# Patient Record
Sex: MALE | Race: WHITE | Employment: OTHER | URBAN - METROPOLITAN AREA
[De-identification: names, ages, dates, MRNs, and addresses within clinical notes are randomized per-mention and may not be internally consistent; named-entity substitution may affect disease eponyms.]

---

## 2017-01-25 ENCOUNTER — TRANSFERRED RECORDS (OUTPATIENT)
Dept: HEALTH INFORMATION MANAGEMENT | Facility: CLINIC | Age: 67
End: 2017-01-25

## 2017-02-14 LAB
CREATININE (EXTERNAL): 4.36 MG/DL (ref 0.7–1.3)
GFR ESTIMATED (EXTERNAL): 13 ML/MIN/1.73M2 (ref 60–150)
GFR ESTIMATED (IF AFRICAN AMERICAN) (EXTERNAL): 15 ML/MIN/1.73M2 (ref 60–150)
GLUCOSE (EXTERNAL): 130 MG/DL (ref 65–99)
POTASSIUM (EXTERNAL): 4.3 MMOL/L (ref 3.5–5.1)

## 2017-02-22 LAB
CREATININE (EXTERNAL): 3.67 MG/DL (ref 0.7–1.3)
GFR ESTIMATED (EXTERNAL): 16 ML/MIN/1.73M2 (ref 60–150)
GFR ESTIMATED (IF AFRICAN AMERICAN) (EXTERNAL): 19 ML/MIN/1.73M2 (ref 60–150)
GLUCOSE (EXTERNAL): 135 MG/DL (ref 65–99)
POTASSIUM (EXTERNAL): 4.9 MMOL/L (ref 3.5–5.1)

## 2017-03-15 ENCOUNTER — TRANSFERRED RECORDS (OUTPATIENT)
Dept: HEALTH INFORMATION MANAGEMENT | Facility: CLINIC | Age: 67
End: 2017-03-15

## 2017-03-24 ENCOUNTER — TRANSFERRED RECORDS (OUTPATIENT)
Dept: HEALTH INFORMATION MANAGEMENT | Facility: CLINIC | Age: 67
End: 2017-03-24

## 2017-10-11 LAB
CREATININE (EXTERNAL): 4.92 MG/DL (ref 0.7–1.3)
GFR ESTIMATED (EXTERNAL): 12 ML/MIN
GLUCOSE (EXTERNAL): 125 MG/DL (ref 70–99)
POTASSIUM (EXTERNAL): 4.8 MMOL/L (ref 3.5–5.1)

## 2017-12-04 ENCOUNTER — TRANSFERRED RECORDS (OUTPATIENT)
Dept: HEALTH INFORMATION MANAGEMENT | Facility: CLINIC | Age: 67
End: 2017-12-04
Payer: MEDICARE

## 2017-12-04 LAB
CREATININE (EXTERNAL): 6.43 MG/DL (ref 0.7–1.3)
GFR ESTIMATED (EXTERNAL): 8 ML/MIN
GFR ESTIMATED (IF AFRICAN AMERICAN) (EXTERNAL): 1.16 ML/MIN
GLUCOSE (EXTERNAL): 132 MG/DL (ref 70–99)
POTASSIUM (EXTERNAL): 5 MMOL/L (ref 3.5–5)

## 2017-12-08 ENCOUNTER — TRANSFERRED RECORDS (OUTPATIENT)
Dept: HEALTH INFORMATION MANAGEMENT | Facility: CLINIC | Age: 67
End: 2017-12-08
Payer: MEDICARE

## 2017-12-29 LAB — INR (EXTERNAL): 1.2

## 2018-01-18 ENCOUNTER — TRANSFERRED RECORDS (OUTPATIENT)
Dept: HEALTH INFORMATION MANAGEMENT | Facility: CLINIC | Age: 68
End: 2018-01-18
Payer: MEDICARE

## 2018-01-18 LAB
ALT SERPL-CCNC: 22 U/L (ref 7–52)
AST SERPL-CCNC: 25 U/L (ref 13–39)
CHOLESTEROL (EXTERNAL): 187 MG/DL (ref 0–200)
CREATININE (EXTERNAL): 5.38 MG/DL (ref 0.7–1.3)
GFR ESTIMATED (EXTERNAL): 10 ML/MIN/1.73M2 (ref 60–150)
GFR ESTIMATED (IF AFRICAN AMERICAN) (EXTERNAL): 12 ML/MIN/1.73M2 (ref 60–150)
GLUCOSE (EXTERNAL): 152 MG/DL (ref 65–99)
HBA1C MFR BLD: 6.8 % (ref 4.3–5.6)
HDLC SERPL-MCNC: 44 MG/DL (ref 23–92)
POTASSIUM (EXTERNAL): 4.9 MMOL/L (ref 3.5–5.1)
TRIGLYCERIDES (EXTERNAL): 410 MG/DL (ref 0–150)

## 2018-06-07 ENCOUNTER — TRANSFERRED RECORDS (OUTPATIENT)
Dept: HEALTH INFORMATION MANAGEMENT | Facility: CLINIC | Age: 68
End: 2018-06-07
Payer: MEDICARE

## 2018-07-19 ENCOUNTER — TRANSFERRED RECORDS (OUTPATIENT)
Dept: HEALTH INFORMATION MANAGEMENT | Facility: CLINIC | Age: 68
End: 2018-07-19
Payer: MEDICARE

## 2018-07-19 LAB — HBA1C MFR BLD: 6 % (ref 4.3–5.6)

## 2018-09-10 ENCOUNTER — TRANSFERRED RECORDS (OUTPATIENT)
Dept: HEALTH INFORMATION MANAGEMENT | Facility: CLINIC | Age: 68
End: 2018-09-10
Payer: MEDICARE

## 2018-10-04 ENCOUNTER — TRANSFERRED RECORDS (OUTPATIENT)
Dept: HEALTH INFORMATION MANAGEMENT | Facility: CLINIC | Age: 68
End: 2018-10-04

## 2018-10-10 ENCOUNTER — MEDICAL CORRESPONDENCE (OUTPATIENT)
Dept: HEALTH INFORMATION MANAGEMENT | Facility: CLINIC | Age: 68
End: 2018-10-10
Payer: MEDICARE

## 2019-01-01 ENCOUNTER — TRANSFERRED RECORDS (OUTPATIENT)
Dept: MULTI SPECIALTY CLINIC | Facility: CLINIC | Age: 69
End: 2019-01-01

## 2019-01-15 ENCOUNTER — TRANSFERRED RECORDS (OUTPATIENT)
Dept: HEALTH INFORMATION MANAGEMENT | Facility: CLINIC | Age: 69
End: 2019-01-15
Payer: MEDICARE

## 2019-01-15 LAB
ALT SERPL-CCNC: 16 U/L (ref 7–52)
AST SERPL-CCNC: 20 U/L (ref 13–39)
CHOLESTEROL (EXTERNAL): 185 MG/DL (ref 0–200)
CREATININE (EXTERNAL): 7.09 MG/DL (ref 0.7–1.3)
GFR ESTIMATED (EXTERNAL): 7 ML/MIN/1.73M2 (ref 60–150)
GLUCOSE (EXTERNAL): 82 MG/DL (ref 65–99)
HDLC SERPL-MCNC: 52 MG/DL (ref 23–92)
LDL CHOLESTEROL (EXTERNAL): 92 MG/DL (ref 0–130)
POTASSIUM (EXTERNAL): 4.7 MMOL/L (ref 3.5–5.1)
TRIGLYCERIDES (EXTERNAL): 205 MG/DL (ref 0–150)

## 2019-01-22 ENCOUNTER — TRANSFERRED RECORDS (OUTPATIENT)
Dept: HEALTH INFORMATION MANAGEMENT | Facility: CLINIC | Age: 69
End: 2019-01-22
Payer: MEDICARE

## 2019-01-24 ENCOUNTER — TRANSFERRED RECORDS (OUTPATIENT)
Dept: HEALTH INFORMATION MANAGEMENT | Facility: CLINIC | Age: 69
End: 2019-01-24

## 2019-03-26 ENCOUNTER — TRANSFERRED RECORDS (OUTPATIENT)
Dept: HEALTH INFORMATION MANAGEMENT | Facility: CLINIC | Age: 69
End: 2019-03-26
Payer: MEDICARE

## 2019-05-13 ENCOUNTER — TRANSFERRED RECORDS (OUTPATIENT)
Dept: HEALTH INFORMATION MANAGEMENT | Facility: CLINIC | Age: 69
End: 2019-05-13
Payer: MEDICARE

## 2019-05-29 ENCOUNTER — TRANSFERRED RECORDS (OUTPATIENT)
Dept: HEALTH INFORMATION MANAGEMENT | Facility: CLINIC | Age: 69
End: 2019-05-29

## 2019-07-25 NOTE — PROGRESS NOTES
Subjective     Leif Ariza is a 68 year old male who presents to clinic today for the following health issues:    HPI   New Patient/Transfer of Care    Leif is a 68 y.o male who presents to the office to establish care.  He recently moved to [place] from [place] with his wife.  He has brought a detailed list of his current medications and plans to request records from previous providers.  He has multiple chronic health conditions.      CKD 5-- currently requires dialysis MWF at [facility].  He is followed by Nephrology (Dr. Irby).  States he started dialysis 12/31/2017.      T2DM-- dx 12-15 years ago.  Reports most recent A1C 4.1.  Compliant with Repaglinide before meals.  Does not check blood sugars at home.  States he was previously on an injectable insulin, lost 90 lbs over the past 18 months and was switched to oral antidiabetic.  Does not follow a diabetic diet.  Does suffer with neuropathy in lower extremities. Reports Retinal Eye Scan completed at [facility] 1/2019.     HTN-- Reports blood pressures stable at dialysis 3 x per week. He does not monitor BPS at home.  States he has been moving multiple boxes into his new home and has had 3 spells of lightheadedness.  Reports spells seems to occur with position changes and resolve as soon as his sits down.  States it has been very hot outside when unloading boxes.  Denies chest pain.  Reports chronic exertional shortness of breath.  Follows a low sodium diet    HLD-- denies myalgias with Atorvastatin and Fenofibrate.      H/o Atrial Fibrillation s/p cardioversion-- Denies chest pain or palpitations.  Is requesting referral to new cardiologist at [facility].  Was previously followed by Cardiology.  Needs refills on meds.     Chronic left knee pain-- reports previous surgery on left knee in the 1970's.  States he is needing a knee replacement and would like to see Ortho for further evaluation.  Ambulates with a cane.       Hyperuricemia-- H/o frequent  gout flare-ups-- stable with Allopurinol.      DORI-- compliant with CPAP use. Sleeping well at night.     Patient Active Problem List   Diagnosis     Essential hypertension     Mixed hyperlipidemia     CKD (chronic kidney disease) stage 5, GFR less than 15 ml/min (H)     ESRD (end stage renal disease) on dialysis (H)     Type 2 diabetes mellitus, without long-term current use of insulin (H)     Atrial fibrillation status post cardioversion (H)     Hyperuricemia     DORI on CPAP     Morbid obesity (H)     Past Surgical History:   Procedure Laterality Date     APPENDECTOMY         Social History     Tobacco Use     Smoking status: Former Smoker     Smokeless tobacco: Never Used   Substance Use Topics     Alcohol use: Never     Frequency: Never     History reviewed. No pertinent family history.      Current Outpatient Medications   Medication Sig Dispense Refill     allopurinol (ZYLOPRIM) 300 MG tablet Take 2 tablets (600 mg) by mouth daily 180 tablet 3     amLODIPine (NORVASC) 5 MG tablet Take 1 tablet (5 mg) by mouth daily 90 tablet 3     atorvastatin (LIPITOR) 20 MG tablet Take 1 tablet (20 mg) by mouth daily 90 tablet 3     calcium acetate (PHOSLO) 667 MG CAPS capsule Take 4 capsule 3 times a day       ELIQUIS 2.5 MG tablet Take 2.5 mg by mouth 2 times daily        fenofibrate (TRICOR) 145 MG tablet Take 1 tablet (145 mg) by mouth daily 90 tablet 3     furosemide (LASIX) 80 MG tablet Take 80 mg by mouth daily        Misc Natural Products (OSTEO BI-FLEX TRIPLE STRENGTH) TABS Take 3 tablets by mouth 2 times daily       order for DME Equipment being ordered: Digital home blood pressure monitor kit 1 kit 0     propafenone (RYTHMOL) 300 MG tablet Take 1 tablet (300 mg) by mouth 2 times daily 180 tablet 0     repaglinide (PRANDIN) 1 MG tablet Take 1 tablet (1 mg) by mouth 3 times daily (before meals) 270 tablet 3     SODIUM BICARBONATE PO Take 10 g by mouth daily       Allergies   Allergen Reactions     Penicillins   "      Reviewed and updated as needed this visit by Provider  Tobacco  Allergies  Meds  Problems  Med Hx  Surg Hx  Fam Hx         Review of Systems   Constitutional: Negative for chills, fever and unexpected weight change.   HENT: Negative for congestion, ear pain, hearing loss, sore throat and trouble swallowing.    Eyes: Negative for pain and visual disturbance.   Respiratory: Negative for cough and shortness of breath.    Cardiovascular: Negative for chest pain, palpitations and peripheral edema.   Gastrointestinal: Negative for abdominal pain, constipation, diarrhea, heartburn, hematochezia and nausea.   Genitourinary: Negative for discharge, dysuria, frequency, genital sores, hematuria, impotence and urgency.   Musculoskeletal: Positive for arthralgias. Negative for joint swelling and myalgias.   Skin: Negative for rash.   Neurological: Positive for weakness, light-headedness and numbness. Negative for dizziness, headaches and paresthesias.   Psychiatric/Behavioral: Negative for mood changes and sleep disturbance. The patient is not nervous/anxious.             Objective    /68   Pulse 95   Temp 98.6  F (37  C) (Oral)   Ht 1.803 m (5' 11\")   Wt 139.3 kg (307 lb 3.2 oz)   SpO2 97%   BMI 42.85 kg/m    Body mass index is 42.85 kg/m .  Physical Exam   Constitutional: He is oriented to person, place, and time. Vital signs are normal. He appears well-developed and well-nourished.   HENT:   Head: Normocephalic.   Right Ear: Tympanic membrane normal.   Left Ear: Tympanic membrane normal.   Nose: Nose normal.   Mouth/Throat: Uvula is midline, oropharynx is clear and moist and mucous membranes are normal.   Eyes: Pupils are equal, round, and reactive to light. Conjunctivae and lids are normal.   Neck: Normal range of motion. Neck supple. No thyromegaly present.   Cardiovascular: Normal rate, regular rhythm and normal heart sounds.   Pulmonary/Chest: Effort normal and breath sounds normal.   Abdominal: " Soft. Bowel sounds are normal. He exhibits no distension. There is no tenderness.   Obese abdomen   Musculoskeletal: He exhibits tenderness.        Left knee: He exhibits decreased range of motion. Tenderness found.   Feet:   Right Foot:   Protective Sensation: 10 sites tested. 4 sites sensed.   Skin Integrity: Positive for dry skin.   Left Foot:   Protective Sensation: 10 sites tested. 2 sites sensed.   Skin Integrity: Positive for dry skin.   Lymphadenopathy:     He has no cervical adenopathy.   Neurological: He is alert and oriented to person, place, and time.   Skin: Skin is warm, dry and intact.   Psychiatric: He has a normal mood and affect. His speech is normal and behavior is normal. Thought content normal.   Vitals reviewed.               Assessment & Plan     1. Essential hypertension  - advised to check blood pressure if lightheaded spells occur at in the future and keep log.   - CBC with platelets; Future  - Comprehensive metabolic panel; Future  - TSH with free T4 reflex; Future  - amLODIPine (NORVASC) 5 MG tablet; Take 1 tablet (5 mg) by mouth daily  Dispense: 90 tablet; Refill: 3  - order for DME; Equipment being ordered: Digital home blood pressure monitor kit  Dispense: 1 kit; Refill: 0    2. Mixed hyperlipidemia  - Lipid panel reflex to direct LDL Fasting; Future  - fenofibrate (TRICOR) 145 MG tablet; Take 1 tablet (145 mg) by mouth daily  Dispense: 90 tablet; Refill: 3  - atorvastatin (LIPITOR) 20 MG tablet; Take 1 tablet (20 mg) by mouth daily  Dispense: 90 tablet; Refill: 3    3. CKD (chronic kidney disease) stage 5, GFR less than 15 ml/min (H)  - followed by Nephrology (Dr. Irby)  - CBC with platelets; Future  - Comprehensive metabolic panel; Future    4. ESRD (end stage renal disease) on dialysis (H)  - Followed by Nephrology (Dr. Irby). Dialysis MWF  - CBC with platelets; Future  - Comprehensive metabolic panel; Future    5. Type 2 diabetes mellitus with chronic kidney disease on chronic  "dialysis, without long-term current use of insulin (H)  - Hemoglobin A1c; Future  - Albumin Random Urine Quantitative with Creat Ratio; Future  - repaglinide (PRANDIN) 1 MG tablet; Take 1 tablet (1 mg) by mouth 3 times daily (before meals)  Dispense: 270 tablet; Refill: 3    6. Atrial fibrillation status post cardioversion (H)  - Pt requesting referral to Cardiologist at [facility]- he will call back with the providers name so the referral can be placed.  - he denies need for Eliquis refill at this time.   - TSH with free T4 reflex; Future  - *UA reflex to Microscopic; Future  - propafenone (RYTHMOL) 300 MG tablet; Take 1 tablet (300 mg) by mouth 2 times daily  Dispense: 180 tablet; Refill: 0  - order for DME; Equipment being ordered: Digital home blood pressure monitor kit  Dispense: 1 kit; Refill: 0    7. Lightheadedness  - advised to check blood pressure if occurs in the future.  All episodes have been related to quick position changes when moving.   - order for DME; Equipment being ordered: Digital home blood pressure monitor kit  Dispense: 1 kit; Refill: 0    8. Hyperuricemia  - Uric acid; Future  - allopurinol (ZYLOPRIM) 300 MG tablet; Take 2 tablets (600 mg) by mouth daily  Dispense: 180 tablet; Refill: 3    9. Chronic pain of left knee  - ORTHO  REFERRAL    10. DORI on CPAP    11. Morbid obesity (H)  - Discussed diet modifications.  He is working on smaller portions and healthier food options.  He has lost 90 lbs in the past 18 months.         BMI:   Estimated body mass index is 42.85 kg/m  as calculated from the following:    Height as of this encounter: 1.803 m (5' 11\").    Weight as of this encounter: 139.3 kg (307 lb 3.2 oz).   Weight management plan: Discussed healthy diet and exercise guidelines        Return in about 4 weeks (around 8/23/2019) for lab review and lightheadedness.    Nguyen Torres NP  [facility]    "

## 2019-07-26 ENCOUNTER — OFFICE VISIT (OUTPATIENT)
Dept: FAMILY MEDICINE | Facility: CLINIC | Age: 69
End: 2019-07-26
Payer: MEDICARE

## 2019-07-26 VITALS
OXYGEN SATURATION: 97 % | SYSTOLIC BLOOD PRESSURE: 123 MMHG | TEMPERATURE: 98.6 F | DIASTOLIC BLOOD PRESSURE: 68 MMHG | HEIGHT: 71 IN | HEART RATE: 95 BPM | BODY MASS INDEX: 43.01 KG/M2 | WEIGHT: 307.2 LBS

## 2019-07-26 DIAGNOSIS — R42 LIGHTHEADEDNESS: ICD-10-CM

## 2019-07-26 DIAGNOSIS — N18.6 TYPE 2 DIABETES MELLITUS WITH CHRONIC KIDNEY DISEASE ON CHRONIC DIALYSIS, WITHOUT LONG-TERM CURRENT USE OF INSULIN (H): ICD-10-CM

## 2019-07-26 DIAGNOSIS — N18.6 ESRD (END STAGE RENAL DISEASE) ON DIALYSIS (H): ICD-10-CM

## 2019-07-26 DIAGNOSIS — E78.2 MIXED HYPERLIPIDEMIA: ICD-10-CM

## 2019-07-26 DIAGNOSIS — Z99.2 TYPE 2 DIABETES MELLITUS WITH CHRONIC KIDNEY DISEASE ON CHRONIC DIALYSIS, WITHOUT LONG-TERM CURRENT USE OF INSULIN (H): ICD-10-CM

## 2019-07-26 DIAGNOSIS — E11.22 TYPE 2 DIABETES MELLITUS WITH CHRONIC KIDNEY DISEASE ON CHRONIC DIALYSIS, WITHOUT LONG-TERM CURRENT USE OF INSULIN (H): ICD-10-CM

## 2019-07-26 DIAGNOSIS — I10 ESSENTIAL HYPERTENSION: Primary | ICD-10-CM

## 2019-07-26 DIAGNOSIS — E79.0 HYPERURICEMIA: ICD-10-CM

## 2019-07-26 DIAGNOSIS — Z99.2 ESRD (END STAGE RENAL DISEASE) ON DIALYSIS (H): ICD-10-CM

## 2019-07-26 DIAGNOSIS — E66.01 MORBID OBESITY (H): ICD-10-CM

## 2019-07-26 DIAGNOSIS — N18.5 CKD (CHRONIC KIDNEY DISEASE) STAGE 5, GFR LESS THAN 15 ML/MIN (H): ICD-10-CM

## 2019-07-26 DIAGNOSIS — M25.562 CHRONIC PAIN OF LEFT KNEE: ICD-10-CM

## 2019-07-26 DIAGNOSIS — G47.33 OSA ON CPAP: ICD-10-CM

## 2019-07-26 DIAGNOSIS — G89.29 CHRONIC PAIN OF LEFT KNEE: ICD-10-CM

## 2019-07-26 DIAGNOSIS — I48.91 ATRIAL FIBRILLATION STATUS POST CARDIOVERSION (H): ICD-10-CM

## 2019-07-26 PROBLEM — E11.9 TYPE 2 DIABETES MELLITUS, WITHOUT LONG-TERM CURRENT USE OF INSULIN (H): Status: ACTIVE | Noted: 2019-07-26

## 2019-07-26 PROCEDURE — 99205 OFFICE O/P NEW HI 60 MIN: CPT | Performed by: NURSE PRACTITIONER

## 2019-07-26 RX ORDER — ATORVASTATIN CALCIUM 20 MG/1
20 TABLET, FILM COATED ORAL DAILY
COMMUNITY
Start: 2019-05-20 | End: 2019-07-26

## 2019-07-26 RX ORDER — ALLOPURINOL 300 MG/1
600 TABLET ORAL DAILY
Qty: 180 TABLET | Refills: 3 | Status: SHIPPED | OUTPATIENT
Start: 2019-07-26 | End: 2020-10-02

## 2019-07-26 RX ORDER — PROPAFENONE HYDROCHLORIDE 300 MG/1
300 TABLET, COATED ORAL 2 TIMES DAILY
COMMUNITY
Start: 2018-09-13 | End: 2019-07-26

## 2019-07-26 RX ORDER — REPAGLINIDE 1 MG/1
1 TABLET ORAL
COMMUNITY
Start: 2019-05-20 | End: 2019-07-26

## 2019-07-26 RX ORDER — FENOFIBRATE 145 MG/1
145 TABLET, COATED ORAL DAILY
COMMUNITY
Start: 2018-09-13 | End: 2019-07-26

## 2019-07-26 RX ORDER — ALLOPURINOL 300 MG/1
600 TABLET ORAL DAILY
COMMUNITY
Start: 2019-05-20 | End: 2019-07-26

## 2019-07-26 RX ORDER — AMLODIPINE BESYLATE 10 MG/1
TABLET ORAL
COMMUNITY
Start: 2018-12-21 | End: 2019-07-26 | Stop reason: DRUGHIGH

## 2019-07-26 RX ORDER — CALCIUM ACETATE 667 MG/1
CAPSULE ORAL
COMMUNITY
Start: 2019-05-20 | End: 2020-09-21

## 2019-07-26 RX ORDER — FUROSEMIDE 80 MG
80 TABLET ORAL DAILY
COMMUNITY
Start: 2018-12-21 | End: 2019-11-08

## 2019-07-26 RX ORDER — REPAGLINIDE 1 MG/1
1 TABLET ORAL
Qty: 270 TABLET | Refills: 3 | Status: SHIPPED | OUTPATIENT
Start: 2019-07-26 | End: 2019-09-12

## 2019-07-26 RX ORDER — ATORVASTATIN CALCIUM 20 MG/1
20 TABLET, FILM COATED ORAL DAILY
Qty: 90 TABLET | Refills: 3 | Status: SHIPPED | OUTPATIENT
Start: 2019-07-26 | End: 2020-05-27

## 2019-07-26 RX ORDER — PROPAFENONE HYDROCHLORIDE 300 MG/1
300 TABLET, COATED ORAL 2 TIMES DAILY
Qty: 180 TABLET | Refills: 0 | Status: SHIPPED | OUTPATIENT
Start: 2019-07-26 | End: 2020-01-29

## 2019-07-26 RX ORDER — AMLODIPINE BESYLATE 5 MG/1
5 TABLET ORAL DAILY
COMMUNITY
End: 2019-07-26

## 2019-07-26 RX ORDER — AMLODIPINE BESYLATE 5 MG/1
5 TABLET ORAL DAILY
Qty: 90 TABLET | Refills: 3 | Status: SHIPPED | OUTPATIENT
Start: 2019-07-26 | End: 2019-08-22

## 2019-07-26 RX ORDER — APIXABAN 2.5 MG/1
2.5 TABLET, FILM COATED ORAL 2 TIMES DAILY
COMMUNITY
Start: 2018-09-21 | End: 2019-09-12

## 2019-07-26 RX ORDER — FENOFIBRATE 145 MG/1
145 TABLET, COATED ORAL DAILY
Qty: 90 TABLET | Refills: 3 | Status: SHIPPED | OUTPATIENT
Start: 2019-07-26 | End: 2020-09-28

## 2019-07-26 SDOH — HEALTH STABILITY: MENTAL HEALTH: HOW OFTEN DO YOU HAVE A DRINK CONTAINING ALCOHOL?: NEVER

## 2019-07-26 ASSESSMENT — ENCOUNTER SYMPTOMS
DIZZINESS: 0
UNEXPECTED WEIGHT CHANGE: 0
NUMBNESS: 1
ARTHRALGIAS: 1
SORE THROAT: 0
HEMATURIA: 0
TROUBLE SWALLOWING: 0
LIGHT-HEADEDNESS: 1
CHILLS: 0
DIARRHEA: 0
SHORTNESS OF BREATH: 0
HEARTBURN: 0
ABDOMINAL PAIN: 0
EYE PAIN: 0
JOINT SWELLING: 0
HEADACHES: 0
HEMATOCHEZIA: 0
PALPITATIONS: 0
FREQUENCY: 0
COUGH: 0
DYSURIA: 0
PARESTHESIAS: 0
WEAKNESS: 1
SLEEP DISTURBANCE: 0
NERVOUS/ANXIOUS: 0
FEVER: 0
NAUSEA: 0
CONSTIPATION: 0
MYALGIAS: 0

## 2019-07-26 ASSESSMENT — MIFFLIN-ST. JEOR: SCORE: 2195.58

## 2019-07-26 NOTE — PATIENT INSTRUCTIONS
Akil Ariza,    Thank you for allowing [facility] to manage your care.    I ordered some blood work - please go to the laboratory to get your laboratory studies.  Please call [phone] to schedule your future laboratory appointment.     I sent your prescriptions to your pharmacy.    I made a referral, please call to scheduled/they will be calling you in 1-2 weeks to set up your appointment.  If you do not hear from them, please call the specialty number on your after visit or call our office.    Please allow 1-2 business days for our office to call you in regards to your laboratory/radiological studies.  If not done so, I encourage you to login into Mychart [email or web address] to review your results as well.     If you have any questions or concerns, please feel free to call us at [phone].    Sincerely,      JOYA Henley

## 2019-07-28 DIAGNOSIS — I48.91 ATRIAL FIBRILLATION STATUS POST CARDIOVERSION (H): ICD-10-CM

## 2019-07-28 DIAGNOSIS — Z99.2 ESRD (END STAGE RENAL DISEASE) ON DIALYSIS (H): ICD-10-CM

## 2019-07-28 DIAGNOSIS — E79.0 HYPERURICEMIA: ICD-10-CM

## 2019-07-28 DIAGNOSIS — E78.2 MIXED HYPERLIPIDEMIA: ICD-10-CM

## 2019-07-28 DIAGNOSIS — E11.22 TYPE 2 DIABETES MELLITUS WITH CHRONIC KIDNEY DISEASE ON CHRONIC DIALYSIS, WITHOUT LONG-TERM CURRENT USE OF INSULIN (H): ICD-10-CM

## 2019-07-28 DIAGNOSIS — N18.6 ESRD (END STAGE RENAL DISEASE) ON DIALYSIS (H): ICD-10-CM

## 2019-07-28 DIAGNOSIS — N18.6 TYPE 2 DIABETES MELLITUS WITH CHRONIC KIDNEY DISEASE ON CHRONIC DIALYSIS, WITHOUT LONG-TERM CURRENT USE OF INSULIN (H): ICD-10-CM

## 2019-07-28 DIAGNOSIS — N18.5 CKD (CHRONIC KIDNEY DISEASE) STAGE 5, GFR LESS THAN 15 ML/MIN (H): ICD-10-CM

## 2019-07-28 DIAGNOSIS — I10 ESSENTIAL HYPERTENSION: ICD-10-CM

## 2019-07-28 DIAGNOSIS — Z99.2 TYPE 2 DIABETES MELLITUS WITH CHRONIC KIDNEY DISEASE ON CHRONIC DIALYSIS, WITHOUT LONG-TERM CURRENT USE OF INSULIN (H): ICD-10-CM

## 2019-07-28 LAB
ALBUMIN UR-MCNC: 100 MG/DL
APPEARANCE UR: ABNORMAL
BACTERIA #/AREA URNS HPF: ABNORMAL /HPF
BILIRUB UR QL STRIP: NEGATIVE
COLOR UR AUTO: YELLOW
ERYTHROCYTE [DISTWIDTH] IN BLOOD BY AUTOMATED COUNT: 13.7 % (ref 10–15)
GLUCOSE UR STRIP-MCNC: NEGATIVE MG/DL
HBA1C MFR BLD: 5.4 % (ref 0–5.6)
HCT VFR BLD AUTO: 40.9 % (ref 40–53)
HGB BLD-MCNC: 13.2 G/DL (ref 13.3–17.7)
HGB UR QL STRIP: ABNORMAL
KETONES UR STRIP-MCNC: NEGATIVE MG/DL
LEUKOCYTE ESTERASE UR QL STRIP: ABNORMAL
MCH RBC QN AUTO: 33.5 PG (ref 26.5–33)
MCHC RBC AUTO-ENTMCNC: 32.3 G/DL (ref 31.5–36.5)
MCV RBC AUTO: 104 FL (ref 78–100)
NITRATE UR QL: NEGATIVE
NON-SQ EPI CELLS #/AREA URNS LPF: ABNORMAL /LPF
PH UR STRIP: 8.5 PH (ref 5–7)
PLATELET # BLD AUTO: 244 10E9/L (ref 150–450)
RBC # BLD AUTO: 3.94 10E12/L (ref 4.4–5.9)
RBC #/AREA URNS AUTO: ABNORMAL /HPF
SOURCE: ABNORMAL
SP GR UR STRIP: 1.01 (ref 1–1.03)
UROBILINOGEN UR STRIP-ACNC: 0.2 EU/DL (ref 0.2–1)
WBC # BLD AUTO: 7.5 10E9/L (ref 4–11)
WBC #/AREA URNS AUTO: ABNORMAL /HPF

## 2019-07-28 PROCEDURE — 80053 COMPREHEN METABOLIC PANEL: CPT | Performed by: NURSE PRACTITIONER

## 2019-07-28 PROCEDURE — 85027 COMPLETE CBC AUTOMATED: CPT | Performed by: NURSE PRACTITIONER

## 2019-07-28 PROCEDURE — 80061 LIPID PANEL: CPT | Performed by: NURSE PRACTITIONER

## 2019-07-28 PROCEDURE — 84439 ASSAY OF FREE THYROXINE: CPT | Performed by: NURSE PRACTITIONER

## 2019-07-28 PROCEDURE — 84550 ASSAY OF BLOOD/URIC ACID: CPT | Performed by: NURSE PRACTITIONER

## 2019-07-28 PROCEDURE — 81001 URINALYSIS AUTO W/SCOPE: CPT | Performed by: NURSE PRACTITIONER

## 2019-07-28 PROCEDURE — 84443 ASSAY THYROID STIM HORMONE: CPT | Performed by: NURSE PRACTITIONER

## 2019-07-28 PROCEDURE — 83036 HEMOGLOBIN GLYCOSYLATED A1C: CPT | Performed by: NURSE PRACTITIONER

## 2019-07-28 PROCEDURE — 36415 COLL VENOUS BLD VENIPUNCTURE: CPT | Performed by: NURSE PRACTITIONER

## 2019-07-28 PROCEDURE — 82043 UR ALBUMIN QUANTITATIVE: CPT | Performed by: NURSE PRACTITIONER

## 2019-07-29 ENCOUNTER — TELEPHONE (OUTPATIENT)
Dept: FAMILY MEDICINE | Facility: CLINIC | Age: 69
End: 2019-07-29

## 2019-07-29 LAB
ALBUMIN SERPL-MCNC: 3.6 G/DL (ref 3.4–5)
ALP SERPL-CCNC: 59 U/L (ref 40–150)
ALT SERPL W P-5'-P-CCNC: 21 U/L (ref 0–70)
ANION GAP SERPL CALCULATED.3IONS-SCNC: 12 MMOL/L (ref 3–14)
AST SERPL W P-5'-P-CCNC: 22 U/L (ref 0–45)
BILIRUB SERPL-MCNC: 0.5 MG/DL (ref 0.2–1.3)
BUN SERPL-MCNC: 45 MG/DL (ref 7–30)
CALCIUM SERPL-MCNC: 9.5 MG/DL (ref 8.5–10.1)
CHLORIDE SERPL-SCNC: 91 MMOL/L (ref 94–109)
CHOLEST SERPL-MCNC: 151 MG/DL
CO2 SERPL-SCNC: 30 MMOL/L (ref 20–32)
CREAT SERPL-MCNC: 9.16 MG/DL (ref 0.66–1.25)
CREAT UR-MCNC: 63 MG/DL
GFR SERPL CREATININE-BSD FRML MDRD: 5 ML/MIN/{1.73_M2}
GLUCOSE SERPL-MCNC: 121 MG/DL (ref 70–99)
HDLC SERPL-MCNC: 41 MG/DL
LDLC SERPL CALC-MCNC: 57 MG/DL
MICROALBUMIN UR-MCNC: 1040 MG/L
MICROALBUMIN/CREAT UR: 1640.38 MG/G CR (ref 0–17)
NONHDLC SERPL-MCNC: 110 MG/DL
POTASSIUM SERPL-SCNC: 4 MMOL/L (ref 3.4–5.3)
PROT SERPL-MCNC: 8.4 G/DL (ref 6.8–8.8)
SODIUM SERPL-SCNC: 133 MMOL/L (ref 133–144)
T4 FREE SERPL-MCNC: 0.77 NG/DL (ref 0.76–1.46)
TRIGL SERPL-MCNC: 267 MG/DL
TSH SERPL DL<=0.005 MIU/L-ACNC: 4.31 MU/L (ref 0.4–4)
URATE SERPL-MCNC: 3.6 MG/DL (ref 3.5–7.2)

## 2019-07-29 NOTE — TELEPHONE ENCOUNTER
According to Nguyen Torres, NP's office notes last week, patient is getting dialysis 3 times weekly:    CKD 5-- currently requires dialysis MWF at [facility].  He is followed by Nephrology (Dr. Irby).  States he started dialysis 12/31/2017.      Left message on voicemail for patient to call back.     Direct number given: [phone].  Daja PACHECON, RN

## 2019-07-29 NOTE — TELEPHONE ENCOUNTER
Lab called with a critical lab value of creatinine:  9.16.    Patient has CKD 5 and is on dialysis.  Routing to covering providers to address.    Daja PACHECON, RN

## 2019-07-30 NOTE — TELEPHONE ENCOUNTER
I called patient this morning and reached him.     He said that he can see his lab results on his MyChart. He is doing dialysis 3 days a week and has never missed an appointment.  His next appointment is tomorrow and he will bring his lab results with to show the dialysis center.     Routing to Nguyen Torres NP as ZEYNEP.    Daja PACHECON, RN

## 2019-07-31 ENCOUNTER — MYC MEDICAL ADVICE (OUTPATIENT)
Dept: FAMILY MEDICINE | Facility: CLINIC | Age: 69
End: 2019-07-31

## 2019-07-31 NOTE — PROGRESS NOTES
SUBJECTIVE:   Leif Ariza is a 68 year old male who is seen in consultation at the request of Dr. Torres for evaluation of left knee pain that has been present chronically. No known injury recently.  Reports previous surgery on left knee in the 1970's.  States he is needing a knee replacement  He was told his BMI was too high for TOTAL KNEE ARTHROPLASTY.  He has lost 82 pounds since then.  Present symptoms: pain to walk. Can't do stair normally.  Can't lift leg up get shoe on or step over thresholds, shower.  Pain at night.     Treatments tried to this point: Corticosteroid injections, multiple in the past. Help for a while, but less effective.  Last injection was early May.     Orthopedic PMH: open menisectomy in the 70's on right knee, thinks his knee is ACL deficient.    Review of Systems:  Constitutional:  NEGATIVE for fever, chills, change in weight  Integumentary/Skin:  NEGATIVE for worrisome rashes, moles or lesions  Eyes:  NEGATIVE for vision changes or irritation  ENT/Mouth:  NEGATIVE for ear, mouth and throat problems  Resp:  NEGATIVE for significant cough or SOB  Breast:  NEGATIVE for masses, tenderness or discharge  CV:  NEGATIVE for chest pain, palpitations or peripheral edema  GI:  NEGATIVE for nausea, abdominal pain, heartburn, or change in bowel habits  :  Negative   Musculoskeletal:  See HPI above  Neuro:  NEGATIVE for weakness, dizziness or paresthesias  Endocrine:  NEGATIVE for temperature intolerance, skin/hair changes  Heme/allergy/immune:  NEGATIVE for bleeding problems  Psychiatric:  NEGATIVE for changes in mood or affect    Past Medical History:   Past Medical History:   Diagnosis Date     Atrial fibrillation (H)  takes Eliquis.      CKD (chronic kidney disease) stage 5, GFR less than 15 ml/min (H).  He is on Dialysis.-- 2017     Gout      HLD (hyperlipidemia)      HTN (hypertension)      Type 2 diabetes mellitus (H)      Past Surgical History:   Past Surgical History:   Procedure  Laterality Date     APPENDECTOMY       Family History: No family history on file.  Social History:   Social History     Tobacco Use     Smoking status: Former Smoker     Smokeless tobacco: Never Used   Substance Use Topics     Alcohol use: Never     Frequency: Never     OBJECTIVE:  Physical Exam:  /76 (BP Location: Right arm, Patient Position: Sitting, Cuff Size: Adult Large)   Pulse 89   Wt 136.1 kg (300 lb)   SpO2 96%   BMI 41.84 kg/m    General Appearance: healthy, alert and no distress   Skin: no suspicious lesions or rashes  Neuro: Normal strength and tone, mentation intact and speech normal  Vascular: good pulses, and cappillary refill   Lymph: no lymphadenopathy   Psych:  mentation appears normal and affect normal/bright  Resp: no increased work of breathing     Left Knee Exam:  Gait: walks with antalgic gait favoring left side, difficulty getting to exam table  Alignment: normal   Squat: not tested .    Patellofemoral joint: moderate crepitations in the patellofemoral joint.  Effusion: mild  ROM: 7-92*  Tender: medial joint line  Masses: none  Ligaments:   Varus/Valgus stress: stable to varus and valgus stress.  Pain with varus-valgus  X-rays:  Obtained today of the left knee 3-views, reviewed in the office with the patient by myself today and show severe medial degenerative disease with some early bone loss of the tibia medially.  Tricompartmental disease is seen.  Chondrocalcinosis.  Osteopenia.       ASSESSMENT:   Encounter Diagnoses   Name Primary?     Primary osteoarthritis of left knee Yes     Chronic pain of left knee         PLAN:   I suggest TOTAL KNEE ARTHROPLASTY.  Total Knee Arthroplasty: We talked about the patient's condition and diagnosis.  Because of severe arthritis, and failure of conservative measures, I have suggested total knee arthroplasty of the left knee(s).  I've talked in depth about the procedure and the risks, which include, but are not limited to blood loss requiring  transfusion, infection, neurovascular injury, DVT, PE, continued pain, (both perioperative and persistent post-recovery pain), numbness around the wound, instability, and potential anesthetic and perioperative medical complications, and the possibility of needing additional procedures. We also talked about recovery time, which differs from patient to patient.  We've encouraged attendance at the arthroplasty class at the hospital.  Medical clearance will need to be obtained.  Special considerations dialysis patient.  They can do dialysis on the morning of surgery, and not use heparin.  Then a run after discharge 2 days later.  No Heparin x one week with dialysis postoperative probably.  If he has to stay in the hospital longer then normal, that may become a problem, and may need to be done elsewhere if not possible at [facility].        ZACHERY Carr MD  Dept. Orthopedic Surgery  [facility]

## 2019-08-01 ENCOUNTER — ANCILLARY PROCEDURE (OUTPATIENT)
Dept: GENERAL RADIOLOGY | Facility: CLINIC | Age: 69
End: 2019-08-01
Attending: ORTHOPAEDIC SURGERY
Payer: MEDICARE

## 2019-08-01 ENCOUNTER — OFFICE VISIT (OUTPATIENT)
Dept: ORTHOPEDICS | Facility: CLINIC | Age: 69
End: 2019-08-01
Payer: MEDICARE

## 2019-08-01 VITALS
WEIGHT: 300 LBS | DIASTOLIC BLOOD PRESSURE: 76 MMHG | OXYGEN SATURATION: 96 % | BODY MASS INDEX: 41.84 KG/M2 | HEART RATE: 89 BPM | SYSTOLIC BLOOD PRESSURE: 129 MMHG

## 2019-08-01 DIAGNOSIS — M25.562 CHRONIC PAIN OF LEFT KNEE: ICD-10-CM

## 2019-08-01 DIAGNOSIS — G89.29 CHRONIC PAIN OF LEFT KNEE: ICD-10-CM

## 2019-08-01 DIAGNOSIS — M17.12 PRIMARY OSTEOARTHRITIS OF LEFT KNEE: Primary | ICD-10-CM

## 2019-08-01 PROCEDURE — 73562 X-RAY EXAM OF KNEE 3: CPT | Mod: LT

## 2019-08-01 PROCEDURE — 99204 OFFICE O/P NEW MOD 45 MIN: CPT | Performed by: ORTHOPAEDIC SURGERY

## 2019-08-01 RX ORDER — TRAMADOL HYDROCHLORIDE 50 MG/1
50 TABLET ORAL EVERY 6 HOURS PRN
Qty: 40 TABLET | Refills: 0 | Status: SHIPPED | OUTPATIENT
Start: 2019-08-01 | End: 2019-09-30

## 2019-08-01 ASSESSMENT — PAIN SCALES - GENERAL: PAINLEVEL: EXTREME PAIN (8)

## 2019-08-01 NOTE — LETTER
8/1/2019         RE: Leif Ariza  [address] [address] [address]        Dear Colleague,    Thank you for referring your patient, Leif Ariza, to the [facility]. Please see a copy of my visit note below.    SUBJECTIVE:   Leif Ariza is a 68 year old male who is seen in consultation at the request of Dr. Torres for evaluation of left knee pain that has been present chronically. No known injury recently.  Reports previous surgery on left knee in the 1970's.  States he is needing a knee replacement  He was told his BMI was too high for TOTAL KNEE ARTHROPLASTY.  He has lost 82 pounds since then.  Present symptoms: pain to walk. Can't do stair normally.  Can't lift leg up get shoe on or step over thresholds, shower.  Pain at night.     Treatments tried to this point: Corticosteroid injections, multiple in the past. Help for a while, but less effective.  Last injection was early May.     Orthopedic PMH: open menisectomy in the 70's on right knee, thinks his knee is ACL deficient.    Review of Systems:  Constitutional:  NEGATIVE for fever, chills, change in weight  Integumentary/Skin:  NEGATIVE for worrisome rashes, moles or lesions  Eyes:  NEGATIVE for vision changes or irritation  ENT/Mouth:  NEGATIVE for ear, mouth and throat problems  Resp:  NEGATIVE for significant cough or SOB  Breast:  NEGATIVE for masses, tenderness or discharge  CV:  NEGATIVE for chest pain, palpitations or peripheral edema  GI:  NEGATIVE for nausea, abdominal pain, heartburn, or change in bowel habits  :  Negative   Musculoskeletal:  See HPI above  Neuro:  NEGATIVE for weakness, dizziness or paresthesias  Endocrine:  NEGATIVE for temperature intolerance, skin/hair changes  Heme/allergy/immune:  NEGATIVE for bleeding problems  Psychiatric:  NEGATIVE for changes in mood or affect    Past Medical History:   Past Medical History:   Diagnosis Date     Atrial fibrillation (H)  takes Eliquis.      CKD (chronic kidney  disease) stage 5, GFR less than 15 ml/min (H).  He is on Dialysis.-- 2017     Gout      HLD (hyperlipidemia)      HTN (hypertension)      Type 2 diabetes mellitus (H)      Past Surgical History:   Past Surgical History:   Procedure Laterality Date     APPENDECTOMY       Family History: No family history on file.  Social History:   Social History     Tobacco Use     Smoking status: Former Smoker     Smokeless tobacco: Never Used   Substance Use Topics     Alcohol use: Never     Frequency: Never     OBJECTIVE:  Physical Exam:  /76 (BP Location: Right arm, Patient Position: Sitting, Cuff Size: Adult Large)   Pulse 89   Wt 136.1 kg (300 lb)   SpO2 96%   BMI 41.84 kg/m     General Appearance: healthy, alert and no distress   Skin: no suspicious lesions or rashes  Neuro: Normal strength and tone, mentation intact and speech normal  Vascular: good pulses, and cappillary refill   Lymph: no lymphadenopathy   Psych:  mentation appears normal and affect normal/bright  Resp: no increased work of breathing     Left Knee Exam:  Gait: walks with antalgic gait favoring left side, difficulty getting to exam table  Alignment: normal   Squat: not tested .    Patellofemoral joint: moderate crepitations in the patellofemoral joint.  Effusion: mild  ROM: 7-92*  Tender: medial joint line  Masses: none  Ligaments:   Varus/Valgus stress: stable to varus and valgus stress.  Pain with varus-valgus  X-rays:  Obtained today of the left knee 3-views, reviewed in the office with the patient by myself today and show severe medial degenerative disease with some early bone loss of the tibia medially.  Tricompartmental disease is seen.  Chondrocalcinosis.  Osteopenia.       ASSESSMENT:   Encounter Diagnoses   Name Primary?     Primary osteoarthritis of left knee Yes     Chronic pain of left knee         PLAN:   I suggest TOTAL KNEE ARTHROPLASTY.  Total Knee Arthroplasty: We talked about the patient's condition and diagnosis.  Because of  severe arthritis, and failure of conservative measures, I have suggested total knee arthroplasty of the left knee(s).  I've talked in depth about the procedure and the risks, which include, but are not limited to blood loss requiring transfusion, infection, neurovascular injury, DVT, PE, continued pain, (both perioperative and persistent post-recovery pain), numbness around the wound, instability, and potential anesthetic and perioperative medical complications, and the possibility of needing additional procedures. We also talked about recovery time, which differs from patient to patient.  We've encouraged attendance at the arthroplasty class at the hospital.  Medical clearance will need to be obtained.  Special considerations dialysis patient.  They can do dialysis on the morning of surgery, and not use heparin.  Then a run after discharge 2 days later.  No Heparin x one week with dialysis postoperative probably.  If he has to stay in the hospital longer then normal, that may become a problem, and may need to be done elsewhere if not possible at [facility].        ZACHERY Carr MD  Dept. Orthopedic Surgery  [facility]       Again, thank you for allowing me to participate in the care of your patient.        Sincerely,        Arvind Carr MD

## 2019-08-09 ENCOUNTER — TELEPHONE (OUTPATIENT)
Dept: ORTHOPEDICS | Facility: CLINIC | Age: 69
End: 2019-08-09

## 2019-08-14 ENCOUNTER — MEDICAL CORRESPONDENCE (OUTPATIENT)
Dept: HEALTH INFORMATION MANAGEMENT | Facility: CLINIC | Age: 69
End: 2019-08-14
Payer: MEDICARE

## 2019-08-22 ENCOUNTER — OFFICE VISIT (OUTPATIENT)
Dept: FAMILY MEDICINE | Facility: CLINIC | Age: 69
End: 2019-08-22
Payer: MEDICARE

## 2019-08-22 VITALS
HEART RATE: 90 BPM | HEIGHT: 71 IN | SYSTOLIC BLOOD PRESSURE: 102 MMHG | WEIGHT: 296 LBS | RESPIRATION RATE: 18 BRPM | OXYGEN SATURATION: 99 % | DIASTOLIC BLOOD PRESSURE: 59 MMHG | TEMPERATURE: 98.4 F | BODY MASS INDEX: 41.44 KG/M2

## 2019-08-22 DIAGNOSIS — I10 ESSENTIAL HYPERTENSION: ICD-10-CM

## 2019-08-22 DIAGNOSIS — I48.91 ATRIAL FIBRILLATION STATUS POST CARDIOVERSION (H): ICD-10-CM

## 2019-08-22 DIAGNOSIS — Z13.6 SCREENING FOR AAA (ABDOMINAL AORTIC ANEURYSM): ICD-10-CM

## 2019-08-22 DIAGNOSIS — Z00.00 MEDICARE ANNUAL WELLNESS VISIT, SUBSEQUENT: Primary | ICD-10-CM

## 2019-08-22 DIAGNOSIS — R42 LIGHTHEADEDNESS: ICD-10-CM

## 2019-08-22 DIAGNOSIS — N18.6 ESRD (END STAGE RENAL DISEASE) ON DIALYSIS (H): ICD-10-CM

## 2019-08-22 DIAGNOSIS — E11.9 COMPREHENSIVE DIABETIC FOOT EXAMINATION, TYPE 2 DM, ENCOUNTER FOR (H): ICD-10-CM

## 2019-08-22 DIAGNOSIS — Z99.2 ESRD (END STAGE RENAL DISEASE) ON DIALYSIS (H): ICD-10-CM

## 2019-08-22 PROCEDURE — 99214 OFFICE O/P EST MOD 30 MIN: CPT | Mod: 25 | Performed by: NURSE PRACTITIONER

## 2019-08-22 PROCEDURE — G0438 PPPS, INITIAL VISIT: HCPCS | Performed by: NURSE PRACTITIONER

## 2019-08-22 RX ORDER — AMLODIPINE BESYLATE 5 MG/1
2.5 TABLET ORAL DAILY
Qty: 90 TABLET | Refills: 3
Start: 2019-08-22 | End: 2019-09-12

## 2019-08-22 ASSESSMENT — ENCOUNTER SYMPTOMS
ABDOMINAL PAIN: 0
MYALGIAS: 0
PALPITATIONS: 0
SHORTNESS OF BREATH: 0
FATIGUE: 1
LIGHT-HEADEDNESS: 1
SLEEP DISTURBANCE: 0
COUGH: 0
CHILLS: 0
ARTHRALGIAS: 1
FEVER: 0
DIFFICULTY URINATING: 0
DIZZINESS: 0
NERVOUS/ANXIOUS: 0

## 2019-08-22 ASSESSMENT — MIFFLIN-ST. JEOR: SCORE: 2144.78

## 2019-08-22 NOTE — PROGRESS NOTES
SUBJECTIVE:   Leif Ariza is a 68 year old male who presents for Preventive Visit.    Are you in the first 12 months of your Medicare coverage?  No    HPI  Do you feel safe in your environment? Yes    Do you have a Health Care Directive? No: Advance care planning reviewed with patient; information given to patient to review.      Fall risk  Fallen 2 or more times in the past year?: No  Any fall with injury in the past year?: No    Cognitive Screening   1) Repeat 3 items (Leader, Season, Table)    2) Clock draw: NORMAL  3) 3 item recall: Recalls 3 objects  Results: 3 items recalled: COGNITIVE IMPAIRMENT LESS LIKELY    Mini-CogTM Copyright S Blair. Licensed by the author for use in [facility]; reprinted with permission (jing@[email or web address]). All rights reserved.      Do you have sleep apnea, excessive snoring or daytime drowsiness?: yes sleep apnea    Reviewed and updated as needed this visit by clinical staff  Tobacco  Allergies  Soc Hx        Reviewed and updated as needed this visit by Provider        Social History     Tobacco Use     Smoking status: Former Smoker     Smokeless tobacco: Never Used   Substance Use Topics     Alcohol use: Never     Frequency: Never         No flowsheet data found.    Leif is here to also follow-up on a few chronic conditions. He has ESRD on Dialysis [facility] and followed by Dr. Bee (Nephrology).  He is requesting a referral to see Cardiologist as he wants to get established here in [place] since moving from [place].  He has a h/o afib s/p ablation.  He was seen 4 weeks ago as a new pt.  At that time he reported lightheadedness when going from a sitting to standing position.  This has continued to happen.  He is not checking BP routinely at home but has it monitored when at Dialysis.  Lightheadedness is only positional.  Denies chest pain, shortness of breath, weakness, nausea, or numbness.        Current providers sharing in care for this patient include:   Patient Care  "Team:  Nguyen Torres, NP as PCP - General   Dr. Irby- Nephrologist.      The following health maintenance items are reviewed in Epic and correct as of today:  Health Maintenance   Topic Date Due     URINE DRUG SCREEN  1950     HEPATITIS C SCREENING  1950     COLONOSCOPY  12/16/1962     DTAP/TDAP/TD IMMUNIZATION (1 - Tdap) 12/16/1977     ZOSTER IMMUNIZATION (1 of 2) 12/16/2002     MEDICARE ANNUAL WELLNESS VISIT  12/16/2017     PNEUMOCOCCAL IMMUNIZATION 65+ LOW/MEDIUM RISK (1 of 2 - PCV13) 12/16/2017     AORTIC ANEURYSM SCREENING (SYSTEM ASSIGNED)  12/16/2017     INFLUENZA VACCINE (1) 09/01/2019     A1C  10/28/2019     EYE EXAM  01/15/2020     BMP  07/28/2020     LIPID  07/28/2020     MICROALBUMIN  07/28/2020     DIABETIC FOOT EXAM  08/22/2020     FALL RISK ASSESSMENT  08/22/2020     TSH W/FREE T4 REFLEX  07/28/2021     ADVANCE CARE PLANNING  08/22/2024     PHQ-2  Completed     IPV IMMUNIZATION  Aged Out     MENINGITIS IMMUNIZATION  Aged Out           Review of Systems   Constitutional: Positive for fatigue. Negative for chills and fever.   Respiratory: Negative for cough and shortness of breath.    Cardiovascular: Positive for peripheral edema. Negative for chest pain and palpitations.   Gastrointestinal: Negative for abdominal pain.   Genitourinary: Negative for difficulty urinating.   Musculoskeletal: Positive for arthralgias. Negative for myalgias.   Neurological: Positive for light-headedness. Negative for dizziness and syncope.   Psychiatric/Behavioral: Negative for sleep disturbance. The patient is not nervous/anxious.          OBJECTIVE:   /59   Pulse 90   Temp 98.4  F (36.9  C) (Oral)   Resp 18   Ht 1.803 m (5' 11\")   Wt 134.3 kg (296 lb)   SpO2 99%   BMI 41.28 kg/m   Estimated body mass index is 41.28 kg/m  as calculated from the following:    Height as of this encounter: 1.803 m (5' 11\").    Weight as of this encounter: 134.3 kg (296 lb).  Physical Exam   Constitutional: He is " oriented to person, place, and time. Vital signs are normal. He appears well-developed and well-nourished.   HENT:   Head: Normocephalic.   Cardiovascular: Normal rate, regular rhythm and normal heart sounds.   Pulmonary/Chest: Effort normal and breath sounds normal.   Abdominal: Soft. There is no tenderness.   Musculoskeletal:        Right foot: There is no deformity.        Left foot: There is no deformity.   Feet:   Right Foot:   Protective Sensation: 10 sites tested. 2 sites sensed.   Skin Integrity: Negative for ulcer, blister or skin breakdown.   Left Foot:   Protective Sensation: 10 sites tested. 4 sites sensed.   Skin Integrity: Negative for ulcer, blister or skin breakdown.   Neurological: He is alert and oriented to person, place, and time.   Skin: Skin is warm and dry.   Psychiatric: He has a normal mood and affect. His behavior is normal. Thought content normal.   Vitals reviewed.            ASSESSMENT / PLAN:   1. Medicare annual wellness visit, subsequent  - stable. Will repeat annually    2. Essential hypertension  - lightheadedness described as Orthostatic Hypotension as it occurs with position changes.  Advised to decrease Amlodipine to 2.5 mg daily.  Monitor BP and notify office if /90 or higher.   - amLODIPine (NORVASC) 5 MG tablet; Take 0.5 tablets (2.5 mg) by mouth daily  Dispense: 90 tablet; Refill: 3  - continue Lasix 80 mg daily for edema.     3. Lightheadedness  - will decrease Amlodipine and see if this resolves.      4. ESRD (end stage renal disease) on dialysis (H)  - stable. Managed by Nephrology.     5. Comprehensive diabetic foot examination, type 2 DM, encounter for (H)  - completed. Stable.     6. Atrial fibrillation status post cardioversion (H)  - stable.   - CARDIOLOGY EVAL ADULT REFERRAL    7. Screening for AAA (abdominal aortic aneurysm)  - Abdominal Aortic Aneurysm Screening/Tracking    End of Life Planning:  Patient currently has an advanced directive: No.  I have  "verified the patient's ablity to prepare an advanced directive/make health care decisions.  Literature was provided to assist patient in preparing an advanced directive.    COUNSELING:  Reviewed preventive health counseling, as reflected in patient instructions       Consider AAA screening for ages 65-75 and smoking history       Regular exercise       Healthy diet/nutrition       Vision screening       Fall risk prevention    Estimated body mass index is 41.28 kg/m  as calculated from the following:    Height as of this encounter: 1.803 m (5' 11\").    Weight as of this encounter: 134.3 kg (296 lb).    Weight management plan: Discussed healthy diet and exercise guidelines     reports that he has quit smoking. He has never used smokeless tobacco.      Appropriate preventive services were discussed with this patient, including applicable screening as appropriate for cardiovascular disease, diabetes, osteopenia/osteoporosis, and glaucoma.  As appropriate for age/gender, discussed screening for colorectal cancer, prostate cancer, breast cancer, and cervical cancer. Checklist reviewing preventive services available has been given to the patient.    Reviewed patients plan of care and provided an AVS. The Basic Care Plan (routine screening as documented in Health Maintenance) for Leif meets the Care Plan requirement. This Care Plan has been established and reviewed with the Patient.    Counseling Resources:  ATP IV Guidelines  Pooled Cohorts Equation Calculator  Breast Cancer Risk Calculator  FRAX Risk Assessment  ICSI Preventive Guidelines  Dietary Guidelines for Americans, 2010  USDA's MyPlate  ASA Prophylaxis  Lung CA Screening    Nguyen Torres NP  [facility]    Identified Health Risks:  "

## 2019-08-22 NOTE — NURSING NOTE
"Chief Complaint   Patient presents with     [facility]     Health Maintenance     Pended orders, ACP. AAA, Zoster, Wellness, Fall, PHQ2       Initial /59   Pulse 90   Temp 98.4  F (36.9  C) (Oral)   Resp 18   Ht 1.803 m (5' 11\")   Wt 134.3 kg (296 lb)   SpO2 99%   BMI 41.28 kg/m   Estimated body mass index is 41.28 kg/m  as calculated from the following:    Height as of this encounter: 1.803 m (5' 11\").    Weight as of this encounter: 134.3 kg (296 lb).    Ayaka Engel, CMA    "

## 2019-08-22 NOTE — PATIENT INSTRUCTIONS
Change Amlodipine from 5 mg to 2.5 mg daily.  Check blood pressures at home. Notify office if Blood pressure is 140/90 or higher.

## 2019-08-22 NOTE — Clinical Note
Office note is complete for Cardiology referral/fax.  Please refer to first office visit note as well.

## 2019-08-23 NOTE — PROGRESS NOTES
On 8/22/19 I faxed the Cardiology referral and 8/22/19 OV notes to [facility] @[phone].  Pilar Leblanc,

## 2019-08-29 ENCOUNTER — TELEPHONE (OUTPATIENT)
Dept: FAMILY MEDICINE | Facility: CLINIC | Age: 69
End: 2019-08-29

## 2019-09-03 ENCOUNTER — TELEPHONE (OUTPATIENT)
Dept: FAMILY MEDICINE | Facility: CLINIC | Age: 69
End: 2019-09-03

## 2019-09-03 NOTE — TELEPHONE ENCOUNTER
Hello- patient has an appt. 9/12 for a pre-op. Medical records arrived for your review. They are in the 's cubby for .    Thank you

## 2019-09-11 ENCOUNTER — TELEPHONE (OUTPATIENT)
Dept: FAMILY MEDICINE | Facility: CLINIC | Age: 69
End: 2019-09-11

## 2019-09-11 NOTE — TELEPHONE ENCOUNTER
I spoke to the patient and I scheduled an appointment for him tomorrow 9/12/19.  Pilar Leblanc,

## 2019-09-11 NOTE — TELEPHONE ENCOUNTER
Reason for call:  Same Day Appointment   Requested Provider: Nguyen Torres    PCP: @Robinson@    Reason for visit: Hospital follow up appt for Staph infection, recommend follow up 1-3 days     Duration of symptoms:     Have you been treated for this in the past?     Additional comments: Pt okay to see any provider, needs more medication      Phone number to reach patient:  Cell number on file:    Telephone Information:   Mobile [phone]       Best Time:  asap    Can we leave a detailed message on this number?  NO-prefer to speak with someone

## 2019-09-11 NOTE — TELEPHONE ENCOUNTER
TC, ok to put patient on Dr Eve Vazquez or same-day providers schedule if PCP has no openings this week.    Daja PACHECON, RN

## 2019-09-11 NOTE — PROGRESS NOTES
Subjective     Leif Ariza is a 68 year old male who presents to clinic today for the following health issues:    HPI       Hospital Follow-up Visit:    Hospital/Nursing Home/IP Rehab Facility: [facility]  Date of Admission: 9/1/2019  Date of Discharge: 9/8/2019  Reason(s) for Admission:        Sepsis, due to unspecified organism (HC) (Primary Dx);   Acute febrile illness;   Left leg cellulitis;   Multiple falls;   Weakness;   Fever;   Paroxysmal atrial fibrillation (HC);   Acute deep vein thrombosis (DVT) of left lower extremity, unspecified vein (HC);   Gout, unspecified cause, unspecified            Problems taking medications regularly:  None       Medication changes since discharge: updated       Problems adhering to non-medication therapy:  None    Summary of hospitalization:  CareEverywhere information obtained and reviewed  Diagnostic Tests/Treatments reviewed.  Follow up needed: INR check  Other Healthcare Providers Involved in Patient s Care:         None  Update since discharge: improved.     Post Discharge Medication Reconciliation: discharge medications reconciled and changed, per note/orders (see AVS).  Plan of care communicated with patient and family     Coding guidelines for this visit:  Type of Medical   Decision Making Face-to-Face Visit       within 7 Days of discharge Face-to-Face Visit        within 14 days of discharge   Moderate Complexity [id number]   High Complexity [id number]              Leif is a 68 y.o male with a PMH of HTN, ESRD on dialysis, Atrial Fibrillation, T2DM, and DORI who presented the hospital on 9/1 with after fall at home with fever, pain/swelling of left lower leg, and overall not feeling well.  He was admitted and treated for Sepsis secondary to acute cellulitis LLE, and LLE DVT. He was treated with IV antibiotics and discharged with oral Keflex.  ID, Hematology, and Nephrology consulted.  His Eliquis was discontinued switched to Heparin inpatient, later transitioned  to Warfarin.  At discharge his INR was 2.6.  He was taking Prandin 1 mg TID, had a blood sugar in the 60's during hospitalization and was then changed to Prandin 0.5 mg TID at discharge.  A1C 5.3.    Today he reports feeling the best since discharge.  States he did get lightheaded from walking in the building from the car as this has been the most activity he has done since discharge.  He has been propping his left leg up as tolerated when at home.  He is taking Oxycodone for pain/burning in his left leg.  He is ambulating with a walker.  Reports last night home BP was 129/74.    During his visit today, the [facility] nurse came in to check his INR.  Again, at discharge his INR was 2.6.  Today INR was 6.1.  Further investigation reveals Leif has been taking the Warfarin 5 mg whole tablet instead of 0.5 tablet to equal warfarin 2.5 mg daily.  He denies any significant bruising, bleeding, or falls since discharge.          Patient Active Problem List   Diagnosis     Essential hypertension     Mixed hyperlipidemia     CKD (chronic kidney disease) stage 5, GFR less than 15 ml/min (H)     ESRD (end stage renal disease) on dialysis (H)     Type 2 diabetes mellitus, without long-term current use of insulin (H)     Atrial fibrillation status post cardioversion (H)     Hyperuricemia     DORI on CPAP     Morbid obesity (H)     Past Surgical History:   Procedure Laterality Date     ANESTH,UPPER ARM AV FISTULA REPAIR Left 2018     APPENDECTOMY  1958     C REPAIR CRUCIATE LIGAMENT,KNEE  1976     CARDIOVERSION  2000     carpel tunnel surgery Left 2000     SINUS SURGERY  1997       Social History     Tobacco Use     Smoking status: Former Smoker     Smokeless tobacco: Never Used   Substance Use Topics     Alcohol use: Never     Frequency: Never     History reviewed. No pertinent family history.      Current Outpatient Medications   Medication Sig Dispense Refill     Acetaminophen 325 MG CAPS Take 650 mg by mouth 4 times daily as  needed       allopurinol (ZYLOPRIM) 300 MG tablet Take 2 tablets (600 mg) by mouth daily (Patient taking differently: Take 300 mg by mouth daily ) 180 tablet 3     atorvastatin (LIPITOR) 20 MG tablet Take 1 tablet (20 mg) by mouth daily 90 tablet 3     calcium acetate (PHOSLO) 667 MG CAPS capsule Take 4 capsule 3 times a day       cephALEXin (KEFLEX) 500 MG capsule Take 500 mg by mouth 2 times daily       fenofibrate (TRICOR) 145 MG tablet Take 1 tablet (145 mg) by mouth daily 90 tablet 3     furosemide (LASIX) 80 MG tablet Take 80 mg by mouth daily        order for DME Equipment being ordered: Digital home blood pressure monitor kit 1 kit 0     oxyCODONE (ROXICODONE) 5 MG tablet Take 1 tablet (5 mg) by mouth every 4 hours as needed for moderate to severe pain 42 tablet 0     propafenone (RYTHMOL) 300 MG tablet Take 1 tablet (300 mg) by mouth 2 times daily 180 tablet 0     warfarin ANTICOAGULANT (COUMADIN) 5 MG tablet Take 5 mg by mouth daily  0     Misc Natural Products (OSTEO BI-FLEX TRIPLE STRENGTH) TABS Take 3 tablets by mouth 2 times daily       SODIUM BICARBONATE PO Take 10 g by mouth daily       Allergies   Allergen Reactions     Penicillins        Reviewed and updated as needed this visit by Provider         Review of Systems   Constitutional: Positive for chills and fatigue. Negative for activity change and fever.   HENT: Negative for sore throat and trouble swallowing.    Respiratory: Negative for cough, shortness of breath and wheezing.    Cardiovascular: Positive for peripheral edema. Negative for chest pain and palpitations.   Gastrointestinal: Negative for abdominal pain, anal bleeding, hematochezia, nausea and vomiting.   Genitourinary: Negative for difficulty urinating and hematuria.   Musculoskeletal: Positive for arthralgias.   Skin: Positive for color change.   Neurological: Positive for weakness and light-headedness. Negative for dizziness, speech difficulty and numbness.    Psychiatric/Behavioral: Negative for sleep disturbance. The patient is not nervous/anxious.           Objective    BP (!) 84/61   Pulse 93   Temp 97.1  F (36.2  C) (Oral)   Wt 127.6 kg (281 lb 3.2 oz)   SpO2 96%   BMI 39.22 kg/m    Body mass index is 39.22 kg/m .  Physical Exam   Constitutional: He is oriented to person, place, and time. He appears well-developed and well-nourished.   HENT:   Head: Normocephalic.   Nose: Nose normal.   Mouth/Throat: Oropharynx is clear and moist.   Eyes: Conjunctivae are normal.   Neck: Normal range of motion. Neck supple.   Cardiovascular: Normal rate, regular rhythm and normal heart sounds.   Pulmonary/Chest: Effort normal and breath sounds normal.   Abdominal: Soft. Bowel sounds are normal. He exhibits no distension. There is no tenderness.   Musculoskeletal: He exhibits tenderness.   Neurological: He is alert and oriented to person, place, and time.   Skin: Skin is warm and dry. There is erythema.        Erythema, mild warmth, and dry skin on left lower extremity.  Tender to touch but overall pt reports tenderness and warmth has improved. Scabbed abrasions on bilateral knees.   Psychiatric: He has a normal mood and affect. His behavior is normal.   Vitals reviewed.             Assessment & Plan     1. Cellulitis of left lower extremity  - overall pt reports some improvement in pain, swelling, and redness.    - continue Keflex as prescribed.   - oxyCODONE (ROXICODONE) 5 MG tablet; Take 1 tablet (5 mg) by mouth every 4 hours as needed for moderate to severe pain  Dispense: 42 tablet; Refill: 0  - PHYSICAL THERAPY REFERRAL; Future  - **CBC with platelets differential FUTURE 2mo    2. Leg DVT (deep venous thromboembolism), acute, left (H)  - INR 6.1!! Leif has been taking Warfarin 5 mg daily instead of Warfarin 2.5 mg daily as ordered at discharge.  He has met with [facility] nurse.  Advised to HOLD Warfarin tomorrow and Saturday, take Warfarin 2.5 mg on Sunday and meet  with [facility] nurse on Monday for further instructions.   - INR CLINIC REFERRAL  - INR point of care  - oxyCODONE (ROXICODONE) 5 MG tablet; Take 1 tablet (5 mg) by mouth every 4 hours as needed for moderate to severe pain  Dispense: 42 tablet; Refill: 0  - PHYSICAL THERAPY REFERRAL; Future  - CBC with platelets differential     3. Type 2 diabetes mellitus with chronic kidney disease on chronic dialysis, without long-term current use of insulin (H)  - due to A1C 5.3, we have decided to discontinue the Prandin TID completely.  We will recheck A1C in 3 months.      4. Orthostatic hypotension  - Discontinue Amlodipine 2.5 mg daily.  - ensure adequate fluid intake.  His home BP was 129/74.    - **CBC with platelets differential     5. Generalized muscle weakness  - discussed importance of eating regular meals, staying hydrated, and rest.   - PHYSICAL THERAPY REFERRAL; Future    6. Risk for falls  - continue ambulating with walker.   - PHYSICAL THERAPY REFERRAL; Future     I have had a long discussion with Leif and his son today about medication changes.  Advised to go back to the ER if he develops chest pain, shortness of breath, worsening pain/redness/swelling of left lower leg, increase in bruising, bleeding, or experiences any fall.  We have gone over the Warfarin instructions multiple times and he is able to verbalize back to me the correct instructions.        Return in about 1 week (around 9/19/2019).    Nguyen Torres NP  [facility]

## 2019-09-12 ENCOUNTER — ANTICOAGULATION THERAPY VISIT (OUTPATIENT)
Dept: FAMILY MEDICINE | Facility: CLINIC | Age: 69
End: 2019-09-12

## 2019-09-12 ENCOUNTER — TELEPHONE (OUTPATIENT)
Dept: FAMILY MEDICINE | Facility: CLINIC | Age: 69
End: 2019-09-12

## 2019-09-12 ENCOUNTER — OFFICE VISIT (OUTPATIENT)
Dept: FAMILY MEDICINE | Facility: CLINIC | Age: 69
End: 2019-09-12
Payer: MEDICARE

## 2019-09-12 VITALS
TEMPERATURE: 97.1 F | SYSTOLIC BLOOD PRESSURE: 84 MMHG | BODY MASS INDEX: 39.22 KG/M2 | HEART RATE: 93 BPM | DIASTOLIC BLOOD PRESSURE: 61 MMHG | WEIGHT: 281.2 LBS | OXYGEN SATURATION: 96 %

## 2019-09-12 DIAGNOSIS — Z99.2 TYPE 2 DIABETES MELLITUS WITH CHRONIC KIDNEY DISEASE ON CHRONIC DIALYSIS, WITHOUT LONG-TERM CURRENT USE OF INSULIN (H): ICD-10-CM

## 2019-09-12 DIAGNOSIS — M62.81 GENERALIZED MUSCLE WEAKNESS: ICD-10-CM

## 2019-09-12 DIAGNOSIS — Z79.01 LONG TERM (CURRENT) USE OF ANTICOAGULANTS: ICD-10-CM

## 2019-09-12 DIAGNOSIS — N18.6 TYPE 2 DIABETES MELLITUS WITH CHRONIC KIDNEY DISEASE ON CHRONIC DIALYSIS, WITHOUT LONG-TERM CURRENT USE OF INSULIN (H): ICD-10-CM

## 2019-09-12 DIAGNOSIS — E11.22 TYPE 2 DIABETES MELLITUS WITH CHRONIC KIDNEY DISEASE ON CHRONIC DIALYSIS, WITHOUT LONG-TERM CURRENT USE OF INSULIN (H): ICD-10-CM

## 2019-09-12 DIAGNOSIS — I48.91 ATRIAL FIBRILLATION STATUS POST CARDIOVERSION (H): ICD-10-CM

## 2019-09-12 DIAGNOSIS — Z91.81 RISK FOR FALLS: ICD-10-CM

## 2019-09-12 DIAGNOSIS — L03.116 CELLULITIS OF LEFT LOWER EXTREMITY: Primary | ICD-10-CM

## 2019-09-12 DIAGNOSIS — I95.1 ORTHOSTATIC HYPOTENSION: ICD-10-CM

## 2019-09-12 DIAGNOSIS — I82.409 DEEP VEIN THROMBOSIS (DVT) (H): ICD-10-CM

## 2019-09-12 DIAGNOSIS — I82.402 LEG DVT (DEEP VENOUS THROMBOEMBOLISM), ACUTE, LEFT (H): ICD-10-CM

## 2019-09-12 LAB
BASOPHILS # BLD AUTO: 0.1 10E9/L (ref 0–0.2)
BASOPHILS NFR BLD AUTO: 1 %
DIFFERENTIAL METHOD BLD: ABNORMAL
EOSINOPHIL # BLD AUTO: 0.1 10E9/L (ref 0–0.7)
EOSINOPHIL NFR BLD AUTO: 1 %
ERYTHROCYTE [DISTWIDTH] IN BLOOD BY AUTOMATED COUNT: 13.5 % (ref 10–15)
HCT VFR BLD AUTO: 38.2 % (ref 40–53)
HGB BLD-MCNC: 12.4 G/DL (ref 13.3–17.7)
HYPOCHROMIA BLD QL: PRESENT
INR BLD: 6.1 (ref 0.86–1.14)
LYMPHOCYTES # BLD AUTO: 3 10E9/L (ref 0.8–5.3)
LYMPHOCYTES NFR BLD AUTO: 21 %
MCH RBC QN AUTO: 33.3 PG (ref 26.5–33)
MCHC RBC AUTO-ENTMCNC: 32.5 G/DL (ref 31.5–36.5)
MCV RBC AUTO: 103 FL (ref 78–100)
MONOCYTES # BLD AUTO: 0.7 10E9/L (ref 0–1.3)
MONOCYTES NFR BLD AUTO: 5 %
NEUTROPHILS # BLD AUTO: 10.5 10E9/L (ref 1.6–8.3)
NEUTROPHILS NFR BLD AUTO: 72 %
PLATELET # BLD AUTO: 383 10E9/L (ref 150–450)
PLATELET # BLD EST: ABNORMAL 10*3/UL
RBC # BLD AUTO: 3.72 10E12/L (ref 4.4–5.9)
WBC # BLD AUTO: 14.4 10E9/L (ref 4–11)

## 2019-09-12 PROCEDURE — 99207 ZZC NO CHARGE NURSE ONLY: CPT | Performed by: NURSE PRACTITIONER

## 2019-09-12 PROCEDURE — 99496 TRANSJ CARE MGMT HIGH F2F 7D: CPT | Performed by: NURSE PRACTITIONER

## 2019-09-12 PROCEDURE — 85025 COMPLETE CBC W/AUTO DIFF WBC: CPT | Performed by: NURSE PRACTITIONER

## 2019-09-12 PROCEDURE — 85610 PROTHROMBIN TIME: CPT | Mod: QW | Performed by: NURSE PRACTITIONER

## 2019-09-12 PROCEDURE — 36416 COLLJ CAPILLARY BLOOD SPEC: CPT | Performed by: NURSE PRACTITIONER

## 2019-09-12 RX ORDER — CEPHALEXIN 500 MG/1
500 CAPSULE ORAL 2 TIMES DAILY
COMMUNITY
End: 2019-09-16

## 2019-09-12 RX ORDER — WARFARIN SODIUM 5 MG/1
5 TABLET ORAL DAILY
Refills: 0 | COMMUNITY
Start: 2019-09-07 | End: 2019-10-02 | Stop reason: DRUGHIGH

## 2019-09-12 RX ORDER — OXYCODONE HYDROCHLORIDE 5 MG/1
5 TABLET ORAL EVERY 4 HOURS PRN
COMMUNITY
Start: 2019-09-08 | End: 2019-09-12

## 2019-09-12 RX ORDER — OXYCODONE HYDROCHLORIDE 5 MG/1
5 TABLET ORAL EVERY 4 HOURS PRN
Qty: 42 TABLET | Refills: 0 | Status: SHIPPED | OUTPATIENT
Start: 2019-09-12 | End: 2019-10-24

## 2019-09-12 ASSESSMENT — ENCOUNTER SYMPTOMS
ANAL BLEEDING: 0
SHORTNESS OF BREATH: 0
WEAKNESS: 1
DIFFICULTY URINATING: 0
SPEECH DIFFICULTY: 0
PALPITATIONS: 0
COLOR CHANGE: 1
DIZZINESS: 0
ACTIVITY CHANGE: 0
CHILLS: 1
FEVER: 0
NERVOUS/ANXIOUS: 0
ABDOMINAL PAIN: 0
TROUBLE SWALLOWING: 0
COUGH: 0
NUMBNESS: 0
HEMATURIA: 0
WHEEZING: 0
FATIGUE: 1
LIGHT-HEADEDNESS: 1
VOMITING: 0
NAUSEA: 0
HEMATOCHEZIA: 0
SLEEP DISTURBANCE: 0
SORE THROAT: 0
ARTHRALGIAS: 1

## 2019-09-12 NOTE — TELEPHONE ENCOUNTER
The lab is calling to report that the patients INR is 6.1.  I informed provider Nguyen Torres of the result in person and she states she is aware of this and she discussed it with our INR nurse Oma.  Pilar Leblanc,

## 2019-09-12 NOTE — PATIENT INSTRUCTIONS
Stop Amlodipine (Norvasc) 2.5 mg daily.  Stop Repaglinide (Prandin) 0.5 mg TID.    HOLD Warfarin Friday and Saturday. Take Warfarin 2.5 mg Sunday. Follow-up with [facility] on Monday.

## 2019-09-13 ENCOUNTER — TELEPHONE (OUTPATIENT)
Dept: FAMILY MEDICINE | Facility: CLINIC | Age: 69
End: 2019-09-13

## 2019-09-13 PROBLEM — I82.409 DEEP VEIN THROMBOSIS (DVT) (H): Status: ACTIVE | Noted: 2019-09-13

## 2019-09-13 PROBLEM — Z79.01 LONG TERM (CURRENT) USE OF ANTICOAGULANTS: Status: ACTIVE | Noted: 2019-09-13

## 2019-09-13 NOTE — TELEPHONE ENCOUNTER
Notes recorded by Nguyen Torres NP on 9/12/2019 at 6:49 PM CDT    Please call and let Leif know his blood counts are stable when compared to his recent hospital visit.  His WBC count is elevated but given his recent infection this can be expected.  Please see how he is feeling and again stress the importance of taking the antibiotic Keflex as prescribed.  I will see him again on Wednesday for follow-up.  If he starts feeling worse over the weekend I want him to go back to the hospital.

## 2019-09-13 NOTE — PROGRESS NOTES
ANTICOAGULATION FOLLOW-UP CLINIC VISIT    Patient Name:  Leif Ariza  Date:  9/13/2019  Contact Type:  Face to Face    SUBJECTIVE:  Patient Findings     Positives:   Change in health, Extra doses, Change in medications    Comments:   Patient seen for first time  in provider room today. Has left lower leg DVT and cellulitis. On dialysis M,W,F. Previously on eliquis. Tells me he was on warfarin years ago. Keflex for cellulitis. Discharged from hospital 9/8 and chart reviewed. INR was therapeutic at 2.6 on discharge and notes say recommended warfarin dose to be 2.5 mg daily. Sent home with 5 mg tablets and patient took 5 mg daily. No bleeding concerns. Some bruising but reports these are from needle sticks in hospital. Has dialysis tomorrow and unable to come in for INR.  INR 6.1 with recheck of 6.2. Patient took 5 mg warfarin dose today 9/12. Discussed results and plan with provider and patient instructed to hold warfarin 2 days. Take half dose Sunday and scheduled for INR after dialysis on Monday. Cautioned on increased risk of bleeding.         Clinical Outcomes     Comments:   Patient seen for first time  in provider room today. Has left lower leg DVT and cellulitis. On dialysis M,W,F. Previously on eliquis. Tells me he was on warfarin years ago. Keflex for cellulitis. Discharged from hospital 9/8 and chart reviewed. INR was therapeutic at 2.6 on discharge and notes say recommended warfarin dose to be 2.5 mg daily. Sent home with 5 mg tablets and patient took 5 mg daily. No bleeding concerns. Some bruising but reports these are from needle sticks in hospital. Has dialysis tomorrow and unable to come in for INR.  INR 6.1 with recheck of 6.2. Patient took 5 mg warfarin dose today 9/12. Discussed results and plan with provider and patient instructed to hold warfarin 2 days. Take half dose Sunday and scheduled for INR after dialysis on Monday. Cautioned on increased risk of bleeding.            OBJECTIVE    INR  Point of Care   Date Value Ref Range Status   2019 6.1 (HH) 0.86 - 1.14 Final     Comment:     This test is intended for monitoring Coumadin therapy.  Results are not   accurate in patients with prolonged INR due to factor deficiency.  Critical Value called to and read back by  BENJI ON 19 AT 0950 BY KV  This test is intended for monitoring Coumadin therapy.  Results are not   accurate in patients with prolonged INR due to factor deficiency.         ASSESSMENT / PLAN  INR assessment SUPRA    Recheck INR In: 3 DAYS    INR Location Clinic      Anticoagulation Summary  As of 2019    INR goal:   2.0-3.0   TTR:   --   INR used for dosin.1! (2019)   Warfarin maintenance plan:   No maintenance plan   Full warfarin instructions:   : 5 mg; : Hold; : Hold; 9/15: 2.5 mg   Plan last modified:   Oma Taylor RN (2019)   Next INR check:   2019   Target end date:       Indications    Atrial fibrillation status post cardioversion (H) [I48.91]  Deep vein thrombosis (DVT) (H) [I82.409]  Long term (current) use of anticoagulants [Z79.01]             Anticoagulation Episode Summary     INR check location:       Preferred lab:       Send INR reminders to:   PEARL CORNEJO    Comments:   Has Dialysis  and Friday.      Anticoagulation Care Providers     Provider Role Specialty Phone number    Melissa Nguyen OTRIZ, NP Referring  [phone]            See the Encounter Report to view Anticoagulation Flowsheet and Dosing Calendar (Go to Encounters tab in chart review, and find the Anticoagulation Therapy Visit)    Dosage adjustment made based on physician directed care plan.    Oma Taylor RN

## 2019-09-13 NOTE — TELEPHONE ENCOUNTER
Patient already read message from provider.   MyChart message sent to patient to ask how he is feeling.      Daja PACHECON, RN

## 2019-09-16 ENCOUNTER — ANTICOAGULATION THERAPY VISIT (OUTPATIENT)
Dept: NURSING | Facility: CLINIC | Age: 69
End: 2019-09-16
Payer: MEDICARE

## 2019-09-16 ENCOUNTER — TELEPHONE (OUTPATIENT)
Dept: FAMILY MEDICINE | Facility: CLINIC | Age: 69
End: 2019-09-16

## 2019-09-16 DIAGNOSIS — I82.409 DEEP VEIN THROMBOSIS (DVT) (H): ICD-10-CM

## 2019-09-16 DIAGNOSIS — I48.91 ATRIAL FIBRILLATION STATUS POST CARDIOVERSION (H): ICD-10-CM

## 2019-09-16 DIAGNOSIS — Z79.01 LONG TERM (CURRENT) USE OF ANTICOAGULANTS: ICD-10-CM

## 2019-09-16 DIAGNOSIS — L03.116 CELLULITIS OF LEFT LOWER EXTREMITY: Primary | ICD-10-CM

## 2019-09-16 LAB — INR PPP: 8.1 (ref 0.86–1.14)

## 2019-09-16 PROCEDURE — 99207 ZZC NO CHARGE NURSE ONLY: CPT

## 2019-09-16 PROCEDURE — 85610 PROTHROMBIN TIME: CPT | Performed by: NURSE PRACTITIONER

## 2019-09-16 PROCEDURE — 36415 COLL VENOUS BLD VENIPUNCTURE: CPT | Performed by: NURSE PRACTITIONER

## 2019-09-16 RX ORDER — CEPHALEXIN 500 MG/1
500 CAPSULE ORAL 2 TIMES DAILY
Qty: 5 CAPSULE | Refills: 0 | Status: SHIPPED | OUTPATIENT
Start: 2019-09-16 | End: 2019-09-30

## 2019-09-16 NOTE — TELEPHONE ENCOUNTER
States was told to take keflex for 10 days post discharge from hospital for cellulitis of left lower leg. States took last dose this morning and should continue until 9/18. Requesting 5 more doses of antibiotic.  Discussed with provider and verbal ok given from Dr Virgen to send refill .   Refill sent.  To provider to cosign. Thank you. Oma Virgen MD

## 2019-09-17 ENCOUNTER — ANTICOAGULATION THERAPY VISIT (OUTPATIENT)
Dept: NURSING | Facility: CLINIC | Age: 69
End: 2019-09-17
Payer: MEDICARE

## 2019-09-17 DIAGNOSIS — Z79.01 LONG TERM (CURRENT) USE OF ANTICOAGULANTS: ICD-10-CM

## 2019-09-17 DIAGNOSIS — I48.91 ATRIAL FIBRILLATION STATUS POST CARDIOVERSION (H): ICD-10-CM

## 2019-09-17 DIAGNOSIS — I82.409 DEEP VEIN THROMBOSIS (DVT) (H): ICD-10-CM

## 2019-09-17 LAB — INR PPP: 7.62 (ref 0.86–1.14)

## 2019-09-17 PROCEDURE — 85610 PROTHROMBIN TIME: CPT | Performed by: NURSE PRACTITIONER

## 2019-09-17 PROCEDURE — 36415 COLL VENOUS BLD VENIPUNCTURE: CPT | Performed by: NURSE PRACTITIONER

## 2019-09-17 PROCEDURE — 99207 ZZC NO CHARGE NURSE ONLY: CPT

## 2019-09-17 NOTE — PROGRESS NOTES
ANTICOAGULATION FOLLOW-UP CLINIC VISIT    Patient Name:  Leif Ariza  Date:  9/17/2019  Contact Type:  Face to Face    SUBJECTIVE:  Patient Findings     Positives:   Other complaints    Comments:   Venous INR done in lab. POC INR >8.0. Yesterday final INR 8.1 after POC >8.0. Will await final INR result. Held warfarin yesterday and instructed to continue to hold warfarin. Noted small amount of blood at dialysis fistula site in upper left arm this morning and said it stopped with new bandage applied. No noted bruising or swelling in the area of fistula site and denies any other bruising or bleeding. Again reviewed signs and symptoms of bleeding and clots to watch for. Given warfarin therapy booklet and reviewed vit k and diet and medications and supplements. Was on warfarin years ago. States does recall some of this information. States has lost more than 100 #s since was on warfarin several years ago.   Final INR 7.62. Plan hold today and recheck in clinic tomorrow.        Clinical Outcomes     Comments:   Venous INR done in lab. POC INR >8.0. Yesterday final INR 8.1 after POC >8.0. Will await final INR result. Held warfarin yesterday and instructed to continue to hold warfarin. Noted small amount of blood at dialysis fistula site in upper left arm this morning and said it stopped with new bandage applied. No noted bruising or swelling in the area of fistula site and denies any other bruising or bleeding. Again reviewed signs and symptoms of bleeding and clots to watch for. Given warfarin therapy booklet and reviewed vit k and diet and medications and supplements. Was on warfarin years ago. States does recall some of this information. \[facility] [place] [facility]\"" has lost more than 100 #s since was on warfarin several years ago.   Final INR 7.62. Plan hold today and recheck in clinic tomorrow.           OBJECTIVE    INR   Date Value Ref Range Status   09/17/2019 7.62 (HH) 0.86 - 1.14 Corrected     Comment:     Critical Value called to  and read back by  YUSUF MILLAN AT 1340, 19 1170  Critical Value called to and read back by  TO OMA JOHNS AT 1345 ON [id number] BY TZ  CORRECTED ON  AT 1346: PREVIOUSLY REPORTED AS 7.62 Critical Value called   to and read back by YUSUF MILLAN AT 1340, 19 1170         ASSESSMENT / PLAN  INR assessment SUPRA    Recheck INR In: 1 DAY    INR Location Clinic      Anticoagulation Summary  As of 2019    INR goal:   2.0-3.0   TTR:   --   INR used for dosin.62! (2019)   Warfarin maintenance plan:   No maintenance plan   Full warfarin instructions:   : Hold   Plan last modified:   Oma Johns RN (2019)   Next INR check:   2019   Target end date:       Indications    Atrial fibrillation status post cardioversion (H) [I48.91]  Deep vein thrombosis (DVT) (H) [I82.409]  Long term (current) use of anticoagulants [Z79.01]             Anticoagulation Episode Summary     INR check location:       Preferred lab:       Send INR reminders to:   PEARL CORNEJO    Comments:   Has Dialysis  and Friday.      Anticoagulation Care Providers     Provider Role Specialty Phone number    Nguyen Torres, NP Referring  [phone]            See the Encounter Report to view Anticoagulation Flowsheet and Dosing Calendar (Go to Encounters tab in chart review, and find the Anticoagulation Therapy Visit)    Dosage adjustment made based on physician directed care plan.    Oma Johns, RN

## 2019-09-17 NOTE — Clinical Note
FYI. INR supra at 7.62. See notes on encounter and yesterdays visit also. Plan recheck tomorrow in clinic and continue warfarin hold.

## 2019-09-17 NOTE — PROGRESS NOTES
ANTICOAGULATION FOLLOW-UP CLINIC VISIT    Patient Name:  Leif Ariza  Date:  9/17/2019  Contact Type:  Face to Face    SUBJECTIVE:  Patient Findings     Positives:   Change in health, Change in medications    Comments:   INR POC > 8.0 with check X2.  Serum INR done in lab and await results. INR on 9/12 was 6.1. Has had 2 day hold and took half dose yesterday. Dialysis M,W,F and had dialysis this morning. No noted bleeding or unusual bruising. Small bruise on right arm from last week. Left lower leg is red and swollen and patient states this is unchanged from last week. Continues for 5 more doses of Keflex for this. Eating ok and no change in diet. Was recently switched from eliquis when in hospital 9/1- 9/8. Discussed results with provider and with pharmacist. Hold warfarin. Await final result. Patient cautioned on increased risk of bleeding and bruising and also reviewed symptoms of clots. Directed if any concerns, bleeding or new symptoms to call 911 .   Final INR 8.1. Continue same plan. Patient coming for INR again 9/17.         Clinical Outcomes     Comments:   INR POC > 8.0 with check X2.  Serum INR done in lab and await results. INR on 9/12 was 6.1. Has had 2 day hold and took half dose yesterday. Dialysis M,W,F and had dialysis this morning. No noted bleeding or unusual bruising. Small bruise on right arm from last week. Left lower leg is red and swollen and patient states this is unchanged from last week. Continues for 5 more doses of Keflex for this. Eating ok and no change in diet. Was recently switched from eliquis when in hospital 9/1- 9/8. Discussed results with provider and with pharmacist. Hold warfarin. Await final result. Patient cautioned on increased risk of bleeding and bruising and also reviewed symptoms of clots. Directed if any concerns, bleeding or new symptoms to call 911 .   Final INR 8.1. Continue same plan. Patient coming for INR again 9/17.            OBJECTIVE    INR   Date Value  Ref Range Status   09/16/2019 8.10 (HH) 0.86 - 1.14 Final     Comment:     Critical Value called to and read back by  AMILCAR MILLAN AT 1626 ON 09.16.2019 BY LEAH  Critical Value called to and read back by  ALVARO REINA RN AT 1628 ON 09.16.2019.MM         ASSESSMENT / PLAN  INR assessment SUPRA    Recheck INR In: 1 DAY    INR Location Clinic      Anticoagulation Summary  As of 9/16/2019    INR goal:   2.0-3.0   TTR:   --   INR used for dosing:      Warfarin maintenance plan:   No maintenance plan   Full warfarin instructions:   9/16: Hold   Plan last modified:   Oma Taylor RN (9/12/2019)   Next INR check:   9/17/2019   Target end date:       Indications    Atrial fibrillation status post cardioversion (H) [I48.91]  Deep vein thrombosis (DVT) (H) [I82.409]  Long term (current) use of anticoagulants [Z79.01]             Anticoagulation Episode Summary     INR check location:       Preferred lab:       Send INR reminders to:   PEARL CORNEJO    Comments:   Has Dialysis Monday Wednesday and Friday.      Anticoagulation Care Providers     Provider Role Specialty Phone number    Nguyen Torres, NP Referring  [phone]            See the Encounter Report to view Anticoagulation Flowsheet and Dosing Calendar (Go to Encounters tab in chart review, and find the Anticoagulation Therapy Visit)    Dosage adjustment made based on physician directed care plan.    Oma Taylor, RN

## 2019-09-18 ENCOUNTER — TRANSFERRED RECORDS (OUTPATIENT)
Dept: HEALTH INFORMATION MANAGEMENT | Facility: CLINIC | Age: 69
End: 2019-09-18

## 2019-09-18 ENCOUNTER — ANTICOAGULATION THERAPY VISIT (OUTPATIENT)
Dept: NURSING | Facility: CLINIC | Age: 69
End: 2019-09-18
Payer: MEDICARE

## 2019-09-18 ENCOUNTER — OFFICE VISIT (OUTPATIENT)
Dept: FAMILY MEDICINE | Facility: CLINIC | Age: 69
End: 2019-09-18
Payer: MEDICARE

## 2019-09-18 VITALS
DIASTOLIC BLOOD PRESSURE: 64 MMHG | HEART RATE: 85 BPM | SYSTOLIC BLOOD PRESSURE: 88 MMHG | OXYGEN SATURATION: 98 % | TEMPERATURE: 97.6 F

## 2019-09-18 DIAGNOSIS — Z99.2 ESRD (END STAGE RENAL DISEASE) ON DIALYSIS (H): ICD-10-CM

## 2019-09-18 DIAGNOSIS — I82.402 ACUTE DEEP VEIN THROMBOSIS (DVT) OF LEFT LOWER EXTREMITY, UNSPECIFIED VEIN (H): ICD-10-CM

## 2019-09-18 DIAGNOSIS — L03.116 CELLULITIS OF LEFT LEG: Primary | ICD-10-CM

## 2019-09-18 DIAGNOSIS — R06.09 DYSPNEA ON EXERTION: ICD-10-CM

## 2019-09-18 DIAGNOSIS — N18.6 ESRD (END STAGE RENAL DISEASE) ON DIALYSIS (H): ICD-10-CM

## 2019-09-18 DIAGNOSIS — Z79.01 LONG TERM (CURRENT) USE OF ANTICOAGULANTS: ICD-10-CM

## 2019-09-18 DIAGNOSIS — I82.409 DEEP VEIN THROMBOSIS (DVT) (H): Primary | ICD-10-CM

## 2019-09-18 DIAGNOSIS — I48.91 ATRIAL FIBRILLATION STATUS POST CARDIOVERSION (H): ICD-10-CM

## 2019-09-18 LAB
BASOPHILS # BLD AUTO: 0.1 10E9/L (ref 0–0.2)
BASOPHILS NFR BLD AUTO: 1.4 %
CREAT SERPL-MCNC: 5.97 MG/DL (ref 0.72–1.25)
DIFFERENTIAL METHOD BLD: ABNORMAL
EOSINOPHIL # BLD AUTO: 0.2 10E9/L (ref 0–0.7)
EOSINOPHIL NFR BLD AUTO: 2.4 %
ERYTHROCYTE [DISTWIDTH] IN BLOOD BY AUTOMATED COUNT: 13.2 % (ref 10–15)
GFR SERPL CREATININE-BSD FRML MDRD: 10 ML/MIN/1.73M2
GLUCOSE SERPL-MCNC: 77 MG/DL (ref 65–100)
HCT VFR BLD AUTO: 37.3 % (ref 40–53)
HGB BLD-MCNC: 12.2 G/DL (ref 13.3–17.7)
INR PPP: 8.23 (ref 0.86–1.14)
LYMPHOCYTES # BLD AUTO: 2.4 10E9/L (ref 0.8–5.3)
LYMPHOCYTES NFR BLD AUTO: 33.9 %
MCH RBC QN AUTO: 33.2 PG (ref 26.5–33)
MCHC RBC AUTO-ENTMCNC: 32.7 G/DL (ref 31.5–36.5)
MCV RBC AUTO: 102 FL (ref 78–100)
MONOCYTES # BLD AUTO: 0.7 10E9/L (ref 0–1.3)
MONOCYTES NFR BLD AUTO: 10.1 %
NEUTROPHILS # BLD AUTO: 3.7 10E9/L (ref 1.6–8.3)
NEUTROPHILS NFR BLD AUTO: 52.2 %
PLATELET # BLD AUTO: 494 10E9/L (ref 150–450)
POTASSIUM SERPL-SCNC: 5.2 MMOL/L (ref 3.5–5)
RBC # BLD AUTO: 3.67 10E12/L (ref 4.4–5.9)
WBC # BLD AUTO: 7.1 10E9/L (ref 4–11)

## 2019-09-18 PROCEDURE — 99207 ZZC NO CHARGE NURSE ONLY: CPT

## 2019-09-18 PROCEDURE — 99215 OFFICE O/P EST HI 40 MIN: CPT | Performed by: NURSE PRACTITIONER

## 2019-09-18 PROCEDURE — 36415 COLL VENOUS BLD VENIPUNCTURE: CPT | Performed by: NURSE PRACTITIONER

## 2019-09-18 PROCEDURE — 85610 PROTHROMBIN TIME: CPT | Performed by: NURSE PRACTITIONER

## 2019-09-18 PROCEDURE — 85025 COMPLETE CBC W/AUTO DIFF WBC: CPT | Performed by: NURSE PRACTITIONER

## 2019-09-18 ASSESSMENT — ENCOUNTER SYMPTOMS
WEAKNESS: 1
CHILLS: 0
DIZZINESS: 1
SLEEP DISTURBANCE: 0
COUGH: 0
VOMITING: 0
LIGHT-HEADEDNESS: 1
WOUND: 1
NAUSEA: 0
COLOR CHANGE: 1
APPETITE CHANGE: 0
SHORTNESS OF BREATH: 1
FATIGUE: 1
ABDOMINAL PAIN: 0
NERVOUS/ANXIOUS: 0
FEVER: 0
PALPITATIONS: 0

## 2019-09-18 NOTE — PROGRESS NOTES
ANTICOAGULATION FOLLOW-UP CLINIC VISIT    Patient Name:  Leif Ariza  Date:  9/18/2019  Contact Type:  Face to Face    SUBJECTIVE:  Patient Findings     Comments:   INR poc >8.0 and venous INR done in lab stat. Patient seeing provider and having SOB and hypotension. Sent to Hospital via ambulance by provider. See provider notes. No further plan at this time.         Clinical Outcomes     Comments:   INR poc >8.0 and venous INR done in lab stat. Patient seeing provider and having SOB and hypotension. Sent to Hospital via ambulance by provider. See provider notes. No further plan at this time.            OBJECTIVE    INR   Date Value Ref Range Status   09/17/2019 7.62 (HH) 0.86 - 1.14 Corrected     Comment:     Critical Value called to and read back by  YUSUF MILLAN AT 1340, 09/17/19 1170  Critical Value called to and read back by  TO OMA JOHNS AT 1345 ON [id number] BY TZ  CORRECTED ON 09/17 AT 1346: PREVIOUSLY REPORTED AS 7.62 Critical Value called   to and read back by YUSUFCHI MILLAN AT 1340, 09/17/19 1170         ASSESSMENT / PLAN  INR assessment SUPRA    Recheck INR In: 1 DAY    INR Location Clinic      Anticoagulation Summary  As of 9/18/2019    INR goal:   2.0-3.0   TTR:   --   INR used for dosing:      Warfarin maintenance plan:   No maintenance plan   Full warfarin instructions:   9/18: Hold   Plan last modified:   Oma Johns RN (9/12/2019)   Next INR check:   9/19/2019   Target end date:       Indications    Atrial fibrillation status post cardioversion (H) [I48.91]  Deep vein thrombosis (DVT) (H) [I82.409]  Long term (current) use of anticoagulants [Z79.01]             Anticoagulation Episode Summary     INR check location:       Preferred lab:       Send INR reminders to:   PEARL CORNEJO    Comments:   Has Dialysis Monday Wednesday and Friday.      Anticoagulation Care Providers     Provider Role Specialty Phone number    Nguyen Torres, NP Referring  [phone]            See the Encounter  Report to view Anticoagulation Flowsheet and Dosing Calendar (Go to Encounters tab in chart review, and find the Anticoagulation Therapy Visit)    Dosage adjustment made based on physician directed care plan.    Oma Taylor RN

## 2019-09-18 NOTE — PROGRESS NOTES
Subjective     Leif Ariza is a 68 year old male who presents to clinic today for the following health issues:    HPI   Recheck left foot  Patient would like INR done today if possible    Leif is a 68 y.o male who presents for 6 day f/u of left lower extremity cellulitis and left leg DVT.  He was hospitalized 9/1-9/8/19 at [facility] for Sepsis, LLE cellulitis, and DVT LLE.  He has ESRD on dialysis- Eliquis for PAF was switched to Warfarin during hospitalization.  INR has been high since discharge.  Warfarin was held last Friday and Saturday.  He took Warfarin 2.5 mg on Sunday.  Monday INR 8.10.  Tuesday INR- 7.62.  He has not had any Warfarin since Sunday.  He reports taking Warfarin 20 years ago prior to ESRD and when 100 lbs heavier.  He has had 10 days of Keflex, and continues to have a lot of pain/burning/redness in lower aspect of left leg, greatest around his ankle.  Reports shortness of breath and dizziness with any activity at home.  Denies any falls.  In the office he is extremely dizzy and short of breath.   Reports Monday after dialysis he felt fairly good and today feeling much worse.  Denies fever.  Reports appetite has been fairly good.         Patient Active Problem List   Diagnosis     Essential hypertension     Mixed hyperlipidemia     CKD (chronic kidney disease) stage 5, GFR less than 15 ml/min (H)     ESRD (end stage renal disease) on dialysis (H)     Type 2 diabetes mellitus, without long-term current use of insulin (H)     Atrial fibrillation status post cardioversion (H)     Hyperuricemia     DORI on CPAP     Morbid obesity (H)     Deep vein thrombosis (DVT) (H)     Long term (current) use of anticoagulants     Past Surgical History:   Procedure Laterality Date     ANESTH,UPPER ARM AV FISTULA REPAIR Left 2018     APPENDECTOMY  1958     C REPAIR CRUCIATE LIGAMENT,KNEE  1976     CARDIOVERSION  2000     carpel tunnel surgery Left 2000     COLONOSCOPY       SINUS SURGERY  1997       Social  History     Tobacco Use     Smoking status: Former Smoker     Smokeless tobacco: Never Used   Substance Use Topics     Alcohol use: Not Currently     Frequency: Never     Comment: Stopped when I was put on blood thinners     Family History   Problem Relation Age of Onset     Cerebrovascular Disease Father         Had multiple strokes while in hospital when he passed         Current Outpatient Medications   Medication Sig Dispense Refill     Acetaminophen 325 MG CAPS Take 650 mg by mouth 4 times daily as needed       allopurinol (ZYLOPRIM) 300 MG tablet Take 2 tablets (600 mg) by mouth daily (Patient taking differently: Take 300 mg by mouth daily ) 180 tablet 3     atorvastatin (LIPITOR) 20 MG tablet Take 1 tablet (20 mg) by mouth daily 90 tablet 3     calcium acetate (PHOSLO) 667 MG CAPS capsule Take 4 capsule 3 times a day       cephALEXin (KEFLEX) 500 MG capsule Take 1 capsule (500 mg) by mouth 2 times daily for 5 doses 5 capsule 0     fenofibrate (TRICOR) 145 MG tablet Take 1 tablet (145 mg) by mouth daily 90 tablet 3     furosemide (LASIX) 80 MG tablet Take 80 mg by mouth daily        Misc Natural Products (OSTEO BI-FLEX TRIPLE STRENGTH) TABS Take 3 tablets by mouth 2 times daily       order for DME Equipment being ordered: Digital home blood pressure monitor kit 1 kit 0     oxyCODONE (ROXICODONE) 5 MG tablet Take 1 tablet (5 mg) by mouth every 4 hours as needed for moderate to severe pain 42 tablet 0     propafenone (RYTHMOL) 300 MG tablet Take 1 tablet (300 mg) by mouth 2 times daily 180 tablet 0     SODIUM BICARBONATE PO Take 10 g by mouth daily       warfarin ANTICOAGULANT (COUMADIN) 5 MG tablet Take 5 mg by mouth daily  0     Allergies   Allergen Reactions     Penicillins        Reviewed and updated as needed this visit by Provider         Review of Systems   Constitutional: Positive for fatigue. Negative for appetite change, chills and fever.   Respiratory: Positive for shortness of breath (SOB on  exertion). Negative for cough.    Cardiovascular: Negative for chest pain, palpitations and peripheral edema.   Gastrointestinal: Negative for abdominal pain, nausea and vomiting.   Skin: Positive for color change and wound.   Neurological: Positive for dizziness, weakness and light-headedness. Negative for syncope.   Psychiatric/Behavioral: Negative for sleep disturbance. The patient is not nervous/anxious.             Objective    BP (!) 88/64   Pulse 85   Temp 97.6  F (36.4  C) (Tympanic)   SpO2 98%   There is no height or weight on file to calculate BMI.  Physical Exam   Constitutional: He is oriented to person, place, and time. He appears well-developed and well-nourished. He appears distressed.   HENT:   Head: Normocephalic.   Cardiovascular: Normal rate, regular rhythm and normal heart sounds.   Pulmonary/Chest: Effort normal.   Neurological: He is alert and oriented to person, place, and time.   Skin: Skin is warm and dry. There is erythema.   Left lower extremity with erythema, warmth, scaling.  Swelling greatest around ankle and most tender.    Psychiatric: His mood appears anxious.   Vitals reviewed.             Assessment & Plan     1. Cellulitis of left leg  - CBC with platelets and differential    2. Deep vein thrombosis (DVT) (H)  - INR    3. Long term (current) use of anticoagulants  - INR    4. Dyspnea on exertion    5. ESRD (end stage renal disease) on dialysis (H)    Overall he is not improving since previous follow-up 6 days ago and despite 10 days of Keflex outpatient. I am concerned about his ongoing pain/swelling/redness of LLE and weakness/dizziness.  He is hypotensive.  We have called an ambulance and he will be transferred to [facility] for further evaluation.         No follow-ups on file.    Nguyen Torres NP  [facility]

## 2019-09-19 LAB — INR PPP: 7.5 (ref 0.9–1.1)

## 2019-09-20 LAB — INR PPP: 6.6 (ref 0.9–1.1)

## 2019-09-21 LAB — INR PPP: 5 (ref 0.9–1.1)

## 2019-09-22 LAB — INR PPP: 4.2 (ref 0.9–1.1)

## 2019-09-23 LAB — INR PPP: 2.9 (ref 0.9–1.1)

## 2019-09-24 ENCOUNTER — ANTICOAGULATION THERAPY VISIT (OUTPATIENT)
Dept: NURSING | Facility: CLINIC | Age: 69
End: 2019-09-24
Payer: MEDICARE

## 2019-09-24 DIAGNOSIS — I82.402 ACUTE DEEP VEIN THROMBOSIS (DVT) OF LEFT LOWER EXTREMITY, UNSPECIFIED VEIN (H): ICD-10-CM

## 2019-09-24 DIAGNOSIS — I48.91 ATRIAL FIBRILLATION STATUS POST CARDIOVERSION (H): ICD-10-CM

## 2019-09-24 DIAGNOSIS — Z79.01 LONG TERM (CURRENT) USE OF ANTICOAGULANTS: ICD-10-CM

## 2019-09-24 LAB — INR POINT OF CARE: 2.3 (ref 0.86–1.14)

## 2019-09-24 PROCEDURE — 36416 COLLJ CAPILLARY BLOOD SPEC: CPT

## 2019-09-24 PROCEDURE — 85610 PROTHROMBIN TIME: CPT | Mod: QW

## 2019-09-24 RX ORDER — PREDNISONE 20 MG/1
20 TABLET ORAL
COMMUNITY
Start: 2019-09-24 | End: 2019-09-30

## 2019-09-24 NOTE — PROGRESS NOTES
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  Was in hospital for cellulitis and elevated INR     Current concerns: no concerns, patient is feeling better  Cellulitis:  Completed antibiotics treatment. Feeling better. Pain is better in the left leg.   End stage renal disease :  - Likely secondary to Diabetic chronic kidney disease. He lost weight to be on the transplant list.   - HD MWF; dialyzed day of discharge 9/23   -had dialysis today no issues.      Atrial fibrillation  Currently on Warfarin - [id number] score:  3 ( age, DM, Vascular disease)  - Heart rate Well controlled on current home medications: propafenone   - Changes made during hospital stay: None    Hypertension  Sopped taking Amlodipine 3 weeks ago due to low blood pressure and feeling lightheaded.      Sleep apnea syndrome  - Chronic and stable  - Uses CPAP at home     Diabetes mellitus Type 2  Lab Results   Component Value Date    A1C 5.4 07/28/2019       Well controlled  Not on any medications   Hemoglobin A1C   Date Value Ref Range Status   07/28/2019 5.4 0 - 5.6 % Final     Comment:     Normal <5.7% Prediabetes 5.7-6.4%  Diabetes 6.5% or higher - adopted from ADA   consensus guidelines.           Interim Hx   09/01 -9/8/19 Admitted to [facility] for cellulitis and sepsis. Presented with  fever,  increased redness and tenderness of the left lower leg. Received  IV ceftriaxone, ID was consulted and he was switched to Cefazolin and Clindamycin stopped on 09/5, patient was discharged on oral Keflex renal dosing for 10 days. Blood culture was contaminant Coag neg staph .   09/18 patient was seen at [facility] for follow up. He was noted to hypotensive and was c/o dizziness and weakness. Patient was sent to ED by  ambulance and was admitted.   09/18-09/23 Admitted to [facility] for cellulitis and supra-therapeutic INR.   - CT left tibia/fibula without intravenous contrast.   1. Soft tissue edema, but no CT evidence of abscess, tenosynovitis, septic joint or osteomyelitis.  2. Extensive degenerative changes in the knee and ankle. There are also degenerative changes in the subtalar joints.  3. Osteochondral lesion in the medial corner of the talar dome with impaction of the subchondral bone plate.  4. Muscle atrophy.  5. Calcification in the region of the peroneal tendons consistent with long-standing tendinosis.  - Venous Duplex US showed:  1. Persistent nonocclusive deep vein thrombus involving the proximal and mid portions of the left femoral vein as well as the left popliteal vein, slightly improved to comparison.   2. Complex left-sided Baker's cyst  Vital signs on discharge form the hospital were stable.     Past Medical, social, family histories, medications, and allergies reviewed and updated  Past Medical History:   Diagnosis Date     Arthritis      Atrial fibrillation (H)      CKD (chronic kidney disease) stage 5, GFR less than 15 ml/min (H) 2017     DVT (deep venous thrombosis) (H) 2000     Gout      History of blood transfusion      HLD (hyperlipidemia)      HTN (hypertension)      Kidney stone 2016     Type 2 diabetes mellitus (H)      Past Surgical History:   Procedure Laterality Date     ANESTH,UPPER ARM AV FISTULA REPAIR Left 2018     APPENDECTOMY  1958     C REPAIR CRUCIATE LIGAMENT,KNEE  1976     CARDIOVERSION  2000     carpel tunnel surgery Left 2000     COLONOSCOPY       SINUS SURGERY  1997     Family History   Problem Relation Age of Onset     Cerebrovascular Disease Father         Had multiple strokes while in hospital when he passed        Allergies   Allergen Reactions     Penicillins      Social History     Socioeconomic History     Marital status:      Spouse name: Not on file     Number of children:  Not on file     Years of education: Not on file     Highest education level: Not on file   Occupational History     Not on file   Social Needs     Financial resource strain: Not on file     Food insecurity:     Worry: Not on file     Inability: Not on file     Transportation needs:     Medical: Not on file     Non-medical: Not on file   Tobacco Use     Smoking status: Former Smoker     Smokeless tobacco: Never Used   Substance and Sexual Activity     Alcohol use: Not Currently     Frequency: Never     Comment: Stopped when I was put on blood thinners     Drug use: Never     Sexual activity: Yes     Partners: Female     Birth control/protection: Post-menopausal   Lifestyle     Physical activity:     Days per week: Not on file     Minutes per session: Not on file     Stress: Not on file   Relationships     Social connections:     Talks on phone: Not on file     Gets together: Not on file     Attends Yazidi service: Not on file     Active member of club or organization: Not on file     Attends meetings of clubs or organizations: Not on file     Relationship status: Not on file     Intimate partner violence:     Fear of current or ex partner: Not on file     Emotionally abused: Not on file     Physically abused: Not on file     Forced sexual activity: Not on file   Other Topics Concern     Parent/sibling w/ CABG, MI or angioplasty before 65F 55M? No   Social History Narrative     Not on file       REVIEW OF SYSTEMS  Review of Systems   Constitutional: Negative for chills, diaphoresis, fever and weight loss.   HENT: Negative for hearing loss and tinnitus.    Eyes: Negative for blurred vision, double vision and pain.   Respiratory: Negative for cough, hemoptysis and sputum production.    Cardiovascular: Negative for chest pain, palpitations, orthopnea and claudication.   Gastrointestinal: Negative for abdominal pain, heartburn, nausea and vomiting.   Musculoskeletal:        Left leg pain    Skin: Negative for itching  and rash.   Neurological: Negative for dizziness, tingling and headaches.   Endo/Heme/Allergies: Negative.    Psychiatric/Behavioral: Negative for depression.       OBJECTIVE:  /60   Temp 97  F (36.1  C) (Oral)   Wt 127 kg (280 lb)   BMI 39.05 kg/m        Physical Exam  Constitutional:       General: He is not in acute distress.     Appearance: He is not ill-appearing, toxic-appearing or diaphoretic.   HENT:      Head: Normocephalic and atraumatic.      Nose: Nose normal. No congestion.      Mouth/Throat:      Mouth: Mucous membranes are moist.   Eyes:      Pupils: Pupils are equal, round, and reactive to light.   Neck:      Musculoskeletal: Normal range of motion and neck supple. No neck rigidity.   Cardiovascular:      Rate and Rhythm: Normal rate and regular rhythm.      Pulses: Normal pulses.      Heart sounds: No murmur. No gallop.    Pulmonary:      Effort: Pulmonary effort is normal. No respiratory distress.      Breath sounds: No wheezing or rales.   Musculoskeletal:        Legs:    Neurological:      Mental Status: He is alert.         Leif was seen today for hospital f/u and health maintenance.    Diagnoses and all orders for this visit:    Hospital discharge follow-up  09/18-09/23 Admitted to [facility] for cellulitis and supra-therapeutic INR.   DORI (obstructive sleep apnea)  -     SLEEP EVALUATION & MANAGEMENT REFERRAL - [facility] [place]  [phone] (Age 15 and up); Future  Patient with DORI. Has old machine 10 years old. Referred him to sleep medicine for new machine and nasal mask recommended to use CPAP all the time even with naps  Cellulitis of left lower extremity  Completed antibiotics treatment. Feeling better. Pain is better in the left leg.advised leg elevation. Follow up as needed  Atrial fibrillation status post cardioversion (H)  Stable. Taking propafenone, Warfarin. Follow up with coumadin clinic. INR today     Patient verbalized understanding and agreed  on the plan of care. All questions answered.

## 2019-09-24 NOTE — PROGRESS NOTES
ANTICOAGULATION FOLLOW-UP CLINIC VISIT    Patient Name:  Leif Ariza  Date:  2019  Contact Type:  Face to Face    SUBJECTIVE:  Patient Findings     Positives:   Change in health, Missed doses, Change in medications, Hospital admission    Comments:   Hospital  - for leg pain, severe DJD/gout. Warfarin held until yesterday due to supra INR. Took warfarin 2.5 mg yesterday. Feeling much better. Using tylenol now for leg and ankle pain. On prednisone for 5 days.  Continues on dialysis M,W,F. Appointment tomorrow with provider for hospital follow up and will check INR again tomorrow.         Clinical Outcomes     Comments:   Hospital  - for leg pain, severe DJD/gout. Warfarin held until yesterday due to supra INR. Took warfarin 2.5 mg yesterday. Feeling much better. Using tylenol now for leg and ankle pain. On prednisone for 5 days.  Continues on dialysis M,W,F. Appointment tomorrow with provider for hospital follow up and will check INR again tomorrow.            OBJECTIVE    INR Protime   Date Value Ref Range Status   2019 2.3 (A) 0.86 - 1.14 Final       ASSESSMENT / PLAN  INR assessment THER    Recheck INR In: 1 DAY    INR Location Clinic      Anticoagulation Summary  As of 2019    INR goal:   2.0-3.0   TTR:   53.8 % (2 d)   INR used for dosin.3 (2019)   Warfarin maintenance plan:   No maintenance plan   Full warfarin instructions:   : 2.5 mg   Plan last modified:   Oma Taylor RN (2019)   Next INR check:   2019   Target end date:       Indications    Atrial fibrillation status post cardioversion (H) [I48.91]  Deep vein thrombosis (DVT) (H) [I82.409]  Long term (current) use of anticoagulants [Z79.01]             Anticoagulation Episode Summary     INR check location:       Preferred lab:       Send INR reminders to:   PEARL CORNEJO    Comments:   Has Dialysis  and Friday.      Anticoagulation Care Providers     Provider Role  Specialty Phone number    Nguyen Torres, NP Referring  [phone]            See the Encounter Report to view Anticoagulation Flowsheet and Dosing Calendar (Go to Encounters tab in chart review, and find the Anticoagulation Therapy Visit)        Oma Taylor RN

## 2019-09-25 ENCOUNTER — ANTICOAGULATION THERAPY VISIT (OUTPATIENT)
Dept: NURSING | Facility: CLINIC | Age: 69
End: 2019-09-25
Payer: MEDICARE

## 2019-09-25 ENCOUNTER — OFFICE VISIT (OUTPATIENT)
Dept: FAMILY MEDICINE | Facility: CLINIC | Age: 69
End: 2019-09-25
Payer: MEDICARE

## 2019-09-25 VITALS
TEMPERATURE: 97 F | BODY MASS INDEX: 39.05 KG/M2 | WEIGHT: 280 LBS | SYSTOLIC BLOOD PRESSURE: 110 MMHG | DIASTOLIC BLOOD PRESSURE: 60 MMHG

## 2019-09-25 DIAGNOSIS — I82.402 ACUTE DEEP VEIN THROMBOSIS (DVT) OF LEFT LOWER EXTREMITY, UNSPECIFIED VEIN (H): ICD-10-CM

## 2019-09-25 DIAGNOSIS — I48.91 ATRIAL FIBRILLATION STATUS POST CARDIOVERSION (H): ICD-10-CM

## 2019-09-25 DIAGNOSIS — Z79.01 LONG TERM (CURRENT) USE OF ANTICOAGULANTS: ICD-10-CM

## 2019-09-25 DIAGNOSIS — G47.33 OSA (OBSTRUCTIVE SLEEP APNEA): ICD-10-CM

## 2019-09-25 DIAGNOSIS — Z09 HOSPITAL DISCHARGE FOLLOW-UP: Primary | ICD-10-CM

## 2019-09-25 DIAGNOSIS — L03.116 CELLULITIS OF LEFT LOWER EXTREMITY: ICD-10-CM

## 2019-09-25 LAB — INR POINT OF CARE: 2.9 (ref 0.86–1.14)

## 2019-09-25 PROCEDURE — 99495 TRANSJ CARE MGMT MOD F2F 14D: CPT | Performed by: FAMILY MEDICINE

## 2019-09-25 PROCEDURE — 36416 COLLJ CAPILLARY BLOOD SPEC: CPT

## 2019-09-25 PROCEDURE — 85610 PROTHROMBIN TIME: CPT | Mod: QW

## 2019-09-25 ASSESSMENT — ENCOUNTER SYMPTOMS
HEADACHES: 0
VOMITING: 0
BLURRED VISION: 0
HEARTBURN: 0
NAUSEA: 0
EYE PAIN: 0
CLAUDICATION: 0
ORTHOPNEA: 0
FEVER: 0
COUGH: 0
ABDOMINAL PAIN: 0
DOUBLE VISION: 0
DIZZINESS: 0
DEPRESSION: 0
CHILLS: 0
DIAPHORESIS: 0
HEMOPTYSIS: 0
WEIGHT LOSS: 0
PALPITATIONS: 0
TINGLING: 0
SPUTUM PRODUCTION: 0

## 2019-09-25 NOTE — PROGRESS NOTES
ANTICOAGULATION FOLLOW-UP CLINIC VISIT    Patient Name:  Leif Ariza  Date:  2019  Contact Type:  Face to Face    SUBJECTIVE:  Patient Findings     Comments:   Seen by provider today for hospital follow up. Continues on prednisone for 3 more days. Feeling better every day. Had dialysis this morning.  INR 2.9, up from 2.3 yesterday. No change in plan of care. Recheck INR tomorrow.         Clinical Outcomes     Comments:   Seen by provider today for hospital follow up. Continues on prednisone for 3 more days. Feeling better every day. Had dialysis this morning.  INR 2.9, up from 2.3 yesterday. No change in plan of care. Recheck INR tomorrow.            OBJECTIVE    INR Protime   Date Value Ref Range Status   2019 2.9 (A) 0.86 - 1.14 Final       ASSESSMENT / PLAN  INR assessment THER    Recheck INR In: 1 DAY    INR Location Clinic      Anticoagulation Summary  As of 2019    INR goal:   2.0-3.0   TTR:   69.2 % (3 d)   INR used for dosin.9 (2019)   Warfarin maintenance plan:   No maintenance plan   Full warfarin instructions:   : 2.5 mg   Plan last modified:   Oma Taylor RN (2019)   Next INR check:   2019   Target end date:       Indications    Atrial fibrillation status post cardioversion (H) [I48.91]  Deep vein thrombosis (DVT) (H) [I82.409]  Long term (current) use of anticoagulants [Z79.01]             Anticoagulation Episode Summary     INR check location:       Preferred lab:       Send INR reminders to:   PEARL CORNEJO    Comments:   Has Dialysis  and Friday.      Anticoagulation Care Providers     Provider Role Specialty Phone number    Nguyen Torres, NP Referring  [phone]            See the Encounter Report to view Anticoagulation Flowsheet and Dosing Calendar (Go to Encounters tab in chart review, and find the Anticoagulation Therapy Visit)        Oma Taylor RN

## 2019-09-26 ENCOUNTER — ANTICOAGULATION THERAPY VISIT (OUTPATIENT)
Dept: NURSING | Facility: CLINIC | Age: 69
End: 2019-09-26
Payer: MEDICARE

## 2019-09-26 DIAGNOSIS — I48.91 ATRIAL FIBRILLATION STATUS POST CARDIOVERSION (H): ICD-10-CM

## 2019-09-26 DIAGNOSIS — I82.402 ACUTE DEEP VEIN THROMBOSIS (DVT) OF LEFT LOWER EXTREMITY, UNSPECIFIED VEIN (H): ICD-10-CM

## 2019-09-26 DIAGNOSIS — Z79.01 LONG TERM (CURRENT) USE OF ANTICOAGULANTS: ICD-10-CM

## 2019-09-26 LAB — INR POINT OF CARE: 3.1 (ref 0.86–1.14)

## 2019-09-26 PROCEDURE — 85610 PROTHROMBIN TIME: CPT | Mod: QW

## 2019-09-26 PROCEDURE — 36416 COLLJ CAPILLARY BLOOD SPEC: CPT

## 2019-09-26 RX ORDER — WARFARIN SODIUM 2 MG/1
2 TABLET ORAL DAILY
Qty: 60 TABLET | Refills: 0 | Status: SHIPPED | OUTPATIENT
Start: 2019-09-26 | End: 2019-10-02

## 2019-09-26 NOTE — PROGRESS NOTES
ANTICOAGULATION FOLLOW-UP CLINIC VISIT    Patient Name:  Leif Ariza  Date:  9/26/2019  Contact Type:  Face to Face    SUBJECTIVE:  Patient Findings     Comments:   Trending up. New rx sent to pharmacy for 2 mg tablets. Recheck tomorrow.        Clinical Outcomes     Comments:   Trending up. New rx sent to pharmacy for 2 mg tablets. Recheck tomorrow.           OBJECTIVE    INR Protime   Date Value Ref Range Status   09/26/2019 3.1 (A) 0.86 - 1.14 Final       ASSESSMENT / PLAN  INR assessment SUPRA    Recheck INR In: 1 DAY    INR Location Clinic      Anticoagulation Summary  As of 9/26/2019    INR goal:   2.0-3.0   TTR:   64.4 % (4 d)   INR used for dosing:   3.1! (9/26/2019)   Warfarin maintenance plan:   No maintenance plan   Full warfarin instructions:   9/26: 2 mg   Plan last modified:   Oma Taylor RN (9/26/2019)   Next INR check:   9/27/2019   Target end date:       Indications    Atrial fibrillation status post cardioversion (H) [I48.91]  Deep vein thrombosis (DVT) (H) [I82.409]  Long term (current) use of anticoagulants [Z79.01]             Anticoagulation Episode Summary     INR check location:       Preferred lab:       Send INR reminders to:   PEARL CORNEJO    Comments:   Has Dialysis Monday Wednesday and Friday.      Anticoagulation Care Providers     Provider Role Specialty Phone number    Nguyen Torres, NP Referring  [phone]            See the Encounter Report to view Anticoagulation Flowsheet and Dosing Calendar (Go to Encounters tab in chart review, and find the Anticoagulation Therapy Visit)    Dosage adjustment made based on physician directed care plan.    Oma Taylor RN

## 2019-09-27 ENCOUNTER — ANTICOAGULATION THERAPY VISIT (OUTPATIENT)
Dept: NURSING | Facility: CLINIC | Age: 69
End: 2019-09-27
Payer: MEDICARE

## 2019-09-27 DIAGNOSIS — Z79.01 LONG TERM (CURRENT) USE OF ANTICOAGULANTS: ICD-10-CM

## 2019-09-27 DIAGNOSIS — I48.91 ATRIAL FIBRILLATION STATUS POST CARDIOVERSION (H): ICD-10-CM

## 2019-09-27 DIAGNOSIS — I82.402 ACUTE DEEP VEIN THROMBOSIS (DVT) OF LEFT LOWER EXTREMITY, UNSPECIFIED VEIN (H): ICD-10-CM

## 2019-09-27 LAB — INR POINT OF CARE: 3.3 (ref 0.86–1.14)

## 2019-09-27 PROCEDURE — 36416 COLLJ CAPILLARY BLOOD SPEC: CPT

## 2019-09-27 PROCEDURE — 85610 PROTHROMBIN TIME: CPT | Mod: QW

## 2019-09-27 NOTE — PROGRESS NOTES
ANTICOAGULATION FOLLOW-UP CLINIC VISIT    Patient Name:  Leif Ariza  Date:  9/27/2019  Contact Type:  Face to Face    SUBJECTIVE:  Patient Findings     Comments:   No new concerns. Had dialysis this morning. INR 3.3. no bleeding or bruising. Recheck on Monday.         Clinical Outcomes     Comments:   No new concerns. Had dialysis this morning. INR 3.3. no bleeding or bruising. Recheck on Monday.            OBJECTIVE    INR Protime   Date Value Ref Range Status   09/27/2019 3.3 (A) 0.86 - 1.14 Final       ASSESSMENT / PLAN  INR assessment SUPRA    Recheck INR In: 3 DAYS    INR Location Clinic      Anticoagulation Summary  As of 9/27/2019    INR goal:   2.0-3.0   TTR:   51.5 % (5 d)   INR used for dosing:   3.3! (9/27/2019)   Warfarin maintenance plan:   No maintenance plan   Full warfarin instructions:   9/27: 1 mg; 9/28: 1 mg; 9/29: 2 mg   Plan last modified:   Oma Taylor RN (9/26/2019)   Next INR check:   9/30/2019   Target end date:       Indications    Atrial fibrillation status post cardioversion (H) [I48.91]  Deep vein thrombosis (DVT) (H) [I82.409]  Long term (current) use of anticoagulants [Z79.01]             Anticoagulation Episode Summary     INR check location:       Preferred lab:       Send INR reminders to:   PEARL CORNEJO    Comments:   Has Dialysis Monday Wednesday and Friday.      Anticoagulation Care Providers     Provider Role Specialty Phone number    TorresNguyen eid, NP Referring  [phone]            See the Encounter Report to view Anticoagulation Flowsheet and Dosing Calendar (Go to Encounters tab in chart review, and find the Anticoagulation Therapy Visit)    Dosage adjustment made based on physician directed care plan.    Oma Taylor RN

## 2019-09-30 ENCOUNTER — ANTICOAGULATION THERAPY VISIT (OUTPATIENT)
Dept: NURSING | Facility: CLINIC | Age: 69
End: 2019-09-30
Payer: MEDICARE

## 2019-09-30 DIAGNOSIS — Z79.01 LONG TERM (CURRENT) USE OF ANTICOAGULANTS: ICD-10-CM

## 2019-09-30 DIAGNOSIS — I82.402 ACUTE DEEP VEIN THROMBOSIS (DVT) OF LEFT LOWER EXTREMITY, UNSPECIFIED VEIN (H): ICD-10-CM

## 2019-09-30 DIAGNOSIS — I48.91 ATRIAL FIBRILLATION STATUS POST CARDIOVERSION (H): ICD-10-CM

## 2019-09-30 LAB — INR POINT OF CARE: 3.9 (ref 0.86–1.14)

## 2019-09-30 PROCEDURE — 85610 PROTHROMBIN TIME: CPT | Mod: QW

## 2019-09-30 PROCEDURE — 36416 COLLJ CAPILLARY BLOOD SPEC: CPT

## 2019-09-30 NOTE — PROGRESS NOTES
ANTICOAGULATION FOLLOW-UP CLINIC VISIT    Patient Name:  Leif Ariza  Date:  9/30/2019  Contact Type:  Face to Face    SUBJECTIVE:  Patient Findings     Comments:   Trending up. No change in diet. Dialysis today. Off prednisone. Recheck in 2 days.         Clinical Outcomes     Comments:   Trending up. No change in diet. Dialysis today. Off prednisone. Recheck in 2 days.            OBJECTIVE    INR Protime   Date Value Ref Range Status   09/30/2019 3.9 (A) 0.86 - 1.14 Final       ASSESSMENT / PLAN  INR assessment SUPRA    Recheck INR In: 2 DAYS    INR Location Clinic      Anticoagulation Summary  As of 9/30/2019    INR goal:   2.0-3.0   TTR:   32.2 % (1.1 wk)   INR used for dosing:   3.9! (9/30/2019)   Warfarin maintenance plan:   No maintenance plan   Full warfarin instructions:   9/30: Hold; 10/1: 1 mg   Plan last modified:   Oma Taylor RN (9/26/2019)   Next INR check:   10/2/2019   Target end date:       Indications    Atrial fibrillation status post cardioversion (H) [I48.91]  Deep vein thrombosis (DVT) (H) [I82.409]  Long term (current) use of anticoagulants [Z79.01]             Anticoagulation Episode Summary     INR check location:       Preferred lab:       Send INR reminders to:   PEARL CORNEJO    Comments:   Has Dialysis Monday Wednesday and Friday.      Anticoagulation Care Providers     Provider Role Specialty Phone number    Nguyen Torres NP Referring  [phone]            See the Encounter Report to view Anticoagulation Flowsheet and Dosing Calendar (Go to Encounters tab in chart review, and find the Anticoagulation Therapy Visit)        Oma Taylor RN

## 2019-10-02 ENCOUNTER — ANTICOAGULATION THERAPY VISIT (OUTPATIENT)
Dept: NURSING | Facility: CLINIC | Age: 69
End: 2019-10-02
Payer: MEDICARE

## 2019-10-02 DIAGNOSIS — I48.91 ATRIAL FIBRILLATION STATUS POST CARDIOVERSION (H): ICD-10-CM

## 2019-10-02 DIAGNOSIS — Z79.01 LONG TERM (CURRENT) USE OF ANTICOAGULANTS: ICD-10-CM

## 2019-10-02 DIAGNOSIS — I82.402 ACUTE DEEP VEIN THROMBOSIS (DVT) OF LEFT LOWER EXTREMITY, UNSPECIFIED VEIN (H): ICD-10-CM

## 2019-10-02 LAB — INR POINT OF CARE: 2.4 (ref 0.86–1.14)

## 2019-10-02 PROCEDURE — 36416 COLLJ CAPILLARY BLOOD SPEC: CPT

## 2019-10-02 PROCEDURE — 85610 PROTHROMBIN TIME: CPT | Mod: QW

## 2019-10-02 RX ORDER — WARFARIN SODIUM 2 MG/1
TABLET ORAL
Qty: 60 TABLET | Refills: 0
Start: 2019-10-02 | End: 2019-10-18

## 2019-10-02 NOTE — PROGRESS NOTES
ANTICOAGULATION FOLLOW-UP CLINIC VISIT    Patient Name:  Leif Ariza  Date:  10/2/2019  Contact Type:  Face to Face    SUBJECTIVE:  Patient Findings     Comments:   Therapeutic. No new concerns. Had dialysis today. Had 9.5 mg warfarin in past 7 days. Using 2 mg tablets . Will dose weekly at 10 mg /wk and recheck in 5 days. Prefers to come in afternoon on dialysis days. Prefer to keep INR done after dialysis.         Clinical Outcomes     Comments:   Therapeutic. No new concerns. Had dialysis today. Had 9.5 mg warfarin in past 7 days. Using 2 mg tablets . Will dose weekly at 10 mg /wk and recheck in 5 days. Prefers to come in afternoon on dialysis days. Prefer to keep INR done after dialysis.            OBJECTIVE    INR Protime   Date Value Ref Range Status   10/02/2019 2.4 (A) 0.86 - 1.14 Final       ASSESSMENT / PLAN  INR assessment THER    Recheck INR In: 5 DAYS    INR Location Clinic      Anticoagulation Summary  As of 10/2/2019    INR goal:   2.0-3.0   TTR:   33.8 % (1.4 wk)   INR used for dosin.4 (10/2/2019)   Warfarin maintenance plan:   2 mg (2 mg x 1) every Mon, Wed, Fri; 1 mg (2 mg x 0.5) all other days   Full warfarin instructions:   2 mg every Mon, Wed, Fri; 1 mg all other days   Weekly warfarin total:   10 mg   Plan last modified:   Oma Taylor RN (10/2/2019)   Next INR check:   10/7/2019   Target end date:       Indications    Atrial fibrillation status post cardioversion (H) [I48.91]  Deep vein thrombosis (DVT) (H) [I82.409]  Long term (current) use of anticoagulants [Z79.01]             Anticoagulation Episode Summary     INR check location:       Preferred lab:       Send INR reminders to:   PEARL CORNEJO    Comments:   Has Dialysis  and Friday.      Anticoagulation Care Providers     Provider Role Specialty Phone number    Nguyen Torres NP Referring  [phone]            See the Encounter Report to view Anticoagulation Flowsheet and Dosing Calendar (Go to  Encounters tab in chart review, and find the Anticoagulation Therapy Visit)    Dosage adjustment made based on physician directed care plan.    Oma Taylor RN

## 2019-10-03 ENCOUNTER — TRANSFERRED RECORDS (OUTPATIENT)
Dept: HEALTH INFORMATION MANAGEMENT | Facility: CLINIC | Age: 69
End: 2019-10-03

## 2019-10-07 ENCOUNTER — ANTICOAGULATION THERAPY VISIT (OUTPATIENT)
Dept: NURSING | Facility: CLINIC | Age: 69
End: 2019-10-07
Payer: MEDICARE

## 2019-10-07 DIAGNOSIS — Z79.01 LONG TERM (CURRENT) USE OF ANTICOAGULANTS: ICD-10-CM

## 2019-10-07 DIAGNOSIS — I82.402 ACUTE DEEP VEIN THROMBOSIS (DVT) OF LEFT LOWER EXTREMITY, UNSPECIFIED VEIN (H): ICD-10-CM

## 2019-10-07 DIAGNOSIS — I48.91 ATRIAL FIBRILLATION STATUS POST CARDIOVERSION (H): ICD-10-CM

## 2019-10-07 LAB — INR POINT OF CARE: 1.7 (ref 0.86–1.14)

## 2019-10-07 PROCEDURE — 85610 PROTHROMBIN TIME: CPT | Mod: QW

## 2019-10-07 PROCEDURE — 36416 COLLJ CAPILLARY BLOOD SPEC: CPT

## 2019-10-07 PROCEDURE — 99207 ZZC NO CHARGE NURSE ONLY: CPT

## 2019-10-07 NOTE — PROGRESS NOTES
ANTICOAGULATION FOLLOW-UP CLINIC VISIT    Patient Name:  Leif Ariza  Date:  10/7/2019  Contact Type:  Face to Face    SUBJECTIVE:  Patient Findings     Comments:   Patient had dialysis this morning. Has not taken his Warfarin yet today    Sonya COBURN, RN, CPN          Clinical Outcomes     Comments:   Patient had dialysis this morning. Has not taken his Warfarin yet today    Sonya COBURN, RN, CPN             OBJECTIVE    INR Protime   Date Value Ref Range Status   10/07/2019 1.7 (A) 0.86 - 1.14 Final       ASSESSMENT / PLAN  INR assessment SUB    Recheck INR In: 4 DAYS    INR Location Clinic      Anticoagulation Summary  As of 10/7/2019    INR goal:   2.0-3.0   TTR:   41.6 % (2.1 wk)   INR used for dosin.7! (10/7/2019)   Warfarin maintenance plan:   2 mg (2 mg x 1) every Mon, Wed, Fri; 1 mg (2 mg x 0.5) all other days   Full warfarin instructions:   10/7: 3 mg; Otherwise 2 mg every Mon, Wed, Fri; 1 mg all other days   Weekly warfarin total:   10 mg   Plan last modified:   Oma Taylor RN (10/2/2019)   Next INR check:   10/11/2019   Target end date:       Indications    Atrial fibrillation status post cardioversion (H) [I48.91]  Deep vein thrombosis (DVT) (H) [I82.409]  Long term (current) use of anticoagulants [Z79.01]             Anticoagulation Episode Summary     INR check location:       Preferred lab:       Send INR reminders to:   PEARL CORNEJO    Comments:   Has Dialysis  and Friday.      Anticoagulation Care Providers     Provider Role Specialty Phone number    Nguyen Torres NP Referring  [phone]            See the Encounter Report to view Anticoagulation Flowsheet and Dosing Calendar (Go to Encounters tab in chart review, and find the Anticoagulation Therapy Visit)        Sonya Lopez RN

## 2019-10-08 ENCOUNTER — TRANSFERRED RECORDS (OUTPATIENT)
Dept: HEALTH INFORMATION MANAGEMENT | Facility: CLINIC | Age: 69
End: 2019-10-08

## 2019-10-11 ENCOUNTER — ANTICOAGULATION THERAPY VISIT (OUTPATIENT)
Dept: NURSING | Facility: CLINIC | Age: 69
End: 2019-10-11
Payer: MEDICARE

## 2019-10-11 DIAGNOSIS — I82.402 ACUTE DEEP VEIN THROMBOSIS (DVT) OF LEFT LOWER EXTREMITY, UNSPECIFIED VEIN (H): ICD-10-CM

## 2019-10-11 DIAGNOSIS — Z79.01 LONG TERM (CURRENT) USE OF ANTICOAGULANTS: ICD-10-CM

## 2019-10-11 DIAGNOSIS — I48.91 ATRIAL FIBRILLATION STATUS POST CARDIOVERSION (H): ICD-10-CM

## 2019-10-11 LAB — INR POINT OF CARE: 1.4 (ref 0.86–1.14)

## 2019-10-11 PROCEDURE — 36416 COLLJ CAPILLARY BLOOD SPEC: CPT

## 2019-10-11 PROCEDURE — 99207 ZZC NO CHARGE NURSE ONLY: CPT

## 2019-10-11 PROCEDURE — 85610 PROTHROMBIN TIME: CPT | Mod: QW

## 2019-10-11 NOTE — PROGRESS NOTES
ANTICOAGULATION FOLLOW-UP CLINIC VISIT    Patient Name:  Leif Ariza  Date:  10/11/2019  Contact Type:  Face to Face    SUBJECTIVE:  Patient Findings     Comments:   Dialysis this morning. INR sub . Was sub on 4 days ago and dose was increased. No missed dose and no other changes.         Clinical Outcomes     Comments:   Dialysis this morning. INR sub . Was sub on 4 days ago and dose was increased. No missed dose and no other changes.            OBJECTIVE    INR Protime   Date Value Ref Range Status   10/11/2019 1.4 (A) 0.86 - 1.14 Final       ASSESSMENT / PLAN  INR assessment SUB    Recheck INR In: 3 DAYS    INR Location Clinic      Anticoagulation Summary  As of 10/11/2019    INR goal:   2.0-3.0   TTR:   32.8 % (2.7 wk)   INR used for dosin.4! (10/11/2019)   Warfarin maintenance plan:   2 mg (2 mg x 1) every Mon, Wed; 4 mg (2 mg x 2) every Fri, Sat; 1 mg (2 mg x 0.5) all other days   Full warfarin instructions:   10/12: 2 mg; Otherwise 2 mg every Mon, Wed; 4 mg every Fri, Sat; 1 mg all other days   Weekly warfarin total:   15 mg   Plan last modified:   Oma Taylor RN (10/11/2019)   Next INR check:   10/14/2019   Target end date:       Indications    Atrial fibrillation status post cardioversion (H) [I48.91]  Deep vein thrombosis (DVT) (H) [I82.409]  Long term (current) use of anticoagulants [Z79.01]             Anticoagulation Episode Summary     INR check location:       Preferred lab:       Send INR reminders to:   PEARL CORNEJO    Comments:   Has Dialysis  and Friday.      Anticoagulation Care Providers     Provider Role Specialty Phone number    TorresNguyen eid NP Referring Nurse Practitioner - Family [phone]            See the Encounter Report to view Anticoagulation Flowsheet and Dosing Calendar (Go to Encounters tab in chart review, and find the Anticoagulation Therapy Visit)        Oma Taylor RN

## 2019-10-14 ENCOUNTER — ANTICOAGULATION THERAPY VISIT (OUTPATIENT)
Dept: NURSING | Facility: CLINIC | Age: 69
End: 2019-10-14
Payer: MEDICARE

## 2019-10-14 DIAGNOSIS — I48.91 ATRIAL FIBRILLATION STATUS POST CARDIOVERSION (H): ICD-10-CM

## 2019-10-14 DIAGNOSIS — Z79.01 LONG TERM (CURRENT) USE OF ANTICOAGULANTS: ICD-10-CM

## 2019-10-14 DIAGNOSIS — I82.402 ACUTE DEEP VEIN THROMBOSIS (DVT) OF LEFT LOWER EXTREMITY, UNSPECIFIED VEIN (H): ICD-10-CM

## 2019-10-14 LAB — INR POINT OF CARE: 1.3 (ref 0.86–1.14)

## 2019-10-14 PROCEDURE — 85610 PROTHROMBIN TIME: CPT | Mod: QW

## 2019-10-14 PROCEDURE — 99207 ZZC NO CHARGE NURSE ONLY: CPT

## 2019-10-14 PROCEDURE — 36416 COLLJ CAPILLARY BLOOD SPEC: CPT

## 2019-10-14 NOTE — PROGRESS NOTES
ANTICOAGULATION FOLLOW-UP CLINIC VISIT    Patient Name:  Leif Ariza  Date:  10/14/2019  Contact Type:  Face to Face    SUBJECTIVE:  Patient Findings     Comments:   Had dialysis this am. No missed dose. Trending down with dose increase. States only change is using glucosamine bioflex supplement occasionally instead of daily. Bump dose today and tomorrow.        Clinical Outcomes     Comments:   Had dialysis this am. No missed dose. Trending down with dose increase. States only change is using glucosamine bioflex supplement occasionally instead of daily. Bump dose today and tomorrow.           OBJECTIVE    INR Protime   Date Value Ref Range Status   10/14/2019 1.3 (A) 0.86 - 1.14 Final       ASSESSMENT / PLAN  INR assessment SUB    Recheck INR In: 2 DAYS    INR Location Clinic      Anticoagulation Summary  As of 10/14/2019    INR goal:   2.0-3.0   TTR:   28.3 % (3.1 wk)   INR used for dosin.3! (10/14/2019)   Warfarin maintenance plan:   2 mg (2 mg x 1) every Mon, Wed; 4 mg (2 mg x 2) every Fri, Sat; 1 mg (2 mg x 0.5) all other days   Full warfarin instructions:   10/14: 4 mg; 10/15: 4 mg; Otherwise 2 mg every Mon, Wed; 4 mg every Fri, Sat; 1 mg all other days   Weekly warfarin total:   15 mg   Plan last modified:   Oma Taylor RN (10/11/2019)   Next INR check:   10/16/2019   Target end date:       Indications    Atrial fibrillation status post cardioversion (H) [I48.91]  Deep vein thrombosis (DVT) (H) [I82.409]  Long term (current) use of anticoagulants [Z79.01]             Anticoagulation Episode Summary     INR check location:       Preferred lab:       Send INR reminders to:   PEARL CORNEJO    Comments:   Has Dialysis  and Friday.      Anticoagulation Care Providers     Provider Role Specialty Phone number    Nguyen Torres, NP Referring Nurse Practitioner - Family [phone]            See the Encounter Report to view Anticoagulation Flowsheet and Dosing Calendar (Go to  Encounters tab in chart review, and find the Anticoagulation Therapy Visit)        Oma Taylor RN

## 2019-10-16 ENCOUNTER — ANTICOAGULATION THERAPY VISIT (OUTPATIENT)
Dept: NURSING | Facility: CLINIC | Age: 69
End: 2019-10-16
Payer: MEDICARE

## 2019-10-16 DIAGNOSIS — I82.402 ACUTE DEEP VEIN THROMBOSIS (DVT) OF LEFT LOWER EXTREMITY, UNSPECIFIED VEIN (H): ICD-10-CM

## 2019-10-16 DIAGNOSIS — Z79.01 LONG TERM (CURRENT) USE OF ANTICOAGULANTS: ICD-10-CM

## 2019-10-16 DIAGNOSIS — I48.91 ATRIAL FIBRILLATION STATUS POST CARDIOVERSION (H): ICD-10-CM

## 2019-10-16 LAB — INR POINT OF CARE: 1.4 (ref 0.86–1.14)

## 2019-10-16 PROCEDURE — 36416 COLLJ CAPILLARY BLOOD SPEC: CPT

## 2019-10-16 PROCEDURE — 85610 PROTHROMBIN TIME: CPT | Mod: QW

## 2019-10-16 NOTE — PROGRESS NOTES
ANTICOAGULATION FOLLOW-UP CLINIC VISIT    Patient Name:  Leif Ariza  Date:  10/16/2019  Contact Type:  Face to Face    SUBJECTIVE:  Patient Findings     Comments:   Had dialysis today. States dialysis was stopped after 1 hour due to infiltrate.  Plans to have next dialysis on Friday. No other change or concern. Still sub with dose increase. Bump again and recheck in 2 days.         Clinical Outcomes     Comments:   Had dialysis today. States dialysis was stopped after 1 hour due to infiltrate.  Plans to have next dialysis on Friday. No other change or concern. Still sub with dose increase. Bump again and recheck in 2 days.            OBJECTIVE    INR Protime   Date Value Ref Range Status   10/16/2019 1.4 (A) 0.86 - 1.14 Final       ASSESSMENT / PLAN  INR assessment SUB    Recheck INR In: 2 DAYS    INR Location Clinic      Anticoagulation Summary  As of 10/16/2019    INR goal:   2.0-3.0   TTR:   26.0 % (3.4 wk)   INR used for dosin.4! (10/16/2019)   Warfarin maintenance plan:   No maintenance plan   Full warfarin instructions:   10/16: 6 mg; 10/17: 6 mg   Plan last modified:   Oma Taylor RN (10/16/2019)   Next INR check:   10/18/2019   Target end date:       Indications    Atrial fibrillation status post cardioversion (H) [I48.91]  Deep vein thrombosis (DVT) (H) [I82.409]  Long term (current) use of anticoagulants [Z79.01]             Anticoagulation Episode Summary     INR check location:       Preferred lab:       Send INR reminders to:   PEARL CORNEJO    Comments:   Has Dialysis  and Friday.      Anticoagulation Care Providers     Provider Role Specialty Phone number    Nguyen Torres, NP Referring Nurse Practitioner - Family [phone]            See the Encounter Report to view Anticoagulation Flowsheet and Dosing Calendar (Go to Encounters tab in chart review, and find the Anticoagulation Therapy Visit)        Oma Taylor RN

## 2019-10-18 ENCOUNTER — ANTICOAGULATION THERAPY VISIT (OUTPATIENT)
Dept: NURSING | Facility: CLINIC | Age: 69
End: 2019-10-18
Payer: MEDICARE

## 2019-10-18 DIAGNOSIS — I48.91 ATRIAL FIBRILLATION STATUS POST CARDIOVERSION (H): ICD-10-CM

## 2019-10-18 DIAGNOSIS — Z79.01 LONG TERM (CURRENT) USE OF ANTICOAGULANTS: ICD-10-CM

## 2019-10-18 DIAGNOSIS — I82.402 ACUTE DEEP VEIN THROMBOSIS (DVT) OF LEFT LOWER EXTREMITY, UNSPECIFIED VEIN (H): ICD-10-CM

## 2019-10-18 LAB — INR POINT OF CARE: 2.3 (ref 0.86–1.14)

## 2019-10-18 PROCEDURE — 36416 COLLJ CAPILLARY BLOOD SPEC: CPT

## 2019-10-18 PROCEDURE — 85610 PROTHROMBIN TIME: CPT | Mod: QW

## 2019-10-18 RX ORDER — WARFARIN SODIUM 2 MG/1
TABLET ORAL
Qty: 60 TABLET | Refills: 0
Start: 2019-10-18 | End: 2019-11-05

## 2019-10-18 NOTE — PROGRESS NOTES
ANTICOAGULATION FOLLOW-UP CLINIC VISIT    Patient Name:  Leif Ariza  Date:  10/18/2019  Contact Type:  Face to Face    SUBJECTIVE:  Patient Findings     Comments:   therapeutic with dose increase. Had dialysis again this morning. No concerns today. Weekly dose adjusted and recheck in 3 days.         Clinical Outcomes     Negatives:   Major bleeding event, Thromboembolic event, Anticoagulation-related hospital admission, Anticoagulation-related ED visit, Anticoagulation-related fatality    Comments:   therapeutic with dose increase. Had dialysis again this morning. No concerns today. Weekly dose adjusted and recheck in 3 days.            OBJECTIVE    INR Protime   Date Value Ref Range Status   10/18/2019 2.3 (A) 0.86 - 1.14 Final       ASSESSMENT / PLAN  INR assessment THER    Recheck INR In: 3 DAYS    INR Location Clinic      Anticoagulation Summary  As of 10/18/2019    INR goal:   2.0-3.0   TTR:   26.5 % (3.7 wk)   INR used for dosin.3 (10/18/2019)   Warfarin maintenance plan:   4 mg (2 mg x 2) every day   Full warfarin instructions:   4 mg every day   Weekly warfarin total:   28 mg   Plan last modified:   Oma Taylor RN (10/18/2019)   Next INR check:   10/21/2019   Target end date:       Indications    Atrial fibrillation status post cardioversion (H) [I48.91]  Deep vein thrombosis (DVT) (H) [I82.409]  Long term (current) use of anticoagulants [Z79.01]             Anticoagulation Episode Summary     INR check location:       Preferred lab:       Send INR reminders to:   PEARL CORNEJO    Comments:   Has Dialysis  and Friday.      Anticoagulation Care Providers     Provider Role Specialty Phone number    Nguyen Torres, NP Referring Nurse Practitioner - Family [phone]            See the Encounter Report to view Anticoagulation Flowsheet and Dosing Calendar (Go to Encounters tab in chart review, and find the Anticoagulation Therapy Visit)        Oma Taylor RN

## 2019-10-21 ENCOUNTER — ANTICOAGULATION THERAPY VISIT (OUTPATIENT)
Dept: NURSING | Facility: CLINIC | Age: 69
End: 2019-10-21
Payer: MEDICARE

## 2019-10-21 ENCOUNTER — OFFICE VISIT (OUTPATIENT)
Dept: FAMILY MEDICINE | Facility: CLINIC | Age: 69
End: 2019-10-21
Payer: MEDICARE

## 2019-10-21 VITALS
HEIGHT: 71 IN | DIASTOLIC BLOOD PRESSURE: 73 MMHG | SYSTOLIC BLOOD PRESSURE: 124 MMHG | HEART RATE: 96 BPM | BODY MASS INDEX: 40.18 KG/M2 | WEIGHT: 287 LBS | TEMPERATURE: 98.1 F

## 2019-10-21 DIAGNOSIS — R31.0 GROSS HEMATURIA: Primary | ICD-10-CM

## 2019-10-21 DIAGNOSIS — I48.91 ATRIAL FIBRILLATION STATUS POST CARDIOVERSION (H): ICD-10-CM

## 2019-10-21 DIAGNOSIS — N30.01 ACUTE CYSTITIS WITH HEMATURIA: ICD-10-CM

## 2019-10-21 DIAGNOSIS — R82.90 NONSPECIFIC FINDING ON EXAMINATION OF URINE: ICD-10-CM

## 2019-10-21 DIAGNOSIS — Z79.01 LONG TERM (CURRENT) USE OF ANTICOAGULANTS: ICD-10-CM

## 2019-10-21 DIAGNOSIS — I82.402 ACUTE DEEP VEIN THROMBOSIS (DVT) OF LEFT LOWER EXTREMITY, UNSPECIFIED VEIN (H): ICD-10-CM

## 2019-10-21 LAB
ALBUMIN UR-MCNC: 100 MG/DL
APPEARANCE UR: ABNORMAL
BACTERIA #/AREA URNS HPF: ABNORMAL /HPF
BILIRUB UR QL STRIP: NEGATIVE
COLOR UR AUTO: YELLOW
GLUCOSE UR STRIP-MCNC: NEGATIVE MG/DL
HGB UR QL STRIP: ABNORMAL
INR POINT OF CARE: 2.8 (ref 0.86–1.14)
KETONES UR STRIP-MCNC: NEGATIVE MG/DL
LEUKOCYTE ESTERASE UR QL STRIP: ABNORMAL
NITRATE UR QL: NEGATIVE
NON-SQ EPI CELLS #/AREA URNS LPF: ABNORMAL /LPF
PH UR STRIP: 7 PH (ref 5–7)
RBC #/AREA URNS AUTO: >100 /HPF
SOURCE: ABNORMAL
SP GR UR STRIP: 1.01 (ref 1–1.03)
UROBILINOGEN UR STRIP-ACNC: 0.2 EU/DL (ref 0.2–1)
WBC #/AREA URNS AUTO: >100 /HPF

## 2019-10-21 PROCEDURE — 85610 PROTHROMBIN TIME: CPT | Mod: QW

## 2019-10-21 PROCEDURE — 81001 URINALYSIS AUTO W/SCOPE: CPT | Performed by: FAMILY MEDICINE

## 2019-10-21 PROCEDURE — 99207 ZZC NO CHARGE NURSE ONLY: CPT

## 2019-10-21 PROCEDURE — 36416 COLLJ CAPILLARY BLOOD SPEC: CPT

## 2019-10-21 PROCEDURE — 87088 URINE BACTERIA CULTURE: CPT | Performed by: FAMILY MEDICINE

## 2019-10-21 PROCEDURE — 87086 URINE CULTURE/COLONY COUNT: CPT | Performed by: FAMILY MEDICINE

## 2019-10-21 PROCEDURE — 87186 SC STD MICRODIL/AGAR DIL: CPT | Performed by: FAMILY MEDICINE

## 2019-10-21 PROCEDURE — 99213 OFFICE O/P EST LOW 20 MIN: CPT | Performed by: FAMILY MEDICINE

## 2019-10-21 ASSESSMENT — MIFFLIN-ST. JEOR: SCORE: 2103.95

## 2019-10-21 NOTE — PROGRESS NOTES
ANTICOAGULATION FOLLOW-UP CLINIC VISIT    Patient Name:  Leif Ariza  Date:  10/21/2019  Contact Type:  Face to Face    SUBJECTIVE:  Patient Findings     Comments:   Here after dialysis this morning. INR therapeutic. Reports urgency with urination this morning and during dialysis. Noted he had some blood in urine last void and staff in dialysis suggested he bring specimen to clinic. Doses have history or UTI. Also has history of urgency when he is constipated. No other symptoms. Advised he see provider. He will take next available apt today or come to . Therapeutic INR and will continue same dose warfarin and recheck in 4 days.        Clinical Outcomes     Comments:   Here after dialysis this morning. INR therapeutic. Reports urgency with urination this morning and during dialysis. Noted he had some blood in urine last void and staff in dialysis suggested he bring specimen to clinic. Doses have history or UTI. Also has history of urgency when he is constipated. No other symptoms. Advised he see provider. He will take next available apt today or come to . Therapeutic INR and will continue same dose warfarin and recheck in 4 days.           OBJECTIVE    INR Protime   Date Value Ref Range Status   10/21/2019 2.8 (A) 0.86 - 1.14 Final       ASSESSMENT / PLAN  INR assessment THER    Recheck INR In: 4 DAYS    INR Location Clinic      Anticoagulation Summary  As of 10/21/2019    INR goal:   2.0-3.0   TTR:   34.1 % (4.1 wk)   INR used for dosin.8 (10/21/2019)   Warfarin maintenance plan:   4 mg (2 mg x 2) every day   Full warfarin instructions:   4 mg every day   Weekly warfarin total:   28 mg   Plan last modified:   Oma Taylor RN (10/21/2019)   Next INR check:   10/25/2019   Target end date:       Indications    Atrial fibrillation status post cardioversion (H) [I48.91]  Deep vein thrombosis (DVT) (H) [I82.409]  Long term (current) use of anticoagulants [Z79.01]             Anticoagulation Episode  Summary     INR check location:       Preferred lab:       Send INR reminders to:   PEARL CORNEJO    Comments:   Has Dialysis Monday Wednesday and Friday.      Anticoagulation Care Providers     Provider Role Specialty Phone number    Nguyen Torres, NP Referring Nurse Practitioner - Family [phone]            See the Encounter Report to view Anticoagulation Flowsheet and Dosing Calendar (Go to Encounters tab in chart review, and find the Anticoagulation Therapy Visit)        Oma Taylor RN

## 2019-10-21 NOTE — PROGRESS NOTES
"Subjective     Leif Ariza is a 68 year old male who presents to clinic today for the following health issues:    HPI   Genitourinary symptoms      Duration: 1 day     Description:  frequency and hematuria    Intensity:  Mild     Accompanying signs and symptoms (fever/discharge/nausea/vomiting/back or abdominal pain):  Painful urination     History (frequent UTI's/kidney stones/prostate problems): dialysis 3 times a week  Sexually active: YES    Precipitating or alleviating factors: None    Therapies tried and outcome: none        Pt with blood in urine x and pain with urination while at dialysis this morning. He urinated 4-5 times.  No pain or symptoms now.  No fever. Otherwise feels well and urine seems to be having less blood in it  No history of bladder infections in recent history.       Reviewed and updated as needed this visit by Provider         Review of Systems   ROS COMP: Constitutional, HEENT, cardiovascular, pulmonary, gi and gu systems are negative, except as otherwise noted.      Objective    /73   Pulse 96   Temp 98.1  F (36.7  C) (Oral)   Ht 1.803 m (5' 11\")   Wt 130.2 kg (287 lb)   BMI 40.03 kg/m    Body mass index is 40.03 kg/m .  Physical Exam   GENERAL: healthy, alert and no distress    Diagnostic Test Results:  Labs reviewed in Epic  Urinalysis - UA RESULTS:  Recent Labs   Lab Test 10/21/19  1354   COLOR Yellow   APPEARANCE Slightly Cloudy   URINEGLC Negative   URINEBILI Negative   URINEKETONE Negative   SG 1.010   UBLD Large*   URINEPH 7.0   PROTEIN 100*   UROBILINOGEN 0.2   NITRITE Negative   LEUKEST Large*   RBCU >100*   WBCU >100*             Assessment & Plan     1. Gross hematuria  ? Infection. If UC negative, will refer to urology  - *UA reflex to Microscopic and Culture ([place] [facility] (except [place] and [place])  - Urine Microscopic    2. Nonspecific finding on examination of urine    - Urine Culture Aerobic Bacterial     BMI:   Estimated body mass index " "is 40.03 kg/m  as calculated from the following:    Height as of this encounter: 1.803 m (5' 11\").    Weight as of this encounter: 130.2 kg (287 lb).   Weight management plan: Discussed healthy diet and exercise guidelines            No follow-ups on file.    Shamika Erazo MD  [facility]    "

## 2019-10-23 ENCOUNTER — TELEPHONE (OUTPATIENT)
Dept: FAMILY MEDICINE | Facility: CLINIC | Age: 69
End: 2019-10-23

## 2019-10-23 ENCOUNTER — MYC MEDICAL ADVICE (OUTPATIENT)
Dept: FAMILY MEDICINE | Facility: CLINIC | Age: 69
End: 2019-10-23

## 2019-10-23 LAB
BACTERIA SPEC CULT: ABNORMAL
SPECIMEN SOURCE: ABNORMAL

## 2019-10-23 RX ORDER — CIPROFLOXACIN 500 MG/1
500 TABLET, FILM COATED ORAL DAILY
Qty: 10 TABLET | Refills: 0 | Status: SHIPPED | OUTPATIENT
Start: 2019-10-23 | End: 2019-11-05

## 2019-10-24 ENCOUNTER — OFFICE VISIT (OUTPATIENT)
Dept: SLEEP MEDICINE | Facility: CLINIC | Age: 69
End: 2019-10-24
Payer: MEDICARE

## 2019-10-24 ENCOUNTER — TRANSFERRED RECORDS (OUTPATIENT)
Dept: HEALTH INFORMATION MANAGEMENT | Facility: CLINIC | Age: 69
End: 2019-10-24

## 2019-10-24 VITALS
OXYGEN SATURATION: 51 % | SYSTOLIC BLOOD PRESSURE: 107 MMHG | DIASTOLIC BLOOD PRESSURE: 70 MMHG | HEIGHT: 71 IN | BODY MASS INDEX: 40.32 KG/M2 | HEART RATE: 90 BPM | WEIGHT: 288 LBS

## 2019-10-24 DIAGNOSIS — G47.33 OSA (OBSTRUCTIVE SLEEP APNEA): Primary | ICD-10-CM

## 2019-10-24 DIAGNOSIS — I48.91 ATRIAL FIBRILLATION STATUS POST CARDIOVERSION (H): Chronic | ICD-10-CM

## 2019-10-24 PROBLEM — E78.2 MIXED HYPERLIPIDEMIA: Chronic | Status: ACTIVE | Noted: 2019-07-26

## 2019-10-24 PROBLEM — I10 ESSENTIAL HYPERTENSION: Chronic | Status: ACTIVE | Noted: 2019-07-26

## 2019-10-24 PROBLEM — E66.01 MORBID OBESITY (H): Chronic | Status: ACTIVE | Noted: 2019-07-26

## 2019-10-24 PROBLEM — E11.9 TYPE 2 DIABETES MELLITUS, WITHOUT LONG-TERM CURRENT USE OF INSULIN (H): Chronic | Status: ACTIVE | Noted: 2019-07-26

## 2019-10-24 PROBLEM — N25.81 SECONDARY HYPERPARATHYROIDISM, RENAL (H): Status: ACTIVE | Noted: 2019-10-24

## 2019-10-24 PROBLEM — N25.81 SECONDARY HYPERPARATHYROIDISM, RENAL (H): Chronic | Status: ACTIVE | Noted: 2019-10-24

## 2019-10-24 PROBLEM — Z79.01 LONG TERM (CURRENT) USE OF ANTICOAGULANTS: Chronic | Status: ACTIVE | Noted: 2019-09-13

## 2019-10-24 PROBLEM — Z99.2 ESRD (END STAGE RENAL DISEASE) ON DIALYSIS (H): Chronic | Status: ACTIVE | Noted: 2019-07-26

## 2019-10-24 PROBLEM — N18.6 ESRD (END STAGE RENAL DISEASE) ON DIALYSIS (H): Chronic | Status: ACTIVE | Noted: 2019-07-26

## 2019-10-24 PROCEDURE — 99203 OFFICE O/P NEW LOW 30 MIN: CPT | Performed by: PHYSICIAN ASSISTANT

## 2019-10-24 ASSESSMENT — MIFFLIN-ST. JEOR: SCORE: 2108.49

## 2019-10-24 NOTE — TELEPHONE ENCOUNTER
Left message on cell phone voicemail for patient to call back.     Also sent [employer] message to patient since he uses his account.       Daja PACHECON, RN      
Notes recorded by Shamika Virgen MD on 10/23/2019 at 8:26 AM CDT    Please ignore previous message. I see he is allergic to PCNs.   Changed to cipro. Needs to see INR nurse few days after starting to make sure INR is okay  Again run drug and dosage by renal. Recommended dosing for renal failure per uptodate.      ------      Notes recorded by Shamika Virgen MD on 10/23/2019 at 8:22 AM CDT    Please call pt.  Pt with positive Urine culture. He is on dialysis so dosing of amoxicillin should be 500 mg daily  He should take it after dialysis. This rx has been sent in but please have him run it by his nephrologist at the dialysis center.     Shamika Virgen MD  
Reason for Call:  Other call back    Detailed comments: patient is calling back, states is on the road driving and may not  the phone but please leave message and phone and at next stop will call back.    Phone Number Patient can be reached at: Home number on file [phone] (home)    Best Time:     Can we leave a detailed message on this number? YES    Call taken on 10/23/2019 at 10:54 AM by Stephany Valenzuela      
See [employer] message.     Daja Dumont BSN, RN    
Voicemail was already left by this RN for patient to call me back directly.     Daja Dumont BSN, RN    
Stable,   Continue metoprolol with hold parameters

## 2019-10-24 NOTE — PATIENT INSTRUCTIONS
Your BMI is Body mass index is 40.17 kg/m .  Weight management is a personal decision.  If you are interested in exploring weight loss strategies, the following discussion covers the approaches that may be successful. Body mass index (BMI) is one way to tell whether you are at a healthy weight, overweight, or obese. It measures your weight in relation to your height.  A BMI of 18.5 to 24.9 is in the healthy range. A person with a BMI of 25 to 29.9 is considered overweight, and someone with a BMI of 30 or greater is considered obese. More than two-thirds of American adults are considered overweight or obese.  Being overweight or obese increases the risk for further weight gain. Excess weight may lead to heart disease and diabetes.  Creating and following plans for healthy eating and physical activity may help you improve your health.  Weight control is part of healthy lifestyle and includes exercise, emotional health, and healthy eating habits. Careful eating habits lifelong are the mainstay of weight control. Though there are significant health benefits from weight loss, long-term weight loss with diet alone may be very difficult to achieve- studies show long-term success with dietary management in less than 10% of people. Attaining a healthy weight may be especially difficult to achieve in those with severe obesity. In some cases, medications, devices and surgical management might be considered.  What can you do?  If you are overweight or obese and are interested in methods for weight loss, you should discuss this with your provider.     Consider reducing daily calorie intake by 500 calories.     Keep a food journal.     Avoiding skipping meals, consider cutting portions instead.    Diet combined with exercise helps maintain muscle while optimizing fat loss. Strength training is particularly important for building and maintaining muscle mass. Exercise helps reduce stress, increase energy, and improves fitness.  Increasing exercise without diet control, however, may not burn enough calories to loose weight.       Start walking three days a week 10-20 minutes at a time    Work towards walking thirty minutes five days a week     Eventually, increase the speed of your walking for 1-2 minutes at time    In addition, we recommend that you review healthy lifestyles and methods for weight loss available through the National Institutes of Health patient information sites:  http://win.niddk.nih.gov/publications/index.htm    And look into health and wellness programs that may be available through your health insurance provider, employer, local community center, or eri club.    Weight management plan: Patient was referred to their PCP to discuss a diet and exercise plan.

## 2019-10-24 NOTE — PROGRESS NOTES
"  Sleep Consultation:    Date on this visit: 10/24/2019    Leif Ariza is sent by Edelmira Proctor for a sleep consultation regarding Obstructive sleep apnea.    Primary Physician: Nguyen Torres     Chief Complaint   Patient presents with     Sleep Problem     consult- Need new cpap current one is broken       Leif Ariza reports that he was initially diagnosed with obstructive sleep apnea in the late 1990's. Concerns of snoring, witnessed apnea and fatigue. His most recent sleep study was done in [place] in 2014. His sleep study records are not available at the time of writing this note.  He states that he was on CPAP since the 90's and uses it religiously.  He reports he obtained his current CPAP in 2014. His current CPAP is worn with broken knob and turns off during therapy.    Overall, he rates the experience with PAP as 3 (0 poor, 10 great). The mask is not comfortable.  The mask is uncomfortable because of \"doesn't properly seal around nose\". The mask is leaking.  He is not snoring with the mask on. He is not having gasp arousals.  He is having significant oral/nasal dryness. The pressure is comfortable.     His PAP interface is Nasal Mask.    Bedtime is typically 2330. Usually it takes about 30 min minutes to fall asleep with the mask on. Wake time is typically 0800.  Patient is using PAP therapy 5 hours per night. The patient is usually getting 5 hours of sleep per night.    He does not feel rested in the morning.    Total score - [place]: 7 (10/24/2019 11:00 AM)    [facility]   He did not bring his CPAP with him today.    Patient does not use electronics in bed.     Leif does not do shift work.      Patient sleeps on his back and side. He denies no morning headaches, morning confusion and restless legs. Leif denies any bruxism, sleep walking, sleep talking, dream enactment, sleep paralysis, cataplexy and hypnogogic/hypnopompic hallucinations.    Leif denies difficulty breathing through his " nose, claustrophobia and reflux at night.      Leif has lost ~100 pounds in 2 years to get on kidney transplant list.  Patient describes themself as a morning person.  He would prefer to go to sleep at 9:30 PM and wake up at 7:00 AM.      Leif naps 3 times per week during dialysis.  He takes no inadvertant naps.  He denies falling asleep while driving.  Patient was counseled on the importance of driving while alert, to pull over if drowsy, or nap before getting into the vehicle if sleepy.  He uses occasional caffeine.    Allergies:    Allergies   Allergen Reactions     Penicillins        Medications:    Current Outpatient Medications   Medication Sig Dispense Refill     Acetaminophen 325 MG CAPS Take 650 mg by mouth 4 times daily as needed       allopurinol (ZYLOPRIM) 300 MG tablet Take 2 tablets (600 mg) by mouth daily (Patient taking differently: Take 300 mg by mouth daily ) 180 tablet 3     atorvastatin (LIPITOR) 20 MG tablet Take 1 tablet (20 mg) by mouth daily 90 tablet 3     calcium acetate (PHOSLO) 667 MG CAPS capsule Take 4 capsule 3 times a day       ciprofloxacin (CIPRO) 500 MG tablet Take 1 tablet (500 mg) by mouth daily 10 tablet 0     fenofibrate (TRICOR) 145 MG tablet Take 1 tablet (145 mg) by mouth daily 90 tablet 3     furosemide (LASIX) 80 MG tablet Take 80 mg by mouth daily        Misc Natural Products (OSTEO BI-FLEX TRIPLE STRENGTH) TABS Take 3 tablets by mouth 2 times daily       order for DME Equipment being ordered: Digital home blood pressure monitor kit 1 kit 0     propafenone (RYTHMOL) 300 MG tablet Take 1 tablet (300 mg) by mouth 2 times daily 180 tablet 0     SODIUM BICARBONATE PO Take 10 g by mouth daily       warfarin ANTICOAGULANT (COUMADIN) 2 MG tablet Take daily as directed. Current dose 4 mg daily. 60 tablet 0       Problem List:  Patient Active Problem List    Diagnosis Date Noted     Secondary hyperparathyroidism, renal (H) 10/24/2019     Priority: Medium     Deep vein  thrombosis (DVT) (H) 09/13/2019     Priority: Medium     Long term (current) use of anticoagulants 09/13/2019     Priority: Medium     Essential hypertension 07/26/2019     Priority: Medium     Mixed hyperlipidemia 07/26/2019     Priority: Medium     CKD (chronic kidney disease) stage 5, GFR less than 15 ml/min (H) 07/26/2019     Priority: Medium     ESRD (end stage renal disease) on dialysis (H) 07/26/2019     Priority: Medium     Type 2 diabetes mellitus, without long-term current use of insulin (H) 07/26/2019     Priority: Medium     Atrial fibrillation status post cardioversion (H) 07/26/2019     Priority: Medium     Hyperuricemia 07/26/2019     Priority: Medium     DORI (obstructive sleep apnea) 07/26/2019     Priority: Medium     Morbid obesity (H) 07/26/2019     Priority: Medium        Past Medical/Surgical History:  Past Medical History:   Diagnosis Date     Arthritis      Atrial fibrillation (H)      CKD (chronic kidney disease) stage 5, GFR less than 15 ml/min (H) 2017     DVT (deep venous thrombosis) (H) 2000     Gout      History of blood transfusion      HLD (hyperlipidemia)      HTN (hypertension)      Kidney stone 2016     Type 2 diabetes mellitus (H)      Past Surgical History:   Procedure Laterality Date     ANESTH,UPPER ARM AV FISTULA REPAIR Left 2018     APPENDECTOMY  1958     C REPAIR CRUCIATE LIGAMENT,KNEE  1976     CARDIOVERSION  2000     carpel tunnel surgery Left 2000     COLONOSCOPY       SINUS SURGERY  1997       Social History:  Social History     Socioeconomic History     Marital status:      Spouse name: Not on file     Number of children: Not on file     Years of education: Not on file     Highest education level: Not on file   Occupational History     Not on file   Social Needs     Financial resource strain: Not on file     Food insecurity:     Worry: Not on file     Inability: Not on file     Transportation needs:     Medical: Not on file     Non-medical: Not on file   Tobacco  Use     Smoking status: Former Smoker     Smokeless tobacco: Never Used   Substance and Sexual Activity     Alcohol use: Not Currently     Frequency: Never     Comment: Stopped when I was put on blood thinners     Drug use: Never     Sexual activity: Yes     Partners: Female     Birth control/protection: Post-menopausal   Lifestyle     Physical activity:     Days per week: Not on file     Minutes per session: Not on file     Stress: Not on file   Relationships     Social connections:     Talks on phone: Not on file     Gets together: Not on file     Attends Sikh service: Not on file     Active member of club or organization: Not on file     Attends meetings of clubs or organizations: Not on file     Relationship status: Not on file     Intimate partner violence:     Fear of current or ex partner: Not on file     Emotionally abused: Not on file     Physically abused: Not on file     Forced sexual activity: Not on file   Other Topics Concern     Parent/sibling w/ CABG, MI or angioplasty before 65F 55M? No   Social History Narrative     Not on file       Family History:  Family History   Problem Relation Age of Onset     Cerebrovascular Disease Father         Had multiple strokes while in hospital when he passed       Review of Systems:  A complete review of systems reviewed by me is negative with the exeption of what has been mentioned in the history of present illness.  CONSTITUTIONAL:  POSITIVE for  weight loss  EYES: NEGATIVE for changes in vision, blind spots, double vision.  ENT: NEGATIVE for ear pain, sore throat, sinus pain, post-nasal drip, runny nose, bloody nose  CARDIAC:  POSITIVE for  swollen legs and NEGATIVE for  fast heart beats, fluttering in chest, chest pain, chest pressure and breathlessness when lying flat  NEUROLOGIC: NEGATIVE headaches, weakness or numbness in the arms or legs.  DERMATOLOGIC: NEGATIVE for rashes, new moles or change in mole(s)  PULMONARY:  POSITIVE for  SOB with activity and  "NEGATIVE for  SOB at rest, dry cough, productive cough, coughing up blood and wheezing   GASTROINTESTINAL: NEGATIVE for nausea or vomitting, loose or watery stools, fat or grease in stools, constipation, abdominal pain, bowel movements black in color or blood noted.  GENITOURINARY: NEGATIVE for pain during urination, blood in urine, urinating more frequently than usual, irregular menstrual periods.  MUSCULOSKELETAL:  POSITIVE for  muscle pain and bone or joint pain  ENDOCRINE: NEGATIVE for increased thirst or urination, diabetes.  LYMPHATIC: NEGATIVE for swollen lymph nodes, lumps or bumps in the breasts or nipple discharge.    Physical Examination:  Vitals: /70   Pulse 90   Ht 1.803 m (5' 11\")   Wt 130.6 kg (288 lb)   SpO2 (!) 51%   BMI 40.17 kg/m    BMI= Body mass index is 40.17 kg/m .         [place] Total Score 10/24/2019   Total score - [place] 7       REMI Total Score: 16 (10/24/19 1100)    GENERAL APPEARANCE: alert and no distress  EYES: Eyes grossly normal to inspection, PERRL and conjunctivae and sclerae normal  HENT: ear canals and TM's normal, nose and mouth without ulcers or lesions and oropharynx crowded  NECK: no asymmetry, masses, or scars  PSYCH: mentation appears normal and affect normal/bright  Mallampati Class: IV.  Tonsillar Stage:     Last Comprehensive Metabolic Panel:  Sodium   Date Value Ref Range Status   07/28/2019 133 133 - 144 mmol/L Final     Potassium   Date Value Ref Range Status   07/28/2019 4.0 3.4 - 5.3 mmol/L Final     Chloride   Date Value Ref Range Status   07/28/2019 91 (L) 94 - 109 mmol/L Final     Carbon Dioxide   Date Value Ref Range Status   07/28/2019 30 20 - 32 mmol/L Final     Anion Gap   Date Value Ref Range Status   07/28/2019 12 3 - 14 mmol/L Final     Glucose   Date Value Ref Range Status   07/28/2019 121 (H) 70 - 99 mg/dL Final     Comment:     Fasting specimen     Urea Nitrogen   Date Value Ref Range Status   07/28/2019 45 (H) 7 - 30 mg/dL Final "     Creatinine   Date Value Ref Range Status   07/28/2019 9.16 (H) 0.66 - 1.25 mg/dL Final     Comment:     Critical Value [facility] and read back [facility]  [facility] 1231 ON 07.29.2019 BY GARDENIA  Critical Value called to and read back by  NORA GARNICA RN AT 1237 ON 07/29/2019 BY STEFAN       GFR Estimate   Date Value Ref Range Status   07/28/2019 5 (L) >60 mL/min/[1.73_m2] Corrected     Comment:     Starting 12/18/2018, serum creatinine based estimated GFR (eGFR) will be   calculated using the Chronic Kidney Disease Epidemiology Collaboration   (CKD-EPI) equation.  CORRECTED ON 07/29 AT 1243: PREVIOUSLY REPORTED AS 5 Non  GFR   Calc Starting 12/18/2018, serum creatinine based estimated GFR (eGFR) will be   calculated using the Chronic Kidney Disease Epidemiology Collaboration (CKD   EPI) equation.       Calcium   Date Value Ref Range Status   07/28/2019 9.5 8.5 - 10.1 mg/dL Final     TSH   Date Value Ref Range Status   07/28/2019 4.31 (H) 0.40 - 4.00 mU/L Final     T4 Free   Date Value Ref Range Status   07/28/2019 0.77 0.76 - 1.46 ng/dL Final       Impression/Plan:  1. Obstructive sleep apnea, severity unknown  Obtain records.  Once records are available will place a comprehensive DME order for replacement CPAP.  If sleep study records aren't found, will have him undergo a polysomnogram with transcutaneous CO2 monitoring.    Obstructive sleep apnea reviewed.  Complications of untreated sleep apnea were reviewed.    ADDENDUM 10/25/2019:  Sleep study records found. Order for replacement CPAP placed.       Sahra Recio PA-C    CC: Edelmira Proctor

## 2019-10-25 ENCOUNTER — ANTICOAGULATION THERAPY VISIT (OUTPATIENT)
Dept: NURSING | Facility: CLINIC | Age: 69
End: 2019-10-25
Payer: MEDICARE

## 2019-10-25 DIAGNOSIS — I82.402 ACUTE DEEP VEIN THROMBOSIS (DVT) OF LEFT LOWER EXTREMITY, UNSPECIFIED VEIN (H): ICD-10-CM

## 2019-10-25 DIAGNOSIS — Z79.01 LONG TERM (CURRENT) USE OF ANTICOAGULANTS: ICD-10-CM

## 2019-10-25 DIAGNOSIS — I48.91 ATRIAL FIBRILLATION STATUS POST CARDIOVERSION (H): ICD-10-CM

## 2019-10-25 LAB — INR POINT OF CARE: 3.1 (ref 0.86–1.14)

## 2019-10-25 PROCEDURE — 36416 COLLJ CAPILLARY BLOOD SPEC: CPT

## 2019-10-25 PROCEDURE — 99207 ZZC NO CHARGE NURSE ONLY: CPT

## 2019-10-25 PROCEDURE — 85610 PROTHROMBIN TIME: CPT | Mod: QW

## 2019-10-25 NOTE — PROGRESS NOTES
ANTICOAGULATION FOLLOW-UP CLINIC VISIT    Patient Name:  Leif Ariza  Date:  10/25/2019  Contact Type:  Face to Face    SUBJECTIVE:  Patient Findings     Positives:   Change in medications    Comments:   Had dialysis today. On day 3 / 10 of cipro for UTI. Feeling well and symptoms gone. This can increase INR. Trending up. Will adjust dose today and recheck in 3 days.        Clinical Outcomes     Negatives:   Major bleeding event, Thromboembolic event, Anticoagulation-related hospital admission, Anticoagulation-related ED visit, Anticoagulation-related fatality    Comments:   Had dialysis today. On day 3 / 10 of cipro for UTI. Feeling well and symptoms gone. This can increase INR. Trending up. Will adjust dose today and recheck in 3 days.           OBJECTIVE    INR Protime   Date Value Ref Range Status   10/25/2019 3.1 (A) 0.86 - 1.14 Final       ASSESSMENT / PLAN  INR assessment SUPRA    Recheck INR In: 3 DAYS    INR Location Clinic      Anticoagulation Summary  As of 10/25/2019    INR goal:   2.0-3.0   TTR:   38.1 % (1.1 mo)   INR used for dosing:   3.1! (10/25/2019)   Warfarin maintenance plan:   4 mg (2 mg x 2) every day   Full warfarin instructions:   10/25: 2 mg; Otherwise 4 mg every day   Weekly warfarin total:   28 mg   Plan last modified:   Oma Taylor RN (10/21/2019)   Next INR check:   10/28/2019   Target end date:       Indications    Atrial fibrillation status post cardioversion (H) [I48.91]  Deep vein thrombosis (DVT) (H) [I82.409]  Long term (current) use of anticoagulants [Z79.01]             Anticoagulation Episode Summary     INR check location:       Preferred lab:       Send INR reminders to:   PEARL CORNEJO    Comments:   Has Dialysis Monday Wednesday and Friday.      Anticoagulation Care Providers     Provider Role Specialty Phone number    Nguyen Torres, NP Referring Nurse Practitioner - Family [phone]            See the Encounter Report to view Anticoagulation Flowsheet and  Dosing Calendar (Go to Encounters tab in chart review, and find the Anticoagulation Therapy Visit)        Oma Taylor RN

## 2019-10-25 NOTE — PROGRESS NOTES
Spoke with pt and let him know an order was put in for a replacement machine and someone from [facility] will be contacting him soon.     Elham Arreaga MA on 10/25/2019 at 1:59 PM

## 2019-10-28 ENCOUNTER — ANTICOAGULATION THERAPY VISIT (OUTPATIENT)
Dept: NURSING | Facility: CLINIC | Age: 69
End: 2019-10-28
Payer: MEDICARE

## 2019-10-28 DIAGNOSIS — I82.402 ACUTE DEEP VEIN THROMBOSIS (DVT) OF LEFT LOWER EXTREMITY, UNSPECIFIED VEIN (H): ICD-10-CM

## 2019-10-28 DIAGNOSIS — Z79.01 LONG TERM (CURRENT) USE OF ANTICOAGULANTS: ICD-10-CM

## 2019-10-28 DIAGNOSIS — I48.91 ATRIAL FIBRILLATION STATUS POST CARDIOVERSION (H): ICD-10-CM

## 2019-10-28 LAB — INR POINT OF CARE: 3.4 (ref 0.86–1.14)

## 2019-10-28 PROCEDURE — 36416 COLLJ CAPILLARY BLOOD SPEC: CPT

## 2019-10-28 PROCEDURE — 99207 ZZC NO CHARGE NURSE ONLY: CPT

## 2019-10-28 PROCEDURE — 85610 PROTHROMBIN TIME: CPT | Mod: QW

## 2019-10-28 NOTE — PROGRESS NOTES
ANTICOAGULATION FOLLOW-UP CLINIC VISIT    Patient Name:  Leif Ariza  Date:  10/28/2019  Contact Type:  Face to Face    SUBJECTIVE:  Patient Findings     Positives:   Change in medications    Comments:   INR 3.4, up from 3.1. dose was reduced on Friday for antibiotic Cipro. He has 3 days left of Cipro. Will hold today and then resume daily dose. If therapeutic on Friday anticipate resuming 4 mg daily         Clinical Outcomes     Comments:   INR 3.4, up from 3.1. dose was reduced on Friday for antibiotic Cipro. He has 3 days left of Cipro. Will hold today and then resume daily dose. If therapeutic on Friday anticipate resuming 4 mg daily            OBJECTIVE    INR Protime   Date Value Ref Range Status   10/28/2019 3.4 (A) 0.86 - 1.14 Final       ASSESSMENT / PLAN  INR assessment SUPRA    Recheck INR In: 4 DAYS    INR Location Clinic      Anticoagulation Summary  As of 10/28/2019    INR goal:   2.0-3.0   TTR:   34.9 % (1.2 mo)   INR used for dosing:   3.4! (10/28/2019)   Warfarin maintenance plan:   4 mg (2 mg x 2) every day   Full warfarin instructions:   10/28: Hold; Otherwise 4 mg every day   Weekly warfarin total:   28 mg   Plan last modified:   Oma Taylor RN (10/21/2019)   Next INR check:   11/1/2019   Target end date:       Indications    Atrial fibrillation status post cardioversion (H) [I48.91]  Deep vein thrombosis (DVT) (H) [I82.409]  Long term (current) use of anticoagulants [Z79.01]             Anticoagulation Episode Summary     INR check location:       Preferred lab:       Send INR reminders to:   PEARL CORNEJO    Comments:   Has Dialysis Monday Wednesday and Friday.      Anticoagulation Care Providers     Provider Role Specialty Phone number    Nguyen Torres, NP Referring Nurse Practitioner - Family [phone]            See the Encounter Report to view Anticoagulation Flowsheet and Dosing Calendar (Go to Encounters tab in chart review, and find the Anticoagulation Therapy  Visit)        Oma Taylor RN

## 2019-10-30 ENCOUNTER — TELEPHONE (OUTPATIENT)
Dept: SLEEP MEDICINE | Facility: CLINIC | Age: 69
End: 2019-10-30

## 2019-10-30 NOTE — TELEPHONE ENCOUNTER
Patients paperwork was received via [employer] for the patient to transfer Cpap care to [facility]. I called and left a voicemail for the patient to go over the transfer process.

## 2019-10-31 ENCOUNTER — TRANSFERRED RECORDS (OUTPATIENT)
Dept: HEALTH INFORMATION MANAGEMENT | Facility: CLINIC | Age: 69
End: 2019-10-31

## 2019-11-01 ENCOUNTER — ANTICOAGULATION THERAPY VISIT (OUTPATIENT)
Dept: NURSING | Facility: CLINIC | Age: 69
End: 2019-11-01
Payer: MEDICARE

## 2019-11-01 DIAGNOSIS — Z79.01 LONG TERM (CURRENT) USE OF ANTICOAGULANTS: ICD-10-CM

## 2019-11-01 DIAGNOSIS — I48.91 ATRIAL FIBRILLATION STATUS POST CARDIOVERSION (H): ICD-10-CM

## 2019-11-01 DIAGNOSIS — I82.402 ACUTE DEEP VEIN THROMBOSIS (DVT) OF LEFT LOWER EXTREMITY, UNSPECIFIED VEIN (H): ICD-10-CM

## 2019-11-01 LAB — INR POINT OF CARE: 3.4 (ref 0.86–1.14)

## 2019-11-01 PROCEDURE — 99207 ZZC NO CHARGE NURSE ONLY: CPT

## 2019-11-01 PROCEDURE — 85610 PROTHROMBIN TIME: CPT | Mod: QW

## 2019-11-01 PROCEDURE — 36416 COLLJ CAPILLARY BLOOD SPEC: CPT

## 2019-11-01 NOTE — PROGRESS NOTES
ANTICOAGULATION FOLLOW-UP CLINIC VISIT    Patient Name:  Leif Ariza  Date:  11/1/2019  Contact Type:  Face to Face    SUBJECTIVE:  Patient Findings     Comments:   The patient was assessed for diet, medication, and activity level changes, missed or extra doses, bruising or bleeding, with no problem findings.  Sonya COBURN, RN, CPN          Clinical Outcomes     Negatives:   Major bleeding event, Thromboembolic event, Anticoagulation-related hospital admission, Anticoagulation-related ED visit, Anticoagulation-related fatality    Comments:   The patient was assessed for diet, medication, and activity level changes, missed or extra doses, bruising or bleeding, with no problem findings.  Sonya COBURN, RN, CPN             OBJECTIVE    INR Protime   Date Value Ref Range Status   11/01/2019 3.4 (A) 0.86 - 1.14 Final       ASSESSMENT / PLAN  INR assessment SUPRA    Recheck INR In: 3 DAYS    INR Location Clinic      Anticoagulation Summary  As of 11/1/2019    INR goal:   2.0-3.0   TTR:   31.4 % (1.3 mo)   INR used for dosing:   3.4! (11/1/2019)   Warfarin maintenance plan:   4 mg (2 mg x 2) every day   Full warfarin instructions:   11/1: Hold; 11/3: 2 mg; Otherwise 4 mg every day   Weekly warfarin total:   28 mg   Plan last modified:   Oma Taylor RN (10/21/2019)   Next INR check:   11/4/2019   Target end date:       Indications    Atrial fibrillation status post cardioversion (H) [I48.91]  Deep vein thrombosis (DVT) (H) [I82.409]  Long term (current) use of anticoagulants [Z79.01]             Anticoagulation Episode Summary     INR check location:       Preferred lab:       Send INR reminders to:   PEARL CORNEJO    Comments:   Has Dialysis Monday Wednesday and Friday.      Anticoagulation Care Providers     Provider Role Specialty Phone number    Nguyen Torres, NP Referring Nurse Practitioner - Family [phone]            See the Encounter Report to view Anticoagulation Flowsheet and Dosing  Calendar (Go to Encounters tab in chart review, and find the Anticoagulation Therapy Visit)        Sonya Lopez RN

## 2019-11-04 ENCOUNTER — ANTICOAGULATION THERAPY VISIT (OUTPATIENT)
Dept: NURSING | Facility: CLINIC | Age: 69
End: 2019-11-04
Payer: MEDICARE

## 2019-11-04 DIAGNOSIS — I82.402 ACUTE DEEP VEIN THROMBOSIS (DVT) OF LEFT LOWER EXTREMITY, UNSPECIFIED VEIN (H): ICD-10-CM

## 2019-11-04 DIAGNOSIS — I48.91 ATRIAL FIBRILLATION STATUS POST CARDIOVERSION (H): ICD-10-CM

## 2019-11-04 DIAGNOSIS — Z79.01 LONG TERM (CURRENT) USE OF ANTICOAGULANTS: ICD-10-CM

## 2019-11-04 LAB — INR POINT OF CARE: 2.5 (ref 0.86–1.14)

## 2019-11-04 PROCEDURE — 85610 PROTHROMBIN TIME: CPT | Mod: QW

## 2019-11-04 PROCEDURE — 36416 COLLJ CAPILLARY BLOOD SPEC: CPT

## 2019-11-04 NOTE — PROGRESS NOTES
ANTICOAGULATION FOLLOW-UP CLINIC VISIT    Patient Name:  Leif Ariza  Date:  2019  Contact Type:  Face to Face    SUBJECTIVE:  Patient Findings     Comments:   Patient had dialysis this morning  Sonya COBURN, RN, CPN          Clinical Outcomes     Negatives:   Major bleeding event, Thromboembolic event, Anticoagulation-related hospital admission, Anticoagulation-related ED visit, Anticoagulation-related fatality    Comments:   Patient had dialysis this morning  Sonya COBURN, RN, CPN             OBJECTIVE    INR Protime   Date Value Ref Range Status   2019 2.5 (A) 0.86 - 1.14 Final       ASSESSMENT / PLAN  INR assessment THER    Recheck INR In: 3 DAYS    INR Location Clinic      Anticoagulation Summary  As of 2019    INR goal:   2.0-3.0   TTR:   33.1 % (1.4 mo)   INR used for dosin.5 (2019)   Warfarin maintenance plan:   4 mg (2 mg x 2) every day   Full warfarin instructions:   : 2 mg; Otherwise 4 mg every day   Weekly warfarin total:   28 mg   Plan last modified:   Oma Taylor RN (10/21/2019)   Next INR check:   2019   Target end date:       Indications    Atrial fibrillation status post cardioversion (H) [I48.91]  Deep vein thrombosis (DVT) (H) [I82.409]  Long term (current) use of anticoagulants [Z79.01]             Anticoagulation Episode Summary     INR check location:       Preferred lab:       Send INR reminders to:   PEARL CORNEJO    Comments:   Has Dialysis  and Friday.      Anticoagulation Care Providers     Provider Role Specialty Phone number    Nguyen Torres, NP Referring Nurse Practitioner - Family [phone]            See the Encounter Report to view Anticoagulation Flowsheet and Dosing Calendar (Go to Encounters tab in chart review, and find the Anticoagulation Therapy Visit)        Sonya Lopez RN

## 2019-11-05 ENCOUNTER — MYC REFILL (OUTPATIENT)
Dept: NURSING | Facility: CLINIC | Age: 69
End: 2019-11-05

## 2019-11-05 DIAGNOSIS — I82.402 ACUTE DEEP VEIN THROMBOSIS (DVT) OF LEFT LOWER EXTREMITY, UNSPECIFIED VEIN (H): ICD-10-CM

## 2019-11-05 DIAGNOSIS — Z79.01 LONG TERM (CURRENT) USE OF ANTICOAGULANTS: ICD-10-CM

## 2019-11-05 DIAGNOSIS — I48.91 ATRIAL FIBRILLATION STATUS POST CARDIOVERSION (H): ICD-10-CM

## 2019-11-05 RX ORDER — WARFARIN SODIUM 2 MG/1
TABLET ORAL
Qty: 60 TABLET | Refills: 0 | Status: SHIPPED | OUTPATIENT
Start: 2019-11-05 | End: 2019-12-14

## 2019-11-05 NOTE — TELEPHONE ENCOUNTER
"Prescription approved per [facility] Refill Protocol.    Requested Prescriptions   Pending Prescriptions Disp Refills     warfarin ANTICOAGULANT (COUMADIN) 2 MG tablet 60 tablet 0     Sig: Take daily as directed. Current dose 4 mg daily.       Vitamin K Antagonists Failed - 11/5/2019  9:14 AM        Failed - INR is within goal in the past 6 weeks     Confirm INR is within goal in the past 6 weeks.     Recent Labs   Lab Test 11/04/19   INR 2.5*                       Passed - Recent (12 mo) or future (30 days) visit within the authorizing provider's specialty     Patient has had an office visit with the authorizing provider or a provider within the authorizing providers department within the previous 12 mos or has a future within next 30 days. See \"Patient Info\" tab in inbasket, or \"Choose Columns\" in Meds & Orders section of the refill encounter.              Passed - Medication is active on med list        Passed - Patient is 18 years of age or older        IRISH Arroyo, RN    "

## 2019-11-07 ENCOUNTER — ANTICOAGULATION THERAPY VISIT (OUTPATIENT)
Dept: NURSING | Facility: CLINIC | Age: 69
End: 2019-11-07
Payer: MEDICARE

## 2019-11-07 DIAGNOSIS — I82.402 ACUTE DEEP VEIN THROMBOSIS (DVT) OF LEFT LOWER EXTREMITY, UNSPECIFIED VEIN (H): ICD-10-CM

## 2019-11-07 DIAGNOSIS — I48.91 ATRIAL FIBRILLATION STATUS POST CARDIOVERSION (H): ICD-10-CM

## 2019-11-07 DIAGNOSIS — Z79.01 LONG TERM (CURRENT) USE OF ANTICOAGULANTS: ICD-10-CM

## 2019-11-07 LAB — INR POINT OF CARE: 2.9 (ref 0.86–1.14)

## 2019-11-07 PROCEDURE — 85610 PROTHROMBIN TIME: CPT | Mod: QW

## 2019-11-07 PROCEDURE — 36416 COLLJ CAPILLARY BLOOD SPEC: CPT

## 2019-11-07 PROCEDURE — 99207 ZZC NO CHARGE NURSE ONLY: CPT

## 2019-11-07 NOTE — PROGRESS NOTES
ANTICOAGULATION FOLLOW-UP CLINIC VISIT    Patient Name:  Leif Ariza  Date:  2019  Contact Type:  Face to Face    SUBJECTIVE:  Patient Findings     Comments:   Therapeutic today. Does not have dialysis today. Will repeat weekly dose and recheck again in 1 wk.         Clinical Outcomes     Negatives:   Major bleeding event, Thromboembolic event, Anticoagulation-related hospital admission, Anticoagulation-related ED visit, Anticoagulation-related fatality    Comments:   Therapeutic today. Does not have dialysis today. Will repeat weekly dose and recheck again in 1 wk.            OBJECTIVE    INR Protime   Date Value Ref Range Status   2019 2.9 (A) 0.86 - 1.14 Final       ASSESSMENT / PLAN  INR assessment THER    Recheck INR In: 1 WEEK    INR Location Clinic      Anticoagulation Summary  As of 2019    INR goal:   2.0-3.0   TTR:   37.5 % (1.5 mo)   INR used for dosin.9 (2019)   Warfarin maintenance plan:   4 mg (2 mg x 2) every Mon, Wed, Fri; 2 mg (2 mg x 1) all other days   Full warfarin instructions:   4 mg every Mon, Wed, Fri; 2 mg all other days   Weekly warfarin total:   20 mg   Plan last modified:   Oma Taylor RN (2019)   Next INR check:   2019   Target end date:       Indications    Atrial fibrillation status post cardioversion (H) [I48.91]  Deep vein thrombosis (DVT) (H) [I82.409]  Long term (current) use of anticoagulants [Z79.01]             Anticoagulation Episode Summary     INR check location:       Preferred lab:       Send INR reminders to:   PEARL CORNEJO    Comments:   Has Dialysis  and Friday.      Anticoagulation Care Providers     Provider Role Specialty Phone number    Nguyen Torres, NP Referring Nurse Practitioner - Family [phone]            See the Encounter Report to view Anticoagulation Flowsheet and Dosing Calendar (Go to Encounters tab in chart review, and find the Anticoagulation Therapy Visit)        Oma CHOW  SE Taylor

## 2019-11-11 ENCOUNTER — TELEPHONE (OUTPATIENT)
Dept: FAMILY MEDICINE | Facility: CLINIC | Age: 69
End: 2019-11-11

## 2019-11-11 NOTE — TELEPHONE ENCOUNTER
Panel Management Review      Patient has the following on his problem list:     Diabetes    ASA: Failed    Last A1C  Lab Results   Component Value Date    A1C 5.4 07/28/2019     A1C tested: Passed    Last LDL:    Lab Results   Component Value Date    CHOL 151 07/28/2019     Lab Results   Component Value Date    HDL 41 07/28/2019     Lab Results   Component Value Date    LDL 57 07/28/2019     Lab Results   Component Value Date    TRIG 267 07/28/2019     No results found for: CHOLHDLRATIO  Lab Results   Component Value Date    NHDL 110 07/28/2019       Is the patient on a Statin? YES             Is the patient on Aspirin? NO    Medications     HMG CoA Reductase Inhibitors     atorvastatin (LIPITOR) 20 MG tablet             Last three blood pressure readings:  BP Readings from Last 3 Encounters:   10/24/19 107/70   10/21/19 124/73   09/25/19 110/60       Date of last diabetes office visit: 9/12/2019     Tobacco History:     History   Smoking Status     Former Smoker   Smokeless Tobacco     Never Used         Hypertension   Last three blood pressure readings:  BP Readings from Last 3 Encounters:   10/24/19 107/70   10/21/19 124/73   09/25/19 110/60     Blood pressure: Passed    HTN Guidelines:  Less than 140/90      Composite cancer screening  Chart review shows that this patient is due/due soon for the following None  Summary:    Patient is due/failing the following:   A1C    Action needed:   none    Type of outreach:    none    Questions for provider review:    None                                                                                                                                    Michelle Bullard CMA       Chart routed to close .

## 2019-11-14 ENCOUNTER — ANTICOAGULATION THERAPY VISIT (OUTPATIENT)
Dept: NURSING | Facility: CLINIC | Age: 69
End: 2019-11-14
Payer: MEDICARE

## 2019-11-14 DIAGNOSIS — Z79.01 LONG TERM (CURRENT) USE OF ANTICOAGULANTS: ICD-10-CM

## 2019-11-14 DIAGNOSIS — I82.402 ACUTE DEEP VEIN THROMBOSIS (DVT) OF LEFT LOWER EXTREMITY, UNSPECIFIED VEIN (H): ICD-10-CM

## 2019-11-14 DIAGNOSIS — I48.91 ATRIAL FIBRILLATION STATUS POST CARDIOVERSION (H): ICD-10-CM

## 2019-11-14 LAB — INR POINT OF CARE: 3.5 (ref 0.86–1.14)

## 2019-11-14 PROCEDURE — 99207 ZZC NO CHARGE NURSE ONLY: CPT

## 2019-11-14 PROCEDURE — 85610 PROTHROMBIN TIME: CPT | Mod: QW

## 2019-11-14 PROCEDURE — 36416 COLLJ CAPILLARY BLOOD SPEC: CPT

## 2019-11-14 NOTE — PROGRESS NOTES
ANTICOAGULATION FOLLOW-UP CLINIC VISIT    Patient Name:  Leif Ariza  Date:  11/14/2019  Contact Type:  Face to Face    SUBJECTIVE:  Patient Findings     Comments:   Patient denies any identifiable changes that caused the supra therapeutic INR. Does dialysis M,W,F. Will hold dose today. Recheck in 1 wk.           Clinical Outcomes     Negatives:   Major bleeding event, Thromboembolic event, Anticoagulation-related hospital admission, Anticoagulation-related ED visit, Anticoagulation-related fatality    Comments:   Patient denies any identifiable changes that caused the supra therapeutic INR. Does dialysis M,W,F. Will hold dose today. Recheck in 1 wk.              OBJECTIVE    INR Protime   Date Value Ref Range Status   11/14/2019 3.5 (A) 0.86 - 1.14 Final       ASSESSMENT / PLAN  INR assessment SUPRA    Recheck INR In: 1 WEEK    INR Location Clinic      Anticoagulation Summary  As of 11/14/2019    INR goal:   2.0-3.0   TTR:   34.7 % (1.8 mo)   INR used for dosing:   3.5! (11/14/2019)   Warfarin maintenance plan:   4 mg (2 mg x 2) every Mon, Wed, Fri; 2 mg (2 mg x 1) all other days   Full warfarin instructions:   11/14: Hold; Otherwise 4 mg every Mon, Wed, Fri; 2 mg all other days   Weekly warfarin total:   20 mg   Plan last modified:   Oma Taylor RN (11/7/2019)   Next INR check:   11/21/2019   Priority:   High   Target end date:       Indications    Atrial fibrillation status post cardioversion (H) [I48.91]  Deep vein thrombosis (DVT) (H) [I82.409]  Long term (current) use of anticoagulants [Z79.01]             Anticoagulation Episode Summary     INR check location:       Preferred lab:       Send INR reminders to:   PEARL CORNEJO    Comments:   Has Dialysis Monday Wednesday and Friday.      Anticoagulation Care Providers     Provider Role Specialty Phone number    Nguyen Torres NP Referring Nurse Practitioner - Family [phone]            See the Encounter Report to view Anticoagulation  Flowsheet and Dosing Calendar (Go to Encounters tab in chart review, and find the Anticoagulation Therapy Visit)        Oma Taylor RN

## 2019-11-18 ENCOUNTER — TELEPHONE (OUTPATIENT)
Dept: FAMILY MEDICINE | Facility: CLINIC | Age: 69
End: 2019-11-18

## 2019-11-18 ENCOUNTER — MYC MEDICAL ADVICE (OUTPATIENT)
Dept: FAMILY MEDICINE | Facility: CLINIC | Age: 69
End: 2019-11-18

## 2019-11-18 ENCOUNTER — OFFICE VISIT (OUTPATIENT)
Dept: URGENT CARE | Facility: URGENT CARE | Age: 69
End: 2019-11-18
Payer: MEDICARE

## 2019-11-18 VITALS
HEART RATE: 109 BPM | WEIGHT: 288 LBS | TEMPERATURE: 98.6 F | OXYGEN SATURATION: 97 % | SYSTOLIC BLOOD PRESSURE: 113 MMHG | DIASTOLIC BLOOD PRESSURE: 67 MMHG | BODY MASS INDEX: 40.17 KG/M2

## 2019-11-18 DIAGNOSIS — L03.116 CELLULITIS OF LEFT LOWER EXTREMITY: Primary | ICD-10-CM

## 2019-11-18 PROCEDURE — 99214 OFFICE O/P EST MOD 30 MIN: CPT | Performed by: FAMILY MEDICINE

## 2019-11-18 RX ORDER — CEPHALEXIN 500 MG/1
500 CAPSULE ORAL 4 TIMES DAILY
Qty: 40 CAPSULE | Refills: 1 | Status: SHIPPED | OUTPATIENT
Start: 2019-11-18 | End: 2019-12-10

## 2019-11-18 NOTE — TELEPHONE ENCOUNTER
Panel Management Review      Patient has the following on his problem list:     Diabetes    ASA: Failed    Last A1C  Lab Results   Component Value Date    A1C 5.4 07/28/2019     A1C tested: Passed    Last LDL:    Lab Results   Component Value Date    CHOL 151 07/28/2019     Lab Results   Component Value Date    HDL 41 07/28/2019     Lab Results   Component Value Date    LDL 57 07/28/2019     Lab Results   Component Value Date    TRIG 267 07/28/2019     No results found for: CHOLHDLRATIO  Lab Results   Component Value Date    NHDL 110 07/28/2019       Is the patient on a Statin? YES             Is the patient on Aspirin? NO    Medications     HMG CoA Reductase Inhibitors     atorvastatin (LIPITOR) 20 MG tablet             Last three blood pressure readings:  BP Readings from Last 3 Encounters:   10/24/19 107/70   10/21/19 124/73   09/25/19 110/60       Date of last diabetes office visit: scheduled 11/21/2019     Tobacco History:     History   Smoking Status     Former Smoker   Smokeless Tobacco     Never Used         Hypertension   Last three blood pressure readings:  BP Readings from Last 3 Encounters:   10/24/19 107/70   10/21/19 124/73   09/25/19 110/60     Blood pressure: Passed    HTN Guidelines:  Less than 140/90      Composite cancer screening  Chart review shows that this patient is due/due soon for the following None  Summary:    Patient is due/failing the following:   A1C and ASA    Action needed:   Routed to provider for review.    Type of outreach:    None, routed to provider for review.    Questions for provider review:    Jean has an appointment scheduled but I noticed he does not have ASA on his med list. Should he be on ASA? If not could you adjust his med list to say that he id intentionally not on ASA.                                                                                                                                    Michelle Bullard [facility]       Chart routed to Provider .

## 2019-11-19 NOTE — PROGRESS NOTES
"SUBJECTIVE:   Leif Ariza is a 68 year old male presenting with a chief complaint of   Chief Complaint   Patient presents with     Leg Pain     having left leg pain, feels the same as leg did when had blood clot in September        He is an established patient of [facility].    \"Black spot\" on the toe noticed over the past couple of years.   No pain. Denies redness.     Does have hx of left leg cellulitis over the past   DVT lift groin area 2 months ago and has been on warfarin. Eloquis use over the prior 3 years.   Dialysis 3 days per week     1999 -- had a blood clot left groin at that time, currently on warfarin    Dad CVA -- 63   Brother -- 63 -- colon cancer     Review of Systems    Past Medical History:   Diagnosis Date     Arthritis      Atrial fibrillation (H)      CKD (chronic kidney disease) stage 5, GFR less than 15 ml/min (H) 2017     DVT (deep venous thrombosis) (H) 2000     Gout      History of blood transfusion      HLD (hyperlipidemia)      HTN (hypertension)      Kidney stone 2016     Type 2 diabetes mellitus (H)      Family History   Problem Relation Age of Onset     Cerebrovascular Disease Father         Had multiple strokes while in hospital when he passed     Current Outpatient Medications   Medication Sig Dispense Refill     Acetaminophen 325 MG CAPS Take 650 mg by mouth 4 times daily as needed       allopurinol (ZYLOPRIM) 300 MG tablet Take 2 tablets (600 mg) by mouth daily (Patient taking differently: Take 300 mg by mouth daily ) 180 tablet 3     atorvastatin (LIPITOR) 20 MG tablet Take 1 tablet (20 mg) by mouth daily 90 tablet 3     calcium acetate (PHOSLO) 667 MG CAPS capsule Take 4 capsule 3 times a day       fenofibrate (TRICOR) 145 MG tablet Take 1 tablet (145 mg) by mouth daily 90 tablet 3     furosemide (LASIX) 80 MG tablet Take 1 tablet (80 mg) by mouth daily 90 tablet 0     Misc Natural Products (OSTEO BI-FLEX TRIPLE STRENGTH) TABS Take 3 tablets by mouth 2 times daily       order " for DME Equipment being ordered: Digital home blood pressure monitor kit 1 kit 0     propafenone (RYTHMOL) 300 MG tablet Take 1 tablet (300 mg) by mouth 2 times daily 180 tablet 0     SODIUM BICARBONATE PO Take 10 g by mouth daily       warfarin ANTICOAGULANT (COUMADIN) 2 MG tablet Take daily as directed. Current dose 4 mg daily. 60 tablet 0     Social History     Tobacco Use     Smoking status: Former Smoker     Smokeless tobacco: Never Used   Substance Use Topics     Alcohol use: Not Currently     Frequency: Never     Comment: Stopped when I was put on blood thinners       OBJECTIVE  /67   Pulse 109   Temp 98.6  F (37  C) (Oral)   Wt 130.6 kg (288 lb)   SpO2 97%   BMI 40.17 kg/m      Physical Exam  HENT:      Head: Normocephalic.   Neck:      Musculoskeletal: Normal range of motion.   Cardiovascular:      Rate and Rhythm: Normal rate.      Pulses: Normal pulses.   Pulmonary:      Effort: Pulmonary effort is normal.   Skin:     General: Skin is warm.      Capillary Refill: Capillary refill takes less than 2 seconds.      Findings: Rash (noted color changes at mid lower leg distally consistent with venous statis dermatitis possibly early cellullitis but not obvious ) present.      Comments: Noted considerable lower extremity brawny edema left > right from mi-calf distally    Neurological:      Mental Status: He is alert and oriented to person, place, and time.      Sensory: Sensory deficit (noted at all toes distally consistent with diabetes neuropathy ) present.   Psychiatric:         Mood and Affect: Mood normal.           ASSESSMENT:    ICD-10-CM    1. Cellulitis of left lower extremity L03.116 cephALEXin (KEFLEX) 500 MG capsule        PLAN:  Hx of diabetes and peripheral nephropathy.   Immediately follow-up if swelling or redness spreading or worsening.   Noted early mild cellulitis changes and poor sensation and circulation at distal toes.   Emphasized importance of compression stockings and  elevation of leg when possible.   Patient educational/instructional material provided including reasons for follow-up    Lb Ferrer MD

## 2019-11-20 NOTE — PROGRESS NOTES
Subjective     Leif Ariza is a 68 year old male who presents to clinic today for the following health issues:    HPI   Diabetes Follow-up      How often are you checking your blood sugar? Not at all    What concerns do you have today about your diabetes? None     Do you have any of these symptoms? (Select all that apply)  Numbness in feet     Have you had a diabetic eye exam in the last 12 months? Yes- Date of last eye exam: 10/2019    BP Readings from Last 2 Encounters:   11/21/19 124/65   11/18/19 113/67     Hemoglobin A1C (%)   Date Value   11/21/2019 5.4   07/28/2019 5.4     LDL Cholesterol Calculated (mg/dL)   Date Value   07/28/2019 57       Diabetes Management Resources    Hypertension Follow-up      Do you check your blood pressure regularly outside of the clinic? Yes     Are you following a low salt diet? No    Are your blood pressures ever more than 140 on the top number (systolic) OR more   than 90 on the bottom number (diastolic), for example 140/90? Yes    Chronic Kidney Disease Follow-up    Do you take any over the counter pain medicine?: Yes   What over the counter medicine are you taking for your pain?:  tylenol      How often do you take this medicine?:  twice a day      How many servings of fruits and vegetables do you eat daily?  2-3    On average, how many sweetened beverages do you drink each day (soda, juice, sweet tea, etc)?   0    How many days per week do you miss taking your medication? 0      Jean is a 68 y.o male with a PMH of CKD 5/ESRD on dialysis, h/o Afib, T2DM, HLD, and HTN.  He reports doing well since last visit.  He was seen 3 days ago for mild redness and tenderness to his left lower leg, for which he is being for cellulitis with Keflex.  He was hospitalized a few months ago and took quite some time to get over a bad Cellulitis episode.   He is wanting to stay on top of things now.  Reports he has been wearing a knee high compression stocking and this helps with tenderness  and swelling.      BP has been stable since we discontinued the Amlodipine.  Blood sugars have been excellent- last A1C 5.3 (diet controlled).  He has seen Cardiology and Hematology recently.  He is hoping to have a left knee replacement in the near future and has been working with Orthopedics on this.        Patient Active Problem List   Diagnosis     Essential hypertension     Mixed hyperlipidemia     CKD (chronic kidney disease) stage 5, GFR less than 15 ml/min (H)     ESRD (end stage renal disease) on dialysis (H)     Type 2 diabetes mellitus, without long-term current use of insulin (H)     Atrial fibrillation status post cardioversion (H)     Hyperuricemia     DORI (obstructive sleep apnea)     Morbid obesity (H)     Deep vein thrombosis (DVT) (H)     Long term (current) use of anticoagulants     Secondary hyperparathyroidism, renal (H)     Past Surgical History:   Procedure Laterality Date     ANESTH,UPPER ARM AV FISTULA REPAIR Left 2018     APPENDECTOMY  1958     C REPAIR CRUCIATE LIGAMENT,KNEE  1976     CARDIOVERSION  2000     carpel tunnel surgery Left 2000     COLONOSCOPY       SINUS SURGERY  1997       Social History     Tobacco Use     Smoking status: Never Smoker     Smokeless tobacco: Never Used   Substance Use Topics     Alcohol use: Not Currently     Frequency: Never     Comment: Stopped when I was put on blood thinners     Family History   Problem Relation Age of Onset     Cerebrovascular Disease Father         Had multiple strokes while in hospital when he passed         Current Outpatient Medications   Medication Sig Dispense Refill     Acetaminophen 325 MG CAPS Take 650 mg by mouth 4 times daily as needed       allopurinol (ZYLOPRIM) 300 MG tablet Take 2 tablets (600 mg) by mouth daily (Patient taking differently: Take 300 mg by mouth daily ) 180 tablet 3     atorvastatin (LIPITOR) 20 MG tablet Take 1 tablet (20 mg) by mouth daily 90 tablet 3     calcium acetate (PHOSLO) 667 MG CAPS capsule Take  4 capsule 3 times a day       cephALEXin (KEFLEX) 500 MG capsule Take 1 capsule (500 mg) by mouth 4 times daily for 10 days 40 capsule 1     fenofibrate (TRICOR) 145 MG tablet Take 1 tablet (145 mg) by mouth daily 90 tablet 3     furosemide (LASIX) 80 MG tablet Take 1 tablet (80 mg) by mouth daily 90 tablet 1     Misc Natural Products (OSTEO BI-FLEX TRIPLE STRENGTH) TABS Take 3 tablets by mouth 2 times daily       order for DME Equipment being ordered: Digital home blood pressure monitor kit 1 kit 0     propafenone (RYTHMOL) 300 MG tablet Take 1 tablet (300 mg) by mouth 2 times daily 180 tablet 0     SODIUM BICARBONATE PO Take 10 g by mouth daily       warfarin ANTICOAGULANT (COUMADIN) 2 MG tablet Take daily as directed. Current dose 4 mg daily. 60 tablet 0     Allergies   Allergen Reactions     Penicillins        Reviewed and updated as needed this visit by Provider  Tobacco  Allergies  Meds  Problems  Med Hx  Surg Hx  Fam Hx         Review of Systems   Constitutional: Negative for chills, fatigue and fever.   HENT: Negative for sore throat and trouble swallowing.    Respiratory: Negative for cough and shortness of breath.    Cardiovascular: Negative for chest pain, palpitations and peripheral edema.   Gastrointestinal: Negative for abdominal pain, anal bleeding and hematochezia.   Skin: Positive for color change.   Neurological: Positive for dizziness. Negative for weakness and headaches.   Psychiatric/Behavioral: Negative for sleep disturbance. The patient is not nervous/anxious.             Objective    /65   Pulse 74   Temp 98.2  F (36.8  C) (Oral)   Wt 135 kg (297 lb 9.6 oz)   BMI 41.51 kg/m    Body mass index is 41.51 kg/m .  Physical Exam  Vitals signs reviewed.   Constitutional:       Appearance: Normal appearance. He is well-developed.   HENT:      Head: Normocephalic.   Cardiovascular:      Rate and Rhythm: Normal rate and regular rhythm.   Pulmonary:      Effort: Pulmonary effort is  "normal.      Breath sounds: Normal breath sounds.   Abdominal:      General: Bowel sounds are normal.      Palpations: Abdomen is soft.      Tenderness: There is no abdominal tenderness.   Musculoskeletal:         General: Tenderness present.      Left knee: Tenderness found.   Skin:     General: Skin is warm and dry.      Findings: Erythema present.      Comments: Left lower extremity with mild erythema and mild warmth.     Neurological:      Mental Status: He is alert and oriented to person, place, and time.   Psychiatric:         Mood and Affect: Mood normal.         Behavior: Behavior normal.         Thought Content: Thought content normal.                Assessment & Plan     1. CKD (chronic kidney disease) stage 5, GFR less than 15 ml/min (H)  - followed by Nephrology and going to dialysis MWF  - furosemide (LASIX) 80 MG tablet; Take 1 tablet (80 mg) by mouth daily  Dispense: 90 tablet; Refill: 1  - Comprehensive metabolic panel    2. ESRD (end stage renal disease) on dialysis (H)  - furosemide (LASIX) 80 MG tablet; Take 1 tablet (80 mg) by mouth daily  Dispense: 90 tablet; Refill: 1    3. Type 2 diabetes mellitus with chronic kidney disease on chronic dialysis, without long-term current use of insulin (H)  - diet controlled.   - HEMOGLOBIN A1C  - Comprehensive metabolic panel  - PODIATRY/FOOT & ANKLE SURGERY REFERRAL    4. Atrial fibrillation status post cardioversion (H)  - followed by Cardiology. Stable.     5. History of recurrent deep vein thrombosis (DVT)  - anticoagulated with Warfarin.      6. Cellulitis of left lower extremity  - was started on Keflex 3 days ago.  Advised to contact office if he feels erythema, swelling, or pain is getting worse.        BMI:   Estimated body mass index is 41.51 kg/m  as calculated from the following:    Height as of 10/24/19: 1.803 m (5' 11\").    Weight as of this encounter: 135 kg (297 lb 9.6 oz).   Weight management plan: Discussed healthy diet and exercise " guidelines        Return in about 3 months (around 2/21/2020) for T2DM, HLD, DVT, Cellulitis, .    Nguyen Torres NP  [facility]

## 2019-11-21 ENCOUNTER — OFFICE VISIT (OUTPATIENT)
Dept: FAMILY MEDICINE | Facility: CLINIC | Age: 69
End: 2019-11-21
Payer: MEDICARE

## 2019-11-21 ENCOUNTER — ANTICOAGULATION THERAPY VISIT (OUTPATIENT)
Dept: NURSING | Facility: CLINIC | Age: 69
End: 2019-11-21
Payer: MEDICARE

## 2019-11-21 VITALS
TEMPERATURE: 98.2 F | BODY MASS INDEX: 41.51 KG/M2 | SYSTOLIC BLOOD PRESSURE: 124 MMHG | DIASTOLIC BLOOD PRESSURE: 65 MMHG | HEART RATE: 74 BPM | WEIGHT: 297.6 LBS

## 2019-11-21 DIAGNOSIS — I48.91 ATRIAL FIBRILLATION STATUS POST CARDIOVERSION (H): ICD-10-CM

## 2019-11-21 DIAGNOSIS — Z86.718 HISTORY OF RECURRENT DEEP VEIN THROMBOSIS (DVT): ICD-10-CM

## 2019-11-21 DIAGNOSIS — I48.91 ATRIAL FIBRILLATION STATUS POST CARDIOVERSION (H): Chronic | ICD-10-CM

## 2019-11-21 DIAGNOSIS — I82.402 ACUTE DEEP VEIN THROMBOSIS (DVT) OF LEFT LOWER EXTREMITY, UNSPECIFIED VEIN (H): ICD-10-CM

## 2019-11-21 DIAGNOSIS — N18.5 CKD (CHRONIC KIDNEY DISEASE) STAGE 5, GFR LESS THAN 15 ML/MIN (H): ICD-10-CM

## 2019-11-21 DIAGNOSIS — Z79.01 LONG TERM (CURRENT) USE OF ANTICOAGULANTS: ICD-10-CM

## 2019-11-21 DIAGNOSIS — L03.116 CELLULITIS OF LEFT LOWER EXTREMITY: ICD-10-CM

## 2019-11-21 DIAGNOSIS — E11.22 TYPE 2 DIABETES MELLITUS WITH CHRONIC KIDNEY DISEASE ON CHRONIC DIALYSIS, WITHOUT LONG-TERM CURRENT USE OF INSULIN (H): Primary | Chronic | ICD-10-CM

## 2019-11-21 DIAGNOSIS — Z99.2 ESRD (END STAGE RENAL DISEASE) ON DIALYSIS (H): Chronic | ICD-10-CM

## 2019-11-21 DIAGNOSIS — Z99.2 TYPE 2 DIABETES MELLITUS WITH CHRONIC KIDNEY DISEASE ON CHRONIC DIALYSIS, WITHOUT LONG-TERM CURRENT USE OF INSULIN (H): Primary | Chronic | ICD-10-CM

## 2019-11-21 DIAGNOSIS — N18.6 TYPE 2 DIABETES MELLITUS WITH CHRONIC KIDNEY DISEASE ON CHRONIC DIALYSIS, WITHOUT LONG-TERM CURRENT USE OF INSULIN (H): Primary | Chronic | ICD-10-CM

## 2019-11-21 DIAGNOSIS — N18.6 ESRD (END STAGE RENAL DISEASE) ON DIALYSIS (H): Chronic | ICD-10-CM

## 2019-11-21 LAB
ALBUMIN SERPL-MCNC: 3.5 G/DL (ref 3.4–5)
ALP SERPL-CCNC: 83 U/L (ref 40–150)
ALT SERPL W P-5'-P-CCNC: 18 U/L (ref 0–70)
ANION GAP SERPL CALCULATED.3IONS-SCNC: 5 MMOL/L (ref 3–14)
AST SERPL W P-5'-P-CCNC: 19 U/L (ref 0–45)
BILIRUB SERPL-MCNC: 0.3 MG/DL (ref 0.2–1.3)
BUN SERPL-MCNC: 60 MG/DL (ref 7–30)
CALCIUM SERPL-MCNC: 9.9 MG/DL (ref 8.5–10.1)
CHLORIDE SERPL-SCNC: 97 MMOL/L (ref 94–109)
CO2 SERPL-SCNC: 32 MMOL/L (ref 20–32)
CREAT SERPL-MCNC: 8.62 MG/DL (ref 0.66–1.25)
GFR SERPL CREATININE-BSD FRML MDRD: 6 ML/MIN/{1.73_M2}
GLUCOSE SERPL-MCNC: 80 MG/DL (ref 70–99)
HBA1C MFR BLD: 5.4 % (ref 0–5.6)
INR POINT OF CARE: 2.6 (ref 0.86–1.14)
POTASSIUM SERPL-SCNC: 5.5 MMOL/L (ref 3.4–5.3)
PROT SERPL-MCNC: 8 G/DL (ref 6.8–8.8)
SODIUM SERPL-SCNC: 134 MMOL/L (ref 133–144)

## 2019-11-21 PROCEDURE — 99214 OFFICE O/P EST MOD 30 MIN: CPT | Performed by: NURSE PRACTITIONER

## 2019-11-21 PROCEDURE — 83036 HEMOGLOBIN GLYCOSYLATED A1C: CPT | Performed by: NURSE PRACTITIONER

## 2019-11-21 PROCEDURE — 36416 COLLJ CAPILLARY BLOOD SPEC: CPT

## 2019-11-21 PROCEDURE — 80053 COMPREHEN METABOLIC PANEL: CPT | Performed by: NURSE PRACTITIONER

## 2019-11-21 PROCEDURE — 85610 PROTHROMBIN TIME: CPT | Mod: QW

## 2019-11-21 RX ORDER — FUROSEMIDE 80 MG
80 TABLET ORAL DAILY
Qty: 90 TABLET | Refills: 1 | Status: SHIPPED | OUTPATIENT
Start: 2019-11-21 | End: 2020-04-13

## 2019-11-21 NOTE — PROGRESS NOTES
ANTICOAGULATION FOLLOW-UP CLINIC VISIT    Patient Name:  Leif Ariza  Date:  2019  Contact Type:  Face to Face    SUBJECTIVE:  Patient Findings     Comments:   Recent visit to  for redness in leg and started on Keflex for this. Seeing provider today. Symptoms have not worsened. Keflex for 10 days. Therapeutic. Will recheck next week.         Clinical Outcomes     Comments:   Recent visit to  for redness in leg and started on Keflex for this. Seeing provider today. Symptoms have not worsened. Keflex for 10 days. Therapeutic. Will recheck next week.            OBJECTIVE    INR Protime   Date Value Ref Range Status   2019 2.6 (A) 0.86 - 1.14 Final       ASSESSMENT / PLAN  INR assessment THER    Recheck INR In: 6 DAYS    INR Location Clinic      Anticoagulation Summary  As of 2019    INR goal:   2.0-3.0   TTR:   35.9 % (2 mo)   INR used for dosin.6 (2019)   Warfarin maintenance plan:   4 mg (2 mg x 2) every Wed, Fri; 2 mg (2 mg x 1) all other days   Full warfarin instructions:   4 mg every Wed, Fri; 2 mg all other days   Weekly warfarin total:   18 mg   Plan last modified:   Oma Taylor RN (2019)   Next INR check:   2019   Priority:   High   Target end date:       Indications    Atrial fibrillation status post cardioversion (H) [I48.91]  Deep vein thrombosis (DVT) (H) [I82.409]  Long term (current) use of anticoagulants [Z79.01]             Anticoagulation Episode Summary     INR check location:       Preferred lab:       Send INR reminders to:   PEARL CORNEJO    Comments:   Has Dialysis  and Friday.      Anticoagulation Care Providers     Provider Role Specialty Phone number    Nguyen Torres, NP Referring Nurse Practitioner - Family [phone]            See the Encounter Report to view Anticoagulation Flowsheet and Dosing Calendar (Go to Encounters tab in chart review, and find the Anticoagulation Therapy Visit)        Oma Taylor  RN

## 2019-11-22 ASSESSMENT — ENCOUNTER SYMPTOMS
HEMATOCHEZIA: 0
DIZZINESS: 1
NERVOUS/ANXIOUS: 0
SHORTNESS OF BREATH: 0
WEAKNESS: 0
ANAL BLEEDING: 0
FEVER: 0
TROUBLE SWALLOWING: 0
COUGH: 0
PALPITATIONS: 0
SLEEP DISTURBANCE: 0
CHILLS: 0
COLOR CHANGE: 1
HEADACHES: 0
ABDOMINAL PAIN: 0
SORE THROAT: 0
FATIGUE: 0

## 2019-11-27 ENCOUNTER — ANTICOAGULATION THERAPY VISIT (OUTPATIENT)
Dept: NURSING | Facility: CLINIC | Age: 69
End: 2019-11-27
Payer: MEDICARE

## 2019-11-27 DIAGNOSIS — Z79.01 LONG TERM (CURRENT) USE OF ANTICOAGULANTS: ICD-10-CM

## 2019-11-27 DIAGNOSIS — I82.402 ACUTE DEEP VEIN THROMBOSIS (DVT) OF LEFT LOWER EXTREMITY, UNSPECIFIED VEIN (H): ICD-10-CM

## 2019-11-27 DIAGNOSIS — I48.91 ATRIAL FIBRILLATION STATUS POST CARDIOVERSION (H): ICD-10-CM

## 2019-11-27 LAB — INR POINT OF CARE: 2.5 (ref 0.86–1.14)

## 2019-11-27 PROCEDURE — 99207 ZZC NO CHARGE NURSE ONLY: CPT

## 2019-11-27 PROCEDURE — 85610 PROTHROMBIN TIME: CPT | Mod: QW

## 2019-11-27 PROCEDURE — 36416 COLLJ CAPILLARY BLOOD SPEC: CPT

## 2019-11-27 NOTE — PROGRESS NOTES
ANTICOAGULATION FOLLOW-UP CLINIC VISIT    Patient Name:  Leif Ariza  Date:  2019  Contact Type:  Face to Face    SUBJECTIVE:  Patient Findings     Comments:   The patient was assessed for diet, medication, and activity level changes, missed or extra doses, bruising or bleeding, with no problem findings.  Pt states he has two days of antibiotic left.          Clinical Outcomes     Negatives:   Major bleeding event, Thromboembolic event, Anticoagulation-related hospital admission, Anticoagulation-related ED visit, Anticoagulation-related fatality    Comments:   The patient was assessed for diet, medication, and activity level changes, missed or extra doses, bruising or bleeding, with no problem findings.  Pt states he has two days of antibiotic left.             OBJECTIVE    INR Protime   Date Value Ref Range Status   2019 2.5 (A) 0.86 - 1.14 Final       ASSESSMENT / PLAN  INR assessment THER    Recheck INR In: 2 WEEKS    INR Location Clinic      Anticoagulation Summary  As of 2019    INR goal:   2.0-3.0   TTR:   41.7 % (2.2 mo)   INR used for dosin.5 (2019)   Warfarin maintenance plan:   4 mg (2 mg x 2) every Mon, Wed, Fri; 2 mg (2 mg x 1) all other days   Full warfarin instructions:   4 mg every Mon, Wed, Fri; 2 mg all other days   Weekly warfarin total:   20 mg   Plan last modified:   Ayaka Arce RN (2019)   Next INR check:   12/10/2019   Priority:   High   Target end date:       Indications    Atrial fibrillation status post cardioversion (H) [I48.91]  Deep vein thrombosis (DVT) (H) [I82.409]  Long term (current) use of anticoagulants [Z79.01]             Anticoagulation Episode Summary     INR check location:       Preferred lab:       Send INR reminders to:   PEARL CORNEJO    Comments:   Has Dialysis  and Friday.      Anticoagulation Care Providers     Provider Role Specialty Phone number    Nguyen Torres NP Referring Nurse Practitioner -  Family [phone]            See the Encounter Report to view Anticoagulation Flowsheet and Dosing Calendar (Go to Encounters tab in chart review, and find the Anticoagulation Therapy Visit)      Ayaka Arce RN

## 2019-12-02 PROBLEM — M79.605 LEFT LEG PAIN: Status: ACTIVE | Noted: 2019-09-18

## 2019-12-02 PROBLEM — L03.90 CELLULITIS: Status: ACTIVE | Noted: 2019-09-01

## 2019-12-02 PROBLEM — N39.0 UTI (URINARY TRACT INFECTION): Status: ACTIVE | Noted: 2019-09-01

## 2019-12-02 PROBLEM — A41.9 SEPSIS (H): Status: ACTIVE | Noted: 2019-09-01

## 2019-12-02 PROBLEM — E87.1 HYPONATREMIA: Status: ACTIVE | Noted: 2019-09-01

## 2019-12-04 ENCOUNTER — ANCILLARY PROCEDURE (OUTPATIENT)
Dept: GENERAL RADIOLOGY | Facility: CLINIC | Age: 69
End: 2019-12-04
Attending: PODIATRIST
Payer: MEDICARE

## 2019-12-04 ENCOUNTER — OFFICE VISIT (OUTPATIENT)
Dept: PODIATRY | Facility: CLINIC | Age: 69
End: 2019-12-04
Payer: MEDICARE

## 2019-12-04 VITALS
BODY MASS INDEX: 41.42 KG/M2 | SYSTOLIC BLOOD PRESSURE: 100 MMHG | WEIGHT: 297 LBS | HEART RATE: 88 BPM | DIASTOLIC BLOOD PRESSURE: 62 MMHG

## 2019-12-04 DIAGNOSIS — E11.51 TYPE II DIABETES MELLITUS WITH PERIPHERAL ARTERY DISEASE (H): ICD-10-CM

## 2019-12-04 DIAGNOSIS — M19.079 ARTHRITIS OF ANKLE: ICD-10-CM

## 2019-12-04 DIAGNOSIS — L84 CALLUS: ICD-10-CM

## 2019-12-04 DIAGNOSIS — L97.529 ULCER OF TOE, LEFT, WITH UNSPECIFIED SEVERITY (H): Primary | ICD-10-CM

## 2019-12-04 PROCEDURE — 73610 X-RAY EXAM OF ANKLE: CPT | Mod: LT

## 2019-12-04 PROCEDURE — 99203 OFFICE O/P NEW LOW 30 MIN: CPT | Mod: 25 | Performed by: PODIATRIST

## 2019-12-04 PROCEDURE — 11055 PARING/CUTG B9 HYPRKER LES 1: CPT | Mod: Q8 | Performed by: PODIATRIST

## 2019-12-04 NOTE — LETTER
12/4/2019         RE: Leif Ariza  [id number] [facility] [address]        Dear Colleague,    Thank you for referring your patient, Leif Ariza, to the [facility]. Please see a copy of my visit note below.    Subjective:    Pt is seen today in consult from Zo Torres with the c/c of a discolored left second toe.  He points to the end of this toe.  He has had this for several months.  He is recently moved here from [place].  This was treated by another physician out of [facility].  He recently moved to [place].  He has diabetes.  Patient has numbness and tingling in his feet.  He has chronic kidney disease.  He states he was going to have his left knee replaced before surgery he developed left lower extremity cellulitis.  He was placed on antibiotics for this and now much better.  He states his left leg is always more swollen than his right.  He also has a history of ankle pain.  He states he had an injection which helped this for approximately 3 months.  He had this in [place] before he left.  He is wondering about having an injection and again.  He has a history of DVT and is anticoagulated.  Patient's primary care physician above last seen in November 21, 2019     ROS:  A 10-point review of systems was performed and is positive for that noted in the HPI and as seen below.  All other areas are negative.          Allergies   Allergen Reactions     Penicillins        Current Outpatient Medications   Medication Sig Dispense Refill     Acetaminophen 325 MG CAPS Take 650 mg by mouth 4 times daily as needed       allopurinol (ZYLOPRIM) 300 MG tablet Take 2 tablets (600 mg) by mouth daily (Patient taking differently: Take 300 mg by mouth daily ) 180 tablet 3     atorvastatin (LIPITOR) 20 MG tablet Take 1 tablet (20 mg) by mouth daily 90 tablet 3     calcium acetate (PHOSLO) 667 MG CAPS capsule Take 4 capsule 3 times a day       fenofibrate (TRICOR) 145 MG tablet Take 1 tablet (145 mg)  by mouth daily 90 tablet 3     furosemide (LASIX) 80 MG tablet Take 1 tablet (80 mg) by mouth daily 90 tablet 1     Misc Natural Products (OSTEO BI-FLEX TRIPLE STRENGTH) TABS Take 3 tablets by mouth 2 times daily       order for DME Equipment being ordered: Digital home blood pressure monitor kit 1 kit 0     propafenone (RYTHMOL) 300 MG tablet Take 1 tablet (300 mg) by mouth 2 times daily 180 tablet 0     SODIUM BICARBONATE PO Take 10 g by mouth daily       warfarin ANTICOAGULANT (COUMADIN) 2 MG tablet Take daily as directed. Current dose 4 mg daily. 60 tablet 0       Patient Active Problem List   Diagnosis     Essential hypertension     Mixed hyperlipidemia     CKD (chronic kidney disease) stage 5, GFR less than 15 ml/min (H)     ESRD (end stage renal disease) on dialysis (H)     Type 2 diabetes mellitus, without long-term current use of insulin (H)     Atrial fibrillation status post cardioversion (H)     Hyperuricemia     DORI (obstructive sleep apnea)     Morbid obesity (H)     Deep vein thrombosis (DVT) (H)     Long term (current) use of anticoagulants     Secondary hyperparathyroidism, renal (H)     Cellulitis     Hyponatremia     Left leg pain     Sepsis (H)     UTI (urinary tract infection)       Past Medical History:   Diagnosis Date     Arthritis      Atrial fibrillation (H)      CKD (chronic kidney disease) stage 5, GFR less than 15 ml/min (H) 2017     DVT (deep venous thrombosis) (H) 2000     Gout      History of blood transfusion      HLD (hyperlipidemia)      HTN (hypertension)      Kidney stone 2016     Type 2 diabetes mellitus (H)        Past Surgical History:   Procedure Laterality Date     ANESTH,UPPER ARM AV FISTULA REPAIR Left 2018     APPENDECTOMY  1958     C REPAIR CRUCIATE LIGAMENT,KNEE  1976     CARDIOVERSION  2000     carpel tunnel surgery Left 2000     COLONOSCOPY       SINUS SURGERY  1997       Family History   Problem Relation Age of Onset     Cerebrovascular Disease Father         Had  multiple strokes while in hospital when he passed       Social History     Tobacco Use     Smoking status: Never Smoker     Smokeless tobacco: Never Used   Substance Use Topics     Alcohol use: Not Currently     Frequency: Never     Comment: Stopped when I was put on blood thinners         Exam:    Vitals: /62   Pulse 88   Wt 134.7 kg (297 lb)   BMI 41.42 kg/m     BMI: Body mass index is 41.42 kg/m .  Height: Data Unavailable    Constitutional/ general:  Pt is in no apparent distress, appears well-nourished.  Cooperative with history and physical exam.     Psych:  The patient answered questions appropriately.  Normal affect.  Seems to have reasonable expectations, in terms of treatment.     Eyes:  Visual scanning/ tracking without deficit.     Ears:  Response to auditory stimuli is normal.  negative hearing aid devices.  Auricles in proper alignment.     Lymphatic:  Popliteal lymph nodes not enlarged.     Lungs:  Non labored breathing, non labored speech. No cough.  No audible wheezing. Even, quiet breathing.       Vascular: Patient has lower extremity edema and varicosities bilaterally with the left being worse.  He has trophic changes in his skin.  Edema makes it impossible to palpate his tibial arteries.  His dorsalis pedis is weakly palpable.  There is no hair growth noted bilaterally.    Neuro:  Alert and oriented x 3. Coordinated gait.  Light touch sensation is intact to the L4, L5, S1 distributions. No obvious deficits.  No evidence of neurological-based weakness, spasticity, or contracture in the lower extremities.  Monofilament absent distal to midfoot bilaterally.    Derm: Skin is thin shiny atrophic with no hair growth noted.  Trophic changes noted in his skin bilaterally with the left being worse.    Musculoskeletal:    All the patient's muscle compartments appear to be intact.  Generally he has a decreased range of motion of all forefoot and rear foot joints bilaterally.  The right foot has  less edema looks more healthy and there is no signs of any breakdown in this foot.  He has hammertoes of his lesser digits bilaterally.  The left second is the most acute.  There is a callus on the end of the left second toe.  Underlying this is a partial-thickness 3 x 2 mm wound.  There is no sinus tracts purulence or odor here at all.  There is no erythema or edema at all.    Radiographic Exam:  X-Ray Findings:    XR ANKLE LT G/E 3 VW 12/4/2019 2:04 PM      HISTORY: Arthritis of ankle     COMPARISON: None.                                                                      IMPRESSION: Diffuse soft tissue swelling. No fractures are evident.  Moderate size area of lucency in the medial talar dome consistent with  overlying chondromalacia. Hypertrophic changes in the tibiotalar  joint. Hypertrophic changes at multiple joints of the hindfoot and  midfoot. Osteopenia. Atheromatous calcification.      A:  Diabetes mellitus with peripheral neuropathy and LOPS  Left second hammertoe with callus  Left second toe ulcer  Left ankle and rear foot arthritis    P: With a 15 blade we debrided the callus on the end of the left second toe we discussed there is an underlying ulcer here.  It is superficial and not infected.  We dressed this with antibiotic ointment and a Band-Aid.  We gave wound care instructions.  We gave him an augmented crest pad to offload this.  He will try to minimize his activities until this is healed.  X-rays were taken of his left ankle.  We discussed that he has arthritis in his ankle joint tarsometatarsal joint as well as subtalar joint.  We discussed an ankle gauntlet to help stabilize all these joints.  He declines today.  He would rather try another injection.  We wrote a order for patient to have an ultrasound-guided injection in his left ankle at our [facility] sports medicine clinic.  We will have him return to the clinic in 2 weeks to reevaluate his toe to ensure the wound is healed.  Thank  you for allowing me to participate in the care of this patient    Irineo Chan DPM DPM, FACFAS                 Again, thank you for allowing me to participate in the care of your patient.        Sincerely,        Irineo Chan DPM

## 2019-12-04 NOTE — PATIENT INSTRUCTIONS
Weight management plan: Patient was referred to their PCP to discuss a diet and exercise plan.  We wish you continued good healing. If you have any questions or concerns, please do not hesitate to contact us at [phone]    Please remember to call and schedule a follow up appointment if one was recommended at your earliest convenience.   PODIATRY CLINIC HOURS  TELEPHONE NUMBER    Dr. Irineo Chan D.P.M [facility]    Clinics:  [facility]    [facility]   Tuesday 1PM-6PM  [place]  Wednesday 7AM-2PM  [facility]  Thursday 10AM-6PM  [place]  Friday 7AM-3PM  [place]  Specialty schedulers:   [phone] to make an appointment with any Specialty Provider.        Urgent Care locations:    [facility] Monday-Friday 5 pm - 9 pm. Saturday-Sunday 9 am -5pm    Monday-Friday 11 am - 9 pm Saturday 9 am - 5 pm     Monday-Sunday 12 noon-8PM [phone]     If you need a medication refill, please contact us you may need lab work and/or a follow up visit prior to your refill (i.e. Antifungal medications).    [facility] (secure e-mail communication and access to your chart) to send a message or to make an appointment.    If MRI needed please call Nicolas Johnson at [phone]

## 2019-12-04 NOTE — PROGRESS NOTES
Subjective:    Pt is seen today in consult from Zo Torres with the c/c of a discolored left second toe.  He points to the end of this toe.  He has had this for several months.  He is recently moved here from [place].  This was treated by another physician out of state.  He recently moved to [place].  He has diabetes.  Patient has numbness and tingling in his feet.  He has chronic kidney disease.  He states he was going to have his left knee replaced before surgery he developed left lower extremity cellulitis.  He was placed on antibiotics for this and now much better.  He states his left leg is always more swollen than his right.  He also has a history of ankle pain.  He states he had an injection which helped this for approximately 3 months.  He had this in [place] before he left.  He is wondering about having an injection and again.  He has a history of DVT and is anticoagulated.  Patient's primary care physician above last seen in November 21, 2019     ROS:  A 10-point review of systems was performed and is positive for that noted in the HPI and as seen below.  All other areas are negative.          Allergies   Allergen Reactions     Penicillins        Current Outpatient Medications   Medication Sig Dispense Refill     Acetaminophen 325 MG CAPS Take 650 mg by mouth 4 times daily as needed       allopurinol (ZYLOPRIM) 300 MG tablet Take 2 tablets (600 mg) by mouth daily (Patient taking differently: Take 300 mg by mouth daily ) 180 tablet 3     atorvastatin (LIPITOR) 20 MG tablet Take 1 tablet (20 mg) by mouth daily 90 tablet 3     calcium acetate (PHOSLO) 667 MG CAPS capsule Take 4 capsule 3 times a day       fenofibrate (TRICOR) 145 MG tablet Take 1 tablet (145 mg) by mouth daily 90 tablet 3     furosemide (LASIX) 80 MG tablet Take 1 tablet (80 mg) by mouth daily 90 tablet 1     Misc Natural Products (OSTEO BI-FLEX TRIPLE STRENGTH) TABS Take 3 tablets by mouth 2 times daily       order for DME  Equipment being ordered: Digital home blood pressure monitor kit 1 kit 0     propafenone (RYTHMOL) 300 MG tablet Take 1 tablet (300 mg) by mouth 2 times daily 180 tablet 0     SODIUM BICARBONATE PO Take 10 g by mouth daily       warfarin ANTICOAGULANT (COUMADIN) 2 MG tablet Take daily as directed. Current dose 4 mg daily. 60 tablet 0       Patient Active Problem List   Diagnosis     Essential hypertension     Mixed hyperlipidemia     CKD (chronic kidney disease) stage 5, GFR less than 15 ml/min (H)     ESRD (end stage renal disease) on dialysis (H)     Type 2 diabetes mellitus, without long-term current use of insulin (H)     Atrial fibrillation status post cardioversion (H)     Hyperuricemia     DORI (obstructive sleep apnea)     Morbid obesity (H)     Deep vein thrombosis (DVT) (H)     Long term (current) use of anticoagulants     Secondary hyperparathyroidism, renal (H)     Cellulitis     Hyponatremia     Left leg pain     Sepsis (H)     UTI (urinary tract infection)       Past Medical History:   Diagnosis Date     Arthritis      Atrial fibrillation (H)      CKD (chronic kidney disease) stage 5, GFR less than 15 ml/min (H) 2017     DVT (deep venous thrombosis) (H) 2000     Gout      History of blood transfusion      HLD (hyperlipidemia)      HTN (hypertension)      Kidney stone 2016     Type 2 diabetes mellitus (H)        Past Surgical History:   Procedure Laterality Date     ANESTH,UPPER ARM AV FISTULA REPAIR Left 2018     APPENDECTOMY  1958     C REPAIR CRUCIATE LIGAMENT,KNEE  1976     CARDIOVERSION  2000     carpel tunnel surgery Left 2000     COLONOSCOPY       SINUS SURGERY  1997       Family History   Problem Relation Age of Onset     Cerebrovascular Disease Father         Had multiple strokes while in hospital when he passed       Social History     Tobacco Use     Smoking status: Never Smoker     Smokeless tobacco: Never Used   Substance Use Topics     Alcohol use: Not Currently     Frequency: Never      Comment: Stopped when I was put on blood thinners         Exam:    Vitals: /62   Pulse 88   Wt 134.7 kg (297 lb)   BMI 41.42 kg/m    BMI: Body mass index is 41.42 kg/m .  Height: Data Unavailable    Constitutional/ general:  Pt is in no apparent distress, appears well-nourished.  Cooperative with history and physical exam.     Psych:  The patient answered questions appropriately.  Normal affect.  Seems to have reasonable expectations, in terms of treatment.     Eyes:  Visual scanning/ tracking without deficit.     Ears:  Response to auditory stimuli is normal.  negative hearing aid devices.  Auricles in proper alignment.     Lymphatic:  Popliteal lymph nodes not enlarged.     Lungs:  Non labored breathing, non labored speech. No cough.  No audible wheezing. Even, quiet breathing.       Vascular: Patient has lower extremity edema and varicosities bilaterally with the left being worse.  He has trophic changes in his skin.  Edema makes it impossible to palpate his tibial arteries.  His dorsalis pedis is weakly palpable.  There is no hair growth noted bilaterally.    Neuro:  Alert and oriented x 3. Coordinated gait.  Light touch sensation is intact to the L4, L5, S1 distributions. No obvious deficits.  No evidence of neurological-based weakness, spasticity, or contracture in the lower extremities.  Monofilament absent distal to midfoot bilaterally.    Derm: Skin is thin shiny atrophic with no hair growth noted.  Trophic changes noted in his skin bilaterally with the left being worse.    Musculoskeletal:    All the patient's muscle compartments appear to be intact.  Generally he has a decreased range of motion of all forefoot and rear foot joints bilaterally.  The right foot has less edema looks more healthy and there is no signs of any breakdown in this foot.  He has hammertoes of his lesser digits bilaterally.  The left second is the most acute.  There is a callus on the end of the left second toe.  Underlying  this is a partial-thickness 3 x 2 mm wound.  There is no sinus tracts purulence or odor here at all.  There is no erythema or edema at all.    Radiographic Exam:  X-Ray Findings:    XR ANKLE LT G/E 3 VW 12/4/2019 2:04 PM      HISTORY: Arthritis of ankle     COMPARISON: None.                                                                      IMPRESSION: Diffuse soft tissue swelling. No fractures are evident.  Moderate size area of lucency in the medial talar dome consistent with  overlying chondromalacia. Hypertrophic changes in the tibiotalar  joint. Hypertrophic changes at multiple joints of the hindfoot and  midfoot. Osteopenia. Atheromatous calcification.      A:  Diabetes mellitus with peripheral neuropathy and LOPS  Left second hammertoe with callus  Left second toe ulcer  Left ankle and rear foot arthritis    P: With a 15 blade we debrided the callus on the end of the left second toe we discussed there is an underlying ulcer here.  It is superficial and not infected.  We dressed this with antibiotic ointment and a Band-Aid.  We gave wound care instructions.  We gave him an augmented crest pad to offload this.  He will try to minimize his activities until this is healed.  X-rays were taken of his left ankle.  We discussed that he has arthritis in his ankle joint tarsometatarsal joint as well as subtalar joint.  We discussed an ankle gauntlet to help stabilize all these joints.  He declines today.  He would rather try another injection.  We wrote a order for patient to have an ultrasound-guided injection in his left ankle at our [facility] sports medicine clinic.  We will have him return to the clinic in 2 weeks to reevaluate his toe to ensure the wound is healed.  Thank you for allowing me to participate in the care of this patient    Irineo Chan, BILLY CERVANTES, FACFAS

## 2019-12-10 ENCOUNTER — ANTICOAGULATION THERAPY VISIT (OUTPATIENT)
Dept: NURSING | Facility: CLINIC | Age: 69
End: 2019-12-10
Payer: MEDICARE

## 2019-12-10 DIAGNOSIS — I48.91 ATRIAL FIBRILLATION STATUS POST CARDIOVERSION (H): ICD-10-CM

## 2019-12-10 DIAGNOSIS — I82.402 ACUTE DEEP VEIN THROMBOSIS (DVT) OF LEFT LOWER EXTREMITY, UNSPECIFIED VEIN (H): ICD-10-CM

## 2019-12-10 DIAGNOSIS — Z79.01 LONG TERM (CURRENT) USE OF ANTICOAGULANTS: ICD-10-CM

## 2019-12-10 LAB — INR POINT OF CARE: 1.5 (ref 0.86–1.14)

## 2019-12-10 PROCEDURE — 85610 PROTHROMBIN TIME: CPT | Mod: QW

## 2019-12-10 PROCEDURE — 36416 COLLJ CAPILLARY BLOOD SPEC: CPT

## 2019-12-10 PROCEDURE — 99207 ZZC NO CHARGE NURSE ONLY: CPT

## 2019-12-10 NOTE — PROGRESS NOTES
ANTICOAGULATION FOLLOW-UP CLINIC VISIT    Patient Name:  Leif Ariza  Date:  12/10/2019  Contact Type:  Face to Face    SUBJECTIVE:  Patient Findings     Comments:   Finished antibiotic 12 days ago. No other changes. Dose adjusted.         Clinical Outcomes     Comments:   Finished antibiotic 12 days ago. No other changes. Dose adjusted.            OBJECTIVE    INR Protime   Date Value Ref Range Status   12/10/2019 1.5 (A) 0.86 - 1.14 Final       ASSESSMENT / PLAN  INR assessment SUB    Recheck INR In: 9 DAYS    INR Location Clinic      Anticoagulation Summary  As of 12/10/2019    INR goal:   2.0-3.0   TTR:   43.1 % (2.6 mo)   INR used for dosin.5! (12/10/2019)   Warfarin maintenance plan:   2 mg (2 mg x 1) every Sun, Thu; 4 mg (2 mg x 2) all other days   Full warfarin instructions:   2 mg every Sun, Thu; 4 mg all other days   Weekly warfarin total:   24 mg   Plan last modified:   Oma Taylor RN (12/10/2019)   Next INR check:   2019   Priority:   High   Target end date:       Indications    Atrial fibrillation status post cardioversion (H) [I48.91]  Deep vein thrombosis (DVT) (H) [I82.409]  Long term (current) use of anticoagulants [Z79.01]             Anticoagulation Episode Summary     INR check location:       Preferred lab:       Send INR reminders to:   PEARL CORNEJO    Comments:   Has Dialysis  and Friday.      Anticoagulation Care Providers     Provider Role Specialty Phone number    TorresNguyen, NP Referring Nurse Practitioner - Family [phone]            See the Encounter Report to view Anticoagulation Flowsheet and Dosing Calendar (Go to Encounters tab in chart review, and find the Anticoagulation Therapy Visit)        Oma Taylor RN

## 2019-12-12 DIAGNOSIS — I82.402 DEEP VEIN THROMBOSIS OF LEFT LOWER LIMB (H): Primary | ICD-10-CM

## 2019-12-14 ENCOUNTER — MYC REFILL (OUTPATIENT)
Dept: NURSING | Facility: CLINIC | Age: 69
End: 2019-12-14

## 2019-12-14 DIAGNOSIS — I82.402 ACUTE DEEP VEIN THROMBOSIS (DVT) OF LEFT LOWER EXTREMITY, UNSPECIFIED VEIN (H): ICD-10-CM

## 2019-12-14 DIAGNOSIS — Z79.01 LONG TERM (CURRENT) USE OF ANTICOAGULANTS: ICD-10-CM

## 2019-12-14 DIAGNOSIS — I48.91 ATRIAL FIBRILLATION STATUS POST CARDIOVERSION (H): ICD-10-CM

## 2019-12-16 RX ORDER — WARFARIN SODIUM 2 MG/1
TABLET ORAL
Qty: 180 TABLET | Refills: 1 | Status: SHIPPED | OUTPATIENT
Start: 2019-12-16 | End: 2020-01-02

## 2019-12-18 ENCOUNTER — OFFICE VISIT (OUTPATIENT)
Dept: PODIATRY | Facility: CLINIC | Age: 69
End: 2019-12-18
Payer: MEDICARE

## 2019-12-18 VITALS
WEIGHT: 296 LBS | HEART RATE: 98 BPM | BODY MASS INDEX: 41.28 KG/M2 | SYSTOLIC BLOOD PRESSURE: 116 MMHG | DIASTOLIC BLOOD PRESSURE: 66 MMHG

## 2019-12-18 DIAGNOSIS — L97.529 ULCER OF TOE, LEFT, WITH UNSPECIFIED SEVERITY (H): Primary | ICD-10-CM

## 2019-12-18 DIAGNOSIS — E11.51 TYPE II DIABETES MELLITUS WITH PERIPHERAL ARTERY DISEASE (H): ICD-10-CM

## 2019-12-18 PROCEDURE — 99213 OFFICE O/P EST LOW 20 MIN: CPT | Performed by: PODIATRIST

## 2019-12-18 NOTE — PROGRESS NOTES
Subjective:    12/4/19   Pt is seen today in consult from Zo Torres with the c/c of a discolored left second toe.  He points to the end of this toe.  He has had this for several months.  He is recently moved here from [place].  This was treated by another physician out of state.  He recently moved to [place].  He has diabetes.  Patient has numbness and tingling in his feet.  He has chronic kidney disease.  He states he was going to have his left knee replaced before surgery he developed left lower extremity cellulitis.  He was placed on antibiotics for this and now much better.  He states his left leg is always more swollen than his right.  He also has a history of ankle pain.  He states he had an injection which helped this for approximately 3 months.  He had this in [place] before he left.  He is wondering about having an injection and again.  He has a history of DVT and is anticoagulated.  Patient's primary care physician above last seen in November 21, 2019 12/18/19 patient has been using crest pad on his left second toe.  He states this is comfortable and working well.  He denies any drainage.  He denies any pain.  He denies any erythema or edema.  He is no longer dressing this at all.  He will be getting an injection in his ankle soon.    ROS:  See above         Allergies   Allergen Reactions     Penicillins        Current Outpatient Medications   Medication Sig Dispense Refill     Acetaminophen 325 MG CAPS Take 650 mg by mouth 4 times daily as needed       allopurinol (ZYLOPRIM) 300 MG tablet Take 2 tablets (600 mg) by mouth daily (Patient taking differently: Take 300 mg by mouth daily ) 180 tablet 3     atorvastatin (LIPITOR) 20 MG tablet Take 1 tablet (20 mg) by mouth daily 90 tablet 3     calcium acetate (PHOSLO) 667 MG CAPS capsule Take 4 capsule 3 times a day       fenofibrate (TRICOR) 145 MG tablet Take 1 tablet (145 mg) by mouth daily 90 tablet 3     furosemide (LASIX) 80 MG tablet Take  1 tablet (80 mg) by mouth daily 90 tablet 1     Misc Natural Products (OSTEO BI-FLEX TRIPLE STRENGTH) TABS Take 3 tablets by mouth 2 times daily       order for DME Equipment being ordered: Digital home blood pressure monitor kit 1 kit 0     propafenone (RYTHMOL) 300 MG tablet Take 1 tablet (300 mg) by mouth 2 times daily 180 tablet 0     SODIUM BICARBONATE PO Take 10 g by mouth daily       warfarin ANTICOAGULANT (COUMADIN) 2 MG tablet Take daily as directed. Current dose 4 mg daily. 180 tablet 1       Patient Active Problem List   Diagnosis     Essential hypertension     Mixed hyperlipidemia     CKD (chronic kidney disease) stage 5, GFR less than 15 ml/min (H)     ESRD (end stage renal disease) on dialysis (H)     Type 2 diabetes mellitus, without long-term current use of insulin (H)     Atrial fibrillation status post cardioversion (H)     Hyperuricemia     DORI (obstructive sleep apnea)     Morbid obesity (H)     Deep vein thrombosis (DVT) (H)     Long term (current) use of anticoagulants     Secondary hyperparathyroidism, renal (H)     Cellulitis     Hyponatremia     Left leg pain     Sepsis (H)     UTI (urinary tract infection)       Past Medical History:   Diagnosis Date     Arthritis      Atrial fibrillation (H)      CKD (chronic kidney disease) stage 5, GFR less than 15 ml/min (H) 2017     DVT (deep venous thrombosis) (H) 2000     Gout      History of blood transfusion      HLD (hyperlipidemia)      HTN (hypertension)      Kidney stone 2016     Type 2 diabetes mellitus (H)        Past Surgical History:   Procedure Laterality Date     ANESTH,UPPER ARM AV FISTULA REPAIR Left 2018     APPENDECTOMY  1958     C REPAIR CRUCIATE LIGAMENT,KNEE  1976     CARDIOVERSION  2000     carpel tunnel surgery Left 2000     COLONOSCOPY       SINUS SURGERY  1997       Family History   Problem Relation Age of Onset     Cerebrovascular Disease Father         Had multiple strokes while in hospital when he passed       Social History      Tobacco Use     Smoking status: Never Smoker     Smokeless tobacco: Never Used   Substance Use Topics     Alcohol use: Not Currently     Frequency: Never     Comment: Stopped when I was put on blood thinners         Exam:    Vitals: There were no vitals taken for this visit.  BMI: There is no height or weight on file to calculate BMI.  Height: Data Unavailable    Constitutional/ general:  Pt is in no apparent distress, appears well-nourished.  Cooperative with history and physical exam.     Psych:  The patient answered questions appropriately.  Normal affect.  Seems to have reasonable expectations, in terms of treatment.     Eyes:  Visual scanning/ tracking without deficit.     Ears:  Response to auditory stimuli is normal.  negative hearing aid devices.  Auricles in proper alignment.     Lymphatic:  Popliteal lymph nodes not enlarged.     Lungs:  Non labored breathing, non labored speech. No cough.  No audible wheezing. Even, quiet breathing.       Vascular: Patient has lower extremity edema and varicosities bilaterally with the left being worse.  He has trophic changes in his skin.  Edema makes it impossible to palpate his tibial arteries.  His dorsalis pedis is weakly palpable.  There is no hair growth noted bilaterally.    Neuro:  Alert and oriented x 3. Coordinated gait.  Light touch sensation is intact to the L4, L5, S1 distributions. No obvious deficits.  No evidence of neurological-based weakness, spasticity, or contracture in the lower extremities.  Monofilament absent distal to midfoot bilaterally.    Derm: Skin is thin shiny atrophic with no hair growth noted.  Trophic changes noted in his skin bilaterally with the left being worse.    Musculoskeletal:    All the patient's muscle compartments appear to be intact.  Generally he has a decreased range of motion of all forefoot and rear foot joints bilaterally.  The right foot has less edema looks more healthy and there is no signs of any breakdown in  this foot.  He has hammertoes of his lesser digits bilaterally.  The left second is the most acute.  There is a ulcer on the end of the left second toe that in the past measured 3 x 2 mm and today measures 2 x 1 mm.  The wound is now a superficial dry eschar there is no sinus tracts purulence or odor here at all.  There is no erythema or edema at all.    Radiographic Exam:  X-Ray Findings:    XR ANKLE LT G/E 3 VW 12/4/2019 2:04 PM      HISTORY: Arthritis of ankle     COMPARISON: None.                                                                      IMPRESSION: Diffuse soft tissue swelling. No fractures are evident.  Moderate size area of lucency in the medial talar dome consistent with  overlying chondromalacia. Hypertrophic changes in the tibiotalar  joint. Hypertrophic changes at multiple joints of the hindfoot and  midfoot. Osteopenia. Atheromatous calcification.      A:  Diabetes mellitus with peripheral neuropathy and LOPS  Left second hammertoe   Left second toe ulcer  Left ankle and rear foot arthritis    P:   Discussed with patient wound is smaller and more superficial.  There is no signs of infection.  He will continue the crest pad.  He will make sure the crest pad is not causing breakdown.  He will make sure he does not wear this to bed at night.  We let him return the clinic in 4 weeks for reevaluation    Irineo Chan, BILLY CERVANTES, FACFAS

## 2019-12-18 NOTE — LETTER
12/18/2019         RE: Leif Ariza  [id number] [facility] [address]        Dear Colleague,    Thank you for referring your patient, Leif Ariza, to the [facility]. Please see a copy of my visit note below.    Subjective:    12/4/19   Pt is seen today in consult from Zo Torres with the c/c of a discolored left second toe.  He points to the end of this toe.  He has had this for several months.  He is recently moved here from [place].  This was treated by another physician out of state.  He recently moved to [place].  He has diabetes.  Patient has numbness and tingling in his feet.  He has chronic kidney disease.  He states he was going to have his left knee replaced before surgery he developed left lower extremity cellulitis.  He was placed on antibiotics for this and now much better.  He states his left leg is always more swollen than his right.  He also has a history of ankle pain.  He states he had an injection which helped this for approximately 3 months.  He had this in [place] before he left.  He is wondering about having an injection and again.  He has a history of DVT and is anticoagulated.  Patient's primary care physician above last seen in November 21, 2019 12/18/19 patient has been using crest pad on his left second toe.  He states this is comfortable and working well.  He denies any drainage.  He denies any pain.  He denies any erythema or edema.  He is no longer dressing this at all.  He will be getting an injection in his ankle soon.    ROS:  See above         Allergies   Allergen Reactions     Penicillins        Current Outpatient Medications   Medication Sig Dispense Refill     Acetaminophen 325 MG CAPS Take 650 mg by mouth 4 times daily as needed       allopurinol (ZYLOPRIM) 300 MG tablet Take 2 tablets (600 mg) by mouth daily (Patient taking differently: Take 300 mg by mouth daily ) 180 tablet 3     atorvastatin (LIPITOR) 20 MG tablet Take 1 tablet (20  mg) by mouth daily 90 tablet 3     calcium acetate (PHOSLO) 667 MG CAPS capsule Take 4 capsule 3 times a day       fenofibrate (TRICOR) 145 MG tablet Take 1 tablet (145 mg) by mouth daily 90 tablet 3     furosemide (LASIX) 80 MG tablet Take 1 tablet (80 mg) by mouth daily 90 tablet 1     Misc Natural Products (OSTEO BI-FLEX TRIPLE STRENGTH) TABS Take 3 tablets by mouth 2 times daily       order for DME Equipment being ordered: Digital home blood pressure monitor kit 1 kit 0     propafenone (RYTHMOL) 300 MG tablet Take 1 tablet (300 mg) by mouth 2 times daily 180 tablet 0     SODIUM BICARBONATE PO Take 10 g by mouth daily       warfarin ANTICOAGULANT (COUMADIN) 2 MG tablet Take daily as directed. Current dose 4 mg daily. 180 tablet 1       Patient Active Problem List   Diagnosis     Essential hypertension     Mixed hyperlipidemia     CKD (chronic kidney disease) stage 5, GFR less than 15 ml/min (H)     ESRD (end stage renal disease) on dialysis (H)     Type 2 diabetes mellitus, without long-term current use of insulin (H)     Atrial fibrillation status post cardioversion (H)     Hyperuricemia     DORI (obstructive sleep apnea)     Morbid obesity (H)     Deep vein thrombosis (DVT) (H)     Long term (current) use of anticoagulants     Secondary hyperparathyroidism, renal (H)     Cellulitis     Hyponatremia     Left leg pain     Sepsis (H)     UTI (urinary tract infection)       Past Medical History:   Diagnosis Date     Arthritis      Atrial fibrillation (H)      CKD (chronic kidney disease) stage 5, GFR less than 15 ml/min (H) 2017     DVT (deep venous thrombosis) (H) 2000     Gout      History of blood transfusion      HLD (hyperlipidemia)      HTN (hypertension)      Kidney stone 2016     Type 2 diabetes mellitus (H)        Past Surgical History:   Procedure Laterality Date     ANESTH,UPPER ARM AV FISTULA REPAIR Left 2018     APPENDECTOMY  1958     C REPAIR CRUCIATE LIGAMENT,KNEE  1976     CARDIOVERSION  2000      carpel tunnel surgery Left 2000     COLONOSCOPY       SINUS SURGERY  1997       Family History   Problem Relation Age of Onset     Cerebrovascular Disease Father         Had multiple strokes while in hospital when he passed       Social History     Tobacco Use     Smoking status: Never Smoker     Smokeless tobacco: Never Used   Substance Use Topics     Alcohol use: Not Currently     Frequency: Never     Comment: Stopped when I was put on blood thinners         Exam:    Vitals: There were no vitals taken for this visit.  BMI: There is no height or weight on file to calculate BMI.  Height: Data Unavailable    Constitutional/ general:  Pt is in no apparent distress, appears well-nourished.  Cooperative with history and physical exam.     Psych:  The patient answered questions appropriately.  Normal affect.  Seems to have reasonable expectations, in terms of treatment.     Eyes:  Visual scanning/ tracking without deficit.     Ears:  Response to auditory stimuli is normal.  negative hearing aid devices.  Auricles in proper alignment.     Lymphatic:  Popliteal lymph nodes not enlarged.     Lungs:  Non labored breathing, non labored speech. No cough.  No audible wheezing. Even, quiet breathing.       Vascular: Patient has lower extremity edema and varicosities bilaterally with the left being worse.  He has trophic changes in his skin.  Edema makes it impossible to palpate his tibial arteries.  His dorsalis pedis is weakly palpable.  There is no hair growth noted bilaterally.    Neuro:  Alert and oriented x 3. Coordinated gait.  Light touch sensation is intact to the L4, L5, S1 distributions. No obvious deficits.  No evidence of neurological-based weakness, spasticity, or contracture in the lower extremities.  Monofilament absent distal to midfoot bilaterally.    Derm: Skin is thin shiny atrophic with no hair growth noted.  Trophic changes noted in his skin bilaterally with the left being worse.    Musculoskeletal:    All  the patient's muscle compartments appear to be intact.  Generally he has a decreased range of motion of all forefoot and rear foot joints bilaterally.  The right foot has less edema looks more healthy and there is no signs of any breakdown in this foot.  He has hammertoes of his lesser digits bilaterally.  The left second is the most acute.  There is a ulcer on the end of the left second toe that in the past measured 3 x 2 mm and today measures 2 x 1 mm.  The wound is now a superficial dry eschar there is no sinus tracts purulence or odor here at all.  There is no erythema or edema at all.    Radiographic Exam:  X-Ray Findings:    XR ANKLE LT G/E 3 VW 12/4/2019 2:04 PM      HISTORY: Arthritis of ankle     COMPARISON: None.                                                                      IMPRESSION: Diffuse soft tissue swelling. No fractures are evident.  Moderate size area of lucency in the medial talar dome consistent with  overlying chondromalacia. Hypertrophic changes in the tibiotalar  joint. Hypertrophic changes at multiple joints of the hindfoot and  midfoot. Osteopenia. Atheromatous calcification.      A:  Diabetes mellitus with peripheral neuropathy and LOPS  Left second hammertoe   Left second toe ulcer  Left ankle and rear foot arthritis    P:   Discussed with patient wound is smaller and more superficial.  There is no signs of infection.  He will continue the crest pad.  He will make sure the crest pad is not causing breakdown.  He will make sure he does not wear this to bed at night.  We let him return the clinic in 4 weeks for reevaluation    Irineo Chan DPM DPM, FACFAS                 Again, thank you for allowing me to participate in the care of your patient.        Sincerely,        Irineo Chan DPM

## 2019-12-18 NOTE — PATIENT INSTRUCTIONS
Weight management plan: Patient was referred to their PCP to discuss a diet and exercise plan.  We wish you continued good healing. If you have any questions or concerns, please do not hesitate to contact us at [phone]    Please remember to call and schedule a follow up appointment if one was recommended at your earliest convenience.   PODIATRY CLINIC HOURS  TELEPHONE NUMBER    Dr. Irineo Chan D.P.M [facility]    Clinics:  [facility]    [facility]   Tuesday 1PM-6PM  [place]  Wednesday 7AM-2PM  [facility]  Thursday 10AM-6PM  [place]  Friday 7AM-3PM  [place]  Specialty schedulers:   [phone] to make an appointment with any Specialty Provider.        Urgent Care locations:    [facility] Monday-Friday 5 pm - 9 pm. Saturday-Sunday 9 am -5pm    Monday-Friday 11 am - 9 pm Saturday 9 am - 5 pm     Monday-Sunday 12 noon-8PM [phone]     If you need a medication refill, please contact us you may need lab work and/or a follow up visit prior to your refill (i.e. Antifungal medications).    [email or web address] (secure e-mail communication and access to your chart) to send a message or to make an appointment.    If MRI needed please call Nicolas Johnson at [phone]

## 2019-12-19 ENCOUNTER — OFFICE VISIT (OUTPATIENT)
Dept: ORTHOPEDICS | Facility: CLINIC | Age: 69
End: 2019-12-19
Payer: MEDICARE

## 2019-12-19 ENCOUNTER — ANTICOAGULATION THERAPY VISIT (OUTPATIENT)
Dept: NURSING | Facility: CLINIC | Age: 69
End: 2019-12-19
Payer: MEDICARE

## 2019-12-19 VITALS — BODY MASS INDEX: 41.28 KG/M2 | WEIGHT: 296 LBS

## 2019-12-19 DIAGNOSIS — Z79.01 LONG TERM (CURRENT) USE OF ANTICOAGULANTS: ICD-10-CM

## 2019-12-19 DIAGNOSIS — I82.402 ACUTE DEEP VEIN THROMBOSIS (DVT) OF LEFT LOWER EXTREMITY, UNSPECIFIED VEIN (H): ICD-10-CM

## 2019-12-19 DIAGNOSIS — I48.91 ATRIAL FIBRILLATION STATUS POST CARDIOVERSION (H): ICD-10-CM

## 2019-12-19 DIAGNOSIS — M19.072 ARTHRITIS OF LEFT ANKLE: Primary | ICD-10-CM

## 2019-12-19 LAB — INR POINT OF CARE: 1.8 (ref 0.86–1.14)

## 2019-12-19 PROCEDURE — 36416 COLLJ CAPILLARY BLOOD SPEC: CPT

## 2019-12-19 PROCEDURE — 99207 ZZC NO CHARGE NURSE ONLY: CPT

## 2019-12-19 PROCEDURE — 85610 PROTHROMBIN TIME: CPT | Mod: QW

## 2019-12-19 PROCEDURE — 20606 DRAIN/INJ JOINT/BURSA W/US: CPT | Mod: LT | Performed by: FAMILY MEDICINE

## 2019-12-19 RX ORDER — ROPIVACAINE HYDROCHLORIDE 5 MG/ML
2 INJECTION, SOLUTION EPIDURAL; INFILTRATION; PERINEURAL
Status: DISCONTINUED | OUTPATIENT
Start: 2019-12-19 | End: 2020-11-13

## 2019-12-19 RX ORDER — BETAMETHASONE SODIUM PHOSPHATE AND BETAMETHASONE ACETATE 3; 3 MG/ML; MG/ML
6 INJECTION, SUSPENSION INTRA-ARTICULAR; INTRALESIONAL; INTRAMUSCULAR; SOFT TISSUE
Status: DISCONTINUED | OUTPATIENT
Start: 2019-12-19 | End: 2020-11-13

## 2019-12-19 RX ADMIN — ROPIVACAINE HYDROCHLORIDE 2 ML: 5 INJECTION, SOLUTION EPIDURAL; INFILTRATION; PERINEURAL at 12:30

## 2019-12-19 RX ADMIN — BETAMETHASONE SODIUM PHOSPHATE AND BETAMETHASONE ACETATE 6 MG: 3; 3 INJECTION, SUSPENSION INTRA-ARTICULAR; INTRALESIONAL; INTRAMUSCULAR; SOFT TISSUE at 12:30

## 2019-12-19 NOTE — LETTER
12/19/2019         RE: Leif Ariza  [address] [facility] [address]        Dear Colleague,    Thank you for referring your patient, Leif Ariza, to the [facility]. Please see a copy of my visit note below.    Medium Joint Injection/Arthrocentesis: L ankle  Date/Time: 12/19/2019 12:30 PM  Performed by: Kelvin Nelson MD  Authorized by: Kelvin Nelson MD     Indications:  Pain  Needle Size:  25 G  Guidance: ultrasound    Approach:  Anterior  Location:  Ankle  Site:  L ankle  Medications:  6 mg betamethasone acet & sod phos 6 (3-3) MG/ML; 2 mL ropivacaine 5 MG/ML  Outcome:  Tolerated well, no immediate complications  Procedure discussed: discussed risks, benefits, and alternatives    Consent Given by:  Patient  Timeout: timeout called immediately prior to procedure    Prep: patient was prepped and draped in usual sterile fashion     Patient reported significant improvement of pain after left ankle steroid injection.  Aftercare instructions given to patient.  Plan to follow-up as previously discussed with referring provider.     Kelvin Nelson MD [facility]            Again, thank you for allowing me to participate in the care of your patient.        Sincerely,        Kelvin Nelson MD

## 2019-12-19 NOTE — PROGRESS NOTES
ANTICOAGULATION FOLLOW-UP CLINIC VISIT    Patient Name:  Leif Ariza  Date:  2019  Contact Type:  Face to Face    SUBJECTIVE:  Patient Findings     Comments:   Plans steroid injection in ankle today. No missed dose or other change.         Clinical Outcomes     Negatives:   Major bleeding event, Thromboembolic event, Anticoagulation-related hospital admission, Anticoagulation-related ED visit, Anticoagulation-related fatality    Comments:   Plans steroid injection in ankle today. No missed dose or other change.            OBJECTIVE    INR Protime   Date Value Ref Range Status   2019 1.8 (A) 0.86 - 1.14 Final       ASSESSMENT / PLAN  INR assessment SUB    Recheck INR In: 2 WEEKS    INR Location Clinic      Anticoagulation Summary  As of 2019    INR goal:   2.0-3.0   TTR:   38.6 % (2.9 mo)   INR used for dosin.8! (2019)   Warfarin maintenance plan:   2 mg (2 mg x 1) every Sun; 4 mg (2 mg x 2) all other days   Full warfarin instructions:   2 mg every Sun; 4 mg all other days   Weekly warfarin total:   26 mg   Plan last modified:   Oma Taylor RN (2019)   Next INR check:   2020   Priority:   High   Target end date:       Indications    Atrial fibrillation status post cardioversion (H) [I48.91]  Deep vein thrombosis (DVT) (H) [I82.409]  Long term (current) use of anticoagulants [Z79.01]             Anticoagulation Episode Summary     INR check location:       Preferred lab:       Send INR reminders to:   PEARL CORNEJO    Comments:   Has Dialysis  and Friday.      Anticoagulation Care Providers     Provider Role Specialty Phone number    Nguyen Torres, NP Referring Nurse Practitioner - Family [phone]            See the Encounter Report to view Anticoagulation Flowsheet and Dosing Calendar (Go to Encounters tab in chart review, and find the Anticoagulation Therapy Visit)        Oma Taylor RN

## 2019-12-19 NOTE — PATIENT INSTRUCTIONS
[facility] Injection Discharge Instructions    Procedure: Left Ankle Steroid Injection      You may shower, however avoid swimming, tub baths or hot tubs for 24 hours following your procedure    You may have a mild to moderate increase in pain for several days following the injection.    It may take up to 14 days for the steroid medication to start working although you may feel the effect as early as a few days after the procedure.    You may use ice packs for 10-15 minutes, 3 to 4 times a day at the injection site for comfort    You may use anti-inflammatory medications (such as Ibuprofen or Aleve or Advil) or Tylenol for pain control if necessary    If you were fasting, you may resume your normal diet and medications after the procedure    If you have diabetes, check your blood sugar more frequently than usual as your blood sugar may be higher than normal for 10-14 days following a steroid injection. Contact your doctor who manages your diabetes if your blood sugar is higher than usual      If you experience any of the following, call [facility] @ [phone] or [phone]  -Fever over 100 degree F  -Swelling, bleeding, redness, drainage, warmth at the injection site  - New or worsening pain

## 2019-12-20 ENCOUNTER — TRANSFERRED RECORDS (OUTPATIENT)
Dept: HEALTH INFORMATION MANAGEMENT | Facility: CLINIC | Age: 69
End: 2019-12-20

## 2020-01-02 ENCOUNTER — ANTICOAGULATION THERAPY VISIT (OUTPATIENT)
Dept: NURSING | Facility: CLINIC | Age: 70
End: 2020-01-02
Payer: MEDICARE

## 2020-01-02 DIAGNOSIS — I82.402 ACUTE DEEP VEIN THROMBOSIS (DVT) OF LEFT LOWER EXTREMITY, UNSPECIFIED VEIN (H): ICD-10-CM

## 2020-01-02 DIAGNOSIS — I48.91 ATRIAL FIBRILLATION STATUS POST CARDIOVERSION (H): ICD-10-CM

## 2020-01-02 DIAGNOSIS — Z79.01 LONG TERM (CURRENT) USE OF ANTICOAGULANTS: ICD-10-CM

## 2020-01-02 LAB — INR POINT OF CARE: 2.8 (ref 0.86–1.14)

## 2020-01-02 PROCEDURE — 85610 PROTHROMBIN TIME: CPT | Mod: QW

## 2020-01-02 PROCEDURE — 36416 COLLJ CAPILLARY BLOOD SPEC: CPT

## 2020-01-02 RX ORDER — WARFARIN SODIUM 2 MG/1
TABLET ORAL
Qty: 180 TABLET | Refills: 0
Start: 2020-01-02 | End: 2020-02-25

## 2020-01-02 NOTE — PROGRESS NOTES
ANTICOAGULATION FOLLOW-UP CLINIC VISIT    Patient Name:  Leif Ariza  Date:  2020  Contact Type:  Face to Face    SUBJECTIVE:  Patient Findings     Comments:   No dialysis today. No new concerns or changes. Therapeutic with dose change. Continue same.         Clinical Outcomes     Comments:   No dialysis today. No new concerns or changes. Therapeutic with dose change. Continue same.            OBJECTIVE    INR Protime   Date Value Ref Range Status   2020 2.8 (A) 0.86 - 1.14 Final       ASSESSMENT / PLAN  INR assessment THER    Recheck INR In: 2 WEEKS    INR Location Clinic      Anticoagulation Summary  As of 2020    INR goal:   2.0-3.0   TTR:   44.3 % (3.4 mo)   INR used for dosin.8 (2020)   Warfarin maintenance plan:   2 mg (2 mg x 1) every Sun; 4 mg (2 mg x 2) all other days   Full warfarin instructions:   2 mg every Sun; 4 mg all other days   Weekly warfarin total:   26 mg   No change documented:   Oma Taylor RN   Plan last modified:   Oma Taylor RN (2019)   Next INR check:   2020   Priority:   Maintenance   Target end date:       Indications    Atrial fibrillation status post cardioversion (H) [I48.91]  Deep vein thrombosis (DVT) (H) [I82.409]  Long term (current) use of anticoagulants [Z79.01]             Anticoagulation Episode Summary     INR check location:       Preferred lab:       Send INR reminders to:   PEARL CORNEJO    Comments:   Has Dialysis  and Friday.      Anticoagulation Care Providers     Provider Role Specialty Phone number    Nguyen Torres, NP Referring Nurse Practitioner - Family [phone]            See the Encounter Report to view Anticoagulation Flowsheet and Dosing Calendar (Go to Encounters tab in chart review, and find the Anticoagulation Therapy Visit)        Oma Taylor RN

## 2020-01-16 ENCOUNTER — ANTICOAGULATION THERAPY VISIT (OUTPATIENT)
Dept: NURSING | Facility: CLINIC | Age: 70
End: 2020-01-16
Payer: MEDICARE

## 2020-01-16 DIAGNOSIS — I48.91 ATRIAL FIBRILLATION STATUS POST CARDIOVERSION (H): ICD-10-CM

## 2020-01-16 DIAGNOSIS — Z79.01 LONG TERM (CURRENT) USE OF ANTICOAGULANTS: ICD-10-CM

## 2020-01-16 DIAGNOSIS — I82.402 ACUTE DEEP VEIN THROMBOSIS (DVT) OF LEFT LOWER EXTREMITY, UNSPECIFIED VEIN (H): ICD-10-CM

## 2020-01-16 LAB — INR POINT OF CARE: 3.5 (ref 0.86–1.14)

## 2020-01-16 PROCEDURE — 85610 PROTHROMBIN TIME: CPT | Mod: QW

## 2020-01-16 PROCEDURE — 36416 COLLJ CAPILLARY BLOOD SPEC: CPT

## 2020-01-16 NOTE — PROGRESS NOTES
ANTICOAGULATION FOLLOW-UP CLINIC VISIT    Patient Name:  Leif Ariza  Date:  1/16/2020  Contact Type:  Face to Face    SUBJECTIVE:  Patient Findings     Positives:   Change in medications    Comments:   Stopped Rythmal 6 days ago per cardiology. No bleeding or bruising.        Clinical Outcomes     Comments:   Stopped Rythmal 6 days ago per cardiology. No bleeding or bruising.           OBJECTIVE    INR Protime   Date Value Ref Range Status   01/16/2020 3.5 (A) 0.86 - 1.14 Final       ASSESSMENT / PLAN  INR assessment SUPRA    Recheck INR In: 2 WEEKS    INR Location Clinic      Anticoagulation Summary  As of 1/16/2020    INR goal:   2.0-3.0   TTR:   42.4 % (3.9 mo)   INR used for dosing:   3.5! (1/16/2020)   Warfarin maintenance plan:   2 mg (2 mg x 1) every Sun, Fri; 4 mg (2 mg x 2) all other days   Full warfarin instructions:   2 mg every Sun, Fri; 4 mg all other days   Weekly warfarin total:   24 mg   Plan last modified:   Oma Taylor RN (1/16/2020)   Next INR check:   1/30/2020   Priority:   Maintenance   Target end date:       Indications    Atrial fibrillation status post cardioversion (H) [I48.91]  Deep vein thrombosis (DVT) (H) [I82.409]  Long term (current) use of anticoagulants [Z79.01]             Anticoagulation Episode Summary     INR check location:       Preferred lab:       Send INR reminders to:   PEARL CORNEJO    Comments:   Has Dialysis Monday Wednesday and Friday.      Anticoagulation Care Providers     Provider Role Specialty Phone number    Nguyen Torres, NP Referring Nurse Practitioner - Family [phone]            See the Encounter Report to view Anticoagulation Flowsheet and Dosing Calendar (Go to Encounters tab in chart review, and find the Anticoagulation Therapy Visit)        Oma Taylor RN

## 2020-01-20 ENCOUNTER — TELEPHONE (OUTPATIENT)
Dept: SLEEP MEDICINE | Facility: CLINIC | Age: 70
End: 2020-01-20

## 2020-01-20 NOTE — TELEPHONE ENCOUNTER
Patient called today and has been waiting to get his new c-pap machine the order was put in 10/2019 and he hasn't heard from anyone. Please call him to set-up a c-pap set-up, his phone number is [phone].    Ivana Melendez

## 2020-01-21 ENCOUNTER — TELEPHONE (OUTPATIENT)
Dept: SLEEP MEDICINE | Facility: CLINIC | Age: 70
End: 2020-01-21

## 2020-01-21 NOTE — TELEPHONE ENCOUNTER
Called pt to let him know we re-started his transfer process since we closed his transfer due to him not reaching out back to us after we tried calling him.      Let pt know we need to know what their sleep study was scored at for Medicare requirement. Let him know I sent a request to [employer] [facility].

## 2020-01-22 ENCOUNTER — TELEPHONE (OUTPATIENT)
Dept: SLEEP MEDICINE | Facility: CLINIC | Age: 70
End: 2020-01-22

## 2020-01-22 DIAGNOSIS — R06.00 DYSPNEA AND RESPIRATORY ABNORMALITY: Primary | ICD-10-CM

## 2020-01-22 DIAGNOSIS — E66.01 CLASS 3 SEVERE OBESITY DUE TO EXCESS CALORIES WITH BODY MASS INDEX (BMI) OF 40.0 TO 44.9 IN ADULT, UNSPECIFIED WHETHER SERIOUS COMORBIDITY PRESENT (H): ICD-10-CM

## 2020-01-22 DIAGNOSIS — G47.30 SLEEP APNEA, UNSPECIFIED TYPE: ICD-10-CM

## 2020-01-22 DIAGNOSIS — R53.81 MALAISE AND FATIGUE: ICD-10-CM

## 2020-01-22 DIAGNOSIS — E66.813 CLASS 3 SEVERE OBESITY DUE TO EXCESS CALORIES WITH BODY MASS INDEX (BMI) OF 40.0 TO 44.9 IN ADULT, UNSPECIFIED WHETHER SERIOUS COMORBIDITY PRESENT (H): ICD-10-CM

## 2020-01-22 DIAGNOSIS — Z72.820 LACK OF ADEQUATE SLEEP: ICD-10-CM

## 2020-01-22 DIAGNOSIS — R53.83 MALAISE AND FATIGUE: ICD-10-CM

## 2020-01-22 DIAGNOSIS — R06.89 DYSPNEA AND RESPIRATORY ABNORMALITY: Primary | ICD-10-CM

## 2020-01-22 NOTE — TELEPHONE ENCOUNTER
Rcvd emailed from [employer] Stating pt wants update.  Our Bertha was not working 1/21/2020 and my documentation was not saved. But as noted in Epic, pt was contacted.     Called pt again to let him know we currently are waiting to hear from [facility] to see if they are able to re-score his sleep study.  I representative from [place] stated it was scored at 3% and was not sure if they are able to re-score it since it is an older PSG but will ask their manager and they will get back to us. Explained to pt that we will reach out once we know.   Let him know that if they aren't able to re-score it at 4%, he will need to be re-studied, pt understood.

## 2020-01-22 NOTE — TELEPHONE ENCOUNTER
Telephone encounter note by Kellee ORTIZ on 01/21/20:     Called pt to let him know we re-started his transfer process since we closed his transfer due to him not reaching out back to us after we tried calling him.       Let pt know we need to know what their sleep study was scored at for Medicare requirement. Let him know I sent a request to [employer] PA.

## 2020-01-22 NOTE — TELEPHONE ENCOUNTER
Patient is transferring care from another DME provider.  [facility] transfer team is working on this.  Sent request to Kellee ORTIZ to call patient with status update.

## 2020-01-29 ENCOUNTER — OFFICE VISIT (OUTPATIENT)
Dept: PODIATRY | Facility: CLINIC | Age: 70
End: 2020-01-29
Payer: MEDICARE

## 2020-01-29 VITALS — HEART RATE: 100 BPM | DIASTOLIC BLOOD PRESSURE: 56 MMHG | OXYGEN SATURATION: 98 % | SYSTOLIC BLOOD PRESSURE: 100 MMHG

## 2020-01-29 DIAGNOSIS — L97.529 ULCER OF TOE, LEFT, WITH UNSPECIFIED SEVERITY (H): Primary | ICD-10-CM

## 2020-01-29 DIAGNOSIS — E11.51 TYPE II DIABETES MELLITUS WITH PERIPHERAL ARTERY DISEASE (H): ICD-10-CM

## 2020-01-29 PROCEDURE — 99213 OFFICE O/P EST LOW 20 MIN: CPT | Performed by: PODIATRIST

## 2020-01-29 NOTE — PATIENT INSTRUCTIONS
Weight management plan: Patient was referred to their PCP to discuss a diet and exercise plan.  We wish you continued good healing. If you have any questions or concerns, please do not hesitate to contact us at [phone]    Please remember to call and schedule a follow up appointment if one was recommended at your earliest convenience.   PODIATRY CLINIC HOURS  TELEPHONE NUMBER    Dr. Irineo Chan D.P.M [facility]    Clinics:  [facility]    [facility]   Tuesday 1PM-6PM  [place]  Wednesday 7AM-2PM  [facility]  Thursday 10AM-6PM  [place]  Friday 7AM-3PM  [place]  Specialty schedulers:   [phone] to make an appointment with any Specialty Provider.        Urgent Care locations:    [facility] Monday-Friday 5 pm - 9 pm. Saturday-Sunday 9 am -5pm    Monday-Friday 11 am - 9 pm Saturday 9 am - 5 pm     Monday-Sunday 12 noon-8PM [phone]     If you need a medication refill, please contact us you may need lab work and/or a follow up visit prior to your refill (i.e. Antifungal medications).    [email or web address] (secure e-mail communication and access to your chart) to send a message or to make an appointment.    If MRI needed please call Nicolas Johnson at [phone]

## 2020-01-29 NOTE — LETTER
1/29/2020         RE: Leif Ariza  [id number] [facility] [address]        Dear Colleague,    Thank you for referring your patient, Leif Ariza, to the [facility]. Please see a copy of my visit note below.    Subjective:    12/4/19   Pt is seen today in consult from Zo Torres with the c/c of a discolored left second toe.  He points to the end of this toe.  He has had this for several months.  He is recently moved here from [place].  This was treated by another physician out of state.  He recently moved to [place].  He has diabetes.  Patient has numbness and tingling in his feet.  He has chronic kidney disease.  He states he was going to have his left knee replaced before surgery he developed left lower extremity cellulitis.  He was placed on antibiotics for this and now much better.  He states his left leg is always more swollen than his right.  He also has a history of ankle pain.  He states he had an injection which helped this for approximately 3 months.  He had this in [place] before he left.  He is wondering about having an injection and again.  He has a history of DVT and is anticoagulated.  Patient's primary care physician above last seen in November 21, 2019 12/18/19 patient has been using crest pad on his left second toe.  He states this is comfortable and working well.  He denies any drainage.  He denies any pain.  He denies any erythema or edema.  He is no longer dressing this at all.  He will be getting an injection in his ankle soon.    1/29/20 patient returns for evaluation of his left second toe.  It is not draining at all since I last saw him.  He has been wearing the crest pad until recently when he got this wet.  He denies any erythema edema.  He did get an injection into his right ankle.  It only helped somewhat.  He has no new complaints today, 2 x 1 mm,       ROS:  See above         Allergies   Allergen Reactions     Penicillins        Current  Outpatient Medications   Medication Sig Dispense Refill     Acetaminophen 325 MG CAPS Take 650 mg by mouth 4 times daily as needed       allopurinol (ZYLOPRIM) 300 MG tablet Take 2 tablets (600 mg) by mouth daily (Patient taking differently: Take 300 mg by mouth daily ) 180 tablet 3     atorvastatin (LIPITOR) 20 MG tablet Take 1 tablet (20 mg) by mouth daily 90 tablet 3     calcium acetate (PHOSLO) 667 MG CAPS capsule Take 4 capsule 3 times a day       fenofibrate (TRICOR) 145 MG tablet Take 1 tablet (145 mg) by mouth daily 90 tablet 3     furosemide (LASIX) 80 MG tablet Take 1 tablet (80 mg) by mouth daily 90 tablet 1     Misc Natural Products (OSTEO BI-FLEX TRIPLE STRENGTH) TABS Take 3 tablets by mouth 2 times daily       order for DME Equipment being ordered: Digital home blood pressure monitor kit 1 kit 0     propafenone (RYTHMOL) 300 MG tablet Take 1 tablet (300 mg) by mouth 2 times daily 180 tablet 0     SODIUM BICARBONATE PO Take 10 g by mouth daily       warfarin ANTICOAGULANT (COUMADIN) 2 MG tablet Take daily as directed. Current dose 2 mg Sunday and 4 mg rest of week days. 180 tablet 0       Patient Active Problem List   Diagnosis     Essential hypertension     Mixed hyperlipidemia     CKD (chronic kidney disease) stage 5, GFR less than 15 ml/min (H)     ESRD (end stage renal disease) on dialysis (H)     Type 2 diabetes mellitus, without long-term current use of insulin (H)     Atrial fibrillation status post cardioversion (H)     Hyperuricemia     DORI (obstructive sleep apnea)     Morbid obesity (H)     Deep vein thrombosis (DVT) (H)     Long term (current) use of anticoagulants     Secondary hyperparathyroidism, renal (H)     Cellulitis     Hyponatremia     Left leg pain     Sepsis (H)     UTI (urinary tract infection)       Past Medical History:   Diagnosis Date     Arthritis      Atrial fibrillation (H)      CKD (chronic kidney disease) stage 5, GFR less than 15 ml/min (H) 2017     DVT (deep venous  thrombosis) (H) 2000     Gout      History of blood transfusion      HLD (hyperlipidemia)      HTN (hypertension)      Kidney stone 2016     Type 2 diabetes mellitus (H)        Past Surgical History:   Procedure Laterality Date     ANESTH,UPPER ARM AV FISTULA REPAIR Left 2018     APPENDECTOMY  1958     C REPAIR CRUCIATE LIGAMENT,KNEE  1976     CARDIOVERSION  2000     carpel tunnel surgery Left 2000     COLONOSCOPY       SINUS SURGERY  1997       Family History   Problem Relation Age of Onset     Cerebrovascular Disease Father         Had multiple strokes while in hospital when he passed       Social History     Tobacco Use     Smoking status: Never Smoker     Smokeless tobacco: Never Used   Substance Use Topics     Alcohol use: Not Currently     Frequency: Never     Comment: Stopped when I was put on blood thinners         Exam:    Vitals: There were no vitals taken for this visit.  BMI: There is no height or weight on file to calculate BMI.  Height: Data Unavailable    Constitutional/ general:  Pt is in no apparent distress, appears well-nourished.  Cooperative with history and physical exam.     Psych:  The patient answered questions appropriately.  Normal affect.  Seems to have reasonable expectations, in terms of treatment.     Eyes:  Visual scanning/ tracking without deficit.     Ears:  Response to auditory stimuli is normal.  negative hearing aid devices.  Auricles in proper alignment.     Lymphatic:  Popliteal lymph nodes not enlarged.     Lungs:  Non labored breathing, non labored speech. No cough.  No audible wheezing. Even, quiet breathing.       Vascular: Patient has lower extremity edema and varicosities bilaterally with the left being worse.  He has trophic changes in his skin.  Edema makes it impossible to palpate his tibial arteries.  His dorsalis pedis is weakly palpable.  There is no hair growth noted bilaterally.    Neuro:  Alert and oriented x 3. Coordinated gait.  Light touch sensation is intact  to the L4, L5, S1 distributions. No obvious deficits.  No evidence of neurological-based weakness, spasticity, or contracture in the lower extremities.  Monofilament absent distal to midfoot bilaterally.    Derm: Skin is thin shiny atrophic with no hair growth noted.  Trophic changes noted in his skin bilaterally with the left being worse.    Musculoskeletal:    All the patient's muscle compartments appear to be intact.  Generally he has a decreased range of motion of all forefoot and rear foot joints bilaterally.  The right foot has less edema looks more healthy and there is no signs of any breakdown in this foot.  He has hammertoes of his lesser digits bilaterally.  The left second is the most acute.  There is a ulcer on the end of the left second toe that in the past measured 3 x 2 mm, 2 x 1 mm, and today measures size of pinhead with slight bruising around it.  It is dry with no drainage.  There is no erythema or edema at all.    Radiographic Exam:  X-Ray Findings:    XR ANKLE LT G/E 3 VW 12/4/2019 2:04 PM      HISTORY: Arthritis of ankle     COMPARISON: None.                                                                      IMPRESSION: Diffuse soft tissue swelling. No fractures are evident.  Moderate size area of lucency in the medial talar dome consistent with  overlying chondromalacia. Hypertrophic changes in the tibiotalar  joint. Hypertrophic changes at multiple joints of the hindfoot and  midfoot. Osteopenia. Atheromatous calcification.      A:  Diabetes mellitus with peripheral neuropathy and LOPS  Left second hammertoe   Left second toe ulcer      P:   Discussed with patient wound is smaller and more superficial.  There is no signs of infection.  He will continue the crest pad and we gave him a new augmented crest pad today.  He will make sure the crest pad is not causing breakdown.  He will make sure he does not wear this to bed at night.  We let him return the clinic in 4-6 weeks for  reevaluation    Irineo Chan DPM DPM, FACFAS                 Again, thank you for allowing me to participate in the care of your patient.        Sincerely,        Irineo Chan DPM

## 2020-01-29 NOTE — PROGRESS NOTES
Subjective:    12/4/19   Pt is seen today in consult from Zo Torres with the c/c of a discolored left second toe.  He points to the end of this toe.  He has had this for several months.  He is recently moved here from [place].  This was treated by another physician out of state.  He recently moved to [place].  He has diabetes.  Patient has numbness and tingling in his feet.  He has chronic kidney disease.  He states he was going to have his left knee replaced before surgery he developed left lower extremity cellulitis.  He was placed on antibiotics for this and now much better.  He states his left leg is always more swollen than his right.  He also has a history of ankle pain.  He states he had an injection which helped this for approximately 3 months.  He had this in [place] before he left.  He is wondering about having an injection and again.  He has a history of DVT and is anticoagulated.  Patient's primary care physician above last seen in November 21, 2019 12/18/19 patient has been using crest pad on his left second toe.  He states this is comfortable and working well.  He denies any drainage.  He denies any pain.  He denies any erythema or edema.  He is no longer dressing this at all.  He will be getting an injection in his ankle soon.    1/29/20 patient returns for evaluation of his left second toe.  It is not draining at all since I last saw him.  He has been wearing the crest pad until recently when he got this wet.  He denies any erythema edema.  He did get an injection into his right ankle.  It only helped somewhat.  He has no new complaints today, 2 x 1 mm,       ROS:  See above         Allergies   Allergen Reactions     Penicillins        Current Outpatient Medications   Medication Sig Dispense Refill     Acetaminophen 325 MG CAPS Take 650 mg by mouth 4 times daily as needed       allopurinol (ZYLOPRIM) 300 MG tablet Take 2 tablets (600 mg) by mouth daily (Patient taking differently:  Take 300 mg by mouth daily ) 180 tablet 3     atorvastatin (LIPITOR) 20 MG tablet Take 1 tablet (20 mg) by mouth daily 90 tablet 3     calcium acetate (PHOSLO) 667 MG CAPS capsule Take 4 capsule 3 times a day       fenofibrate (TRICOR) 145 MG tablet Take 1 tablet (145 mg) by mouth daily 90 tablet 3     furosemide (LASIX) 80 MG tablet Take 1 tablet (80 mg) by mouth daily 90 tablet 1     Misc Natural Products (OSTEO BI-FLEX TRIPLE STRENGTH) TABS Take 3 tablets by mouth 2 times daily       order for DME Equipment being ordered: Digital home blood pressure monitor kit 1 kit 0     propafenone (RYTHMOL) 300 MG tablet Take 1 tablet (300 mg) by mouth 2 times daily 180 tablet 0     SODIUM BICARBONATE PO Take 10 g by mouth daily       warfarin ANTICOAGULANT (COUMADIN) 2 MG tablet Take daily as directed. Current dose 2 mg Sunday and 4 mg rest of week days. 180 tablet 0       Patient Active Problem List   Diagnosis     Essential hypertension     Mixed hyperlipidemia     CKD (chronic kidney disease) stage 5, GFR less than 15 ml/min (H)     ESRD (end stage renal disease) on dialysis (H)     Type 2 diabetes mellitus, without long-term current use of insulin (H)     Atrial fibrillation status post cardioversion (H)     Hyperuricemia     DORI (obstructive sleep apnea)     Morbid obesity (H)     Deep vein thrombosis (DVT) (H)     Long term (current) use of anticoagulants     Secondary hyperparathyroidism, renal (H)     Cellulitis     Hyponatremia     Left leg pain     Sepsis (H)     UTI (urinary tract infection)       Past Medical History:   Diagnosis Date     Arthritis      Atrial fibrillation (H)      CKD (chronic kidney disease) stage 5, GFR less than 15 ml/min (H) 2017     DVT (deep venous thrombosis) (H) 2000     Gout      History of blood transfusion      HLD (hyperlipidemia)      HTN (hypertension)      Kidney stone 2016     Type 2 diabetes mellitus (H)        Past Surgical History:   Procedure Laterality Date     ANESTH,UPPER  ARM AV FISTULA REPAIR Left 2018     APPENDECTOMY  1958     C REPAIR CRUCIATE LIGAMENT,KNEE  1976     CARDIOVERSION  2000     carpel tunnel surgery Left 2000     COLONOSCOPY       SINUS SURGERY  1997       Family History   Problem Relation Age of Onset     Cerebrovascular Disease Father         Had multiple strokes while in hospital when he passed       Social History     Tobacco Use     Smoking status: Never Smoker     Smokeless tobacco: Never Used   Substance Use Topics     Alcohol use: Not Currently     Frequency: Never     Comment: Stopped when I was put on blood thinners         Exam:    Vitals: There were no vitals taken for this visit.  BMI: There is no height or weight on file to calculate BMI.  Height: Data Unavailable    Constitutional/ general:  Pt is in no apparent distress, appears well-nourished.  Cooperative with history and physical exam.     Psych:  The patient answered questions appropriately.  Normal affect.  Seems to have reasonable expectations, in terms of treatment.     Eyes:  Visual scanning/ tracking without deficit.     Ears:  Response to auditory stimuli is normal.  negative hearing aid devices.  Auricles in proper alignment.     Lymphatic:  Popliteal lymph nodes not enlarged.     Lungs:  Non labored breathing, non labored speech. No cough.  No audible wheezing. Even, quiet breathing.       Vascular: Patient has lower extremity edema and varicosities bilaterally with the left being worse.  He has trophic changes in his skin.  Edema makes it impossible to palpate his tibial arteries.  His dorsalis pedis is weakly palpable.  There is no hair growth noted bilaterally.    Neuro:  Alert and oriented x 3. Coordinated gait.  Light touch sensation is intact to the L4, L5, S1 distributions. No obvious deficits.  No evidence of neurological-based weakness, spasticity, or contracture in the lower extremities.  Monofilament absent distal to midfoot bilaterally.    Derm: Skin is thin shiny atrophic with  no hair growth noted.  Trophic changes noted in his skin bilaterally with the left being worse.    Musculoskeletal:    All the patient's muscle compartments appear to be intact.  Generally he has a decreased range of motion of all forefoot and rear foot joints bilaterally.  The right foot has less edema looks more healthy and there is no signs of any breakdown in this foot.  He has hammertoes of his lesser digits bilaterally.  The left second is the most acute.  There is a ulcer on the end of the left second toe that in the past measured 3 x 2 mm, 2 x 1 mm, and today measures size of pinhead with slight bruising around it.  It is dry with no drainage.  There is no erythema or edema at all.    Radiographic Exam:  X-Ray Findings:    XR ANKLE LT G/E 3 VW 12/4/2019 2:04 PM      HISTORY: Arthritis of ankle     COMPARISON: None.                                                                      IMPRESSION: Diffuse soft tissue swelling. No fractures are evident.  Moderate size area of lucency in the medial talar dome consistent with  overlying chondromalacia. Hypertrophic changes in the tibiotalar  joint. Hypertrophic changes at multiple joints of the hindfoot and  midfoot. Osteopenia. Atheromatous calcification.      A:  Diabetes mellitus with peripheral neuropathy and LOPS  Left second hammertoe   Left second toe ulcer      P:   Discussed with patient wound is smaller and more superficial.  There is no signs of infection.  He will continue the crest pad and we gave him a new augmented crest pad today.  He will make sure the crest pad is not causing breakdown.  He will make sure he does not wear this to bed at night.  We let him return the clinic in 4-6 weeks for reevaluation    Irineo Chan, BILLY CERVANTES, FACFAS

## 2020-01-30 ENCOUNTER — ANTICOAGULATION THERAPY VISIT (OUTPATIENT)
Dept: NURSING | Facility: CLINIC | Age: 70
End: 2020-01-30
Payer: MEDICARE

## 2020-01-30 DIAGNOSIS — I48.91 ATRIAL FIBRILLATION STATUS POST CARDIOVERSION (H): ICD-10-CM

## 2020-01-30 DIAGNOSIS — I82.402 ACUTE DEEP VEIN THROMBOSIS (DVT) OF LEFT LOWER EXTREMITY, UNSPECIFIED VEIN (H): ICD-10-CM

## 2020-01-30 DIAGNOSIS — Z79.01 LONG TERM (CURRENT) USE OF ANTICOAGULANTS: ICD-10-CM

## 2020-01-30 LAB — INR POINT OF CARE: 4.2 (ref 0.86–1.14)

## 2020-01-30 PROCEDURE — 36416 COLLJ CAPILLARY BLOOD SPEC: CPT

## 2020-01-30 PROCEDURE — 85610 PROTHROMBIN TIME: CPT | Mod: QW

## 2020-01-30 NOTE — PROGRESS NOTES
ANTICOAGULATION FOLLOW-UP CLINIC VISIT    Patient Name:  Leif Ariza  Date:  2020  Contact Type:  Face to Face    SUBJECTIVE:  Patient Findings     Comments:   Patient denies any identifiable changes that caused the supratherapeutic INR. Was supra at last visit and weekly dose reduced. No bleeding or bruising. No signs of infection or illness. Sate dialysis going as usual. This is not a dialysis day. Already took warfarin dose today. Will hold tomorrow. Recheck 1 wk.           Clinical Outcomes     Negatives:   Major bleeding event, Thromboembolic event, Anticoagulation-related hospital admission, Anticoagulation-related ED visit, Anticoagulation-related fatality    Comments:   Patient denies any identifiable changes that caused the supratherapeutic INR. Was supra at last visit and weekly dose reduced. No bleeding or bruising. No signs of infection or illness. Sate dialysis going as usual. This is not a dialysis day. Already took warfarin dose today. Will hold tomorrow. Recheck 1 wk.              OBJECTIVE    INR Protime   Date Value Ref Range Status   2020 4.2 (A) 0.86 - 1.14 Final       ASSESSMENT / PLAN  INR assessment SUPRA    Recheck INR In: 1 WEEK    INR Location Clinic      Anticoagulation Summary  As of 2020    INR goal:   2.0-3.0   TTR:   37.9 % (4.3 mo)   INR used for dosin.2! (2020)   Warfarin maintenance plan:   2 mg (2 mg x 1) every Sun, Fri; 4 mg (2 mg x 2) all other days   Full warfarin instructions:   : Hold; 2/1: 2 mg; 2/2: 4 mg; 2/3: 2 mg; Otherwise 2 mg every Sun, Fri; 4 mg all other days   Weekly warfarin total:   24 mg   Plan last modified:   Oma Taylor RN (2020)   Next INR check:   2020   Priority:   Maintenance   Target end date:       Indications    Atrial fibrillation status post cardioversion (H) [I48.91]  Deep vein thrombosis (DVT) (H) [I82.409]  Long term (current) use of anticoagulants [Z79.01]             Anticoagulation Episode Summary      INR check location:       Preferred lab:       Send INR reminders to:   PEARL CORNEJO    Comments:   Has Dialysis Monday Wednesday and Friday.      Anticoagulation Care Providers     Provider Role Specialty Phone number    Nguyen Torres, NP Referring Nurse Practitioner - Family [phone]            See the Encounter Report to view Anticoagulation Flowsheet and Dosing Calendar (Go to Encounters tab in chart review, and find the Anticoagulation Therapy Visit)        Oma Taylor RN

## 2020-02-06 ENCOUNTER — ANTICOAGULATION THERAPY VISIT (OUTPATIENT)
Dept: NURSING | Facility: CLINIC | Age: 70
End: 2020-02-06
Payer: MEDICARE

## 2020-02-06 DIAGNOSIS — I82.402 ACUTE DEEP VEIN THROMBOSIS (DVT) OF LEFT LOWER EXTREMITY, UNSPECIFIED VEIN (H): ICD-10-CM

## 2020-02-06 DIAGNOSIS — I48.91 ATRIAL FIBRILLATION STATUS POST CARDIOVERSION (H): ICD-10-CM

## 2020-02-06 DIAGNOSIS — Z79.01 LONG TERM (CURRENT) USE OF ANTICOAGULANTS: ICD-10-CM

## 2020-02-06 LAB — INR POINT OF CARE: 2.1 (ref 0.86–1.14)

## 2020-02-06 PROCEDURE — 36416 COLLJ CAPILLARY BLOOD SPEC: CPT

## 2020-02-06 PROCEDURE — 85610 PROTHROMBIN TIME: CPT | Mod: QW

## 2020-02-06 NOTE — PROGRESS NOTES
ANTICOAGULATION FOLLOW-UP CLINIC VISIT    Patient Name:  Leif Ariza  Date:  2020  Contact Type:  Face to Face    SUBJECTIVE:  Patient Findings     Comments:   Therapeutic. Recheck same day as next provider visit.        Clinical Outcomes     Comments:   Therapeutic. Recheck same day as next provider visit.           OBJECTIVE    INR Protime   Date Value Ref Range Status   2020 2.1 (A) 0.86 - 1.14 Final       ASSESSMENT / PLAN  INR assessment THER    Recheck INR In: 2 WEEKS    INR Location Clinic      Anticoagulation Summary  As of 2020    INR goal:   2.0-3.0   TTR:   38.1 % (4.6 mo)   INR used for dosin.1 (2020)   Warfarin maintenance plan:   2 mg (2 mg x 1) every Mon, Wed, Fri; 4 mg (2 mg x 2) all other days   Full warfarin instructions:   2 mg every Mon, Wed, Fri; 4 mg all other days   Weekly warfarin total:   22 mg   Plan last modified:   Oma Taylor RN (2020)   Next INR check:   2020   Priority:   Maintenance   Target end date:       Indications    Atrial fibrillation status post cardioversion (H) [I48.91]  Deep vein thrombosis (DVT) (H) [I82.409]  Long term (current) use of anticoagulants [Z79.01]             Anticoagulation Episode Summary     INR check location:       Preferred lab:       Send INR reminders to:   PEARL CORNEJO    Comments:   Has Dialysis  and Friday.      Anticoagulation Care Providers     Provider Role Specialty Phone number    TorresNguyen, NP Referring Nurse Practitioner - Family [phone]            See the Encounter Report to view Anticoagulation Flowsheet and Dosing Calendar (Go to Encounters tab in chart review, and find the Anticoagulation Therapy Visit)        Oma Taylor RN

## 2020-02-18 NOTE — TELEPHONE ENCOUNTER
I received a phone call from a patient wanting to know the status of his new c-pap machine. He stated he is willing to do another in lab sleep study if necessary (as long as medicare covers it). He just needs need a new c-pap machine.    Please call him with the status of the situation at [phone].    Ivana Melendez

## 2020-02-19 ENCOUNTER — TELEPHONE (OUTPATIENT)
Dept: SLEEP MEDICINE | Facility: CLINIC | Age: 70
End: 2020-02-19

## 2020-02-19 NOTE — TELEPHONE ENCOUNTER
Tried calling pt to touch base with him and let him know I would recommend getting a new sleep study done.  No answer and lvm.

## 2020-02-24 ENCOUNTER — HEALTH MAINTENANCE LETTER (OUTPATIENT)
Age: 70
End: 2020-02-24

## 2020-02-24 NOTE — PROGRESS NOTES
Subjective     Leif Ariza is a 69 year old male with history of cellulitis,  ESRD on HD (MWF), recurrent DVT on warfarin, atrial fibrillation, hypertension, hyperlipidemia, obstructive sleep apnea on CPAP, morbid obesity  seen here today for follow up  who presents to clinic today for the following health issues:    HPI   Diabetes Follow-up      How often are you checking your blood sugar? Not at all    What concerns do you have today about your diabetes? None     Do you have any of these symptoms? (Select all that apply)  Numbness in feet, Burning in feet and Excessive thirst    Reports numbness and burning sensation, can't set still for prolonged period of time he has to move around.  Patient states he had neuropathy in the past 1 to 2 years but now getting worse.   Lost 100 lbs last year intentionally.  He was taken off all diabetes medications.  Now diabetes is diet-controlled  Scheduled for left knee replacmemtn surgery   Patient lost significant amount of weight in order to get his knee replacement surgery.            Hyperlipidemia Follow-Up      Are you regularly taking any medication or supplement to lower your cholesterol?   Yes- Lipitor    Are you having muscle aches or other side effects that you think could be caused by your cholesterol lowering medication?  Yes- muscle aches in thighs and upper arms, unsure if due to the medication    Hypertension Follow-up      Do you check your blood pressure regularly outside of the clinic? No     Are you following a low salt diet? No    Are your blood pressures ever more than 140 on the top number (systolic) OR more   than 90 on the bottom number (diastolic), for example 140/90? n/a  Well controlled.  Currently not on any medications for hypertension.      BP Readings from Last 2 Encounters:   02/25/20 127/76   01/29/20 100/56     Hemoglobin A1C (%)   Date Value   11/21/2019 5.4   07/28/2019 5.4     LDL Cholesterol Calculated (mg/dL)   Date Value   07/28/2019  57       End stage renal disease :  - Likely secondary to Diabetic chronic kidney disease. He lost weight to be on the transplant list.   - HD MWF.  - In the process of being evaluated to start peritoneal dialysis at home.  Patient would like to do that in order to start working again     Atrial fibrillation  Currently on Warfarin - [id number] score:  3 ( age, DM, Vascular disease)  -Followed by cardiology.  -He is currently having ZIO patch monitor to see if he can get off medication    Hypertension   well-controlled  Patient currently not on any medications     Sleep apnea syndrome  - Chronic and stable.  Weight loss helped.  - Uses CPAP at home.  His CPAP machine is very old  - He is scheduled to repeat sleep study in order to get new machine     History of recurrent deep vein thrombosis (DVT)  - anticoagulated with Warfarin.     Neuropathy:  -Chronic problem.  Left more than right  -Likely related to his diabetes in the past.  -We will refer him to neurology for further evaluation and management    Vaccines:  -Advised to stop at the pharmacy to get shingles vaccine          Patient Active Problem List   Diagnosis     Essential hypertension     Mixed hyperlipidemia     CKD (chronic kidney disease) stage 5, GFR less than 15 ml/min (H)     ESRD (end stage renal disease) on dialysis (H)     Type 2 diabetes mellitus, without long-term current use of insulin (H)     Atrial fibrillation status post cardioversion (H)     Hyperuricemia     DORI (obstructive sleep apnea)     Morbid obesity (H)     Deep vein thrombosis (DVT) (H)     Long term (current) use of anticoagulants     Secondary hyperparathyroidism, renal (H)     Cellulitis     Hyponatremia     Left leg pain     Sepsis (H)     UTI (urinary tract infection)     Past Surgical History:   Procedure Laterality Date     ANESTH,UPPER ARM AV FISTULA REPAIR Left 2018     APPENDECTOMY  1958     C REPAIR CRUCIATE LIGAMENT,KNEE  1976     CARDIOVERSION  2000     carpel tunnel  surgery Left 2000     COLONOSCOPY       SINUS SURGERY  1997       Social History     Tobacco Use     Smoking status: Never Smoker     Smokeless tobacco: Never Used   Substance Use Topics     Alcohol use: Not Currently     Frequency: Never     Comment: Stopped when I was put on blood thinners     Family History   Problem Relation Age of Onset     Cerebrovascular Disease Father         Had multiple strokes while in hospital when he passed         Current Outpatient Medications   Medication Sig Dispense Refill     Acetaminophen 325 MG CAPS Take 650 mg by mouth 4 times daily as needed       allopurinol (ZYLOPRIM) 300 MG tablet Take 2 tablets (600 mg) by mouth daily (Patient taking differently: Take 300 mg by mouth daily ) 180 tablet 3     atorvastatin (LIPITOR) 20 MG tablet Take 1 tablet (20 mg) by mouth daily 90 tablet 3     calcium acetate (PHOSLO) 667 MG CAPS capsule Take 4 capsule 3 times a day       fenofibrate (TRICOR) 145 MG tablet Take 1 tablet (145 mg) by mouth daily 90 tablet 3     furosemide (LASIX) 80 MG tablet Take 1 tablet (80 mg) by mouth daily 90 tablet 1     Misc Natural Products (OSTEO BI-FLEX TRIPLE STRENGTH) TABS Take 3 tablets by mouth 2 times daily       order for DME Equipment being ordered: Digital home blood pressure monitor kit 1 kit 0     SODIUM BICARBONATE PO Take 10 g by mouth daily       warfarin ANTICOAGULANT (COUMADIN) 2 MG tablet Take daily as directed. Current dose 2 mg Sunday and 4 mg rest of week days. 180 tablet 0     Allergies   Allergen Reactions     Penicillins      Recent Labs   Lab Test 11/21/19  0916 09/18/19 07/28/19  1224   A1C 5.4  --  5.4   LDL  --   --  57   HDL  --   --  41   TRIG  --   --  267*   ALT 18  --  21   CR 8.62* 5.97* 9.16*   GFRESTIMATED 6* 10* 5*   GFRESTBLACK 7* 12* 6*   POTASSIUM 5.5* 5.2* 4.0   TSH  --   --  4.31*      BP Readings from Last 3 Encounters:   02/25/20 127/76   01/29/20 100/56   12/18/19 116/66    Wt Readings from Last 3 Encounters:   02/25/20  "134.3 kg (296 lb)   12/19/19 134.3 kg (296 lb)   12/18/19 134.3 kg (296 lb)                    Reviewed and updated as needed this visit by Provider  Tobacco  Allergies  Meds  Problems  Med Hx  Surg Hx  Fam Hx         Review of Systems   ROS COMP: Constitutional, HEENT, cardiovascular, pulmonary, gi and gu systems are negative, except as otherwise noted.      Objective    /76   Pulse 84   Temp 97.9  F (36.6  C) (Oral)   Resp 18   Ht 1.803 m (5' 11\")   Wt 134.3 kg (296 lb)   SpO2 97%   BMI 41.28 kg/m    Body mass index is 41.28 kg/m .  Physical Exam  Vitals signs and nursing note reviewed.   Constitutional:       General: He is not in acute distress.     Appearance: Normal appearance. He is obese. He is not ill-appearing, toxic-appearing or diaphoretic.   HENT:      Nose: Nose normal. No congestion or rhinorrhea.      Mouth/Throat:      Pharynx: No oropharyngeal exudate or posterior oropharyngeal erythema.   Neck:      Musculoskeletal: Normal range of motion and neck supple. No neck rigidity or muscular tenderness.      Vascular: No carotid bruit.   Cardiovascular:      Rate and Rhythm: Normal rate and regular rhythm.      Pulses: Normal pulses.           Dorsalis pedis pulses are 2+ on the right side and 2+ on the left side.        Posterior tibial pulses are 2+ on the right side and 2+ on the left side.      Heart sounds: Normal heart sounds. No murmur. No friction rub. No gallop.       Comments: Zio patch in place   Pulmonary:      Effort: Pulmonary effort is normal. No respiratory distress.      Breath sounds: Normal breath sounds. No stridor. No wheezing, rhonchi or rales.   Chest:      Chest wall: No tenderness.   Musculoskeletal:      Right foot: Normal range of motion. No deformity, bunion or foot drop.      Left foot: Normal range of motion. No deformity, bunion, Charcot foot, foot drop or prominent metatarsal heads.   Feet:      Right foot:      Protective Sensation: 8 sites tested. 0 " sites sensed.      Skin integrity: Skin breakdown and dry skin present. No ulcer, blister, erythema, warmth or callus.      Toenail Condition: Right toenails are abnormally thick.      Left foot:      Protective Sensation: 8 sites tested. 2 sites sensed.      Skin integrity: Skin breakdown and dry skin present. No ulcer, blister, erythema, warmth or callus.      Toenail Condition: Left toenails are abnormally thick.   Lymphadenopathy:      Cervical: No cervical adenopathy.   Skin:     Capillary Refill: Capillary refill takes less than 2 seconds.   Neurological:      Mental Status: He is alert.                    Assessment & Plan     1. Type 2 diabetes mellitus with chronic kidney disease on chronic dialysis, without long-term current use of insulin (H)  Diet controled   Lab Results   Component Value Date    A1C 5.4 02/25/2020    A1C 5.4 11/21/2019    A1C 5.4 07/28/2019       - HEMOGLOBIN A1C    2. Neuropathy  Reports numbness and burning sensation, can't set still for prolonged period of time he has to move around.  Patient states he had neuropathy in the past 1 to 2 years but now getting worse.   Referral to neurology for further evaluation and management   - NEUROLOGY ADULT REFERRAL    3. ESRD (end stage renal disease) on dialysis (H)    ESRD on HD (MWF)  4. Atrial fibrillation status post cardioversion (H)  Follow up with cardiology. Patient currently has ZIO patch monitor to determine the need for rhythm control medication     5. Essential hypertension  Well controlled   Continue current medication          Work on weight loss  Regular exercise    Return in about 3 months (around 5/25/2020) for Routine Visit.    Edelmira Proctor MD  [facility]

## 2020-02-25 ENCOUNTER — OFFICE VISIT (OUTPATIENT)
Dept: FAMILY MEDICINE | Facility: CLINIC | Age: 70
End: 2020-02-25
Payer: MEDICARE

## 2020-02-25 ENCOUNTER — ANTICOAGULATION THERAPY VISIT (OUTPATIENT)
Dept: NURSING | Facility: CLINIC | Age: 70
End: 2020-02-25
Payer: MEDICARE

## 2020-02-25 VITALS
DIASTOLIC BLOOD PRESSURE: 76 MMHG | HEART RATE: 84 BPM | SYSTOLIC BLOOD PRESSURE: 127 MMHG | HEIGHT: 71 IN | OXYGEN SATURATION: 97 % | TEMPERATURE: 97.9 F | RESPIRATION RATE: 18 BRPM | WEIGHT: 296 LBS | BODY MASS INDEX: 41.44 KG/M2

## 2020-02-25 DIAGNOSIS — I48.91 ATRIAL FIBRILLATION STATUS POST CARDIOVERSION (H): ICD-10-CM

## 2020-02-25 DIAGNOSIS — I82.402 ACUTE DEEP VEIN THROMBOSIS (DVT) OF LEFT LOWER EXTREMITY, UNSPECIFIED VEIN (H): ICD-10-CM

## 2020-02-25 DIAGNOSIS — Z79.01 LONG TERM (CURRENT) USE OF ANTICOAGULANTS: ICD-10-CM

## 2020-02-25 DIAGNOSIS — N18.6 ESRD (END STAGE RENAL DISEASE) ON DIALYSIS (H): ICD-10-CM

## 2020-02-25 DIAGNOSIS — Z99.2 ESRD (END STAGE RENAL DISEASE) ON DIALYSIS (H): ICD-10-CM

## 2020-02-25 DIAGNOSIS — G62.9 NEUROPATHY: ICD-10-CM

## 2020-02-25 DIAGNOSIS — N18.6 TYPE 2 DIABETES MELLITUS WITH CHRONIC KIDNEY DISEASE ON CHRONIC DIALYSIS, WITHOUT LONG-TERM CURRENT USE OF INSULIN (H): Primary | ICD-10-CM

## 2020-02-25 DIAGNOSIS — Z99.2 TYPE 2 DIABETES MELLITUS WITH CHRONIC KIDNEY DISEASE ON CHRONIC DIALYSIS, WITHOUT LONG-TERM CURRENT USE OF INSULIN (H): Primary | ICD-10-CM

## 2020-02-25 DIAGNOSIS — I10 ESSENTIAL HYPERTENSION: ICD-10-CM

## 2020-02-25 DIAGNOSIS — E11.22 TYPE 2 DIABETES MELLITUS WITH CHRONIC KIDNEY DISEASE ON CHRONIC DIALYSIS, WITHOUT LONG-TERM CURRENT USE OF INSULIN (H): Primary | ICD-10-CM

## 2020-02-25 LAB
HBA1C MFR BLD: 5.4 % (ref 0–5.6)
INR POINT OF CARE: 2 (ref 0.86–1.14)

## 2020-02-25 PROCEDURE — 36416 COLLJ CAPILLARY BLOOD SPEC: CPT

## 2020-02-25 PROCEDURE — 99214 OFFICE O/P EST MOD 30 MIN: CPT | Performed by: FAMILY MEDICINE

## 2020-02-25 PROCEDURE — 83036 HEMOGLOBIN GLYCOSYLATED A1C: CPT | Performed by: FAMILY MEDICINE

## 2020-02-25 PROCEDURE — 85610 PROTHROMBIN TIME: CPT | Mod: QW

## 2020-02-25 RX ORDER — WARFARIN SODIUM 2 MG/1
TABLET ORAL
Qty: 180 TABLET | Refills: 0
Start: 2020-02-25 | End: 2020-10-07

## 2020-02-25 ASSESSMENT — MIFFLIN-ST. JEOR: SCORE: 2139.78

## 2020-02-25 NOTE — PROGRESS NOTES
ANTICOAGULATION FOLLOW-UP CLINIC VISIT    Patient Name:  Leif Ariza  Date:  2020  Contact Type:  Face to Face    SUBJECTIVE:  Patient Findings     Comments:   The patient was assessed for diet, medication, and activity level changes, missed or extra doses, bruising or bleeding, with no problem findings.            Clinical Outcomes     Comments:   The patient was assessed for diet, medication, and activity level changes, missed or extra doses, bruising or bleeding, with no problem findings.               OBJECTIVE    INR Protime   Date Value Ref Range Status   2020 2.0 (A) 0.86 - 1.14 Final       ASSESSMENT / PLAN  INR assessment THER    Recheck INR In: 2 WEEKS    INR Location Clinic      Anticoagulation Summary  As of 2020    INR goal:   2.0-3.0   TTR:   45.6 % (5.2 mo)   INR used for dosin.0 (2020)   Warfarin maintenance plan:   2 mg (2 mg x 1) every Mon, Wed, Fri; 4 mg (2 mg x 2) all other days   Full warfarin instructions:   2 mg every Mon, Wed, Fri; 4 mg all other days   Weekly warfarin total:   22 mg   No change documented:   Oma Taylor RN   Plan last modified:   Oma Taylor RN (2020)   Next INR check:   3/10/2020   Priority:   Maintenance   Target end date:       Indications    Atrial fibrillation status post cardioversion (H) [I48.91]  Deep vein thrombosis (DVT) (H) [I82.409]  Long term (current) use of anticoagulants [Z79.01]             Anticoagulation Episode Summary     INR check location:       Preferred lab:       Send INR reminders to:   PEARL CORNEJO    Comments:   Has Dialysis  and Friday.      Anticoagulation Care Providers     Provider Role Specialty Phone number    Nguyen Torres, NP Referring Nurse Practitioner - Family [phone]            See the Encounter Report to view Anticoagulation Flowsheet and Dosing Calendar (Go to Encounters tab in chart review, and find the Anticoagulation Therapy Visit)        Oma CHOW  SE Taylor

## 2020-03-10 ENCOUNTER — ANTICOAGULATION THERAPY VISIT (OUTPATIENT)
Dept: NURSING | Facility: CLINIC | Age: 70
End: 2020-03-10
Payer: MEDICARE

## 2020-03-10 DIAGNOSIS — I82.402 ACUTE DEEP VEIN THROMBOSIS (DVT) OF LEFT LOWER EXTREMITY, UNSPECIFIED VEIN (H): ICD-10-CM

## 2020-03-10 DIAGNOSIS — Z79.01 LONG TERM (CURRENT) USE OF ANTICOAGULANTS: ICD-10-CM

## 2020-03-10 DIAGNOSIS — I48.91 ATRIAL FIBRILLATION STATUS POST CARDIOVERSION (H): ICD-10-CM

## 2020-03-10 LAB — INR POINT OF CARE: 2.6 (ref 0.86–1.14)

## 2020-03-10 PROCEDURE — 85610 PROTHROMBIN TIME: CPT | Mod: QW

## 2020-03-10 PROCEDURE — 36416 COLLJ CAPILLARY BLOOD SPEC: CPT

## 2020-03-10 NOTE — PROGRESS NOTES
ANTICOAGULATION FOLLOW-UP CLINIC VISIT    Patient Name:  Leif Ariza  Date:  3/10/2020  Contact Type:  Face to Face    SUBJECTIVE:  Patient Findings     Comments:   The patient was assessed for diet, medication, and activity level changes, missed or extra doses, bruising or bleeding, with no problem findings.  Plans to have knee surgery .        Clinical Outcomes     Comments:   The patient was assessed for diet, medication, and activity level changes, missed or extra doses, bruising or bleeding, with no problem findings.  Plans to have knee surgery .           OBJECTIVE    INR Protime   Date Value Ref Range Status   03/10/2020 2.6 (A) 0.86 - 1.14 Final       ASSESSMENT / PLAN  INR assessment THER    Recheck INR In: 2 WEEKS    INR Location Clinic      Anticoagulation Summary  As of 3/10/2020    INR goal:   2.0-3.0   TTR:   50.1 % (5.7 mo)   INR used for dosin.6 (3/10/2020)   Warfarin maintenance plan:   2 mg (2 mg x 1) every Mon, Wed, Fri; 4 mg (2 mg x 2) all other days   Full warfarin instructions:   2 mg every Mon, Wed, Fri; 4 mg all other days   Weekly warfarin total:   22 mg   No change documented:   Oma Taylor RN   Plan last modified:   Oma Taylor RN (2020)   Next INR check:   3/24/2020   Priority:   Maintenance   Target end date:       Indications    Atrial fibrillation status post cardioversion (H) [I48.91]  Deep vein thrombosis (DVT) (H) [I82.409]  Long term (current) use of anticoagulants [Z79.01]             Anticoagulation Episode Summary     INR check location:       Preferred lab:       Send INR reminders to:   PEARL CORNEJO    Comments:   Has Dialysis  and Friday.      Anticoagulation Care Providers     Provider Role Specialty Phone number    Nguyen Torres NP Referring Nurse Practitioner - Family [phone]            See the Encounter Report to view Anticoagulation Flowsheet and Dosing Calendar (Go to Encounters tab in chart review, and find  the Anticoagulation Therapy Visit)        Oma Taylor RN

## 2020-03-17 ENCOUNTER — OFFICE VISIT (OUTPATIENT)
Dept: PODIATRY | Facility: CLINIC | Age: 70
End: 2020-03-17
Payer: MEDICARE

## 2020-03-17 ENCOUNTER — OFFICE VISIT (OUTPATIENT)
Dept: ORTHOPEDICS | Facility: CLINIC | Age: 70
End: 2020-03-17
Payer: MEDICARE

## 2020-03-17 ENCOUNTER — ANCILLARY PROCEDURE (OUTPATIENT)
Dept: GENERAL RADIOLOGY | Facility: CLINIC | Age: 70
End: 2020-03-17
Attending: FAMILY MEDICINE
Payer: MEDICARE

## 2020-03-17 VITALS
WEIGHT: 298 LBS | BODY MASS INDEX: 41.72 KG/M2 | SYSTOLIC BLOOD PRESSURE: 130 MMHG | HEIGHT: 71 IN | DIASTOLIC BLOOD PRESSURE: 72 MMHG

## 2020-03-17 VITALS
SYSTOLIC BLOOD PRESSURE: 130 MMHG | BODY MASS INDEX: 41.56 KG/M2 | HEART RATE: 92 BPM | DIASTOLIC BLOOD PRESSURE: 72 MMHG | WEIGHT: 298 LBS
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
## 2020-03-17 DIAGNOSIS — R07.81 RIB PAIN ON LEFT SIDE: Primary | ICD-10-CM

## 2020-03-17 DIAGNOSIS — L97.529 ULCER OF TOE, LEFT, WITH UNSPECIFIED SEVERITY (H): ICD-10-CM

## 2020-03-17 DIAGNOSIS — L84 CALLUS: ICD-10-CM

## 2020-03-17 DIAGNOSIS — E11.51 TYPE II DIABETES MELLITUS WITH PERIPHERAL ARTERY DISEASE (H): Primary | ICD-10-CM

## 2020-03-17 PROCEDURE — 71101 X-RAY EXAM UNILAT RIBS/CHEST: CPT | Mod: LT

## 2020-03-17 PROCEDURE — 99204 OFFICE O/P NEW MOD 45 MIN: CPT | Performed by: FAMILY MEDICINE

## 2020-03-17 PROCEDURE — 11055 PARING/CUTG B9 HYPRKER LES 1: CPT | Mod: Q8 | Performed by: PODIATRIST

## 2020-03-17 PROCEDURE — 99213 OFFICE O/P EST LOW 20 MIN: CPT | Mod: 25 | Performed by: PODIATRIST

## 2020-03-17 RX ORDER — HYDROMORPHONE HYDROCHLORIDE 2 MG/1
2 TABLET ORAL EVERY 6 HOURS PRN
Qty: 10 TABLET | Refills: 0 | Status: SHIPPED | OUTPATIENT
Start: 2020-03-17 | End: 2020-03-20

## 2020-03-17 ASSESSMENT — MIFFLIN-ST. JEOR: SCORE: 2148.85

## 2020-03-17 NOTE — NURSING NOTE
Jean to follow up with Primary Care provider regarding elevated blood pressure.    Weight management plan: Patient was referred to their PCP to discuss a diet and exercise plan.

## 2020-03-17 NOTE — PROGRESS NOTES
ASSESSMENT & PLAN  Leif was seen today for pain and pain.    Diagnoses and all orders for this visit:    Rib pain on left side  -     HYDROmorphone (DILAUDID) 2 MG tablet; Take 1 tablet (2 mg) by mouth every 6 hours as needed for pain    Fall, initial encounter  -     XR Ribs & Chest LT G/E 3vw; Future      Patient is a 69 year old male presenting for   evaluation of   Chief Complaint   Patient presents with     Middle Back - Pain     Neck - Pain      # Left Posterior Rib Injury: Notable after forward fall with posterior rib/periscapular pain worse with shoulder movement and taking in a deep breath.  TTP over medial scapula and lateral/posterior/superior ribs.  XR showing cortical irregularities concerning for old left rib fractures.  Pt reporting possible injury in the past.  No pneumothorax.  Likely pt has bruised vs non-displaced rib fractures.  Counseled patient on nature of condition and treatment options.  Given this plan as below, follow-up as needed  Treatment: activities as tolerated  Physical Therapy home breathing exercises  Medications  Limited NSAIDs/Tylenol    Concerning signs/sx that would warrant urgent evaluation were discussed.  All questions were answered, patient understands and agrees with plan.      Return if symptoms worsen or fail to improve.    -----    SUBJECTIVE  Leif Ariza is a/an 69 year old Right handed male who is seen as a self referral, AIC for evaluation of right scapula pain. The patient is seen by themselves.    Onset: 3/13/20, 4 day(s) ago. Patient describes injury as fall forward with bilateral arms stretched out.   Location of Pain: left scapula pain   Rating of Pain at worst: 7/10  Rating of Pain Currently: 4/10  Worsened by: coughing, reaching, sit to stand transition   Better with: Tylenol   Treatments tried: Tylenol  Quality: dull, sharp  Associated symptoms: no distal numbness or tingling; denies swelling or warmth  Orthopedic history: NO  Relevant surgical history:  NO  Past Medical History:   Diagnosis Date     Arthritis      Atrial fibrillation (H)      CKD (chronic kidney disease) stage 5, GFR less than 15 ml/min (H) 2017     DVT (deep venous thrombosis) (H) 2000     Gout      History of blood transfusion      HLD (hyperlipidemia)      HTN (hypertension)      Kidney stone 2016     Type 2 diabetes mellitus (H)      Social History     Socioeconomic History     Marital status:      Spouse name: None     Number of children: None     Years of education: None     Highest education level: None   Occupational History     None   Social Needs     Financial resource strain: None     Food insecurity     Worry: None     Inability: None     Transportation needs     Medical: None     Non-medical: None   Tobacco Use     Smoking status: Never Smoker     Smokeless tobacco: Never Used   Substance and Sexual Activity     Alcohol use: Not Currently     Frequency: Never     Comment: Stopped when I was put on blood thinners     Drug use: Never     Sexual activity: Yes     Partners: Female     Birth control/protection: Post-menopausal   Lifestyle     Physical activity     Days per week: None     Minutes per session: None     Stress: None   Relationships     Social connections     Talks on phone: None     Gets together: None     Attends Sikhism service: None     Active member of club or organization: None     Attends meetings of clubs or organizations: None     Relationship status: None     Intimate partner violence     Fear of current or ex partner: None     Emotionally abused: None     Physically abused: None     Forced sexual activity: None   Other Topics Concern     Parent/sibling w/ CABG, MI or angioplasty before 65F 55M? No   Social History Narrative     None         Patient's past medical, surgical, social, and family histories were reviewed today and no changes are noted.  No family history pertinent to the patient's problem today    REVIEW OF SYSTEMS:  10 point ROS is negative  "other than symptoms noted above in HPI, Past Medical History or as stated below  Constitutional: NEGATIVE for fever, chills, change in weight  Skin: NEGATIVE for worrisome rashes, moles or lesions  GI/: NEGATIVE for bowel or bladder changes  Neuro: NEGATIVE for weakness, dizziness or paresthesias    OBJECTIVE:  /72   Ht 1.803 m (5' 11\")   Wt 135.2 kg (298 lb)   BMI 41.56 kg/m     General: healthy, alert and in no distress  HEENT: no scleral icterus or conjunctival erythema  Skin: no suspicious lesions or rash. No jaundice.  CV: regular rhythm by palpation, no m/r/g  Resp: normal respiratory effort without conversational dyspnea, LCTAB  Psych: normal mood and affect  Gait: normal steady gait with appropriate coordination and balance  Neuro: normal light touch sensory exam of the bilateral upper extremities.    MSK:  CERVICAL SPINE  Inspection:    normal cervical lordosis present, rounded shoulders, forward head posture  Palpation:    Nontender.  Range of Motion:     Flexion full    Extension full    Right side bend full    Left side bend full    Right rotation full    Left rotation full  Strength:    Full strength throughout all neck muscles  Special Tests:    Positive: None    Negative: Spurling's (bilateral)    LEFT SHOULDER  Inspection:    no swelling, bruising, discoloration, or obvious deformity or asymmetry  Palpation:    Tender about the superior, posterior, lateral shoulder. Remainder of bony and tendinous landmarks are nontender.  Active Range of Motion:     Abduction 900, , , IR hip pocket.    Strength:    Scapular plane abduction 5-/5, painful,  ER 5-/5, painful, IR 5-/5, painful, biceps 5-/5, painful, triceps 5-/5, painful       Independent visualization of the below image:  Recent Results (from the past 24 hour(s))   XR Ribs & Chest LT G/E 3vw    Narrative    RIBS AND CHEST LEFT THREE VIEWS  3/17/2020 2:55 PM     HISTORY:  Fall, left axillary rib pain. Fall, initial " encounter.    FINDINGS: There are multiple old left rib fractures. No pneumothorax.      Impression    IMPRESSION: Age indeterminate fracture at the lateral aspect of the  left seventh rib.       I  ordered, visualized and reviewed these images with the patient  Concern for irregularities over left superior ribs for fractures no acute appearing fx noted       Kelvin Nelson MD [facility]

## 2020-03-17 NOTE — PATIENT INSTRUCTIONS
Diagnosis: Left Rib Pain  Image Findings: no significant fracture, will contact you if radiologist sees something different  Treatment: rest, ice, breathing exercises  Medications: Tylenol and limited Dilaudid for pain one time prescription  Follow-up: As needed if not improving    It was great seeing you today!    Kelvin Nelson    Patient Education     Rib Contusion or Minor Fracture    A rib contusion is a bruise to one or more rib bones. It may cause pain, tenderness, swelling, and a purplish color to the skin. There may be a sharp pain with each breath. A rib contusion takes anywhere from a few days to a few weeks to heal. A minor rib fracture or break may cause the same symptoms as a rib contusion. The small crack may not be seen on a regular chest X-ray. Treatment for both problems is basically the same.  Home care    You may use over-the-counter pain medicine to control pain, unless another pain medicine was prescribed. If you have chronic liver or kidney disease or ever had a stomach ulcer or GI (gastrointestinal) bleeding, talk with your healthcare provider before using these medicines.    Rest. Don't lift anything heavy or do any activity that causes pain.    Apply an ice pack over the injured area for 15 to 20 minutes every 1 to 2 hours. You should do this for the first 24 to 48 hours. To make an ice pack, put ice cubes in a plastic bag that seals at the top. Wrap the bag in a clean, thin towel or cloth. Never put ice or an ice pack directly on the skin. Continue with ice packs as needed for the relief of pain and swelling.    The first 3 to 4 weeks of healing will be the most painful. If your pain is not under control with the treatment given, call your healthcare provider. Sometimes a stronger pain medicine may be needed. A nerve block can be done in case of severe pain. It will numb the nerve between the ribs.  Follow-up care  Follow up with your healthcare provider, or as advised.  If X-rays were  taken, you will be told of any new findings that may affect your care.  Call 911  Call 911 if you have:    Dizziness, weakness or fainting    Shortness of breath with or without chest discomfort    New or worsening pain  When to seek medical advice  Call your healthcare provider right away if any of these occur:    Fever of 100.4 F (38 C) or higher, or as directed by your healthcare provider    Chills    Stomach pain, vomiting  Date Last Reviewed: 6/1/2018 2000-2019 [employer]. [address] [place] [address]. All rights reserved. This information is not intended as a substitute for professional medical care. Always follow your healthcare professional's instructions.

## 2020-03-17 NOTE — PATIENT INSTRUCTIONS
We wish you continued good healing. If you have any questions or concerns, please do not hesitate to contact us at [phone]    Please remember to call and schedule a follow up appointment if one was recommended at your earliest convenience.   PODIATRY CLINIC HOURS  TELEPHONE NUMBER    Dr. Irineo Chan D.P.M [facility]    Clinics:  [facility]    [facility]   Tuesday 1PM-6PM  [place]  Wednesday 7AM-2PM  [facility]  Thursday 10AM-6PM  [place]  Friday 7AM-3PM  [place]  Specialty schedulers:   [phone] to make an appointment with any Specialty Provider.        Urgent Care locations:    [place] Monday-Friday 5 pm - 9 pm. Saturday-Sunday 9 am -5pm    Monday-Friday 11 am - 9 pm Saturday 9 am - 5 pm     Monday-Sunday 12 noon-8PM [phone]     If you need a medication refill, please contact us you may need lab work and/or a follow up visit prior to your refill (i.e. Antifungal medications).    [email or web address] (secure e-mail communication and access to your chart) to send a message or to make an appointment.    If MRI needed please call [place] Johnson at [phone]

## 2020-03-17 NOTE — LETTER
3/17/2020         RE: Leif Ariza  [id number] [facility] [address]        Dear Colleague,    Thank you for referring your patient, Leif Ariza, to the [facility] [place]. Please see a copy of my visit note below.    Subjective:    12/4/19   Pt is seen today in consult from Zo Torres with the c/c of a discolored left second toe.  He points to the end of this toe.  He has had this for several months.  He is recently moved here from [place].  This was treated by another physician out of state.  He recently moved to [place].  He has diabetes.  Patient has numbness and tingling in his feet.  He has chronic kidney disease.  He states he was going to have his left knee replaced before surgery he developed left lower extremity cellulitis.  He was placed on antibiotics for this and now much better.  He states his left leg is always more swollen than his right.  He also has a history of ankle pain.  He states he had an injection which helped this for approximately 3 months.  He had this in [place] before he left.  He is wondering about having an injection and again.  He has a history of DVT and is anticoagulated.  Patient's primary care physician above last seen in November 21, 2019 12/18/19 patient has been using crest pad on his left second toe.  He states this is comfortable and working well.  He denies any drainage.  He denies any pain.  He denies any erythema or edema.  He is no longer dressing this at all.  He will be getting an injection in his ankle soon.    1/29/20 patient returns for evaluation of his left second toe.  It is not draining at all since I last saw him.  He has been wearing the crest pad until recently when he got this wet.  He denies any erythema edema.  He did get an injection into his right ankle.  It only helped somewhat.  He has no new complaints today    3/17/20   patient returns for evaluation of his left foot.  He states he is using the  augmented crest pad and it is working great.  He has no pain in this toe now.  He denies any drainage from the wound at the end.  He denies any erythema or edema.  He has no other new complaints today.  Primary care is Dr. Proctor last seen 2/25/20       ROS:  See above         Allergies   Allergen Reactions     Penicillins        Current Outpatient Medications   Medication Sig Dispense Refill     Acetaminophen 325 MG CAPS Take 650 mg by mouth 4 times daily as needed       allopurinol (ZYLOPRIM) 300 MG tablet Take 2 tablets (600 mg) by mouth daily (Patient taking differently: Take 300 mg by mouth daily ) 180 tablet 3     atorvastatin (LIPITOR) 20 MG tablet Take 1 tablet (20 mg) by mouth daily 90 tablet 3     calcium acetate (PHOSLO) 667 MG CAPS capsule Take 4 capsule 3 times a day       fenofibrate (TRICOR) 145 MG tablet Take 1 tablet (145 mg) by mouth daily 90 tablet 3     furosemide (LASIX) 80 MG tablet Take 1 tablet (80 mg) by mouth daily 90 tablet 1     HYDROmorphone (DILAUDID) 2 MG tablet Take 1 tablet (2 mg) by mouth every 6 hours as needed for pain 10 tablet 0     Misc Natural Products (OSTEO BI-FLEX TRIPLE STRENGTH) TABS Take 3 tablets by mouth 2 times daily       order for DME Equipment being ordered: Digital home blood pressure monitor kit 1 kit 0     SODIUM BICARBONATE PO Take 10 g by mouth daily       warfarin ANTICOAGULANT (COUMADIN) 2 MG tablet Take daily as directed. Current dose 2 mg M,W,F  and 4 mg rest of week days. 180 tablet 0       Patient Active Problem List   Diagnosis     Essential hypertension     Mixed hyperlipidemia     CKD (chronic kidney disease) stage 5, GFR less than 15 ml/min (H)     ESRD (end stage renal disease) on dialysis (H)     Type 2 diabetes mellitus, without long-term current use of insulin (H)     Atrial fibrillation status post cardioversion (H)     Hyperuricemia     DORI (obstructive sleep apnea)     Morbid obesity (H)     Deep vein thrombosis (DVT) (H)     Long term  (current) use of anticoagulants     Secondary hyperparathyroidism, renal (H)     Cellulitis     Hyponatremia     Left leg pain     Sepsis (H)     UTI (urinary tract infection)       Past Medical History:   Diagnosis Date     Arthritis      Atrial fibrillation (H)      CKD (chronic kidney disease) stage 5, GFR less than 15 ml/min (H) 2017     DVT (deep venous thrombosis) (H) 2000     Gout      History of blood transfusion      HLD (hyperlipidemia)      HTN (hypertension)      Kidney stone 2016     Type 2 diabetes mellitus (H)        Past Surgical History:   Procedure Laterality Date     ANESTH,UPPER ARM AV FISTULA REPAIR Left 2018     APPENDECTOMY  1958     C REPAIR CRUCIATE LIGAMENT,KNEE  1976     CARDIOVERSION  2000     carpel tunnel surgery Left 2000     COLONOSCOPY       SINUS SURGERY  1997       Family History   Problem Relation Age of Onset     Cerebrovascular Disease Father         Had multiple strokes while in hospital when he passed       Social History     Tobacco Use     Smoking status: Never Smoker     Smokeless tobacco: Never Used   Substance Use Topics     Alcohol use: Not Currently     Frequency: Never     Comment: Stopped when I was put on blood thinners         Exam:    Vitals: /72   Pulse 92   Wt 135.2 kg (298 lb)   BMI 41.56 kg/m    BMI: Body mass index is 41.56 kg/m .  Height: Data Unavailable    Constitutional/ general:  Pt is in no apparent distress, appears well-nourished.  Cooperative with history and physical exam.     Psych:  The patient answered questions appropriately.  Normal affect.  Seems to have reasonable expectations, in terms of treatment.     Eyes:  Visual scanning/ tracking without deficit.     Ears:  Response to auditory stimuli is normal.  negative hearing aid devices.  Auricles in proper alignment.     Lymphatic:  Popliteal lymph nodes not enlarged.     Lungs:  Non labored breathing, non labored speech. No cough.  No audible wheezing. Even, quiet breathing.        Vascular: Patient has lower extremity edema and varicosities bilaterally with the left being worse.  He has trophic changes in his skin.  Edema makes it impossible to palpate his tibial arteries.  His dorsalis pedis is weakly palpable.  There is no hair growth noted bilaterally.    Neuro:  Alert and oriented x 3. Coordinated gait.  Light touch sensation is intact to the L4, L5, S1 distributions. No obvious deficits.  No evidence of neurological-based weakness, spasticity, or contracture in the lower extremities.  Monofilament absent distal to midfoot bilaterally.    Derm: Skin is thin shiny atrophic with no hair growth noted.  Trophic changes noted in his skin bilaterally with the left being worse.    Musculoskeletal:    All the patient's muscle compartments appear to be intact.  Generally he has a decreased range of motion of all forefoot and rear foot joints bilaterally.  The right foot has less edema looks more healthy and there is no signs of any breakdown in this foot.  He has hammertoes of his lesser digits bilaterally.  The left second is the most acute.  There is a ulcer on the end of the left second toe that in the past measured 3 x 2 mm, 2 x 1 mm, and today 1 x 1 mm dry superficial eschar.   there is no drainage.  There is no erythema or edema at all.  There is a callus on the end of the left second toe.    Radiographic Exam:  X-Ray Findings:    XR ANKLE LT G/E 3 VW 12/4/2019 2:04 PM      HISTORY: Arthritis of ankle     COMPARISON: None.                                                                      IMPRESSION: Diffuse soft tissue swelling. No fractures are evident.  Moderate size area of lucency in the medial talar dome consistent with  overlying chondromalacia. Hypertrophic changes in the tibiotalar  joint. Hypertrophic changes at multiple joints of the hindfoot and  midfoot. Osteopenia. Atheromatous calcification.      A:  Diabetes mellitus with peripheral neuropathy and LOPS  Callus  Left  second toe ulcer      P:   With a 15 blade we debrided the callus.  We discussed that the eschar is very superficial and it appears to be healing.  He will just watch this.  He will keep this offloaded with a crest pad.  We will have him return the clinic in 2 months for diabetic foot exam.    Irineo Chan DPM DPM, FACFAS                 Again, thank you for allowing me to participate in the care of your patient.        Sincerely,        Irineo Chan DPM

## 2020-03-17 NOTE — LETTER
3/17/2020         RE: Leif Ariza  [address] [facility] [address]        Dear Colleague,    Thank you for referring your patient, Leif Ariza, to the [facility] [place]. Please see a copy of my visit note below.    ASSESSMENT & PLAN  Leif was seen today for pain and pain.    Diagnoses and all orders for this visit:    Rib pain on left side  -     HYDROmorphone (DILAUDID) 2 MG tablet; Take 1 tablet (2 mg) by mouth every 6 hours as needed for pain    Fall, initial encounter  -     XR Ribs & Chest LT G/E 3vw; Future      Patient is a 69 year old male presenting for   evaluation of   Chief Complaint   Patient presents with     Middle Back - Pain     Neck - Pain      # Left Posterior Rib Injury: Notable after forward fall with posterior rib/periscapular pain worse with shoulder movement and taking in a deep breath.  TTP over medial scapula and lateral/posterior/superior ribs.  XR showing cortical irregularities concerning for old left rib fractures.  Pt reporting possible injury in the past.  No pneumothorax.  Likely pt has bruised vs non-displaced rib fractures.  Counseled patient on nature of condition and treatment options.  Given this plan as below, follow-up as needed  Treatment: activities as tolerated  Physical Therapy home breathing exercises  Medications  Limited NSAIDs/Tylenol    Concerning signs/sx that would warrant urgent evaluation were discussed.  All questions were answered, patient understands and agrees with plan.      Return if symptoms worsen or fail to improve.    -----    SUBJECTIVE  Leif Ariza is a/an 69 year old Right handed male who is seen as a self referral, AIC for evaluation of right scapula pain. The patient is seen by themselves.    Onset: 3/13/20, 4 day(s) ago. Patient describes injury as fall forward with bilateral arms stretched out.   Location of Pain: left scapula pain   Rating of Pain at worst: 7/10  Rating of Pain Currently:  4/10  Worsened by: coughing, reaching, sit to stand transition   Better with: Tylenol   Treatments tried: Tylenol  Quality: dull, sharp  Associated symptoms: no distal numbness or tingling; denies swelling or warmth  Orthopedic history: NO  Relevant surgical history: NO  Past Medical History:   Diagnosis Date     Arthritis      Atrial fibrillation (H)      CKD (chronic kidney disease) stage 5, GFR less than 15 ml/min (H) 2017     DVT (deep venous thrombosis) (H) 2000     Gout      History of blood transfusion      HLD (hyperlipidemia)      HTN (hypertension)      Kidney stone 2016     Type 2 diabetes mellitus (H)      Social History     Socioeconomic History     Marital status:      Spouse name: None     Number of children: None     Years of education: None     Highest education level: None   Occupational History     None   Social Needs     Financial resource strain: None     Food insecurity     Worry: None     Inability: None     Transportation needs     Medical: None     Non-medical: None   Tobacco Use     Smoking status: Never Smoker     Smokeless tobacco: Never Used   Substance and Sexual Activity     Alcohol use: Not Currently     Frequency: Never     Comment: Stopped when I was put on blood thinners     Drug use: Never     Sexual activity: Yes     Partners: Female     Birth control/protection: Post-menopausal   Lifestyle     Physical activity     Days per week: None     Minutes per session: None     Stress: None   Relationships     Social connections     Talks on phone: None     Gets together: None     Attends Mandaen service: None     Active member of club or organization: None     Attends meetings of clubs or organizations: None     Relationship status: None     Intimate partner violence     Fear of current or ex partner: None     Emotionally abused: None     Physically abused: None     Forced sexual activity: None   Other Topics Concern     Parent/sibling w/ CABG, MI or angioplasty before 65F 55M?  "No   Social History Narrative     None         Patient's past medical, surgical, social, and family histories were reviewed today and no changes are noted.  No family history pertinent to the patient's problem today    REVIEW OF SYSTEMS:  10 point ROS is negative other than symptoms noted above in HPI, Past Medical History or as stated below  Constitutional: NEGATIVE for fever, chills, change in weight  Skin: NEGATIVE for worrisome rashes, moles or lesions  GI/: NEGATIVE for bowel or bladder changes  Neuro: NEGATIVE for weakness, dizziness or paresthesias    OBJECTIVE:  /72   Ht 1.803 m (5' 11\")   Wt 135.2 kg (298 lb)   BMI 41.56 kg/m     General: healthy, alert and in no distress  HEENT: no scleral icterus or conjunctival erythema  Skin: no suspicious lesions or rash. No jaundice.  CV: regular rhythm by palpation, no m/r/g  Resp: normal respiratory effort without conversational dyspnea, LCTAB  Psych: normal mood and affect  Gait: normal steady gait with appropriate coordination and balance  Neuro: normal light touch sensory exam of the bilateral upper extremities.    MSK:  CERVICAL SPINE  Inspection:    normal cervical lordosis present, rounded shoulders, forward head posture  Palpation:    Nontender.  Range of Motion:     Flexion full    Extension full    Right side bend full    Left side bend full    Right rotation full    Left rotation full  Strength:    Full strength throughout all neck muscles  Special Tests:    Positive: None    Negative: Spurling's (bilateral)    LEFT SHOULDER  Inspection:    no swelling, bruising, discoloration, or obvious deformity or asymmetry  Palpation:    Tender about the superior, posterior, lateral shoulder. Remainder of bony and tendinous landmarks are nontender.  Active Range of Motion:     Abduction 900, , , IR hip pocket.    Strength:    Scapular plane abduction 5-/5, painful,  ER 5-/5, painful, IR 5-/5, painful, biceps 5-/5, painful, triceps 5-/5, " painful       Independent visualization of the below image:  Recent Results (from the past 24 hour(s))   XR Ribs & Chest LT G/E 3vw    Narrative    RIBS AND CHEST LEFT THREE VIEWS  3/17/2020 2:55 PM     HISTORY:  Fall, left axillary rib pain. Fall, initial encounter.    FINDINGS: There are multiple old left rib fractures. No pneumothorax.      Impression    IMPRESSION: Age indeterminate fracture at the lateral aspect of the  left seventh rib.       I  ordered, visualized and reviewed these images with the patient  Concern for irregularities over left superior ribs for fractures no acute appearing fx noted       Kelvin Nelson MD [facility]        Again, thank you for allowing me to participate in the care of your patient.        Sincerely,        Kelvin Nelson MD

## 2020-03-17 NOTE — PROGRESS NOTES
Subjective:    12/4/19   Pt is seen today in consult from Zo Torres with the c/c of a discolored left second toe.  He points to the end of this toe.  He has had this for several months.  He is recently moved here from [place].  This was treated by another physician out of state.  He recently moved to [place].  He has diabetes.  Patient has numbness and tingling in his feet.  He has chronic kidney disease.  He states he was going to have his left knee replaced before surgery he developed left lower extremity cellulitis.  He was placed on antibiotics for this and now much better.  He states his left leg is always more swollen than his right.  He also has a history of ankle pain.  He states he had an injection which helped this for approximately 3 months.  He had this in [place] before he left.  He is wondering about having an injection and again.  He has a history of DVT and is anticoagulated.  Patient's primary care physician above last seen in November 21, 2019 12/18/19 patient has been using crest pad on his left second toe.  He states this is comfortable and working well.  He denies any drainage.  He denies any pain.  He denies any erythema or edema.  He is no longer dressing this at all.  He will be getting an injection in his ankle soon.    1/29/20 patient returns for evaluation of his left second toe.  It is not draining at all since I last saw him.  He has been wearing the crest pad until recently when he got this wet.  He denies any erythema edema.  He did get an injection into his right ankle.  It only helped somewhat.  He has no new complaints today    3/17/20   patient returns for evaluation of his left foot.  He states he is using the augmented crest pad and it is working great.  He has no pain in this toe now.  He denies any drainage from the wound at the end.  He denies any erythema or edema.  He has no other new complaints today.  Primary care is Dr. Proctor last seen 2/25/20        ROS:  See above         Allergies   Allergen Reactions     Penicillins        Current Outpatient Medications   Medication Sig Dispense Refill     Acetaminophen 325 MG CAPS Take 650 mg by mouth 4 times daily as needed       allopurinol (ZYLOPRIM) 300 MG tablet Take 2 tablets (600 mg) by mouth daily (Patient taking differently: Take 300 mg by mouth daily ) 180 tablet 3     atorvastatin (LIPITOR) 20 MG tablet Take 1 tablet (20 mg) by mouth daily 90 tablet 3     calcium acetate (PHOSLO) 667 MG CAPS capsule Take 4 capsule 3 times a day       fenofibrate (TRICOR) 145 MG tablet Take 1 tablet (145 mg) by mouth daily 90 tablet 3     furosemide (LASIX) 80 MG tablet Take 1 tablet (80 mg) by mouth daily 90 tablet 1     HYDROmorphone (DILAUDID) 2 MG tablet Take 1 tablet (2 mg) by mouth every 6 hours as needed for pain 10 tablet 0     Misc Natural Products (OSTEO BI-FLEX TRIPLE STRENGTH) TABS Take 3 tablets by mouth 2 times daily       order for DME Equipment being ordered: Digital home blood pressure monitor kit 1 kit 0     SODIUM BICARBONATE PO Take 10 g by mouth daily       warfarin ANTICOAGULANT (COUMADIN) 2 MG tablet Take daily as directed. Current dose 2 mg M,W,F  and 4 mg rest of week days. 180 tablet 0       Patient Active Problem List   Diagnosis     Essential hypertension     Mixed hyperlipidemia     CKD (chronic kidney disease) stage 5, GFR less than 15 ml/min (H)     ESRD (end stage renal disease) on dialysis (H)     Type 2 diabetes mellitus, without long-term current use of insulin (H)     Atrial fibrillation status post cardioversion (H)     Hyperuricemia     DORI (obstructive sleep apnea)     Morbid obesity (H)     Deep vein thrombosis (DVT) (H)     Long term (current) use of anticoagulants     Secondary hyperparathyroidism, renal (H)     Cellulitis     Hyponatremia     Left leg pain     Sepsis (H)     UTI (urinary tract infection)       Past Medical History:   Diagnosis Date     Arthritis      Atrial  fibrillation (H)      CKD (chronic kidney disease) stage 5, GFR less than 15 ml/min (H) 2017     DVT (deep venous thrombosis) (H) 2000     Gout      History of blood transfusion      HLD (hyperlipidemia)      HTN (hypertension)      Kidney stone 2016     Type 2 diabetes mellitus (H)        Past Surgical History:   Procedure Laterality Date     ANESTH,UPPER ARM AV FISTULA REPAIR Left 2018     APPENDECTOMY  1958     C REPAIR CRUCIATE LIGAMENT,KNEE  1976     CARDIOVERSION  2000     carpel tunnel surgery Left 2000     COLONOSCOPY       SINUS SURGERY  1997       Family History   Problem Relation Age of Onset     Cerebrovascular Disease Father         Had multiple strokes while in hospital when he passed       Social History     Tobacco Use     Smoking status: Never Smoker     Smokeless tobacco: Never Used   Substance Use Topics     Alcohol use: Not Currently     Frequency: Never     Comment: Stopped when I was put on blood thinners         Exam:    Vitals: /72   Pulse 92   Wt 135.2 kg (298 lb)   BMI 41.56 kg/m    BMI: Body mass index is 41.56 kg/m .  Height: Data Unavailable    Constitutional/ general:  Pt is in no apparent distress, appears well-nourished.  Cooperative with history and physical exam.     Psych:  The patient answered questions appropriately.  Normal affect.  Seems to have reasonable expectations, in terms of treatment.     Eyes:  Visual scanning/ tracking without deficit.     Ears:  Response to auditory stimuli is normal.  negative hearing aid devices.  Auricles in proper alignment.     Lymphatic:  Popliteal lymph nodes not enlarged.     Lungs:  Non labored breathing, non labored speech. No cough.  No audible wheezing. Even, quiet breathing.       Vascular: Patient has lower extremity edema and varicosities bilaterally with the left being worse.  He has trophic changes in his skin.  Edema makes it impossible to palpate his tibial arteries.  His dorsalis pedis is weakly palpable.  There is no  hair growth noted bilaterally.    Neuro:  Alert and oriented x 3. Coordinated gait.  Light touch sensation is intact to the L4, L5, S1 distributions. No obvious deficits.  No evidence of neurological-based weakness, spasticity, or contracture in the lower extremities.  Monofilament absent distal to midfoot bilaterally.    Derm: Skin is thin shiny atrophic with no hair growth noted.  Trophic changes noted in his skin bilaterally with the left being worse.    Musculoskeletal:    All the patient's muscle compartments appear to be intact.  Generally he has a decreased range of motion of all forefoot and rear foot joints bilaterally.  The right foot has less edema looks more healthy and there is no signs of any breakdown in this foot.  He has hammertoes of his lesser digits bilaterally.  The left second is the most acute.  There is a ulcer on the end of the left second toe that in the past measured 3 x 2 mm, 2 x 1 mm, and today 1 x 1 mm dry superficial eschar.   there is no drainage.  There is no erythema or edema at all.  There is a callus on the end of the left second toe.    Radiographic Exam:  X-Ray Findings:    XR ANKLE LT G/E 3 VW 12/4/2019 2:04 PM      HISTORY: Arthritis of ankle     COMPARISON: None.                                                                      IMPRESSION: Diffuse soft tissue swelling. No fractures are evident.  Moderate size area of lucency in the medial talar dome consistent with  overlying chondromalacia. Hypertrophic changes in the tibiotalar  joint. Hypertrophic changes at multiple joints of the hindfoot and  midfoot. Osteopenia. Atheromatous calcification.      A:  Diabetes mellitus with peripheral neuropathy and LOPS  Callus  Left second toe ulcer      P:   With a 15 blade we debrided the callus.  We discussed that the eschar is very superficial and it appears to be healing.  He will just watch this.  He will keep this offloaded with a crest pad.  We will have him return the clinic  in 2 months for diabetic foot exam.    Irineo Chan, MARIEM DPM, FACFAS

## 2020-03-23 DIAGNOSIS — Z86.718 HISTORY OF RECURRENT DEEP VEIN THROMBOSIS (DVT): ICD-10-CM

## 2020-03-23 DIAGNOSIS — I48.91 ATRIAL FIBRILLATION STATUS POST CARDIOVERSION (H): Primary | ICD-10-CM

## 2020-03-24 ENCOUNTER — DOCUMENTATION ONLY (OUTPATIENT)
Dept: LAB | Facility: CLINIC | Age: 70
End: 2020-03-24

## 2020-03-24 ENCOUNTER — ANTICOAGULATION THERAPY VISIT (OUTPATIENT)
Dept: FAMILY MEDICINE | Facility: CLINIC | Age: 70
End: 2020-03-24

## 2020-03-24 DIAGNOSIS — Z79.01 LONG TERM (CURRENT) USE OF ANTICOAGULANTS: ICD-10-CM

## 2020-03-24 DIAGNOSIS — I82.402 ACUTE DEEP VEIN THROMBOSIS (DVT) OF LEFT LOWER EXTREMITY, UNSPECIFIED VEIN (H): ICD-10-CM

## 2020-03-24 DIAGNOSIS — I82.409 DEEP VEIN THROMBOSIS (DVT) (H): Primary | ICD-10-CM

## 2020-03-24 DIAGNOSIS — I48.91 ATRIAL FIBRILLATION STATUS POST CARDIOVERSION (H): ICD-10-CM

## 2020-03-24 DIAGNOSIS — Z86.718 HISTORY OF RECURRENT DEEP VEIN THROMBOSIS (DVT): ICD-10-CM

## 2020-03-24 LAB
CAPILLARY BLOOD COLLECTION: NORMAL
INR PPP: 1.8 (ref 0.86–1.14)

## 2020-03-24 PROCEDURE — 99207 ZZC NO CHARGE NURSE ONLY: CPT | Performed by: NURSE PRACTITIONER

## 2020-03-24 PROCEDURE — 85610 PROTHROMBIN TIME: CPT | Performed by: NURSE PRACTITIONER

## 2020-03-24 PROCEDURE — 36416 COLLJ CAPILLARY BLOOD SPEC: CPT | Performed by: NURSE PRACTITIONER

## 2020-03-24 NOTE — PROGRESS NOTES
This is just an FYI to inform you that Jean notified the lab that he missed his Warfarin dose on Saturday 3/21/20.     Peggy Villa on 3/24/2020 at 3:34 PM

## 2020-03-24 NOTE — PROGRESS NOTES
Noted message below about missed dose. Call attempt to patient and left message for patient to call me back. Oma Taylor RN  [phone]

## 2020-03-24 NOTE — PROGRESS NOTES
ANTICOAGULATION FOLLOW-UP CLINIC VISIT    Patient Name:  Leif Ariza  Date:  3/24/2020  Contact Type:  Telephone    SUBJECTIVE:  Patient Findings     Positives:   Missed doses    Comments:   Discussed results with patient on phone. Missed one dose . No other changes. Reports had vascular procedure this am for fistula for dialyisis and they got INR result of 1.2. will dose off of results in our lab of 1.8. has dialysis on M,W,F. He will bump dose today. Verbalized understanding.         Clinical Outcomes     Comments:   Discussed results with patient on phone. Missed one dose . No other changes. Reports had vascular procedure this am for fistula for dialyisis and they got INR result of 1.2. will dose off of results in our lab of 1.8. has dialysis on M,W,F. He will bump dose today. Verbalized understanding.            OBJECTIVE    INR   Date Value Ref Range Status   2020 1.80 (H) 0.86 - 1.14 Final     Comment:     This test is intended for monitoring Coumadin therapy.  Results are not   accurate in patients with prolonged INR due to factor deficiency.  PATIENT STATES HE MISSED HIS DOSE ON 3/21/20         ASSESSMENT / PLAN  INR assessment SUB    Recheck INR In: 2 WEEKS    INR Location Clinic      Anticoagulation Summary  As of 3/24/2020    INR goal:   2.0-3.0   TTR:   52.1 % (6.1 mo)   INR used for dosin.80! (3/24/2020)   Warfarin maintenance plan:   2 mg (2 mg x 1) every Mon, Wed, Fri; 4 mg (2 mg x 2) all other days   Full warfarin instructions:   3/24: 6 mg; Otherwise 2 mg every Mon, Wed, Fri; 4 mg all other days   Weekly warfarin total:   22 mg   Plan last modified:   Oma Taylor RN (2020)   Next INR check:   2020   Priority:   Maintenance   Target end date:       Indications    Atrial fibrillation status post cardioversion (H) [I48.91]  Deep vein thrombosis (DVT) (H) [I82.409]  Long term (current) use of anticoagulants [Z79.01]             Anticoagulation Episode Summary     INR check  location:       Preferred lab:       Send INR reminders to:   PEARL CORNEJO    Comments:   Has Dialysis Monday Wednesday and Friday.      Anticoagulation Care Providers     Provider Role Specialty Phone number    Nguyen Torres, NP Referring Nurse Practitioner - Family [phone]            See the Encounter Report to view Anticoagulation Flowsheet and Dosing Calendar (Go to Encounters tab in chart review, and find the Anticoagulation Therapy Visit)        Oma Taylor RN

## 2020-03-26 ENCOUNTER — MYC MEDICAL ADVICE (OUTPATIENT)
Dept: FAMILY MEDICINE | Facility: CLINIC | Age: 70
End: 2020-03-26

## 2020-03-26 NOTE — TELEPHONE ENCOUNTER
1)     I have an upcoming surgery scheduled for April 28 at [facility] with Dr Muñoz for a total knee replacement, (left knee).  I'm told I need to have a pre-operative exam done 10 to 30 days in advance.  Can we schedule that, (assuming my surgery doesn't get get cancelled due to the COVID-19 pandemic)?  Anytime on a Tuesday or Thursday OR Monday, Wednesday, Fridays in the afternoon.       Due to complex medical history I recommend follow up with Cardiology prior to you surgery for clearance. You can also schedule an appointment with our clinic for preop exam if surgery is not cancelled.         2)     I was informed by Dr Mckinley, (my oncologist who I was referred to by Dr Muñoz) that my blood work came back OK to take me off my Warfarin just prior to surgery.  I informed Dr Muñoz and he said the # of days must be set by the DR that issued the Warfarin prescription.  That would be you.  Can you notify Dr Muñoz at [facility] how many days that should be?  This one has been a little frustrating as no one at Dr Ornelas's or Ryan Muñoz's office can help me get this done.      - typically we bridge patient on Warfarin with Lovenox  Few days (4-5 days) prior to surgery, this will be discussed with you during your appointment , cardiology input will assist with this as well.       Edelmira Protcor MD.

## 2020-03-27 ENCOUNTER — DOCUMENTATION ONLY (OUTPATIENT)
Dept: FAMILY MEDICINE | Facility: CLINIC | Age: 70
End: 2020-03-27

## 2020-03-27 DIAGNOSIS — Z01.818 PREOP GENERAL PHYSICAL EXAM: Primary | ICD-10-CM

## 2020-03-27 NOTE — TELEPHONE ENCOUNTER
Called the patient regarding his concern.  Per patient,  he is a scheduled for knee replacement surgery on 04/28/2020 he would like to schedule preop appointment.  As of today the surgery has not been canceled yet, but he is not sure if he is going to have the surgery or not.  discussed with patient that due to his complex medical history, history of A. fib, I recommend seeing cardiology for surgical clearance.  Referral placed, patient will call his cardiologist to schedule an appointment.  Patient is also on warfarin for DVT, he will need Lovenox prior to surgery.  Discussed with patient that we will discuss this with him when he comes for preop visit.   Patient verbalized understanding and agreed on the plan of care.  All questions answered.

## 2020-04-01 ENCOUNTER — TELEPHONE (OUTPATIENT)
Dept: NEUROLOGY | Facility: CLINIC | Age: 70
End: 2020-04-01

## 2020-04-01 NOTE — TELEPHONE ENCOUNTER
I called patient to convert his appt 4/7/2020 to a video appt.  Pt has a laptop and would like a email sent with link.    Danna Markham LPN

## 2020-04-07 ENCOUNTER — TELEPHONE (OUTPATIENT)
Dept: NEUROLOGY | Facility: CLINIC | Age: 70
End: 2020-04-07

## 2020-04-07 ENCOUNTER — VIRTUAL VISIT (OUTPATIENT)
Dept: NEUROLOGY | Facility: CLINIC | Age: 70
End: 2020-04-07
Attending: FAMILY MEDICINE
Payer: MEDICARE

## 2020-04-07 ENCOUNTER — ANTICOAGULATION THERAPY VISIT (OUTPATIENT)
Dept: NURSING | Facility: CLINIC | Age: 70
End: 2020-04-07

## 2020-04-07 DIAGNOSIS — R20.9 SENSORY DISORDER: Primary | ICD-10-CM

## 2020-04-07 DIAGNOSIS — R20.9 SENSORY DISORDER: ICD-10-CM

## 2020-04-07 DIAGNOSIS — Z79.01 LONG TERM (CURRENT) USE OF ANTICOAGULANTS: ICD-10-CM

## 2020-04-07 DIAGNOSIS — I82.409 DEEP VEIN THROMBOSIS (DVT) (H): ICD-10-CM

## 2020-04-07 DIAGNOSIS — R79.9 ABNORMAL FINDING OF BLOOD CHEMISTRY, UNSPECIFIED: ICD-10-CM

## 2020-04-07 DIAGNOSIS — I82.402 DEEP VEIN THROMBOSIS OF LEFT LOWER LIMB (H): ICD-10-CM

## 2020-04-07 DIAGNOSIS — G25.81 RESTLESS LEGS SYNDROME: ICD-10-CM

## 2020-04-07 DIAGNOSIS — I82.402 ACUTE DEEP VEIN THROMBOSIS (DVT) OF LEFT LOWER EXTREMITY, UNSPECIFIED VEIN (H): ICD-10-CM

## 2020-04-07 DIAGNOSIS — I48.91 ATRIAL FIBRILLATION STATUS POST CARDIOVERSION (H): ICD-10-CM

## 2020-04-07 LAB
ALBUMIN SERPL-MCNC: 3.4 G/DL (ref 3.4–5)
ALP SERPL-CCNC: 76 U/L (ref 40–150)
ALT SERPL W P-5'-P-CCNC: 15 U/L (ref 0–70)
ANION GAP SERPL CALCULATED.3IONS-SCNC: 9 MMOL/L (ref 3–14)
AST SERPL W P-5'-P-CCNC: 17 U/L (ref 0–45)
BILIRUB SERPL-MCNC: 0.4 MG/DL (ref 0.2–1.3)
BUN SERPL-MCNC: 49 MG/DL (ref 7–30)
CALCIUM SERPL-MCNC: 9.4 MG/DL (ref 8.5–10.1)
CAPILLARY BLOOD COLLECTION: NORMAL
CHLORIDE SERPL-SCNC: 95 MMOL/L (ref 94–109)
CO2 SERPL-SCNC: 30 MMOL/L (ref 20–32)
CREAT SERPL-MCNC: 8.87 MG/DL (ref 0.66–1.25)
FERRITIN SERPL-MCNC: 1368 NG/ML (ref 26–388)
GFR SERPL CREATININE-BSD FRML MDRD: 5 ML/MIN/{1.73_M2}
GLUCOSE SERPL-MCNC: 115 MG/DL (ref 70–99)
INR PPP: 3.4 (ref 0.86–1.14)
IRON SERPL-MCNC: 70 UG/DL (ref 35–180)
POTASSIUM SERPL-SCNC: 4.1 MMOL/L (ref 3.4–5.3)
PROT SERPL-MCNC: 7.7 G/DL (ref 6.8–8.8)
SODIUM SERPL-SCNC: 134 MMOL/L (ref 133–144)
VIT B12 SERPL-MCNC: 319 PG/ML (ref 193–986)

## 2020-04-07 PROCEDURE — 85610 PROTHROMBIN TIME: CPT | Performed by: NURSE PRACTITIONER

## 2020-04-07 PROCEDURE — 86146 BETA-2 GLYCOPROTEIN ANTIBODY: CPT | Performed by: PSYCHIATRY & NEUROLOGY

## 2020-04-07 PROCEDURE — 83540 ASSAY OF IRON: CPT | Performed by: PSYCHIATRY & NEUROLOGY

## 2020-04-07 PROCEDURE — 86147 CARDIOLIPIN ANTIBODY EA IG: CPT | Performed by: PSYCHIATRY & NEUROLOGY

## 2020-04-07 PROCEDURE — 85730 THROMBOPLASTIN TIME PARTIAL: CPT | Performed by: PSYCHIATRY & NEUROLOGY

## 2020-04-07 PROCEDURE — 85525 HEPARIN NEUTRALIZATION: CPT | Performed by: PSYCHIATRY & NEUROLOGY

## 2020-04-07 PROCEDURE — 85732 THROMBOPLASTIN TIME PARTIAL: CPT | Performed by: PSYCHIATRY & NEUROLOGY

## 2020-04-07 PROCEDURE — 82607 VITAMIN B-12: CPT | Mod: AY | Performed by: PSYCHIATRY & NEUROLOGY

## 2020-04-07 PROCEDURE — 85613 RUSSELL VIPER VENOM DILUTED: CPT | Performed by: PSYCHIATRY & NEUROLOGY

## 2020-04-07 PROCEDURE — 36416 COLLJ CAPILLARY BLOOD SPEC: CPT | Performed by: NURSE PRACTITIONER

## 2020-04-07 PROCEDURE — 99207 ZZC NO CHARGE NURSE ONLY: CPT

## 2020-04-07 PROCEDURE — 82728 ASSAY OF FERRITIN: CPT | Performed by: PSYCHIATRY & NEUROLOGY

## 2020-04-07 PROCEDURE — 85260 CLOT FACTOR X STUART-POWER: CPT | Performed by: PSYCHIATRY & NEUROLOGY

## 2020-04-07 PROCEDURE — 85025 COMPLETE CBC W/AUTO DIFF WBC: CPT | Performed by: PSYCHIATRY & NEUROLOGY

## 2020-04-07 PROCEDURE — 83921 ORGANIC ACID SINGLE QUANT: CPT | Performed by: PSYCHIATRY & NEUROLOGY

## 2020-04-07 PROCEDURE — 85597 PHOSPHOLIPID PLTLT NEUTRALIZ: CPT | Performed by: PSYCHIATRY & NEUROLOGY

## 2020-04-07 PROCEDURE — 00000401 ZZHCL STATISTIC THROMBIN TIME NC: Performed by: PSYCHIATRY & NEUROLOGY

## 2020-04-07 PROCEDURE — 80053 COMPREHEN METABOLIC PANEL: CPT | Performed by: PSYCHIATRY & NEUROLOGY

## 2020-04-07 PROCEDURE — 00000167 ZZHCL STATISTIC INR NC: Performed by: PSYCHIATRY & NEUROLOGY

## 2020-04-07 PROCEDURE — 99204 OFFICE O/P NEW MOD 45 MIN: CPT | Mod: GT | Performed by: PSYCHIATRY & NEUROLOGY

## 2020-04-07 RX ORDER — GABAPENTIN 100 MG/1
100 CAPSULE ORAL SEE ADMIN INSTRUCTIONS
Qty: 30 CAPSULE | Refills: 1 | Status: SHIPPED | OUTPATIENT
Start: 2020-04-07 | End: 2020-04-27

## 2020-04-07 NOTE — PROGRESS NOTES
ANTICOAGULATION FOLLOW-UP CLINIC VISIT    Patient Name:  Leif Ariza  Date:  4/7/2020  Contact Type:  Telephone    SUBJECTIVE:  Patient Findings     Comments:   Supra at 3.4. denies any new concerns or changes except will be starting gabapentin. No bleeding or unusual bruising. Took dose today. Continues on dialysis M,W,F. Will adjust does this week only. Verbalized understanding.        Clinical Outcomes     Comments:   Supra at 3.4. denies any new concerns or changes except will be starting gabapentin. No bleeding or unusual bruising. Took dose today. Continues on dialysis M,W,F. Will adjust does this week only. Verbalized understanding.           OBJECTIVE    INR   Date Value Ref Range Status   04/07/2020 3.40 (H) 0.86 - 1.14 Final     Comment:     This test is intended for monitoring Coumadin therapy.  Results are not   accurate in patients with prolonged INR due to factor deficiency.         ASSESSMENT / PLAN  INR assessment SUPRA    Recheck INR In: 2 WEEKS    INR Location Clinic      Anticoagulation Summary  As of 4/7/2020    INR goal:   2.0-3.0   TTR:   52.8 % (6.6 mo)   INR used for dosing:   3.40! (4/7/2020)   Warfarin maintenance plan:   2 mg (2 mg x 1) every Mon, Wed, Fri; 4 mg (2 mg x 2) all other days   Full warfarin instructions:   4/9: 2 mg; Otherwise 2 mg every Mon, Wed, Fri; 4 mg all other days   Weekly warfarin total:   22 mg   Plan last modified:   Oma Taylor RN (2/6/2020)   Next INR check:   4/21/2020   Priority:   Maintenance   Target end date:       Indications    Atrial fibrillation status post cardioversion (H) [I48.91]  Deep vein thrombosis (DVT) (H) [I82.409]  Long term (current) use of anticoagulants [Z79.01]             Anticoagulation Episode Summary     INR check location:       Preferred lab:       Send INR reminders to:   PEARL CORNEJO    Comments:   Has Dialysis Monday Wednesday and Friday.      Anticoagulation Care Providers     Provider Role Specialty Phone number     Nguyen Torres, NP Referring Nurse Practitioner - Family [phone]            See the Encounter Report to view Anticoagulation Flowsheet and Dosing Calendar (Go to Encounters tab in chart review, and find the Anticoagulation Therapy Visit)        Oma Taylor RN

## 2020-04-07 NOTE — PROGRESS NOTES
"Leif Ariza is a 69 year old male who is being evaluated via a billable video visit.      The patient has been notified of following:     \"This video visit will be conducted via a call between you and your physician/provider. We have found that certain health care needs can be provided without the need for an in-person physical exam.  This service lets us provide the care you need with a video conversation.  If a prescription is necessary we can send it directly to your pharmacy.  If lab work is needed we can place an order for that and you can then stop by our lab to have the test done at a later time.    Video visits are billed at different rates depending on your insurance coverage.  Please reach out to your insurance provider with any questions.    If during the course of the call the physician/provider feels a video visit is not appropriate, you will not be charged for this service.\"    Patient has given verbal consent for Video visit? Yes    Patient would like the video invitation sent by: Text to cell phone: [phone]    Video Start Time:   Leif Ariza complains of    Chief Complaint   Patient presents with     Consult For       I have reviewed and updated the patient's Past Medical History, Social History, Family History and Medication List.    ALLERGIES  Penicillins    Additional provider notes:     Video-Visit Details    Type of service:  Video Visit    Video End Time (time video stopped):  Distant Location (provider location):  [facility]     Mode of Communication:  Video Conference via [facility]      Arvind Trujillo MD    "

## 2020-04-07 NOTE — NURSING NOTE
Leif Ariza's goals for this visit include: New  He requests these members of his care team be copied on today's visit information:     PCP: Nguyen Torres    Referring Provider:  Edelmira Proctor MD  [id number] [place] [id number]    There were no vitals taken for this visit.    Do you need any medication refills at today's visit? No
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
## 2020-04-08 ENCOUNTER — TELEPHONE (OUTPATIENT)
Dept: FAMILY MEDICINE | Facility: CLINIC | Age: 70
End: 2020-04-08

## 2020-04-08 DIAGNOSIS — I82.409 DEEP VEIN THROMBOSIS (DVT) (H): Primary | ICD-10-CM

## 2020-04-08 DIAGNOSIS — I48.91 ATRIAL FIBRILLATION STATUS POST CARDIOVERSION (H): Chronic | ICD-10-CM

## 2020-04-08 DIAGNOSIS — Z79.01 LONG TERM (CURRENT) USE OF ANTICOAGULANTS: Chronic | ICD-10-CM

## 2020-04-08 PROBLEM — G25.81 RESTLESS LEGS SYNDROME: Status: ACTIVE | Noted: 2020-04-08

## 2020-04-08 LAB
ANISOCYTOSIS BLD QL SMEAR: SLIGHT
B2 GLYCOPROT1 IGA SER-ACNC: 2.5 U/ML
B2 GLYCOPROT1 IGG SERPL IA-ACNC: 1.6 U/ML
B2 GLYCOPROT1 IGM SERPL IA-ACNC: <2.9 U/ML
BASOPHILS # BLD AUTO: 0.1 10E9/L (ref 0–0.2)
BASOPHILS NFR BLD AUTO: 2 %
CARDIOLIPIN ANTIBODY IGG: <1.6 GPL-U/ML (ref 0–19.9)
CARDIOLIPIN ANTIBODY IGM: <0.2 MPL-U/ML (ref 0–19.9)
DIFFERENTIAL METHOD BLD: ABNORMAL
EOSINOPHIL # BLD AUTO: 0.1 10E9/L (ref 0–0.7)
EOSINOPHIL NFR BLD AUTO: 2 %
ERYTHROCYTE [DISTWIDTH] IN BLOOD BY AUTOMATED COUNT: 13.4 % (ref 10–15)
FACT X ACT/NOR PPP CHRO: 19 % (ref 70–130)
HCT VFR BLD AUTO: 33.4 % (ref 40–53)
HGB BLD-MCNC: 10.7 G/DL (ref 13.3–17.7)
LYMPHOCYTES # BLD AUTO: 3 10E9/L (ref 0.8–5.3)
LYMPHOCYTES NFR BLD AUTO: 43 %
MCH RBC QN AUTO: 33.1 PG (ref 26.5–33)
MCHC RBC AUTO-ENTMCNC: 32 G/DL (ref 31.5–36.5)
MCV RBC AUTO: 103 FL (ref 78–100)
MONOCYTES # BLD AUTO: 0.7 10E9/L (ref 0–1.3)
MONOCYTES NFR BLD AUTO: 10 %
NEUTROPHILS # BLD AUTO: 3.1 10E9/L (ref 1.6–8.3)
NEUTROPHILS NFR BLD AUTO: 43 %
PLATELET # BLD AUTO: 280 10E9/L (ref 150–450)
PLATELET # BLD EST: ABNORMAL 10*3/UL
RBC # BLD AUTO: 3.23 10E12/L (ref 4.4–5.9)
STOMATOCYTES BLD QL SMEAR: SLIGHT
TARGETS BLD QL SMEAR: SLIGHT
WBC # BLD AUTO: 7 10E9/L (ref 4–11)

## 2020-04-08 NOTE — TELEPHONE ENCOUNTER
I was paged (neurology) about critical lab (Creatinin 8.8), ordered by Dr. Mckinley. I told her that she needs to contact whoever ordered the test to have a plan. I saw that the patient saw Dr. Trujillo today for neuropathy and he ordered MMA, B12 (in process), iron(normal), ferritin (elevated).     I discussed that this critical lab (creatinin ) is a medical concern and medicine team needs to be aware of it.     Thanks     Anand Osorio  Neurology resident   Pager: [phone]

## 2020-04-08 NOTE — TELEPHONE ENCOUNTER
Reason for Call:  Other call back    Detailed comments: [facility] is calling stating patient is having surgery in next couple of weeks and would like orders for OK to hold coumadin. Please call to discuss. Thank you.    Phone Number Patient can be reached at: [phone]    Best Time:     Can we leave a detailed message on this number? YES    Call taken on 4/8/2020 at 4:04 PM by Stephany Valenzuela

## 2020-04-09 LAB — LA PPP-IMP: NEGATIVE

## 2020-04-09 NOTE — TELEPHONE ENCOUNTER
I spoke with nurse Lety at [facility]. Patient is scheduled to have procedure 4/23 for placement of peritoneal Dialysis in abdomen. INR needs to be <1.5 for this. Need to know if ok to hold warfarin and for how long and if bridging advised. Note patient is on dialysis.  Oma Taylor RN

## 2020-04-13 DIAGNOSIS — N18.5 CKD (CHRONIC KIDNEY DISEASE) STAGE 5, GFR LESS THAN 15 ML/MIN (H): ICD-10-CM

## 2020-04-13 DIAGNOSIS — Z99.2 ESRD (END STAGE RENAL DISEASE) ON DIALYSIS (H): Chronic | ICD-10-CM

## 2020-04-13 DIAGNOSIS — N18.6 ESRD (END STAGE RENAL DISEASE) ON DIALYSIS (H): Chronic | ICD-10-CM

## 2020-04-13 RX ORDER — FUROSEMIDE 80 MG
80 TABLET ORAL DAILY
Qty: 90 TABLET | Refills: 0 | Status: SHIPPED | OUTPATIENT
Start: 2020-04-13

## 2020-04-13 NOTE — TELEPHONE ENCOUNTER
Routing refill request to provider for review/approval because:  Labs out of range:    Creatinine   Date Value Ref Range Status   04/07/2020 8.87 (H) 0.66 - 1.25 mg/dL Final     Comment:     Critical Value called to and read back by  Jackie Hernandez at [facility] ON 4/7/20 at 1935 by JOSE LUIS  Critical Value called to and read back by  DR. RAYMON MYERS AT [facility] AT 2054 ON 04.04.2020.MM       Please advise on refill  Argelia Mcdonald RN

## 2020-04-14 ENCOUNTER — MYC MEDICAL ADVICE (OUTPATIENT)
Dept: NURSING | Facility: CLINIC | Age: 70
End: 2020-04-14

## 2020-04-14 NOTE — TELEPHONE ENCOUNTER
Agree with plan per pharmacist.  Please confirm if knee replacement has been cancelled.  Susan Rayo M.D.

## 2020-04-14 NOTE — TELEPHONE ENCOUNTER
Will need provider review and approval and then will need heparin ordered and patient notified of plan. Oma Taylor RN

## 2020-04-14 NOTE — TELEPHONE ENCOUNTER
[facility] ANTICOAGULATION  MANAGEMENT    Assessment     Warfarin interruption plan for Peritoneal Dialysis access on 2020.    A. Fib and DVT      Risk stratification for thromboembolism: at least moderate (2012 Chest guidelines)    [id number] = 5  DVT 7 months ago    NVAF: 2017 ACC periprocedure pathway for NVAF advises likely bridge for moderate risk stratification ([id number] score 5-6)  with a hx of stroke, TIA or systemic embolism)    Plan   Due to active dialysis, can not use Lovenox.       Pre-Procedure:    Hold warfarin until after procedure startin2020     Heparin  subq Q 20.,000U Q12 hrs SQ    Start heparin 2020     Last dose of heparin prior to procedure: 2020 PM        Post-Procedure:    Resume home warfarin dose if okay with provider doing procedure on night of procedure, 2020 PM: 4mg    Resume heparin ~ 24-72 hrs post procedure when okay with provider doing procedure. Continue until INR >= 2.3    Recheck INR 5-6 days after resuming warfarin     Need to verify if TKA planned for 2020 still scheduled to occur, if so, do NOT start warfarin, continue heparin subcutaneous until night before surgery.    If TKA occurs, Resume heparin ~ 24-72 hrs post procedure when okay with provider doing procedure. Continue until INR >= 2.3      Recheck INR 5-6 days after resuming warfarin       Amy Ahn [facility]    Subjective/Objective:      Leif Ariza, a 69 year old male    Reason for Anticoagulation: A. Fib and DVT    Goal INR Range: 2.0-3.0    Patient bridged in past: Yes: with DVT    Pertinent History: on dialysis, unable to     Wt Readings from Last 3 Encounters:   20 135.2 kg (298 lb)   20 135.2 kg (298 lb)   20 134.3 kg (296 lb)        Ideal body weight: 75.3 kg (166 lb 0.1 oz)  Adjusted ideal body weight: 99.2 kg (218 lb 12.9 oz)     Lab Results   Component Value Date    INR 3.40 (H) 2020    INR 1.80 (H) 2020    INR 2.6 (A)  03/10/2020     Lab Results   Component Value Date    HGB 10.7 04/07/2020    HCT 33.4 04/07/2020     04/07/2020     Lab Results   Component Value Date    CR 8.87 (H) 04/07/2020    CR 8.62 (H) 11/21/2019    CR 5.97 (A) 09/18/2019     Estimated Creatinine Clearance: 11.4 mL/min (A) (based on SCr of 8.87 mg/dL (H)).

## 2020-04-15 RX ORDER — HEPARIN SODIUM 20000 [USP'U]/ML
20000 INJECTION INTRAVENOUS; SUBCUTANEOUS EVERY 12 HOURS
Qty: 14 VIAL | Refills: 1 | Status: SHIPPED | OUTPATIENT
Start: 2020-04-15 | End: 2020-05-05

## 2020-04-15 NOTE — TELEPHONE ENCOUNTER
Heparin ordered, suggest follow up call to verify pharmacy able to order & bill for patient, due to dialysis and Medicare may have restrictions.    Amy Ahn, PharmD BCACP  Anticoagulation Clinical Pharmacist

## 2020-04-15 NOTE — TELEPHONE ENCOUNTER
Spoke with patient who reports the knee surgery has been cancelled and not rescheduled at this time. Preferred pharmacy is [facility], [place].   Need order for the Heparin sent to pharmacy. Will send to pharmacist to enter order please.   I can then send instructions to patient on my chart.yobani young. Oma Taylor RN

## 2020-04-16 ENCOUNTER — TELEPHONE (OUTPATIENT)
Dept: NEUROLOGY | Facility: CLINIC | Age: 70
End: 2020-04-16

## 2020-04-16 ENCOUNTER — MYC MEDICAL ADVICE (OUTPATIENT)
Dept: FAMILY MEDICINE | Facility: CLINIC | Age: 70
End: 2020-04-16

## 2020-04-16 ENCOUNTER — TELEPHONE (OUTPATIENT)
Dept: FAMILY MEDICINE | Facility: CLINIC | Age: 70
End: 2020-04-16

## 2020-04-16 LAB — METHYLMALONATE SERPL-SCNC: 1.36 UMOL/L (ref 0–0.4)

## 2020-04-16 NOTE — TELEPHONE ENCOUNTER
Oma, I see your note regarding this patient's heparin.   Should I tell him to expect a call from the [facility] pharmacist, Adiel?    Daja PACHECON, RN

## 2020-04-16 NOTE — TELEPHONE ENCOUNTER
I spoke with pharmacist Adiel at [facility]. He reports he got the order for heparin and it is not covered under the patient's insurance part D but asking if it could be bundled with cost under part B with his dialysis provider. Cost of heparin would be about $100 and patient willing to pay if needs to but would like to see if covered some other way.  Informed him that I do not know the dialysis provider. He, Adiel will contact the patient for further information. Oma Taylor RN

## 2020-04-16 NOTE — TELEPHONE ENCOUNTER
Pharmacy calling received a request for patients heparin due to upcoming procedure. Insurance is not wanting to cover this. Insurance said to request this from patient dialysis provider.

## 2020-04-16 NOTE — TELEPHONE ENCOUNTER
I discussed warfarin hold and heparin bridging plan with patient on phone. He has given self insulin injections before so is familiar with self injections. He can call if any questions. He has not picked up rx at pharmacy yet. He will call after procedure to schedule INR apt.   Called [facility] and informed Yoana of hold and bridging orders.  Oma Taylor RN

## 2020-04-21 ENCOUNTER — ANTICOAGULATION THERAPY VISIT (OUTPATIENT)
Dept: NURSING | Facility: CLINIC | Age: 70
End: 2020-04-21

## 2020-04-21 DIAGNOSIS — I48.91 ATRIAL FIBRILLATION STATUS POST CARDIOVERSION (H): ICD-10-CM

## 2020-04-21 DIAGNOSIS — I82.409 DEEP VEIN THROMBOSIS (DVT) (H): ICD-10-CM

## 2020-04-21 DIAGNOSIS — Z79.01 LONG TERM (CURRENT) USE OF ANTICOAGULANTS: ICD-10-CM

## 2020-04-21 DIAGNOSIS — I82.402 ACUTE DEEP VEIN THROMBOSIS (DVT) OF LEFT LOWER EXTREMITY, UNSPECIFIED VEIN (H): ICD-10-CM

## 2020-04-21 LAB
CAPILLARY BLOOD COLLECTION: NORMAL
INR PPP: 1.3 (ref 0.86–1.14)

## 2020-04-21 PROCEDURE — 85610 PROTHROMBIN TIME: CPT | Performed by: NURSE PRACTITIONER

## 2020-04-21 PROCEDURE — 99207 ZZC NO CHARGE NURSE ONLY: CPT

## 2020-04-21 PROCEDURE — 36416 COLLJ CAPILLARY BLOOD SPEC: CPT | Performed by: NURSE PRACTITIONER

## 2020-04-21 NOTE — PROGRESS NOTES
ANTICOAGULATION FOLLOW-UP CLINIC VISIT    Patient Name:  Leif Ariza  Date:  4/21/2020  Contact Type:  Telephone    SUBJECTIVE:  Patient Findings     Positives:   Missed doses    Comments:   INR 1.3 warfarin on hold and bridging with heparin. Spoke with patient. No changes made to plan. He will call after procedure on 4/23 to set up next INR .         Clinical Outcomes     Comments:   INR 1.3 warfarin on hold and bridging with heparin. Spoke with patient. No changes made to plan. He will call after procedure on 4/23 to set up next INR .            OBJECTIVE    INR   Date Value Ref Range Status   04/21/2020 1.30 (H) 0.86 - 1.14 Final     Comment:     This test is intended for monitoring Coumadin therapy.  Results are not   accurate in patients with prolonged INR due to factor deficiency.       Chromogenic Factor 10   Date Value Ref Range Status   04/07/2020 19 (L) 70 - 130 % Final     Comment:     Therapeutic Range:  A Chromogenic Factor 10 level of approximately 20-40%   inversely correlates with an INR of 2-3 for patients receiving Warfarin.   Chromogenic Factor 10 levels below 20% indicate an INR greater than 3 and   levels above 40% indicate an INR less than 2.         ASSESSMENT / PLAN  INR assessment SUB    Recheck INR In: 6 DAYS    INR Location Clinic      Anticoagulation Summary  As of 4/21/2020    INR goal:   2.0-3.0   TTR:   52.5 % (7.1 mo)   INR used for dosing:      Warfarin maintenance plan:   2 mg (2 mg x 1) every Mon, Wed, Fri; 4 mg (2 mg x 2) all other days   Full warfarin instructions:   4/21: Hold; 4/22: Hold; Otherwise 2 mg every Mon, Wed, Fri; 4 mg all other days   Weekly warfarin total:   22 mg   Plan last modified:   Oma Taylor RN (2/6/2020)   Next INR check:   4/29/2020   Priority:   High   Target end date:       Indications    Atrial fibrillation status post cardioversion (H) [I48.91]  Deep vein thrombosis (DVT) (H) [I82.409]  Long term (current) use of anticoagulants [Z79.01]              Anticoagulation Episode Summary     INR check location:       Preferred lab:       Send INR reminders to:   PEARL CORNEJO    Comments:   Has Dialysis Monday Wednesday and Friday.      Anticoagulation Care Providers     Provider Role Specialty Phone number    Nguyen Torres, NP Referring Nurse Practitioner - Family [phone]            See the Encounter Report to view Anticoagulation Flowsheet and Dosing Calendar (Go to Encounters tab in chart review, and find the Anticoagulation Therapy Visit)        Oma Taylor RN

## 2020-04-25 ENCOUNTER — MYC MEDICAL ADVICE (OUTPATIENT)
Dept: NEUROLOGY | Facility: CLINIC | Age: 70
End: 2020-04-25

## 2020-04-25 DIAGNOSIS — R20.9 SENSORY DISORDER: ICD-10-CM

## 2020-04-25 DIAGNOSIS — G25.81 RESTLESS LEGS SYNDROME: Primary | ICD-10-CM

## 2020-04-27 RX ORDER — GABAPENTIN 100 MG/1
100 CAPSULE ORAL 2 TIMES DAILY
Qty: 60 CAPSULE | Refills: 1 | Status: SHIPPED | OUTPATIENT
Start: 2020-04-27 | End: 2020-08-26

## 2020-04-28 ENCOUNTER — DOCUMENTATION ONLY (OUTPATIENT)
Dept: LAB | Facility: CLINIC | Age: 70
End: 2020-04-28

## 2020-04-28 ENCOUNTER — ANTICOAGULATION THERAPY VISIT (OUTPATIENT)
Dept: FAMILY MEDICINE | Facility: CLINIC | Age: 70
End: 2020-04-28

## 2020-04-28 DIAGNOSIS — I48.91 ATRIAL FIBRILLATION STATUS POST CARDIOVERSION (H): ICD-10-CM

## 2020-04-28 DIAGNOSIS — Z79.01 LONG TERM (CURRENT) USE OF ANTICOAGULANTS: ICD-10-CM

## 2020-04-28 DIAGNOSIS — G25.81 RESTLESS LEGS SYNDROME: ICD-10-CM

## 2020-04-28 DIAGNOSIS — I82.402 ACUTE DEEP VEIN THROMBOSIS (DVT) OF LEFT LOWER EXTREMITY, UNSPECIFIED VEIN (H): ICD-10-CM

## 2020-04-28 DIAGNOSIS — I82.409 DEEP VEIN THROMBOSIS (DVT) (H): ICD-10-CM

## 2020-04-28 LAB — INR PPP: 1.3 (ref 0.86–1.14)

## 2020-04-28 PROCEDURE — 80171 DRUG SCREEN QUANT GABAPENTIN: CPT | Mod: 90 | Performed by: PSYCHIATRY & NEUROLOGY

## 2020-04-28 PROCEDURE — 99000 SPECIMEN HANDLING OFFICE-LAB: CPT | Performed by: PSYCHIATRY & NEUROLOGY

## 2020-04-28 PROCEDURE — 85610 PROTHROMBIN TIME: CPT | Performed by: NURSE PRACTITIONER

## 2020-04-28 PROCEDURE — 99207 ZZC NO CHARGE NURSE ONLY: CPT | Performed by: NURSE PRACTITIONER

## 2020-04-28 PROCEDURE — 36415 COLL VENOUS BLD VENIPUNCTURE: CPT | Performed by: PSYCHIATRY & NEUROLOGY

## 2020-04-28 NOTE — PROGRESS NOTES
ANTICOAGULATION FOLLOW-UP CLINIC VISIT    Patient Name:  Leif Ariza  Date:  2020  Contact Type:  Telephone    SUBJECTIVE:  Patient Findings     Comments:   Has had 4 doses of warfarin since procedure. Had extra day of dialysis today. Usually has dialysis on M,W,F only. INR done after dialysis. Will  bump warfarin dose today and recheck in 2 days. States has some bleeding at dialysis yesterday at site of port on arm but was probably due to putting pressure on his arm too soon. Pressure was applied and bleeding stopped before leaving dialysis.         Clinical Outcomes     Comments:   Has had 4 doses of warfarin since procedure. Had extra day of dialysis today. Usually has dialysis on M,W,F only. INR done after dialysis. Will  bump warfarin dose today and recheck in 2 days. States has some bleeding at dialysis yesterday at site of port on arm but was probably due to putting pressure on his arm too soon. Pressure was applied and bleeding stopped before leaving dialysis.            OBJECTIVE    INR   Date Value Ref Range Status   2020 1.30 (H) 0.86 - 1.14 Final     Comment:     This test is intended for monitoring Coumadin therapy.  Results are not   accurate in patients with prolonged INR due to factor deficiency.       Chromogenic Factor 10   Date Value Ref Range Status   2020 19 (L) 70 - 130 % Final     Comment:     Therapeutic Range:  A Chromogenic Factor 10 level of approximately 20-40%   inversely correlates with an INR of 2-3 for patients receiving Warfarin.   Chromogenic Factor 10 levels below 20% indicate an INR greater than 3 and   levels above 40% indicate an INR less than 2.         ASSESSMENT / PLAN  INR assessment SUB    Recheck INR In: 2 DAYS    INR Location Clinic      Anticoagulation Summary  As of 2020    INR goal:   2.0-3.0   TTR:   50.8 % (7.3 mo)   INR used for dosin.30! (2020)   Warfarin maintenance plan:   2 mg (2 mg x 1) every Mon, Wed, Fri; 4 mg (2 mg x 2)  all other days   Full warfarin instructions:   4/28: 8 mg; Otherwise 2 mg every Mon, Wed, Fri; 4 mg all other days   Weekly warfarin total:   22 mg   Plan last modified:   Oma Taylor RN (2/6/2020)   Next INR check:   4/30/2020   Priority:   High   Target end date:       Indications    Atrial fibrillation status post cardioversion (H) [I48.91]  Deep vein thrombosis (DVT) (H) [I82.409]  Long term (current) use of anticoagulants [Z79.01]             Anticoagulation Episode Summary     INR check location:       Preferred lab:       Send INR reminders to:   PEARL CORNEJO    Comments:   Has Dialysis Monday Wednesday and Friday.      Anticoagulation Care Providers     Provider Role Specialty Phone number    Nguyen Torres, NP Referring Nurse Practitioner - Family [phone]            See the Encounter Report to view Anticoagulation Flowsheet and Dosing Calendar (Go to Encounters tab in chart review, and find the Anticoagulation Therapy Visit)        Oma Taylor RN

## 2020-04-28 NOTE — PROGRESS NOTES
Patient came in for his INR today 4- and wanted me to let the nurses know that he had his dialysis today, he says that changes his results. And that He stopped taking his heparin, his last dose was Sunday.     Thank you,   Camelia Arboleda MLT ([facility])

## 2020-04-30 ENCOUNTER — ANTICOAGULATION THERAPY VISIT (OUTPATIENT)
Dept: NURSING | Facility: CLINIC | Age: 70
End: 2020-04-30

## 2020-04-30 DIAGNOSIS — I82.402 ACUTE DEEP VEIN THROMBOSIS (DVT) OF LEFT LOWER EXTREMITY, UNSPECIFIED VEIN (H): ICD-10-CM

## 2020-04-30 DIAGNOSIS — Z79.01 LONG TERM (CURRENT) USE OF ANTICOAGULANTS: ICD-10-CM

## 2020-04-30 DIAGNOSIS — Z01.818 PREOP GENERAL PHYSICAL EXAM: ICD-10-CM

## 2020-04-30 DIAGNOSIS — I48.91 ATRIAL FIBRILLATION STATUS POST CARDIOVERSION (H): ICD-10-CM

## 2020-04-30 DIAGNOSIS — I82.409 DEEP VEIN THROMBOSIS (DVT) (H): ICD-10-CM

## 2020-04-30 LAB
GABAPENTIN SERPLBLD-MCNC: 4.1 UG/ML (ref 4–16)
INR PPP: 1.8 (ref 0.86–1.14)

## 2020-04-30 PROCEDURE — 36416 COLLJ CAPILLARY BLOOD SPEC: CPT | Performed by: NURSE PRACTITIONER

## 2020-04-30 PROCEDURE — 99207 ZZC NO CHARGE NURSE ONLY: CPT

## 2020-04-30 PROCEDURE — 85610 PROTHROMBIN TIME: CPT | Performed by: NURSE PRACTITIONER

## 2020-04-30 NOTE — PROGRESS NOTES
ANTICOAGULATION FOLLOW-UP CLINIC VISIT    Patient Name:  Leif Ariza  Date:  2020  Contact Type:  Telephone    SUBJECTIVE:         OBJECTIVE    INR   Date Value Ref Range Status   2020 1.80 (H) 0.86 - 1.14 Final     Comment:     This test is intended for monitoring Coumadin therapy.  Results are not   accurate in patients with prolonged INR due to factor deficiency.       Chromogenic Factor 10   Date Value Ref Range Status   2020 19 (L) 70 - 130 % Final     Comment:     Therapeutic Range:  A Chromogenic Factor 10 level of approximately 20-40%   inversely correlates with an INR of 2-3 for patients receiving Warfarin.   Chromogenic Factor 10 levels below 20% indicate an INR greater than 3 and   levels above 40% indicate an INR less than 2.         ASSESSMENT / PLAN  INR assessment SUB    Recheck INR In: 5 DAYS    INR Location Clinic      Anticoagulation Summary  As of 2020    INR goal:   2.0-3.0   TTR:   50.4 % (7.4 mo)   INR used for dosin.80! (2020)   Warfarin maintenance plan:   2 mg (2 mg x 1) every Mon, Wed, Fri; 4 mg (2 mg x 2) all other days   Full warfarin instructions:   : 6 mg; Otherwise 2 mg every Mon, Wed, Fri; 4 mg all other days   Weekly warfarin total:   22 mg   Plan last modified:   Oma Taylor RN (2020)   Next INR check:   2020   Priority:   High   Target end date:       Indications    Atrial fibrillation status post cardioversion (H) [I48.91]  Deep vein thrombosis (DVT) (H) [I82.409]  Long term (current) use of anticoagulants [Z79.01]             Anticoagulation Episode Summary     INR check location:       Preferred lab:       Send INR reminders to:   PEARL CORNEJO    Comments:   Has Dialysis  and Friday.      Anticoagulation Care Providers     Provider Role Specialty Phone number    Nguyen Torres, NP Referring Nurse Practitioner - Family [phone]            See the Encounter Report to view Anticoagulation Flowsheet and  Dosing Calendar (Go to Encounters tab in chart review, and find the Anticoagulation Therapy Visit)        Oma Taylor RN

## 2020-05-01 DIAGNOSIS — G25.81 RESTLESS LEGS SYNDROME: Primary | ICD-10-CM

## 2020-05-05 ENCOUNTER — TELEPHONE (OUTPATIENT)
Dept: NEUROLOGY | Facility: CLINIC | Age: 70
End: 2020-05-05

## 2020-05-05 ENCOUNTER — ANTICOAGULATION THERAPY VISIT (OUTPATIENT)
Dept: NURSING | Facility: CLINIC | Age: 70
End: 2020-05-05

## 2020-05-05 DIAGNOSIS — I82.409 DEEP VEIN THROMBOSIS (DVT) (H): ICD-10-CM

## 2020-05-05 DIAGNOSIS — Z79.01 LONG TERM (CURRENT) USE OF ANTICOAGULANTS: ICD-10-CM

## 2020-05-05 DIAGNOSIS — G25.81 RESTLESS LEGS SYNDROME: ICD-10-CM

## 2020-05-05 DIAGNOSIS — I82.402 ACUTE DEEP VEIN THROMBOSIS (DVT) OF LEFT LOWER EXTREMITY, UNSPECIFIED VEIN (H): ICD-10-CM

## 2020-05-05 DIAGNOSIS — I48.91 ATRIAL FIBRILLATION STATUS POST CARDIOVERSION (H): ICD-10-CM

## 2020-05-05 LAB — INR PPP: 2.4 (ref 0.86–1.14)

## 2020-05-05 PROCEDURE — 99000 SPECIMEN HANDLING OFFICE-LAB: CPT | Performed by: PSYCHIATRY & NEUROLOGY

## 2020-05-05 PROCEDURE — 36415 COLL VENOUS BLD VENIPUNCTURE: CPT | Performed by: NURSE PRACTITIONER

## 2020-05-05 PROCEDURE — 99207 ZZC NO CHARGE NURSE ONLY: CPT

## 2020-05-05 PROCEDURE — 85610 PROTHROMBIN TIME: CPT | Performed by: NURSE PRACTITIONER

## 2020-05-05 PROCEDURE — 80171 DRUG SCREEN QUANT GABAPENTIN: CPT | Mod: 90 | Performed by: PSYCHIATRY & NEUROLOGY

## 2020-05-05 NOTE — TELEPHONE ENCOUNTER
Reviewed gabapentin level with pt, 4.1, low end of therapeutic.  He is on 100 bid.  He is doing well.  Sx largely resolved.  Continue with current treatment.    
No
Tone is normal, moving all extremities well, reflexes normal for age.

## 2020-05-05 NOTE — PROGRESS NOTES
ANTICOAGULATION FOLLOW-UP CLINIC VISIT    Patient Name:  Leif Arzia  Date:  2020  Contact Type:  Telephone    SUBJECTIVE:  Patient Findings     Comments:   Therapeutic. Will be starting peritoneal dialysis next week. Will resume previous weekly dose. Verbalized understanding.         Clinical Outcomes     Negatives:   Major bleeding event, Thromboembolic event, Anticoagulation-related hospital admission, Anticoagulation-related ED visit, Anticoagulation-related fatality    Comments:   Therapeutic. Will be starting peritoneal dialysis next week. Will resume previous weekly dose. Verbalized understanding.            OBJECTIVE    INR   Date Value Ref Range Status   2020 2.40 (H) 0.86 - 1.14 Final     Comment:     This test is intended for monitoring Coumadin therapy.  Results are not   accurate in patients with prolonged INR due to factor deficiency.       Chromogenic Factor 10   Date Value Ref Range Status   2020 19 (L) 70 - 130 % Final     Comment:     Therapeutic Range:  A Chromogenic Factor 10 level of approximately 20-40%   inversely correlates with an INR of 2-3 for patients receiving Warfarin.   Chromogenic Factor 10 levels below 20% indicate an INR greater than 3 and   levels above 40% indicate an INR less than 2.         ASSESSMENT / PLAN  INR assessment THER    Recheck INR In: 9 DAYS    INR Location Clinic      Anticoagulation Summary  As of 2020    INR goal:   2.0-3.0   TTR:   50.7 % (7.5 mo)   INR used for dosin.40 (2020)   Warfarin maintenance plan:   2 mg (2 mg x 1) every Mon, Wed, Fri; 4 mg (2 mg x 2) all other days   Full warfarin instructions:   2 mg every Mon, Wed, Fri; 4 mg all other days   Weekly warfarin total:   22 mg   No change documented:   Oma Taylor RN   Plan last modified:   Oma Taylor RN (2020)   Next INR check:   2020   Priority:   High   Target end date:       Indications    Atrial fibrillation status post cardioversion (H)  [I48.91]  Deep vein thrombosis (DVT) (H) [I82.409]  Long term (current) use of anticoagulants [Z79.01]             Anticoagulation Episode Summary     INR check location:       Preferred lab:       Send INR reminders to:   PEARL CORNEJO    Comments:   Has Dialysis Monday Wednesday and Friday.      Anticoagulation Care Providers     Provider Role Specialty Phone number    Nguyen Torres, NP Referring Nurse Practitioner - Family [phone]            See the Encounter Report to view Anticoagulation Flowsheet and Dosing Calendar (Go to Encounters tab in chart review, and find the Anticoagulation Therapy Visit)        Oma Taylor RN

## 2020-05-06 ENCOUNTER — TELEPHONE (OUTPATIENT)
Dept: NEUROLOGY | Facility: CLINIC | Age: 70
End: 2020-05-06

## 2020-05-06 LAB — GABAPENTIN SERPLBLD-MCNC: 5 UG/ML (ref 4–16)

## 2020-05-13 ENCOUNTER — OFFICE VISIT (OUTPATIENT)
Dept: ORTHOPEDICS | Facility: CLINIC | Age: 70
End: 2020-05-13
Payer: MEDICARE

## 2020-05-13 VITALS
DIASTOLIC BLOOD PRESSURE: 64 MMHG | SYSTOLIC BLOOD PRESSURE: 120 MMHG | WEIGHT: 300.05 LBS | HEIGHT: 71 IN | BODY MASS INDEX: 42.01 KG/M2

## 2020-05-13 DIAGNOSIS — M19.072 ARTHRITIS OF LEFT ANKLE: Primary | ICD-10-CM

## 2020-05-13 PROCEDURE — 99213 OFFICE O/P EST LOW 20 MIN: CPT | Mod: 25 | Performed by: FAMILY MEDICINE

## 2020-05-13 PROCEDURE — 20606 DRAIN/INJ JOINT/BURSA W/US: CPT | Mod: LT | Performed by: FAMILY MEDICINE

## 2020-05-13 RX ORDER — ROPIVACAINE HYDROCHLORIDE 5 MG/ML
2 INJECTION, SOLUTION EPIDURAL; INFILTRATION; PERINEURAL
Status: DISCONTINUED | OUTPATIENT
Start: 2020-05-13 | End: 2020-11-13

## 2020-05-13 RX ORDER — BETAMETHASONE SODIUM PHOSPHATE AND BETAMETHASONE ACETATE 3; 3 MG/ML; MG/ML
6 INJECTION, SUSPENSION INTRA-ARTICULAR; INTRALESIONAL; INTRAMUSCULAR; SOFT TISSUE
Status: DISCONTINUED | OUTPATIENT
Start: 2020-05-13 | End: 2020-11-13

## 2020-05-13 RX ADMIN — ROPIVACAINE HYDROCHLORIDE 2 ML: 5 INJECTION, SOLUTION EPIDURAL; INFILTRATION; PERINEURAL at 16:10

## 2020-05-13 RX ADMIN — BETAMETHASONE SODIUM PHOSPHATE AND BETAMETHASONE ACETATE 6 MG: 3; 3 INJECTION, SUSPENSION INTRA-ARTICULAR; INTRALESIONAL; INTRAMUSCULAR; SOFT TISSUE at 16:10

## 2020-05-13 ASSESSMENT — MIFFLIN-ST. JEOR: SCORE: 2158.13

## 2020-05-13 NOTE — LETTER
5/13/2020         RE: Leif Ariza  [id number] [facility] [address]        Dear Colleague,    Thank you for referring your patient, Leif Ariza, to the [facility] [place]. Please see a copy of my visit note below.    ASSESSMENT & PLAN  There are no diagnoses linked to this encounter.  Patient is a 69 year old male presenting for evaluation of   Chief Complaint   Patient presents with     Left Ankle - Pain      #Left ankle osteoarthrosis: Longstanding condition with patient receiving steroid injection on 12/19/2018 providing 2 to 2-1/2 months of relief.  Patient having diffuse tenderness to palpation along the ankle joint with chronic stasis changes in the lower calf and no evidence of acute cellulitis.  Patient has chronic neuropathy in the foot with decreased sensation on exam.  Previous x-ray showing significant arthritis in the ankle joint likely cause of his pain.  Given this a repeat steroid injection was completed today with significant provement afterwards.  Patient can follow-up as needed for repeat steroid injections every 3 months given the lack of other viable alternatives.  Patient understands and agrees with plan.  Treatment: activities as tolerated with four wheel walker  Physical Therapy home activities, if not improved can refer for formal PT  Injection Completed today with improved pain afterwards  Medications  Limited NSAIDs/Tylenol    Concerning signs/sx that would warrant urgent evaluation were discussed.  All questions were answered, patient understands and agrees with plan.      No follow-ups on file.      -----    SUBJECTIVE  Leif Ariza is a/an 69 year old male who is seen as a self referral for evaluation of left ankle pain. The patient is seen by themselves.    Onset: Chronic. Reports insidious onset without acute precipitating event.  Location of Pain: left medial and lateral ankle  Rating of Pain at worst: 9/10  Rating of Pain Currently:  7/10  Worsened by: prolonged sitting, sit to stand transition   Better with: movement   Treatments tried: rest/activity avoidance and elevation steroid injection done by Dr. Nelson 12/19/19 (relief for 2-2.5 months)  Associated symptoms: redness, numbness and tingling   Orthopedic history: YES   Relevant surgical history: NO  Past Medical History:   Diagnosis Date     Arthritis      Atrial fibrillation (H)      CKD (chronic kidney disease) stage 5, GFR less than 15 ml/min (H) 2017     DVT (deep venous thrombosis) (H) 2000     Gout      History of blood transfusion      HLD (hyperlipidemia)      HTN (hypertension)      Kidney stone 2016     Type 2 diabetes mellitus (H)      Social History     Socioeconomic History     Marital status:      Spouse name: None     Number of children: None     Years of education: None     Highest education level: None   Occupational History     None   Social Needs     Financial resource strain: None     Food insecurity     Worry: None     Inability: None     Transportation needs     Medical: None     Non-medical: None   Tobacco Use     Smoking status: Never Smoker     Smokeless tobacco: Never Used   Substance and Sexual Activity     Alcohol use: Not Currently     Frequency: Never     Comment: Stopped when I was put on blood thinners     Drug use: Never     Sexual activity: Yes     Partners: Female     Birth control/protection: Post-menopausal   Lifestyle     Physical activity     Days per week: None     Minutes per session: None     Stress: None   Relationships     Social connections     Talks on phone: None     Gets together: None     Attends Church service: None     Active member of club or organization: None     Attends meetings of clubs or organizations: None     Relationship status: None     Intimate partner violence     Fear of current or ex partner: None     Emotionally abused: None     Physically abused: None     Forced sexual activity: None   Other Topics Concern      "Parent/sibling w/ CABG, MI or angioplasty before 65F 55M? No   Social History Narrative     None         Patient's past medical, surgical, social, and family histories were reviewed today and no changes are noted.  No family history pertinent to the patient's problem today     REVIEW OF SYSTEMS:  10 point ROS is negative other than symptoms noted above in HPI, Past Medical History or as stated below  Constitutional: NEGATIVE for fever, chills, change in weight  Skin: NEGATIVE for worrisome rashes, moles or lesions  GI/: NEGATIVE for bowel or bladder changes  Neuro: NEGATIVE for weakness, dizziness or paresthesias    OBJECTIVE:  Ht 1.803 m (5' 11\")   BMI 41.56 kg/m     General: healthy, alert and in no distress  HEENT: no scleral icterus or conjunctival erythema  Skin: no suspicious lesions or rash. No jaundice.  CV:  no pedal edema  Resp: normal respiratory effort without conversational dyspnea   Psych: normal mood and affect  Gait: normal steady gait with appropriate coordination and balance  Neuro: Normal light sensory exam of lower extremity  MSK:  LEFT ANKLE  Inspection:  Chronic stasis changes in lower calf with small scabs, no acute appearing erythema  Palpation:    Tender about the lateral malleolus, medial malleolus and ant joint. Remainder of bony and ligamentous landmarks are nontender.  Range of Motion:     Plantarflexion limited slightly by pain limited by tightness / dorsiflexion limited slightly by pain limited by tightness / inversion limited slightly by pain limited by tightness / eversion limited slightly by pain limited by tightness  Strength:    full  Special Tests:    positive anterior drawer, positive talar tilt, postitive valgus stress, positive forced external rotation/eversion, not done squeeze test. {     LEFT FOOT  Inspection:    no swelling or ecchymosis, sig 2nd claw toe  Palpation:  No sig TTP  Range of Motion:     Grossly intact and non-painful  Strength:    Grossly intact in all " planes       Independent visualization of the below image:  No results found for this or any previous visit (from the past 24 hour(s)).  XR KNEE LT 3 VW  8/1/2019 8:52 AM      HISTORY: Chronic pain of left knee; Chronic pain of left knee     COMPARISON: None.     FINDINGS: There are prominent tricompartmental osteophytes. There is  moderate to severe joint space loss in the medial compartment, and  mild joint space loss in the lateral and patellofemoral compartments.  There is chondrocalcinosis. No acute fracture or dislocation. Vascular  calcifications are noted.                                                                      IMPRESSION:  1. Tricompartmental osteoarthritis, most pronounced in the medial  compartment.  2. Chondrocalcinosis, which can be seen in the setting of CPPD.     ALANA WORLEY MD    Medium Joint Injection/Arthrocentesis: L ankle    Date/Time: 5/13/2020 4:10 PM  Performed by: Kelvin Nelson MD  Authorized by: Kelvin Nelson MD     Indications:  Pain  Needle Size:  25 G  Guidance: ultrasound    Approach:  Anterior  Location:  Ankle  Site:  L ankle  Medications:  6 mg betamethasone acet & sod phos 6 (3-3) MG/ML; 2 mL ropivacaine 5 MG/ML  Outcome:  Tolerated well, no immediate complications  Procedure discussed: discussed risks, benefits, and alternatives    Consent Given by:  Patient  Timeout: timeout called immediately prior to procedure    Prep: patient was prepped and draped in usual sterile fashion     Patient reported significant improvement of pain after repeat left ankle steroid injection.  Aftercare instructions given to patient.  Plan to follow-up as discussed above.     MD [facility]             Kelvin Nelson MD [facility]      Again, thank you for allowing me to participate in the care of your patient.        Sincerely,        Kelvin Nelson MD

## 2020-05-13 NOTE — PATIENT INSTRUCTIONS
Diagnosis: Left Ankle Arthritis  Image Findings: Moderate Arthritis  Treatment: Repeat ankle steroid injection  Medications: Tylenol  Follow-up: As needed    It was great seeing you again today!    Kelvin Nelson    [facility] Injection Discharge Instructions    Procedure: Left ankle steroid injection      You may shower, however avoid swimming, tub baths or hot tubs for 24 hours following your procedure    You may have a mild to moderate increase in pain for several days following the injection.    It may take up to 14 days for the steroid medication to start working although you may feel the effect as early as a few days after the procedure.    You may use ice packs for 10-15 minutes, 3 to 4 times a day at the injection site for comfort    You may use anti-inflammatory medications (such as Ibuprofen or Aleve or Advil) or Tylenol for pain control if necessary    If you were fasting, you may resume your normal diet and medications after the procedure    If you have diabetes, check your blood sugar more frequently than usual as your blood sugar may be higher than normal for 10-14 days following a steroid injection. Contact your doctor who manages your diabetes if your blood sugar is higher than usual      If you experience any of the following, call [facility] @ [phone] or [phone]  -Fever over 100 degree F  -Swelling, bleeding, redness, drainage, warmth at the injection site  - New or worsening pain

## 2020-05-13 NOTE — PROGRESS NOTES
ASSESSMENT & PLAN  There are no diagnoses linked to this encounter.  Patient is a 69 year old male presenting for evaluation of   Chief Complaint   Patient presents with     Left Ankle - Pain      #Left ankle osteoarthrosis: Longstanding condition with patient receiving steroid injection on 12/19/2018 providing 2 to 2-1/2 months of relief.  Patient having diffuse tenderness to palpation along the ankle joint with chronic stasis changes in the lower calf and no evidence of acute cellulitis.  Patient has chronic neuropathy in the foot with decreased sensation on exam.  Previous x-ray showing significant arthritis in the ankle joint likely cause of his pain.  Given this a repeat steroid injection was completed today with significant provement afterwards.  Patient can follow-up as needed for repeat steroid injections every 3 months given the lack of other viable alternatives.  Patient understands and agrees with plan.  Treatment: activities as tolerated with four wheel walker  Physical Therapy home activities, if not improved can refer for formal PT  Injection Completed today with improved pain afterwards  Medications  Limited NSAIDs/Tylenol    Concerning signs/sx that would warrant urgent evaluation were discussed.  All questions were answered, patient understands and agrees with plan.      No follow-ups on file.      -----    SUBJECTIVE  Leif Ariza is a/an 69 year old male who is seen as a self referral for evaluation of left ankle pain. The patient is seen by themselves.    Onset: Chronic. Reports insidious onset without acute precipitating event.  Location of Pain: left medial and lateral ankle  Rating of Pain at worst: 9/10  Rating of Pain Currently: 7/10  Worsened by: prolonged sitting, sit to stand transition   Better with: movement   Treatments tried: rest/activity avoidance and elevation steroid injection done by Dr. Nelson 12/19/19 (relief for 2-2.5 months)  Associated symptoms: redness, numbness and  tingling   Orthopedic history: YES   Relevant surgical history: NO  Past Medical History:   Diagnosis Date     Arthritis      Atrial fibrillation (H)      CKD (chronic kidney disease) stage 5, GFR less than 15 ml/min (H) 2017     DVT (deep venous thrombosis) (H) 2000     Gout      History of blood transfusion      HLD (hyperlipidemia)      HTN (hypertension)      Kidney stone 2016     Type 2 diabetes mellitus (H)      Social History     Socioeconomic History     Marital status:      Spouse name: None     Number of children: None     Years of education: None     Highest education level: None   Occupational History     None   Social Needs     Financial resource strain: None     Food insecurity     Worry: None     Inability: None     Transportation needs     Medical: None     Non-medical: None   Tobacco Use     Smoking status: Never Smoker     Smokeless tobacco: Never Used   Substance and Sexual Activity     Alcohol use: Not Currently     Frequency: Never     Comment: Stopped when I was put on blood thinners     Drug use: Never     Sexual activity: Yes     Partners: Female     Birth control/protection: Post-menopausal   Lifestyle     Physical activity     Days per week: None     Minutes per session: None     Stress: None   Relationships     Social connections     Talks on phone: None     Gets together: None     Attends Buddhist service: None     Active member of club or organization: None     Attends meetings of clubs or organizations: None     Relationship status: None     Intimate partner violence     Fear of current or ex partner: None     Emotionally abused: None     Physically abused: None     Forced sexual activity: None   Other Topics Concern     Parent/sibling w/ CABG, MI or angioplasty before 65F 55M? No   Social History Narrative     None         Patient's past medical, surgical, social, and family histories were reviewed today and no changes are noted.  No family history pertinent to the patient's  "problem today     REVIEW OF SYSTEMS:  10 point ROS is negative other than symptoms noted above in HPI, Past Medical History or as stated below  Constitutional: NEGATIVE for fever, chills, change in weight  Skin: NEGATIVE for worrisome rashes, moles or lesions  GI/: NEGATIVE for bowel or bladder changes  Neuro: NEGATIVE for weakness, dizziness or paresthesias    OBJECTIVE:  Ht 1.803 m (5' 11\")   BMI 41.56 kg/m     General: healthy, alert and in no distress  HEENT: no scleral icterus or conjunctival erythema  Skin: no suspicious lesions or rash. No jaundice.  CV:  no pedal edema  Resp: normal respiratory effort without conversational dyspnea   Psych: normal mood and affect  Gait: normal steady gait with appropriate coordination and balance  Neuro: Normal light sensory exam of lower extremity  MSK:  LEFT ANKLE  Inspection:  Chronic stasis changes in lower calf with small scabs, no acute appearing erythema  Palpation:    Tender about the lateral malleolus, medial malleolus and ant joint. Remainder of bony and ligamentous landmarks are nontender.  Range of Motion:     Plantarflexion limited slightly by pain limited by tightness / dorsiflexion limited slightly by pain limited by tightness / inversion limited slightly by pain limited by tightness / eversion limited slightly by pain limited by tightness  Strength:    full  Special Tests:    positive anterior drawer, positive talar tilt, postitive valgus stress, positive forced external rotation/eversion, not done squeeze test. {     LEFT FOOT  Inspection:    no swelling or ecchymosis, sig 2nd claw toe  Palpation:  No sig TTP  Range of Motion:     Grossly intact and non-painful  Strength:    Grossly intact in all planes       Independent visualization of the below image:  No results found for this or any previous visit (from the past 24 hour(s)).  XR KNEE LT 3 VW  8/1/2019 8:52 AM      HISTORY: Chronic pain of left knee; Chronic pain of left knee     COMPARISON: " None.     FINDINGS: There are prominent tricompartmental osteophytes. There is  moderate to severe joint space loss in the medial compartment, and  mild joint space loss in the lateral and patellofemoral compartments.  There is chondrocalcinosis. No acute fracture or dislocation. Vascular  calcifications are noted.                                                                      IMPRESSION:  1. Tricompartmental osteoarthritis, most pronounced in the medial  compartment.  2. Chondrocalcinosis, which can be seen in the setting of CPPD.     ALANA WORLEY MD    Medium Joint Injection/Arthrocentesis: L ankle    Date/Time: 5/13/2020 4:10 PM  Performed by: Kelvin Nelson MD  Authorized by: Kelvin Nelson MD     Indications:  Pain  Needle Size:  25 G  Guidance: ultrasound    Approach:  Anterior  Location:  Ankle  Site:  L ankle  Medications:  6 mg betamethasone acet & sod phos 6 (3-3) MG/ML; 2 mL ropivacaine 5 MG/ML  Outcome:  Tolerated well, no immediate complications  Procedure discussed: discussed risks, benefits, and alternatives    Consent Given by:  Patient  Timeout: timeout called immediately prior to procedure    Prep: patient was prepped and draped in usual sterile fashion     Patient reported significant improvement of pain after repeat left ankle steroid injection.  Aftercare instructions given to patient.  Plan to follow-up as discussed above.     MD MOISES [facility]             MD MOISES [facility]

## 2020-05-18 ENCOUNTER — ANTICOAGULATION THERAPY VISIT (OUTPATIENT)
Dept: NURSING | Facility: CLINIC | Age: 70
End: 2020-05-18

## 2020-05-18 DIAGNOSIS — I82.402 ACUTE DEEP VEIN THROMBOSIS (DVT) OF LEFT LOWER EXTREMITY, UNSPECIFIED VEIN (H): ICD-10-CM

## 2020-05-18 DIAGNOSIS — Z79.01 LONG TERM (CURRENT) USE OF ANTICOAGULANTS: ICD-10-CM

## 2020-05-18 DIAGNOSIS — I82.409 DEEP VEIN THROMBOSIS (DVT) (H): ICD-10-CM

## 2020-05-18 DIAGNOSIS — I48.91 ATRIAL FIBRILLATION STATUS POST CARDIOVERSION (H): ICD-10-CM

## 2020-05-18 LAB
CAPILLARY BLOOD COLLECTION: NORMAL
INR PPP: 1.8 (ref 0.86–1.14)

## 2020-05-18 PROCEDURE — 85610 PROTHROMBIN TIME: CPT | Performed by: NURSE PRACTITIONER

## 2020-05-18 PROCEDURE — 36416 COLLJ CAPILLARY BLOOD SPEC: CPT | Performed by: NURSE PRACTITIONER

## 2020-05-18 PROCEDURE — 99207 ZZC NO CHARGE NURSE ONLY: CPT

## 2020-05-18 NOTE — PROGRESS NOTES
ANTICOAGULATION FOLLOW-UP CLINIC VISIT    Patient Name:  Leif Ariza  Date:  2020  Contact Type:  Telephone    SUBJECTIVE:  Patient Findings     Positives:   Missed doses    Comments:   Discussed results with patient. Missed INR apt last week. Missed one warfarin dose dose. Is transitioning to peritoneal dialysis in next weeks. Also anticipates knee replacement to be scheduled in . Dose adjusted today. He will call if changes.         Clinical Outcomes     Comments:   Discussed results with patient. Missed INR apt last week. Missed one warfarin dose dose. Is transitioning to peritoneal dialysis in next weeks. Also anticipates knee replacement to be scheduled in . Dose adjusted today. He will call if changes.            OBJECTIVE    Recent labs: (last 7 days)     20  1420   INR 1.80*       ASSESSMENT / PLAN  INR assessment SUB    Recheck INR In: 2 WEEKS    INR Location Clinic      Anticoagulation Summary  As of 2020    INR goal:   2.0-3.0   TTR:   51.6 % (8 mo)   INR used for dosin.80! (2020)   Warfarin maintenance plan:   2 mg (2 mg x 1) every Mon, Wed, Fri; 4 mg (2 mg x 2) all other days   Full warfarin instructions:   : 4 mg; Otherwise 2 mg every Mon, Wed, Fri; 4 mg all other days   Weekly warfarin total:   22 mg   Plan last modified:   Oma Taylor RN (2020)   Next INR check:   2020   Priority:   High   Target end date:       Indications    Atrial fibrillation status post cardioversion (H) [I48.91]  Deep vein thrombosis (DVT) (H) [I82.409]  Long term (current) use of anticoagulants [Z79.01]             Anticoagulation Episode Summary     INR check location:       Preferred lab:       Send INR reminders to:   PEARL CORNEJO    Comments:   Has Dialysis  and Friday.      Anticoagulation Care Providers     Provider Role Specialty Phone number    Nguyen Torres, NP Referring Nurse Practitioner - Family [phone]            See the Encounter  Report to view Anticoagulation Flowsheet and Dosing Calendar (Go to Encounters tab in chart review, and find the Anticoagulation Therapy Visit)        Oma Taylor RN

## 2020-05-27 ENCOUNTER — MYC REFILL (OUTPATIENT)
Dept: FAMILY MEDICINE | Facility: CLINIC | Age: 70
End: 2020-05-27

## 2020-05-27 DIAGNOSIS — E78.2 MIXED HYPERLIPIDEMIA: ICD-10-CM

## 2020-05-27 RX ORDER — ATORVASTATIN CALCIUM 20 MG/1
20 TABLET, FILM COATED ORAL DAILY
Qty: 90 TABLET | Refills: 0 | Status: SHIPPED | OUTPATIENT
Start: 2020-05-27 | End: 2020-08-21 | Stop reason: DRUGHIGH

## 2020-05-27 RX ORDER — ATORVASTATIN CALCIUM 20 MG/1
20 TABLET, FILM COATED ORAL DAILY
Qty: 90 TABLET | Refills: 3 | Status: CANCELLED | OUTPATIENT
Start: 2020-05-27

## 2020-06-01 ENCOUNTER — OFFICE VISIT (OUTPATIENT)
Dept: FAMILY MEDICINE | Facility: CLINIC | Age: 70
End: 2020-06-01
Payer: MEDICARE

## 2020-06-01 ENCOUNTER — TELEPHONE (OUTPATIENT)
Dept: INTERNAL MEDICINE | Facility: CLINIC | Age: 70
End: 2020-06-01

## 2020-06-01 VITALS
WEIGHT: 305 LBS | DIASTOLIC BLOOD PRESSURE: 64 MMHG | RESPIRATION RATE: 18 BRPM | TEMPERATURE: 98.1 F | OXYGEN SATURATION: 99 % | BODY MASS INDEX: 42.7 KG/M2 | HEIGHT: 71 IN | HEART RATE: 89 BPM | SYSTOLIC BLOOD PRESSURE: 130 MMHG

## 2020-06-01 DIAGNOSIS — Z79.01 LONG TERM (CURRENT) USE OF ANTICOAGULANTS: ICD-10-CM

## 2020-06-01 DIAGNOSIS — E11.22 TYPE 2 DIABETES MELLITUS WITH CHRONIC KIDNEY DISEASE ON CHRONIC DIALYSIS, WITHOUT LONG-TERM CURRENT USE OF INSULIN (H): ICD-10-CM

## 2020-06-01 DIAGNOSIS — G47.33 OSA (OBSTRUCTIVE SLEEP APNEA): ICD-10-CM

## 2020-06-01 DIAGNOSIS — I82.409 DEEP VEIN THROMBOSIS (DVT) (H): ICD-10-CM

## 2020-06-01 DIAGNOSIS — Z99.2 ESRD (END STAGE RENAL DISEASE) ON DIALYSIS (H): ICD-10-CM

## 2020-06-01 DIAGNOSIS — Z01.818 PREOP GENERAL PHYSICAL EXAM: Primary | ICD-10-CM

## 2020-06-01 DIAGNOSIS — N18.6 ESRD (END STAGE RENAL DISEASE) ON DIALYSIS (H): ICD-10-CM

## 2020-06-01 DIAGNOSIS — N18.6 TYPE 2 DIABETES MELLITUS WITH CHRONIC KIDNEY DISEASE ON CHRONIC DIALYSIS, WITHOUT LONG-TERM CURRENT USE OF INSULIN (H): ICD-10-CM

## 2020-06-01 DIAGNOSIS — I82.402 ACUTE DEEP VEIN THROMBOSIS (DVT) OF LEFT LOWER EXTREMITY, UNSPECIFIED VEIN (H): ICD-10-CM

## 2020-06-01 DIAGNOSIS — Z99.2 TYPE 2 DIABETES MELLITUS WITH CHRONIC KIDNEY DISEASE ON CHRONIC DIALYSIS, WITHOUT LONG-TERM CURRENT USE OF INSULIN (H): ICD-10-CM

## 2020-06-01 LAB
ALBUMIN SERPL-MCNC: 3.7 G/DL (ref 3.4–5)
ALP SERPL-CCNC: 103 U/L (ref 40–150)
ALT SERPL W P-5'-P-CCNC: 17 U/L (ref 0–70)
ANION GAP SERPL CALCULATED.3IONS-SCNC: 13 MMOL/L (ref 3–14)
ANISOCYTOSIS BLD QL SMEAR: SLIGHT
AST SERPL W P-5'-P-CCNC: 17 U/L (ref 0–45)
BASOPHILS # BLD AUTO: 0.2 10E9/L (ref 0–0.2)
BASOPHILS NFR BLD AUTO: 2 %
BILIRUB SERPL-MCNC: 0.3 MG/DL (ref 0.2–1.3)
BUN SERPL-MCNC: 77 MG/DL (ref 7–30)
CALCIUM SERPL-MCNC: 10.6 MG/DL (ref 8.5–10.1)
CAPILLARY BLOOD COLLECTION: NORMAL
CHLORIDE SERPL-SCNC: 97 MMOL/L (ref 94–109)
CO2 SERPL-SCNC: 24 MMOL/L (ref 20–32)
CREAT SERPL-MCNC: 12.8 MG/DL (ref 0.66–1.25)
DIFFERENTIAL METHOD BLD: ABNORMAL
EOSINOPHIL # BLD AUTO: 0.2 10E9/L (ref 0–0.7)
EOSINOPHIL NFR BLD AUTO: 2 %
ERYTHROCYTE [DISTWIDTH] IN BLOOD BY AUTOMATED COUNT: 13 % (ref 10–15)
GFR SERPL CREATININE-BSD FRML MDRD: 4 ML/MIN/{1.73_M2}
GLUCOSE SERPL-MCNC: 102 MG/DL (ref 70–99)
HBA1C MFR BLD: 5.4 % (ref 0–5.6)
HCT VFR BLD AUTO: 35.7 % (ref 40–53)
HGB BLD-MCNC: 11.5 G/DL (ref 13.3–17.7)
INR PPP: 5.8 (ref 0.86–1.14)
LYMPHOCYTES # BLD AUTO: 2.9 10E9/L (ref 0.8–5.3)
LYMPHOCYTES NFR BLD AUTO: 33 %
MCH RBC QN AUTO: 33.1 PG (ref 26.5–33)
MCHC RBC AUTO-ENTMCNC: 32.2 G/DL (ref 31.5–36.5)
MCV RBC AUTO: 103 FL (ref 78–100)
MONOCYTES # BLD AUTO: 0.3 10E9/L (ref 0–1.3)
MONOCYTES NFR BLD AUTO: 4 %
NEUTROPHILS # BLD AUTO: 5.1 10E9/L (ref 1.6–8.3)
NEUTROPHILS NFR BLD AUTO: 59 %
PLATELET # BLD AUTO: 305 10E9/L (ref 150–450)
PLATELET # BLD EST: ABNORMAL 10*3/UL
POTASSIUM SERPL-SCNC: 5.7 MMOL/L (ref 3.4–5.3)
PROT SERPL-MCNC: 8.7 G/DL (ref 6.8–8.8)
RBC # BLD AUTO: 3.47 10E12/L (ref 4.4–5.9)
SODIUM SERPL-SCNC: 134 MMOL/L (ref 133–144)
WBC # BLD AUTO: 8.7 10E9/L (ref 4–11)

## 2020-06-01 PROCEDURE — 85025 COMPLETE CBC W/AUTO DIFF WBC: CPT | Performed by: FAMILY MEDICINE

## 2020-06-01 PROCEDURE — 93000 ELECTROCARDIOGRAM COMPLETE: CPT | Performed by: FAMILY MEDICINE

## 2020-06-01 PROCEDURE — 36415 COLL VENOUS BLD VENIPUNCTURE: CPT | Performed by: FAMILY MEDICINE

## 2020-06-01 PROCEDURE — 83036 HEMOGLOBIN GLYCOSYLATED A1C: CPT | Performed by: FAMILY MEDICINE

## 2020-06-01 PROCEDURE — 85610 PROTHROMBIN TIME: CPT | Performed by: NURSE PRACTITIONER

## 2020-06-01 PROCEDURE — 80053 COMPREHEN METABOLIC PANEL: CPT | Performed by: FAMILY MEDICINE

## 2020-06-01 PROCEDURE — 99215 OFFICE O/P EST HI 40 MIN: CPT | Performed by: FAMILY MEDICINE

## 2020-06-01 ASSESSMENT — MIFFLIN-ST. JEOR: SCORE: 2180.6

## 2020-06-01 NOTE — PROGRESS NOTES
[facility]  [address] [facility] [address] [phone]  Dept: [phone]    PRE-OP EVALUATION:  Today's date: 2020    Leif Ariza (: 1950) presents for pre-operative evaluation assessment as requested by Dr. Blood.  He requires evaluation and anesthesia risk assessment prior to undergoing surgery/procedure for treatment of Total left knee replacement .    Fax number for surgical facility: [facility]   Primary Physician: Nguyen Torres  Type of Anesthesia Anticipated: to be determined    Patient has a Health Care Directive or Living Will:  NO    Preop Questions 2020   Who is doing your surgery? Dr Blood at [facility]   What are you having done? Total Left Knee Replacement   Date of Surgery/Procedure: 2020   Facility or Hospital where procedure/surgery will be performed: [facility]   1.  Do you have a history of Heart attack, stroke, stent, coronary bypass surgery, or other heart surgery? No   2.  Do you ever have any pain or discomfort in your chest? No   3.  Do you have a history of  Heart Failure? No   4.   Are you troubled by shortness of breath when:  walking on a level surface, or up a slight hill, or at night? No   5.  Do you currently have a cold, bronchitis or other respiratory infection? No   6.  Do you have a cough, shortness of breath, or wheezing? No   7.  Do you sometimes get pains in the calves of your legs when you walk? YES -    8. Do you or anyone in your family have previous history of blood clots? YES - hx of DVT   9.  Do you or does anyone in your family have a serious bleeding problem such as prolonged bleeding following surgeries or cuts? No   10. Have you ever had problems with anemia or been told to take iron pills? YES - hx of anemia    11. Have you had any abnormal blood loss such as black, tarry or bloody stools? No   12. Have you ever had a blood transfusion? YES - received 3 units 7-8 years ago    13. Have you or any of your  relatives ever had problems with anesthesia? No   14. Do you have sleep apnea, excessive snoring or daytime drowsiness? YES -hx of DORI uses CPAP   15. Do you have any prosthetic heart valves? No   16. Do you have prosthetic joints? No         HPI:     HPI related to upcoming procedure:   Patient is going for total left knee replacement       A-FIB -    Hx of Paroxysmal atrial fibrillation: He developed atrial fibrillation around 2006 and underwent successful cardioversion at that time. He had been on propafenone and has had no known recurrence since that single episode. He is on chronic anticoagulation with warfarin, but his primary indication is for recurrent DVT. He had Zio patch placed few months ago. He had the monitor while he was off propafenone . It was stopped by his cardiologist.   denies significant symptoms of lightheadedness, palpitations or dyspnea.     ANEMIA - Patient hasmoderate-severe anemia. Received blood transfusion in the past     DIABETES - Patient has a longstanding history of DiabetesType Type II . Patient is being treated with diet and denies significant side effects. Control has been good. Complicating factors include but are not limited to: chronic kidney disease and morbid obesity .   Lab Results   Component Value Date    A1C 5.4 02/25/2020    A1C 5.4 11/21/2019    A1C 5.4 07/28/2019       RENAL INSUFFICIENCY -  ESRD   Patient has been on hemodialysis since 12/31/2017, recently switched to peritoneal dialysis last Tuesday 05/26/202.   He is currently doing 5 exchanges per day   Creatinine   Date Value Ref Range Status   06/01/2020 12.80 (H) 0.66 - 1.25 mg/dL Final     Comment:     Critical Value called to and read back by  DIDIER MILLAN AT 1913, 06/01/20 1170  Critical Value called to and read back by  DR YARIEL SANDERS ON CALL @1933 ON 6.1.2020 [facility].  Results confirmed by repeat test         SLEEP PROBLEM - patient has hx of DORI. He uses CPAP every day     DVT   He had an initial DVT around  2005 and was initiated on warfarin therapy. He later changed warfarin to Pradaxa but given his worsening renal disease was changed to Eliquis. He developed a DVT while on Eliquis so has been changed back to warfarin and has planned for lifelong anticoagulation with warfarin.     Patient  doing well and has no concerns today . He denies chest discomfort, dyspnea and lightheadedness. No palpitations.     MEDICAL HISTORY:     Patient Active Problem List    Diagnosis Date Noted     Restless legs syndrome 04/08/2020     Priority: Medium     Secondary hyperparathyroidism, renal (H) 10/24/2019     Priority: Medium     Left leg pain 09/18/2019     Priority: Medium     Deep vein thrombosis (DVT) (H) 09/13/2019     Priority: Medium     Long term (current) use of anticoagulants 09/13/2019     Priority: Medium     Cellulitis 09/01/2019     Priority: Medium     Hyponatremia 09/01/2019     Priority: Medium     Sepsis (H) 09/01/2019     Priority: Medium     UTI (urinary tract infection) 09/01/2019     Priority: Medium     Essential hypertension 07/26/2019     Priority: Medium     Mixed hyperlipidemia 07/26/2019     Priority: Medium     CKD (chronic kidney disease) stage 5, GFR less than 15 ml/min (H) 07/26/2019     Priority: Medium     ESRD (end stage renal disease) on dialysis (H) 07/26/2019     Priority: Medium     Type 2 diabetes mellitus, without long-term current use of insulin (H) 07/26/2019     Priority: Medium     Atrial fibrillation status post cardioversion (H) 07/26/2019     Priority: Medium     He reports one episode in 2000 and has not returned s/p cardioversion       Hyperuricemia 07/26/2019     Priority: Medium     DORI (obstructive sleep apnea) 07/26/2019     Priority: Medium     11/7/2001(362#)AHI 53, RDI 53, lowest oxygen saturation was 82%, CPAP 11 cm/H20.        Morbid obesity (H) 07/26/2019     Priority: Medium      Past Medical History:   Diagnosis Date     Arthritis      Atrial fibrillation (H)      CKD  (chronic kidney disease) stage 5, GFR less than 15 ml/min (H) 2017     DVT (deep venous thrombosis) (H) 2000     Gout      History of blood transfusion      HLD (hyperlipidemia)      HTN (hypertension)      Kidney stone 2016     Type 2 diabetes mellitus (H)      Past Surgical History:   Procedure Laterality Date     ABDOMEN SURGERY  inserted catheter for PD    Everyuthing going fine     ANESTH,UPPER ARM AV FISTULA REPAIR Left 2018     APPENDECTOMY  1958     C REPAIR CRUCIATE LIGAMENT,KNEE  1976     CARDIOVERSION  2000     carpel tunnel surgery Left 2000     COLONOSCOPY       SINUS SURGERY  1997     Current Outpatient Medications   Medication Sig Dispense Refill     Acetaminophen 325 MG CAPS Take 650 mg by mouth 4 times daily as needed       allopurinol (ZYLOPRIM) 300 MG tablet Take 2 tablets (600 mg) by mouth daily (Patient taking differently: Take 300 mg by mouth daily ) 180 tablet 3     atorvastatin (LIPITOR) 20 MG tablet Take 1 tablet (20 mg) by mouth daily 90 tablet 0     calcium acetate (PHOSLO) 667 MG CAPS capsule Take 4 capsule 3 times a day       fenofibrate (TRICOR) 145 MG tablet Take 1 tablet (145 mg) by mouth daily 90 tablet 3     furosemide (LASIX) 80 MG tablet Take 1 tablet (80 mg) by mouth daily 90 tablet 0     gabapentin (NEURONTIN) 100 MG capsule Take 1 capsule (100 mg) by mouth 2 times daily 60 capsule 1     Misc Natural Products (OSTEO BI-FLEX TRIPLE STRENGTH) TABS Take 3 tablets by mouth 2 times daily       order for DME Equipment being ordered: Digital home blood pressure monitor kit 1 kit 0     SODIUM BICARBONATE PO Take 10 g by mouth daily       warfarin ANTICOAGULANT (COUMADIN) 2 MG tablet Take daily as directed. Current dose 2 mg M,W,F  and 4 mg rest of week days. 180 tablet 0     OTC products: tylenol     Allergies   Allergen Reactions     Penicillins       Latex Allergy: NO  He has allergy to tape   Social History     Tobacco Use     Smoking status: Never Smoker     Smokeless tobacco: Never  "Used   Substance Use Topics     Alcohol use: Not Currently     Frequency: Never     Comment: Stopped when I was put on blood thinners     History   Drug Use Unknown       REVIEW OF SYSTEMS:   CONSTITUTIONAL: NEGATIVE for fever, chills, change in weight  INTEGUMENTARY/SKIN: NEGATIVE for worrisome rashes, moles or lesions  EYES: NEGATIVE for vision changes or irritation  ENT/MOUTH: NEGATIVE for ear, mouth and throat problems  RESP: NEGATIVE for significant cough or SOB  BREAST: NEGATIVE for masses, tenderness or discharge  CV: NEGATIVE for chest pain, palpitations   CV: POSITIVE for LE edema   GI: NEGATIVE for nausea, abdominal pain, heartburn, or change in bowel habits  : NEGATIVE for frequency, dysuria, or hematuria  MUSCULOSKELETAL:POSITIVE  for UE muscle pain, pain in the left knee   NEURO: NEGATIVE for weakness, dizziness or paresthesias  ENDOCRINE: NEGATIVE for temperature intolerance, skin/hair changes  HEME: NEGATIVE for bleeding problems  PSYCHIATRIC: NEGATIVE for changes in mood or affect    EXAM:   /64   Pulse 89   Temp 98.1  F (36.7  C) (Oral)   Resp 18   Ht 1.803 m (5' 11\")   Wt 138.3 kg (305 lb)   SpO2 99%   BMI 42.54 kg/m      GENERAL APPEARANCE: healthy, alert and no distress     EYES: EOMI,  PERRL     HENT: ear canals and TM's normal and nose and mouth without ulcers or lesions     NECK: no adenopathy, no asymmetry, masses, or scars and thyroid normal to palpation     RESP: lungs clear to auscultation - no rales, rhonchi or wheezes     CV: regular rates and rhythm, normal S1 S2, no S3 or S4 and no murmur, click or rub     ABDOMEN:  soft, nontender, no HSM or masses and bowel sounds normal     MS: extremities normal- no gross deformities noted     SKIN: left LE chronic skin erythematous skin changes      NEURO: Normal strength and tone, sensory exam grossly normal, mentation intact and speech normal     PSYCH: mentation appears normal. and affect normal/bright     LYMPHATICS: No cervical " adenopathy  LE edema ,   DIAGNOSTICS:   EKG: sinus rhythm with pause, normal axis, normal intervals, no acute ST/T changes c/w ischemia, no LVH by voltage criteria, there are no prior tracings available      Orders Only on 06/01/2020   Component Date Value Ref Range Status     INR 06/01/2020 5.80* 0.86 - 1.14 Final    Comment: This test is intended for monitoring Coumadin therapy.  Results are not   accurate in patients with prolonged INR due to factor deficiency.  Results confirmed by repeat test  Critical Value called to and read back by  VERBALLY GIVEN TO LB NAT @1504 ON 6.1.2020 BY [facility]       Capillary Blood Collection 06/01/2020 Capillary collection performed   Final       CBC RESULTS:   Recent Labs   Lab Test 06/01/20  1623   WBC 8.7   RBC 3.47*   HGB 11.5*   HCT 35.7*   *   MCH 33.1*   MCHC 32.2   RDW 13.0        Last Comprehensive Metabolic Panel:  Sodium   Date Value Ref Range Status   06/01/2020 134 133 - 144 mmol/L Final     Potassium   Date Value Ref Range Status   06/01/2020 5.7 (H) 3.4 - 5.3 mmol/L Final     Chloride   Date Value Ref Range Status   06/01/2020 97 94 - 109 mmol/L Final     Carbon Dioxide   Date Value Ref Range Status   06/01/2020 24 20 - 32 mmol/L Final     Anion Gap   Date Value Ref Range Status   06/01/2020 13 3 - 14 mmol/L Final     Glucose   Date Value Ref Range Status   06/01/2020 102 (H) 70 - 99 mg/dL Final     Comment:     Non Fasting     Urea Nitrogen   Date Value Ref Range Status   06/01/2020 77 (H) 7 - 30 mg/dL Final     Creatinine   Date Value Ref Range Status   06/01/2020 12.80 (H) 0.66 - 1.25 mg/dL Final     Comment:     Critical Value called to and read back by  DIDIER MILLAN AT 1913, 06/01/20 1170  Critical Value called to and read back by  DR YARIEL SANDERS ON CALL @1933 ON 6.1.2020 [facility].  Results confirmed by repeat test       GFR Estimate   Date Value Ref Range Status   06/01/2020 4 (L) >60 mL/min/[1.73_m2] Final     Comment:     Non   GFR Calc  Starting 12/18/2018, serum creatinine based estimated GFR (eGFR) will be   calculated using the Chronic Kidney Disease Epidemiology Collaboration   (CKD-EPI) equation.       Calcium   Date Value Ref Range Status   06/01/2020 10.6 (H) 8.5 - 10.1 mg/dL Final     Bilirubin Total   Date Value Ref Range Status   06/01/2020 0.3 0.2 - 1.3 mg/dL Final     Alkaline Phosphatase   Date Value Ref Range Status   06/01/2020 103 40 - 150 U/L Final     ALT   Date Value Ref Range Status   06/01/2020 17 0 - 70 U/L Final     AST   Date Value Ref Range Status   06/01/2020 17 0 - 45 U/L Final     Lab Results   Component Value Date    A1C 5.4 06/01/2020    A1C 5.4 02/25/2020    A1C 5.4 11/21/2019    A1C 5.4 07/28/2019         Wt Readings from Last 4 Encounters:   06/01/20 138.3 kg (305 lb)   05/13/20 136.1 kg (300 lb 0.7 oz)   03/17/20 135.2 kg (298 lb)   03/17/20 135.2 kg (298 lb)       IMPRESSION:   Reason for surgery/procedure: chronic left knee pain   Diagnosis/reason for consult:  evaluation and anesthesia risk assessment prior to undergoing surgery/procedure for treatment of Total left knee replacement .    The proposed surgical procedure is considered INTERMEDIATE risk.    REVISED CARDIAC RISK INDEX  The patient has the following serious cardiovascular risks for perioperative complications such as (MI, PE, VFib and 3  AV Block):  Serum Creatinine >2.0 mg/dl  INTERPRETATION: 0 risks: Class I (very low risk - 0.9% complication rate)    The patient has the following additional risks for perioperative complications:  The 10-year ASCVD risk score (Lizzeth LINN Jr., et al., 2013) is: 25.4%    Values used to calculate the score:      Age: 69 years      Sex: Male      Is Non- : No      Diabetic: Yes      Tobacco smoker: No      Systolic Blood Pressure: 130 mmHg      Is BP treated: No      HDL Cholesterol: 41 mg/dL      Total Cholesterol: 151 mg/dL  Morbid obesity      ICD-10-CM    1. Preop general physical  exam  Z01.818 EKG 12-lead complete w/read - Clinics     Comprehensive metabolic panel (BMP + Alb, Alk Phos, ALT, AST, Total. Bili, TP)     CBC with platelets and differential   2. Type 2 diabetes mellitus with chronic kidney disease on chronic dialysis, without long-term current use of insulin (H)  E11.22 HEMOGLOBIN A1C    N18.6     Z99.2    3. ESRD (end stage renal disease) on dialysis (H)  N18.6     Z99.2    4. Acute deep vein thrombosis (DVT) of left lower extremity, unspecified vein (H)  I82.402    5. DORI (obstructive sleep apnea)  G47.33    6. Long term (current) use of anticoagulants  Z79.01        RECOMMENDATIONS:     --Consult hospital rounder / IM to assist post-op medical management  - patient will need inpatient  nephrology consult for dialysis post op.   --Because of DVT or PE history, patient should be on low molecular weight heparin and wear compression hose before & after surgery      Cardiovascular Risk  Performs 4 METs exercise without symptoms (Light housework (dusting, washing dishes), Climb a flight of stairs, Walk on level ground at 15 minutes per mile (4 miles/hour).    Obstructive Sleep Apnea (or suspected sleep apnea)  Patient is to bring their home CPAP with them on the day of surgery  Patient is clearly advised to use their home CPAP when released from surgery      --Patient is to take all scheduled medications on the day of surgery EXCEPT for modifications listed below.    Anticoagulant or Antiplatelet Medication Use  WARFARIN: Thromboembolic risk is moderate (e.g. DVT/PE 3-12 months ago, recurrent DVT/PE,  hold warfarin 5 days (INR >/= 2)  and bridge with Heparin. Will consult pharmacy for dosing .    Pending review of diagnostic evaluation, APPROVAL GIVEN to proceed with proposed procedure, without further diagnostic evaluation.     06/02/20  Labs showed mild hyperkalemia K 5.7 in the setting of ESRD. Patient is now on peritoneal dialysis. I recommend repeating K prior to the procedure.  Consider Kayexalate if still elevated.   Labs reviewed . Patient may proceed with proposed procedure.     Signed Electronically by: Edelmira Proctor MD    Copy of this evaluation report is provided to requesting physician.    [place] Preop Guidelines    Revised Cardiac Risk Index

## 2020-06-02 ENCOUNTER — ANTICOAGULATION THERAPY VISIT (OUTPATIENT)
Dept: NURSING | Facility: CLINIC | Age: 70
End: 2020-06-02
Payer: MEDICARE

## 2020-06-02 ENCOUNTER — TELEPHONE (OUTPATIENT)
Dept: FAMILY MEDICINE | Facility: CLINIC | Age: 70
End: 2020-06-02

## 2020-06-02 DIAGNOSIS — I82.409 DEEP VEIN THROMBOSIS (DVT) (H): ICD-10-CM

## 2020-06-02 DIAGNOSIS — I48.91 ATRIAL FIBRILLATION STATUS POST CARDIOVERSION (H): ICD-10-CM

## 2020-06-02 DIAGNOSIS — I48.91 ATRIAL FIBRILLATION STATUS POST CARDIOVERSION (H): Chronic | ICD-10-CM

## 2020-06-02 DIAGNOSIS — Z79.01 LONG TERM (CURRENT) USE OF ANTICOAGULANTS: Chronic | ICD-10-CM

## 2020-06-02 DIAGNOSIS — I82.402 ACUTE DEEP VEIN THROMBOSIS (DVT) OF LEFT LOWER EXTREMITY, UNSPECIFIED VEIN (H): ICD-10-CM

## 2020-06-02 DIAGNOSIS — Z79.01 LONG TERM (CURRENT) USE OF ANTICOAGULANTS: ICD-10-CM

## 2020-06-02 PROCEDURE — 99207 ZZC NO CHARGE NURSE ONLY: CPT

## 2020-06-02 RX ORDER — HEPARIN SODIUM 20000 [USP'U]/ML
20000 INJECTION INTRAVENOUS; SUBCUTANEOUS EVERY 12 HOURS
Qty: 14 VIAL | Refills: 1 | Status: SHIPPED | OUTPATIENT
Start: 2020-06-02 | End: 2020-07-20

## 2020-06-02 NOTE — TELEPHONE ENCOUNTER
I was paged with Jean's critical lab of creatinine 12.  He has CKD5 and this is chronic.  No call made to patient

## 2020-06-02 NOTE — TELEPHONE ENCOUNTER
On warfarin for recurrent DVT. Surgery planned for 6/9/20 at [facility] for knee replacement. Diabetic on dialysis and needs heparin for bridging. Note patient recently switched from hemodialysis on 5/22  to peritoneal dialysis at home starting 5/26. INR 5.8 on 6/1. Pre op physical done 6/1 and pharmacy help requested to bridge.  Requests rx to [facility] pharmacy as pended. Patient will be screened for COVID on 6/5 and then says he must remain quarantined until surgery on 6/9. He is holding warfarin today as he took warfarin on 6/1. The 5 day pre op hold starts 6/4. I can have him come back for INR 6/3/ or 6/4 if needed.  Oma Taylor RN  Thank you.

## 2020-06-02 NOTE — PROGRESS NOTES
ANTICOAGULATION FOLLOW-UP CLINIC VISIT    Patient Name:  Leif Ariza  Date:  2020  Contact Type:  Telephone    SUBJECTIVE:  Patient Findings     Comments:   Supra at 5.8. Last day of hemodialysis was  and switched to Peritoneal dialysis on .  Does this 5 time a day at home.  Reports this is only change since last INR . Having Knee replacement surgery on  at [facility] and needs 5 day hold and bridging with heparin. Has done bridging once before. Does not like needles but says can do this again.  Did take warfarin on already on  so was directed to hold warfarin on . Message being sent to pharmacy to assist with bridging instructions. Will plan to recheck INR again this week. Have not schedule that yet.        Clinical Outcomes     Comments:   Supra at 5.8. Last day of hemodialysis was  and switched to Peritoneal dialysis on .  Does this 5 time a day at home.  Reports this is only change since last INR . Having Knee replacement surgery on  at [facility] and needs 5 day hold and bridging with heparin. Has done bridging once before. Does not like needles but says can do this again.  Did take warfarin on already on  so was directed to hold warfarin on . Message being sent to pharmacy to assist with bridging instructions. Will plan to recheck INR again this week. Have not schedule that yet.           OBJECTIVE    Recent labs: (last 7 days)     20  1450   INR 5.80*       ASSESSMENT / PLAN  INR assessment SUPRA    Recheck INR In: 2 DAYS    INR Location Clinic      Anticoagulation Summary  As of 2020    INR goal:   2.0-3.0   TTR:   50.1 % (8.4 mo)   INR used for dosin.80! (2020)   Warfarin maintenance plan:   2 mg (2 mg x 1) every Mon, Wed, Fri; 4 mg (2 mg x 2) all other days   Full warfarin instructions:   6/2: Hold; 6/4: Hold; 6/5: Hold; 6/6: Hold; 6/7: Hold; 68: Hold; Otherwise 2 mg every Mon, Wed, Fri; 4 mg all other days   Weekly warfarin total:   22  mg   Plan last modified:   Oma Taylor RN (2/6/2020)   Next INR check:   6/4/2020   Priority:   High   Target end date:       Indications    Atrial fibrillation status post cardioversion (H) [I48.91]  Deep vein thrombosis (DVT) (H) [I82.409]  Long term (current) use of anticoagulants [Z79.01]             Anticoagulation Episode Summary     INR check location:       Preferred lab:       Send INR reminders to:   PEARL CORNEJO    Comments:   Has Dialysis Monday Wednesday and Friday.      Anticoagulation Care Providers     Provider Role Specialty Phone number    Nguyen Torres, NP Referring Nurse Practitioner - Family [phone]            See the Encounter Report to view Anticoagulation Flowsheet and Dosing Calendar (Go to Encounters tab in chart review, and find the Anticoagulation Therapy Visit)    Dosage adjustment made based on physician directed care plan.    Oma Taylor, RN

## 2020-06-02 NOTE — PROGRESS NOTES
Faxed pre op, labs and EKG to [facility] @ [phone].Kateryna Prado MA/HUI

## 2020-06-02 NOTE — TELEPHONE ENCOUNTER
Message sent to patient on my chart with instructions and to call me if questions. Rx for heparin to his requested pharmacy.   Will need to review results of INR on Thursday to see if further changes need to be made.  Oma Taylor RN

## 2020-06-02 NOTE — TELEPHONE ENCOUNTER
[facility]-PROCEDURAL ANTICOAGULATION  MANAGEMENT    Assessment     Warfarin interruption plan for TKA on 2020.    A. Fib and DVT ()      Risk stratification for thromboembolism: moderate (2017 ACC periprocedure pathway for NVAF expert Cconsensus)      [id number] = 5    NVAF: 2017 ACC periprocedure pathway for NVAF advises likely bridge for moderate risk stratification ([id number] score 5-6)  with a hx of stroke, TIA or systemic embolism    Plan       Pre-Procedure:    Hold warfarin until after procedure startin2020    Heparin 37507Z subcutaneous Q12    Start Heparin: 2020    Last dose of Lovenox prior to procedure: Evening before procedure (at least 12 hours prior to procedure)       Post-Procedure:    Resume home warfarin dose if okay with provider doing procedure on night of procedure, 2020 PM: 4mg    Resume heparin ~ 24 hrs post procedure when okay with provider doing procedure. Continue until INR >= 2.3    Recheck INR 6-7 days after resuming warfarin   ?   Amy Ahn [facility]    Subjective/Objective:      Leif Ariza, a 69 year old male    Reason for Anticoagulation: A. Fib and DVT    Goal INR Range: 2.0-3.0    Patient bridged in past: Yes: for peritoneal access procedure in 2020    Pertinent History: on dialysis, unable to to LMWH    Wt Readings from Last 3 Encounters:   20 138.3 kg (305 lb)   20 136.1 kg (300 lb 0.7 oz)   20 135.2 kg (298 lb)        Ideal body weight: 75.3 kg (166 lb 0.1 oz)  Adjusted ideal body weight: 100.5 kg (221 lb 9.7 oz)     Lab Results   Component Value Date    INR 5.80 (HH) 2020    INR 1.80 (H) 2020    INR 2.40 (H) 2020     Lab Results   Component Value Date    HGB 11.5 2020    HCT 35.7 2020     2020     Lab Results   Component Value Date    CR 12.80 (H) 2020    CR 8.87 (H) 2020    CR 8.62 (H) 2019     Estimated Creatinine Clearance: 8 mL/min (A) (based on SCr of  12.8 mg/dL (H)).

## 2020-06-03 ENCOUNTER — MYC MEDICAL ADVICE (OUTPATIENT)
Dept: FAMILY MEDICINE | Facility: CLINIC | Age: 70
End: 2020-06-03

## 2020-06-03 ENCOUNTER — TELEPHONE (OUTPATIENT)
Dept: FAMILY MEDICINE | Facility: CLINIC | Age: 70
End: 2020-06-03

## 2020-06-03 DIAGNOSIS — E11.22 TYPE 2 DIABETES MELLITUS WITH CHRONIC KIDNEY DISEASE ON CHRONIC DIALYSIS, WITHOUT LONG-TERM CURRENT USE OF INSULIN (H): Primary | ICD-10-CM

## 2020-06-03 DIAGNOSIS — N18.6 TYPE 2 DIABETES MELLITUS WITH CHRONIC KIDNEY DISEASE ON CHRONIC DIALYSIS, WITHOUT LONG-TERM CURRENT USE OF INSULIN (H): Primary | ICD-10-CM

## 2020-06-03 DIAGNOSIS — Z99.2 TYPE 2 DIABETES MELLITUS WITH CHRONIC KIDNEY DISEASE ON CHRONIC DIALYSIS, WITHOUT LONG-TERM CURRENT USE OF INSULIN (H): Primary | ICD-10-CM

## 2020-06-03 NOTE — TELEPHONE ENCOUNTER
I called [employer] and spoke with the PA department to appeal the denial for heparin. They will expediate the claim and notify patient within 72 hours.   Patient will need the heparin for dosing on 6/6.   Patient is aware I am working on this.   Patient did say he has 5 vials of heparin left from last rx that he can use to start on Saturday. Oma Taylor RN  Reference # is [id number].

## 2020-06-03 NOTE — TELEPHONE ENCOUNTER
Reason for Call:  Other call back    Detailed comments: patient is calling stating needs to discuss medication with provider. Patient declined to give detailed info. Please call to discuss. Thank you.    Phone Number Patient can be reached at: Home number on file [phone] (home)    Best Time:     Can we leave a detailed message on this number? YES    Call taken on 6/3/2020 at 12:52 PM by Stephany Valenzuela

## 2020-06-03 NOTE — TELEPHONE ENCOUNTER
Test strips, monitor, lancets pended for MD signature.  Patient lost his in move from [place] to [place] last fall.  Last OV Dr. Proctor was 6/1/20

## 2020-06-04 ENCOUNTER — ANTICOAGULATION THERAPY VISIT (OUTPATIENT)
Dept: NURSING | Facility: CLINIC | Age: 70
End: 2020-06-04

## 2020-06-04 ENCOUNTER — TELEPHONE (OUTPATIENT)
Dept: FAMILY MEDICINE | Facility: CLINIC | Age: 70
End: 2020-06-04

## 2020-06-04 DIAGNOSIS — I82.409 DEEP VEIN THROMBOSIS (DVT) (H): ICD-10-CM

## 2020-06-04 DIAGNOSIS — Z79.01 LONG TERM (CURRENT) USE OF ANTICOAGULANTS: ICD-10-CM

## 2020-06-04 DIAGNOSIS — I48.91 ATRIAL FIBRILLATION STATUS POST CARDIOVERSION (H): ICD-10-CM

## 2020-06-04 DIAGNOSIS — I82.402 ACUTE DEEP VEIN THROMBOSIS (DVT) OF LEFT LOWER EXTREMITY, UNSPECIFIED VEIN (H): ICD-10-CM

## 2020-06-04 LAB — INR PPP: 2.4 (ref 0.86–1.14)

## 2020-06-04 PROCEDURE — 85610 PROTHROMBIN TIME: CPT | Performed by: NURSE PRACTITIONER

## 2020-06-04 PROCEDURE — 36416 COLLJ CAPILLARY BLOOD SPEC: CPT | Performed by: NURSE PRACTITIONER

## 2020-06-04 PROCEDURE — 99207 ZZC NO CHARGE NURSE ONLY: CPT

## 2020-06-04 NOTE — TELEPHONE ENCOUNTER
Reason for Call:  Other call back    Detailed comments: pharmacy is calling to request an appeal need to be completed for prior auth for RX: Heparin Sodium, Porcine, [id number] UNIT/ML SOLN. Please call to clarify. Thank you.    Phone Number Patient can be reached at: [phone]    Best Time:     Can we leave a detailed message on this number? YES    Call taken on 6/4/2020 at 3:49 PM by Stephany Valenzuela

## 2020-06-04 NOTE — PROGRESS NOTES
ANTICOAGULATION FOLLOW-UP CLINIC VISIT    Patient Name:  Leif Ariza  Date:  2020  Contact Type:  Telephone    SUBJECTIVE:  Patient Findings     Comments:   The patient was assessed for diet, medication, and activity level changes, missed or extra doses, bruising or bleeding, with no problem findings.          Clinical Outcomes     Negatives:   Major bleeding event, Thromboembolic event, Anticoagulation-related hospital admission, Anticoagulation-related ED visit, Anticoagulation-related fatality    Comments:   The patient was assessed for diet, medication, and activity level changes, missed or extra doses, bruising or bleeding, with no problem findings.             OBJECTIVE    Recent labs: (last 7 days)     20  1222   INR 2.40*       ASSESSMENT / PLAN  INR assessment THER    Recheck INR In: 2 WEEKS    INR Location Clinic      Anticoagulation Summary  As of 2020    INR goal:   2.0-3.0   TTR:   49.8 % (8.5 mo)   INR used for dosin.40 (2020)   Warfarin maintenance plan:   2 mg (2 mg x 1) every Mon, Wed, Fri; 4 mg (2 mg x 2) all other days   Full warfarin instructions:   : Hold; : Hold; : Hold; : Hold; : Hold; Otherwise 2 mg every Mon, Wed, Fri; 4 mg all other days   Weekly warfarin total:   22 mg   Plan last modified:   Oma Taylor RN (2020)   Next INR check:   2020   Priority:   High   Target end date:       Indications    Atrial fibrillation status post cardioversion (H) [I48.91]  Deep vein thrombosis (DVT) (H) [I82.409]  Long term (current) use of anticoagulants [Z79.01]             Anticoagulation Episode Summary     INR check location:       Preferred lab:       Send INR reminders to:   PEARL CORNEJO    Comments:   Has Dialysis  and Friday.      Anticoagulation Care Providers     Provider Role Specialty Phone number    Nguyen Torres, NP Referring Nurse Practitioner - Family [phone]            See the Encounter Report to view  Anticoagulation Flowsheet and Dosing Calendar (Go to Encounters tab in chart review, and find the Anticoagulation Therapy Visit)      Ayaka Arce RN

## 2020-06-05 ENCOUNTER — TELEPHONE (OUTPATIENT)
Dept: FAMILY MEDICINE | Facility: CLINIC | Age: 70
End: 2020-06-05

## 2020-06-05 NOTE — TELEPHONE ENCOUNTER
Prior Authorization Retail Medication Request    Medication/Dose: Heparin Sodium, Porcine, 99655 UNIT/ML SOLN   ICD code (if different than what is on RX):    Deep vein thrombosis (DVT) (H) [I82.409]        Atrial fibrillation status post cardioversion (H) [I48.91]        Long term (current) use of anticoagulants [Z79.01]           Previously Tried and Failed:    Rationale:      Insurance Name:  Medicare  Insurance ID:  [id number]      Pharmacy Information (if different than what is on RX)  Name:  [facility]  Phone:  [phone]

## 2020-06-05 NOTE — TELEPHONE ENCOUNTER
Reason for Call:  Other prescription    Detailed comments: pharmacy is calling to check the status on medicarion/prior authorization   Patient inform pharmacy that he will stop taking this and this worried the pharmacist     Phone Number Patient can be reached at:     Best Time:     Can we leave a detailed message on this number? YES    Call taken on 6/5/2020 at 11:58 AM by Albina Aragon

## 2020-06-05 NOTE — TELEPHONE ENCOUNTER
It appears a prior authorization was initiated and denied at the clinic level within the last 60 days and due to documentation limitations the central prior authorization team will not be able to do the appeal.

## 2020-06-19 ENCOUNTER — DOCUMENTATION ONLY (OUTPATIENT)
Dept: FAMILY MEDICINE | Facility: CLINIC | Age: 70
End: 2020-06-19

## 2020-06-19 NOTE — PROGRESS NOTES
INR due. Chart reviewed.   Hospitalized at [facility]  for knee replacement 6/9-6/16 and discharged to short term rehab. INR and warfarin being being monitored at facility. Oma Taylor RN

## 2020-06-29 NOTE — PROGRESS NOTES
Subjective     Leif Ariza is a 69 year old male who presents to clinic today for the following health issues:    HPI     Hospital Follow-up Visit:    Hospital/Nursing Home/IP Rehab Facility: [facility]  Date of Admission: 6/09/20  Date of Discharge: 6/16/20  Reason(s) for Admission: Left leg pain      Was your hospitalization related to COVID-19? No   Problems taking medications regularly:  None  Medication changes since discharge: None  Problems adhering to non-medication therapy:  None    Summary of hospitalization:  CareEverywhere information obtained and reviewed  Diagnostic Tests/Treatments reviewed.  Follow up needed: orthopedic surgery   Other Healthcare Providers Involved in Patient s Care:         Homecare and Physical Therapy  Update since discharge: improved.       Post Discharge Medication Reconciliation: discharge medications reconciled, continue medications without change.  Plan of care communicated with patient              Hospital course per care everywhere:  Hospital Course   He has a PMHx of morbid obesity, HTN, DM2 now lifestyle controlled, ESRD currently on PD and previously on HD who initially underwent Left TKA on 6/9/2020 for OA. He had physical disability after his replacement and was transitioned to [facility] as a TCU. While hospitalized after his knee replacement pain management had made recommendations for opiate pain control. He has now completed opiate therapy for his post surgical pain.     While admitted, patient progressed well with therapy services. He reports being very pleased with all of the care that he is received since admitted here to [facility]. He understands to keep follow-up appointments with his primary care and orthopedic surgeon. He is looking forward to returning home today with his family with home care services.        Wt Readings from Last 4 Encounters:   07/02/20 132 kg (291 lb)   06/01/20 138.3 kg (305 lb)   05/13/20 136.1 kg (300 lb 0.7 oz)   03/17/20  135.2 kg (298 lb)           Patient Active Problem List   Diagnosis     Essential hypertension     Mixed hyperlipidemia     CKD (chronic kidney disease) stage 5, GFR less than 15 ml/min (H)     ESRD (end stage renal disease) on dialysis (H)     Type 2 diabetes mellitus, without long-term current use of insulin (H)     Atrial fibrillation status post cardioversion (H)     Hyperuricemia     DORI (obstructive sleep apnea)     Morbid obesity (H)     Deep vein thrombosis (DVT) (H)     Long term (current) use of anticoagulants     Secondary hyperparathyroidism, renal (H)     Cellulitis     Hyponatremia     Left leg pain     Sepsis (H)     UTI (urinary tract infection)     Restless legs syndrome     Past Surgical History:   Procedure Laterality Date     ABDOMEN SURGERY  inserted catheter for PD    Everyuthing going fine     ANESTH,UPPER ARM AV FISTULA REPAIR Left 2018     APPENDECTOMY  1958     C REPAIR CRUCIATE LIGAMENT,KNEE  1976     CARDIOVERSION  2000     carpel tunnel surgery Left 2000     COLONOSCOPY       SINUS SURGERY  1997       Social History     Tobacco Use     Smoking status: Never Smoker     Smokeless tobacco: Never Used   Substance Use Topics     Alcohol use: Not Currently     Frequency: Never     Comment: Stopped when I was put on blood thinners     Family History   Problem Relation Age of Onset     Cerebrovascular Disease Father         Had multiple strokes while in hospital when he passed     Prostate Cancer Brother         passed away in 2000     Other Cancer Brother         passed away in 2000         Current Outpatient Medications   Medication Sig Dispense Refill     Acetaminophen 325 MG CAPS Take 650 mg by mouth 4 times daily as needed       allopurinol (ZYLOPRIM) 300 MG tablet Take 2 tablets (600 mg) by mouth daily (Patient taking differently: Take 300 mg by mouth daily ) 180 tablet 3     atorvastatin (LIPITOR) 20 MG tablet Take 1 tablet (20 mg) by mouth daily 90 tablet 0     blood glucose (NO BRAND  SPECIFIED) test strip Use to test blood sugar 1 times daily or as directed.  Per insurance and patient preference. States had had one touch ultra 2 prior 100 each 1     blood glucose monitoring (NO BRAND SPECIFIED) meter device kit Use to test blood sugar 1 times daily or as directed.  Per insurance and patient preference; patient would like the one touch ultra 2 if insurance covers 1 kit 0     calcium acetate (PHOSLO) 667 MG CAPS capsule Take 4 capsule 3 times a day       fenofibrate (TRICOR) 145 MG tablet Take 1 tablet (145 mg) by mouth daily 90 tablet 3     furosemide (LASIX) 80 MG tablet Take 1 tablet (80 mg) by mouth daily 90 tablet 0     gabapentin (NEURONTIN) 100 MG capsule Take 1 capsule (100 mg) by mouth 2 times daily 60 capsule 1     Heparin Sodium, Porcine, 26325 UNIT/ML SOLN Inject 20,000 Units as directed every 12 hours Per INR nurse directions for bridging while not taking warfarin 14 vial 1     Lancets 30G MISC Use once daily to test blood sugar 100 each 1     Misc Natural Products (OSTEO BI-FLEX TRIPLE STRENGTH) TABS Take 3 tablets by mouth 2 times daily       order for DME Equipment being ordered: Digital home blood pressure monitor kit 1 kit 0     SODIUM BICARBONATE PO Take 10 g by mouth daily       triamcinolone (KENALOG) 0.5 % external ointment Apply to the skin of lower leg 2 times daily for 5 days 1 Tube 0     warfarin ANTICOAGULANT (COUMADIN) 2 MG tablet Take daily as directed. Current dose 2 mg M,W,F  and 4 mg rest of week days. 180 tablet 0     Allergies   Allergen Reactions     Penicillins      Recent Labs   Lab Test 06/01/20  1623 04/07/20  1636 02/25/20  0836 11/21/19  0916  07/28/19  1224   A1C 5.4  --  5.4 5.4  --  5.4   LDL  --   --   --   --   --  57   HDL  --   --   --   --   --  41   TRIG  --   --   --   --   --  267*   ALT 17 15  --  18  --  21   CR 12.80* 8.87*  --  8.62*   < > 9.16*   GFRESTIMATED 4* 5*  --  6*   < > 5*   GFRESTBLACK 4* 6*  --  7*   < > 6*   POTASSIUM 5.7* 4.1   --  5.5*   < > 4.0   TSH  --   --   --   --   --  4.31*    < > = values in this interval not displayed.      BP Readings from Last 3 Encounters:   07/02/20 120/58   06/01/20 130/64   05/13/20 120/64    Wt Readings from Last 3 Encounters:   07/02/20 132 kg (291 lb)   06/01/20 138.3 kg (305 lb)   05/13/20 136.1 kg (300 lb 0.7 oz)                    Reviewed and updated as needed this visit by Provider  Tobacco  Allergies  Meds  Problems  Med Hx  Surg Hx  Fam Hx         Review of Systems   Constitutional, HEENT, cardiovascular, pulmonary, gi and gu systems are negative, except as otherwise noted.      Objective    /58   Pulse 84   Temp 98  F (36.7  C) (Tympanic)   Resp 20   Wt 132 kg (291 lb)   SpO2 99%   BMI 40.59 kg/m    Body mass index is 40.59 kg/m .  Physical Exam  Vitals signs and nursing note reviewed.   Constitutional:       General: He is not in acute distress.     Appearance: Normal appearance. He is obese. He is not ill-appearing, toxic-appearing or diaphoretic.   Neck:      Musculoskeletal: Normal range of motion and neck supple.   Cardiovascular:      Rate and Rhythm: Normal rate.      Pulses: Normal pulses.      Heart sounds: No murmur. No friction rub. No gallop.    Pulmonary:      Effort: Pulmonary effort is normal. No respiratory distress.      Breath sounds: No stridor. No wheezing, rhonchi or rales.   Chest:      Chest wall: No tenderness.   Musculoskeletal:        Legs:    Skin:     Capillary Refill: Capillary refill takes less than 2 seconds.   Neurological:      Mental Status: He is alert.                    Assessment & Plan     1. Hospital discharge follow-up  Patient was admitted to the hospital after left knee replacement, he was discharged to transitional care facility.  Today patient states he is doing well.  He has PT at home.  Had a follow-up yesterday with orthopedic surgeon.    2. DORI (obstructive sleep apnea)  Patient has a history of DORI.  He has a very old machine  and he would like to get a new one.  I referred him to sleep medicine before and he saw them on 10/24/2019, no records were available for his sleep study, so they recommended to obtain a new study.  I give him sleep medicine clinic phone number and advised to call them today to schedule a sleep study  - SLEEP EVALUATION & MANAGEMENT REFERRAL - ADULT -[facility] - Natalie Huff  [phone] (Age 15 and up)    3. Venous stasis dermatitis of right lower extremity  He complains of right lower leg itching and burning, there is chronic skin changes in both lower extremities left more than right, prescribed Kenalog ointment to help with itching.  - triamcinolone (KENALOG) 0.5 % external ointment; Apply to the skin of lower leg 2 times daily for 5 days  Dispense: 1 Tube; Refill: 0     Patient verbalized understanding and agreed on the plan of care.  All questions answered.      Return in about 6 months (around 1/2/2021), or if symptoms worsen or fail to improve.    Edelmira Proctor MD  [facility]

## 2020-07-01 ENCOUNTER — TRANSFERRED RECORDS (OUTPATIENT)
Dept: HEALTH INFORMATION MANAGEMENT | Facility: CLINIC | Age: 70
End: 2020-07-01

## 2020-07-02 ENCOUNTER — MEDICAL CORRESPONDENCE (OUTPATIENT)
Dept: HEALTH INFORMATION MANAGEMENT | Facility: CLINIC | Age: 70
End: 2020-07-02

## 2020-07-02 ENCOUNTER — ANTICOAGULATION THERAPY VISIT (OUTPATIENT)
Dept: FAMILY MEDICINE | Facility: CLINIC | Age: 70
End: 2020-07-02

## 2020-07-02 ENCOUNTER — OFFICE VISIT (OUTPATIENT)
Dept: FAMILY MEDICINE | Facility: CLINIC | Age: 70
End: 2020-07-02
Payer: MEDICARE

## 2020-07-02 VITALS
SYSTOLIC BLOOD PRESSURE: 120 MMHG | WEIGHT: 291 LBS | BODY MASS INDEX: 40.59 KG/M2 | OXYGEN SATURATION: 99 % | HEART RATE: 84 BPM | DIASTOLIC BLOOD PRESSURE: 58 MMHG | RESPIRATION RATE: 20 BRPM | TEMPERATURE: 98 F

## 2020-07-02 DIAGNOSIS — I48.91 ATRIAL FIBRILLATION STATUS POST CARDIOVERSION (H): ICD-10-CM

## 2020-07-02 DIAGNOSIS — I82.402 ACUTE DEEP VEIN THROMBOSIS (DVT) OF LEFT LOWER EXTREMITY, UNSPECIFIED VEIN (H): ICD-10-CM

## 2020-07-02 DIAGNOSIS — Z79.01 LONG TERM (CURRENT) USE OF ANTICOAGULANTS: ICD-10-CM

## 2020-07-02 DIAGNOSIS — G47.33 OSA (OBSTRUCTIVE SLEEP APNEA): ICD-10-CM

## 2020-07-02 DIAGNOSIS — I87.2 VENOUS STASIS DERMATITIS OF RIGHT LOWER EXTREMITY: ICD-10-CM

## 2020-07-02 DIAGNOSIS — I82.409 DEEP VEIN THROMBOSIS (DVT) (H): ICD-10-CM

## 2020-07-02 DIAGNOSIS — Z09 HOSPITAL DISCHARGE FOLLOW-UP: Primary | ICD-10-CM

## 2020-07-02 LAB
CAPILLARY BLOOD COLLECTION: NORMAL
INR PPP: 1.9 (ref 0.86–1.14)

## 2020-07-02 PROCEDURE — 99214 OFFICE O/P EST MOD 30 MIN: CPT | Performed by: FAMILY MEDICINE

## 2020-07-02 PROCEDURE — 99207 ZZC NO CHARGE NURSE ONLY: CPT | Performed by: NURSE PRACTITIONER

## 2020-07-02 PROCEDURE — 85610 PROTHROMBIN TIME: CPT | Performed by: NURSE PRACTITIONER

## 2020-07-02 PROCEDURE — 36416 COLLJ CAPILLARY BLOOD SPEC: CPT | Performed by: NURSE PRACTITIONER

## 2020-07-02 RX ORDER — TRIAMCINOLONE ACETONIDE 5 MG/G
OINTMENT TOPICAL
Qty: 1 TUBE | Refills: 0 | Status: SHIPPED | OUTPATIENT
Start: 2020-07-02 | End: 2020-08-25

## 2020-07-02 NOTE — PROGRESS NOTES
ANTICOAGULATION FOLLOW-UP CLINIC VISIT    Patient Name:  Leif Ariza  Date:  2020  Contact Type:  Telephone    SUBJECTIVE:  Patient Findings     Comments:   Patient had knee surgery, just got out of rehab 2 days ago. INR was 2.4 when left rehab facility 2 days ago    Sonya COBURN, RN, CPN          Clinical Outcomes     Comments:   Patient had knee surgery, just got out of rehab 2 days ago. INR was 2.4 when left rehab facility 2 days ago    Sonya COBURN, RN, CPN             OBJECTIVE    Recent labs: (last 7 days)     20  1048   INR 1.90*       ASSESSMENT / PLAN  INR assessment SUB    Recheck INR In: 2 WEEKS    INR Location Clinic      Anticoagulation Summary  As of 2020    INR goal:   2.0-3.0   TTR:   52.7 % (9.5 mo)   INR used for dosin.90! (2020)   Warfarin maintenance plan:   2 mg (2 mg x 1) every Mon, Wed, Fri; 4 mg (2 mg x 2) all other days   Full warfarin instructions:   : 6 mg; Otherwise 2 mg every Mon, Wed, Fri; 4 mg all other days   Weekly warfarin total:   22 mg   Plan last modified:   Oma Taylor RN (2020)   Next INR check:   2020   Priority:   High   Target end date:       Indications    Atrial fibrillation status post cardioversion (H) [I48.91]  Deep vein thrombosis (DVT) (H) [I82.409]  Long term (current) use of anticoagulants [Z79.01]             Anticoagulation Episode Summary     INR check location:       Preferred lab:       Send INR reminders to:   PEARL CORNEJO    Comments:   Has Dialysis  and Friday.      Anticoagulation Care Providers     Provider Role Specialty Phone number    Nguyen Torres NP Referring Nurse Practitioner - Family [phone]            See the Encounter Report to view Anticoagulation Flowsheet and Dosing Calendar (Go to Encounters tab in chart review, and find the Anticoagulation Therapy Visit)        Sonya Lopez RN

## 2020-07-07 ENCOUNTER — TRANSFERRED RECORDS (OUTPATIENT)
Dept: HEALTH INFORMATION MANAGEMENT | Facility: CLINIC | Age: 70
End: 2020-07-07

## 2020-07-09 ENCOUNTER — MEDICAL CORRESPONDENCE (OUTPATIENT)
Dept: HEALTH INFORMATION MANAGEMENT | Facility: CLINIC | Age: 70
End: 2020-07-09

## 2020-07-15 ENCOUNTER — TELEPHONE (OUTPATIENT)
Dept: FAMILY MEDICINE | Facility: CLINIC | Age: 70
End: 2020-07-15

## 2020-07-15 NOTE — TELEPHONE ENCOUNTER
Dr. Proctor:  Please advise.  Are you following this patient for home care?  Patient had left total knee arthroplasty 6/9/20; TCU for therapy after.  Now at home.  Requesting orders as per below for physical therapy.  Dr. Proctor saw patient 7/2/20 post hospital follow up.  Went to the ED at [facility] 7/7 when too weak to get out of vehicle when going to nephrology appointment.  See notes.  Discharged to home 7/11.

## 2020-07-15 NOTE — TELEPHONE ENCOUNTER
Reason for Call:  Home Health Care    Bushra with [facility] called regarding (reason for call): PT    Orders are needed for this patient. PT verbal orders needed    PT:   2 x per week x 2 weeks  1 x per week x 2 weeks  For ROM, strength and gait    OT:     Skilled Nursing:     Pt Provider: Dr. Proctor    Phone Number Homecare Nurse can be reached at: [phone]    Can we leave a detailed message on this number? YES      Best Time: any    Call taken on 7/15/2020 at 3:39 PM by Heather Oconnell

## 2020-07-16 ENCOUNTER — MEDICAL CORRESPONDENCE (OUTPATIENT)
Dept: HEALTH INFORMATION MANAGEMENT | Facility: CLINIC | Age: 70
End: 2020-07-16

## 2020-07-16 ENCOUNTER — ANTICOAGULATION THERAPY VISIT (OUTPATIENT)
Dept: NURSING | Facility: CLINIC | Age: 70
End: 2020-07-16
Payer: MEDICARE

## 2020-07-16 ENCOUNTER — TELEPHONE (OUTPATIENT)
Dept: FAMILY MEDICINE | Facility: CLINIC | Age: 70
End: 2020-07-16

## 2020-07-16 DIAGNOSIS — Z79.01 LONG TERM (CURRENT) USE OF ANTICOAGULANTS: ICD-10-CM

## 2020-07-16 DIAGNOSIS — I48.91 ATRIAL FIBRILLATION STATUS POST CARDIOVERSION (H): ICD-10-CM

## 2020-07-16 DIAGNOSIS — I82.402 ACUTE DEEP VEIN THROMBOSIS (DVT) OF LEFT LOWER EXTREMITY, UNSPECIFIED VEIN (H): ICD-10-CM

## 2020-07-16 LAB — INR PPP: 1.7 (ref 0.9–1.1)

## 2020-07-16 PROCEDURE — 99207 ZZC NO CHARGE NURSE ONLY: CPT

## 2020-07-16 NOTE — PROGRESS NOTES
ANTICOAGULATION FOLLOW-UP CLINIC VISIT    Patient Name:  Leif Ariza  Date:  2020  Contact Type:  Telephone    SUBJECTIVE:  Patient Findings     Comments:   Pt dialysis changed to [facility]. Pt was recently hospitalized with a gi bleed.        Clinical Outcomes     Comments:   Pt dialysis changed to [facility]. Pt was recently hospitalized with a gi bleed.           OBJECTIVE    Recent labs: (last 7 days)     20   INR 1.7*       ASSESSMENT / PLAN  INR assessment SUB    Recheck INR In: 4 DAYS    INR Location Homecare INR      Anticoagulation Summary  As of 2020    INR goal:   2.0-3.0   TTR:   50.3 % (9.9 mo)   INR used for dosin.7! (2020)   Warfarin maintenance plan:   2 mg (2 mg x 1) every Mon, Wed, Fri; 4 mg (2 mg x 2) all other days   Full warfarin instructions:   : 4 mg; Otherwise 2 mg every Mon, Wed, Fri; 4 mg all other days   Weekly warfarin total:   22 mg   Plan last modified:   Oma Taylor RN (2020)   Next INR check:   2020   Priority:   High   Target end date:       Indications    Atrial fibrillation status post cardioversion (H) [I48.91]  Deep vein thrombosis (DVT) (H) [I82.409]  Long term (current) use of anticoagulants [Z79.01]             Anticoagulation Episode Summary     INR check location:       Preferred lab:       Send INR reminders to:   PEARL CORNEJO    Comments:   Has Dialysis  and Friday.      Anticoagulation Care Providers     Provider Role Specialty Phone number    Nguyen Torres, APRN CNP Referring Nurse Practitioner - Family [phone]            See the Encounter Report to view Anticoagulation Flowsheet and Dosing Calendar (Go to Encounters tab in chart review, and find the Anticoagulation Therapy Visit)      Ayaka Arce, SE

## 2020-07-16 NOTE — TELEPHONE ENCOUNTER
I agree with PT order   PT:   2 x per week x 2 weeks  1 x per week x 2 weeks  For ROM, strength and gait    Edelmira Proctor MD.

## 2020-07-16 NOTE — TELEPHONE ENCOUNTER
Miesha given instruction for INR, see anticoag encounter.  To provider to advise on rest of message.  Ayaka PACHECON, RN

## 2020-07-16 NOTE — TELEPHONE ENCOUNTER
Miesha states she was supposed to draw patient's hemoglobin today but he had it done on Tuesday at dialysis and it was 8.8.  Do you want her to draw again?  Also, patient's INR was 1.7 today.  Please call.    Thank you.

## 2020-07-16 NOTE — TELEPHONE ENCOUNTER
Left provider orders on Bushra's vm as requested.    Original phone number listed for Bushra was wrong.  New number placed in contacts.  Ayaka PACHECON, RN

## 2020-07-17 NOTE — TELEPHONE ENCOUNTER
It appears his Hemoglobin orders have been coming from an outside provider.  I would check with that provider as I do not have any pending orders for a Hemoglobin.      JOYA Motta CNP

## 2020-07-17 NOTE — TELEPHONE ENCOUNTER
Miesha RN notified of provider message as written.  She states the pt just got out of hospital and it was on the discharge paperwork so they usually just draw what is on discharge paperwork.  She is unsure which provider is managing the hgb and states usually it is pmd.  I advised he has been getting it tested monthly by an [facility] provider.  She does not know who that is.  I advised I would call pt.    I called pt and he said his nephrologist Dr. Irby has been checking his hgb monthly. Pt just received shot to boost hgb.   Dr. Irby's RN Michael phone number is [phone].    I do not see hgb lab result from yesterday.  I confirmed with home care nurse she lamont a hgb and result was 8.2 yesterday.  I called and let Michael RN know he said they will draw next hgb and are monitoring it and treating pt and thanked me for the information and we do not need to do anything else.  Ayaka Arce BSN, RN

## 2020-07-20 ENCOUNTER — TELEPHONE (OUTPATIENT)
Dept: FAMILY MEDICINE | Facility: CLINIC | Age: 70
End: 2020-07-20

## 2020-07-20 ENCOUNTER — ANTICOAGULATION THERAPY VISIT (OUTPATIENT)
Dept: NURSING | Facility: CLINIC | Age: 70
End: 2020-07-20
Payer: MEDICARE

## 2020-07-20 DIAGNOSIS — Z79.01 LONG TERM (CURRENT) USE OF ANTICOAGULANTS: ICD-10-CM

## 2020-07-20 DIAGNOSIS — I82.402 ACUTE DEEP VEIN THROMBOSIS (DVT) OF LEFT LOWER EXTREMITY, UNSPECIFIED VEIN (H): ICD-10-CM

## 2020-07-20 DIAGNOSIS — I48.91 ATRIAL FIBRILLATION STATUS POST CARDIOVERSION (H): ICD-10-CM

## 2020-07-20 LAB — INR PPP: 3 (ref 0.9–1.1)

## 2020-07-20 PROCEDURE — 99207 ZZC NO CHARGE NURSE ONLY: CPT

## 2020-07-20 NOTE — TELEPHONE ENCOUNTER
Reason for Call:  Other call back    Detailed comments: home care is calling to report results for INR: 3.0. Thank you.    Phone Number Patient can be reached at: [phone]    Best Time:     Can we leave a detailed message on this number? YES    Call taken on 7/20/2020 at 2:25 PM by Stephany Valenzuela

## 2020-07-20 NOTE — PROGRESS NOTES
ANTICOAGULATION FOLLOW-UP CLINIC VISIT    Patient Name:  Leif Ariza  Date:  7/20/2020  Contact Type:  Telephone    SUBJECTIVE:  Patient Findings     Comments:   Home care nurse reports INR 3.0 today. This is quick rise from 1.7 on 4 days ago.  No concerns or changes reported today. Does home peritoneal dialysis daily. Home care next visit on Thursday. Dose instructions given to Jose Raul.         Clinical Outcomes     Comments:   Home care nurse reports INR 3.0 today. This is quick rise from 1.7 on 4 days ago.  No concerns or changes reported today. Does home peritoneal dialysis daily. Home care next visit on Thursday. Dose instructions given to Jose Raul.            OBJECTIVE    Recent labs: (last 7 days)     07/20/20   INR 3.0*       ASSESSMENT / PLAN  INR assessment THER    Recheck INR In: 3 DAYS    INR Location [facility] INR      Anticoagulation Summary  As of 7/20/2020    INR goal:   2.0-3.0   TTR:   50.7 % (10.1 mo)   INR used for dosing:   3.0 (7/20/2020)   Warfarin maintenance plan:   2 mg (2 mg x 1) every Mon, Wed, Fri; 4 mg (2 mg x 2) all other days   Full warfarin instructions:   2 mg every Mon, Wed, Fri; 4 mg all other days   Weekly warfarin total:   22 mg   No change documented:   Oma Taylor RN   Plan last modified:   Oma Taylor RN (2/6/2020)   Next INR check:   7/23/2020   Priority:   High   Target end date:       Indications    Atrial fibrillation status post cardioversion (H) [I48.91]  Deep vein thrombosis (DVT) (H) [I82.409]  Long term (current) use of anticoagulants [Z79.01]             Anticoagulation Episode Summary     INR check location:       Preferred lab:       Send INR reminders to:   PEARL CORNEJO    Comments:   Has Dialysis Monday Wednesday and Friday.      Anticoagulation Care Providers     Provider Role Specialty Phone number    Nguyen Torres, JOYA CNP Referring Nurse Practitioner - Family [phone]            See the Encounter Report to view Anticoagulation Flowsheet  and Dosing Calendar (Go to Encounters tab in chart review, and find the Anticoagulation Therapy Visit)    Dosage adjustment made based on physician directed care plan.    Oma Taylor RN

## 2020-07-22 ENCOUNTER — TRANSFERRED RECORDS (OUTPATIENT)
Dept: HEALTH INFORMATION MANAGEMENT | Facility: CLINIC | Age: 70
End: 2020-07-22

## 2020-07-23 ENCOUNTER — ANTICOAGULATION THERAPY VISIT (OUTPATIENT)
Dept: NURSING | Facility: CLINIC | Age: 70
End: 2020-07-23
Payer: MEDICARE

## 2020-07-23 ENCOUNTER — TELEPHONE (OUTPATIENT)
Dept: FAMILY MEDICINE | Facility: CLINIC | Age: 70
End: 2020-07-23

## 2020-07-23 DIAGNOSIS — I82.402 ACUTE DEEP VEIN THROMBOSIS (DVT) OF LEFT LOWER EXTREMITY, UNSPECIFIED VEIN (H): ICD-10-CM

## 2020-07-23 DIAGNOSIS — I48.91 ATRIAL FIBRILLATION STATUS POST CARDIOVERSION (H): ICD-10-CM

## 2020-07-23 DIAGNOSIS — Z79.01 LONG TERM (CURRENT) USE OF ANTICOAGULANTS: ICD-10-CM

## 2020-07-23 LAB — INR PPP: 3.8 (ref 0.9–1.1)

## 2020-07-23 PROCEDURE — 99207 ZZC NO CHARGE NURSE ONLY: CPT

## 2020-07-23 NOTE — TELEPHONE ENCOUNTER
See anticoag encounter.  Left dosing and follow up instructions on home care nurse vm.  Ayaka PACHECON, RN

## 2020-07-23 NOTE — TELEPHONE ENCOUNTER
Reason for Call:  results    Detailed comments: home is calling calling in results for inr: 3.8 Thank you.    Phone Number Patient can be reached at: [phone]    Best Time:     Can we leave a detailed message on this number? YES    Call taken on 7/23/2020 at 3:36 PM by Stephany Valenzuela

## 2020-07-23 NOTE — PROGRESS NOTES
ANTICOAGULATION FOLLOW-UP CLINIC VISIT    Patient Name:  Leif Ariza  Date:  7/23/2020  Contact Type:  Telephone    SUBJECTIVE:  Patient Findings     Comments:   The patient was assessed for diet, medication, and activity level changes, missed or extra doses, bruising or bleeding, with no problem findings.          Clinical Outcomes     Negatives:   Major bleeding event, Thromboembolic event, Anticoagulation-related hospital admission, Anticoagulation-related ED visit, Anticoagulation-related fatality    Comments:   The patient was assessed for diet, medication, and activity level changes, missed or extra doses, bruising or bleeding, with no problem findings.             OBJECTIVE    Recent labs: (last 7 days)     07/23/20   INR 3.8*       ASSESSMENT / PLAN  INR assessment SUPRA    Recheck INR In: 5 DAYS    [facility] Location [facility]      Anticoagulation Summary  As of 7/23/2020    INR goal:   2.0-3.0   TTR:   50.2 % (10.2 mo)   INR used for dosing:   3.8! (7/23/2020)   Warfarin maintenance plan:   2 mg (2 mg x 1) every Mon, Wed, Fri; 4 mg (2 mg x 2) all other days   Full warfarin instructions:   2 mg every Mon, Wed, Fri; 4 mg all other days   Weekly warfarin total:   22 mg   No change documented:   Ayaka Arce RN   Plan last modified:   Oma Taylor RN (2/6/2020)   Next INR check:   7/28/2020   Priority:   High   Target end date:       Indications    Atrial fibrillation status post cardioversion (H) [I48.91]  Deep vein thrombosis (DVT) (H) [I82.409]  Long term (current) use of anticoagulants [Z79.01]             Anticoagulation Episode Summary     INR check location:       Preferred lab:       Send INR reminders to:   PEARL CORNEJO    Comments:   Has Dialysis Monday Wednesday and Friday.      Anticoagulation Care Providers     Provider Role Specialty Phone number    Nguyen Torres, JOYA CNP Referring Nurse Practitioner - Family [phone]            See the Encounter Report to view  Anticoagulation Flowsheet and Dosing Calendar (Go to Encounters tab in chart review, and find the Anticoagulation Therapy Visit)      Ayaka Arce RN

## 2020-07-24 ENCOUNTER — MEDICAL CORRESPONDENCE (OUTPATIENT)
Dept: HEALTH INFORMATION MANAGEMENT | Facility: CLINIC | Age: 70
End: 2020-07-24

## 2020-07-24 NOTE — TELEPHONE ENCOUNTER
Jose Raul SAEZ left message on triage vm asking why pt left on same dose when INR was high.  Advised he was in range on that 7 day dose at last INR and no known reason for increase in INR so I kept on same dose with a sooner retest to see if it returns to range or stays high, if high we will adjust dose.  Pt has had lower INR's with lower dose recently.  Advised we would rather have pt just a little high than low and if in the 3-4 range we are not as concerned at this point.  Jose Raul SAEZ verbalized understanding.  Ayaka Arce BSN, RN

## 2020-07-27 ENCOUNTER — TELEPHONE (OUTPATIENT)
Dept: FAMILY MEDICINE | Facility: CLINIC | Age: 70
End: 2020-07-27

## 2020-07-27 ENCOUNTER — OFFICE VISIT (OUTPATIENT)
Dept: FAMILY MEDICINE | Facility: CLINIC | Age: 70
End: 2020-07-27
Payer: MEDICARE

## 2020-07-27 ENCOUNTER — ANTICOAGULATION THERAPY VISIT (OUTPATIENT)
Dept: NURSING | Facility: CLINIC | Age: 70
End: 2020-07-27
Payer: MEDICARE

## 2020-07-27 ENCOUNTER — ANCILLARY PROCEDURE (OUTPATIENT)
Dept: ULTRASOUND IMAGING | Facility: CLINIC | Age: 70
End: 2020-07-27
Attending: PHYSICIAN ASSISTANT
Payer: MEDICARE

## 2020-07-27 VITALS
DIASTOLIC BLOOD PRESSURE: 62 MMHG | SYSTOLIC BLOOD PRESSURE: 104 MMHG | TEMPERATURE: 98 F | WEIGHT: 290.4 LBS | OXYGEN SATURATION: 96 % | HEART RATE: 79 BPM | BODY MASS INDEX: 40.5 KG/M2 | RESPIRATION RATE: 20 BRPM

## 2020-07-27 DIAGNOSIS — I82.402 ACUTE DEEP VEIN THROMBOSIS (DVT) OF LEFT LOWER EXTREMITY, UNSPECIFIED VEIN (H): ICD-10-CM

## 2020-07-27 DIAGNOSIS — L03.116 CELLULITIS OF LEFT LOWER EXTREMITY: ICD-10-CM

## 2020-07-27 DIAGNOSIS — Z79.01 LONG TERM (CURRENT) USE OF ANTICOAGULANTS: ICD-10-CM

## 2020-07-27 DIAGNOSIS — M79.89 LEFT LEG SWELLING: Primary | ICD-10-CM

## 2020-07-27 DIAGNOSIS — M79.89 LEFT LEG SWELLING: ICD-10-CM

## 2020-07-27 DIAGNOSIS — I48.91 ATRIAL FIBRILLATION STATUS POST CARDIOVERSION (H): ICD-10-CM

## 2020-07-27 LAB
CAPILLARY BLOOD COLLECTION: NORMAL
INR PPP: 4.5 (ref 0.86–1.14)

## 2020-07-27 PROCEDURE — 36416 COLLJ CAPILLARY BLOOD SPEC: CPT | Performed by: PHYSICIAN ASSISTANT

## 2020-07-27 PROCEDURE — 93971 EXTREMITY STUDY: CPT | Mod: LT

## 2020-07-27 PROCEDURE — 99214 OFFICE O/P EST MOD 30 MIN: CPT | Performed by: PHYSICIAN ASSISTANT

## 2020-07-27 PROCEDURE — 99207 ZZC NO CHARGE NURSE ONLY: CPT

## 2020-07-27 PROCEDURE — 85610 PROTHROMBIN TIME: CPT | Performed by: PHYSICIAN ASSISTANT

## 2020-07-27 RX ORDER — ASPIRIN 81 MG/1
81 TABLET ORAL
COMMUNITY
Start: 2020-07-13

## 2020-07-27 RX ORDER — SULFAMETHOXAZOLE AND TRIMETHOPRIM 400; 80 MG/1; MG/1
1 TABLET ORAL DAILY
Qty: 10 TABLET | Refills: 0 | Status: SHIPPED | OUTPATIENT
Start: 2020-07-27 | End: 2020-08-06

## 2020-07-27 RX ORDER — ALLOPURINOL 100 MG/1
100 TABLET ORAL
COMMUNITY
Start: 2020-07-12 | End: 2020-12-06

## 2020-07-27 RX ORDER — GENTAMICIN SULFATE 3 MG/ML
SOLUTION/ DROPS OPHTHALMIC
COMMUNITY
End: 2020-10-02

## 2020-07-27 NOTE — PROGRESS NOTES
Subjective     Leif Ariza is a 69 year old male who presents to clinic today for the following health issues:    HPI       Concern - Probably cellulitis Left leg  Onset: x 1 week    Description:   Blisters that break open and bleed, redness, tightness, swelling and painful, H/O total knee replacement 6/9/20    Intensity: moderate    Progression of Symptoms:  worsening    Accompanying Signs & Symptoms:  See above    Previous history of similar problem:   yes    Precipitating factors:   Worsened by: none    Alleviating factors:  Improved by: none    Therapies Tried and outcome: has had cephalexin in the past for cellulitis      No fevers.   Some swelling.      Patient Active Problem List   Diagnosis     Essential hypertension     Mixed hyperlipidemia     CKD (chronic kidney disease) stage 5, GFR less than 15 ml/min (H)     ESRD (end stage renal disease) on dialysis (H)     Type 2 diabetes mellitus, without long-term current use of insulin (H)     Atrial fibrillation status post cardioversion (H)     Hyperuricemia     DORI (obstructive sleep apnea)     Morbid obesity (H)     Deep vein thrombosis (DVT) (H)     Long term (current) use of anticoagulants     Secondary hyperparathyroidism, renal (H)     Cellulitis     Hyponatremia     Left leg pain     Sepsis (H)     UTI (urinary tract infection)     Restless legs syndrome     Past Surgical History:   Procedure Laterality Date     ABDOMEN SURGERY  inserted catheter for PD    Everyuthing going fine     ANESTH,UPPER ARM AV FISTULA REPAIR Left 2018     APPENDECTOMY  1958     C REPAIR CRUCIATE LIGAMENT,KNEE  1976     CARDIOVERSION  2000     carpel tunnel surgery Left 2000     COLONOSCOPY       SINUS SURGERY  1997       Social History     Tobacco Use     Smoking status: Never Smoker     Smokeless tobacco: Never Used   Substance Use Topics     Alcohol use: Not Currently     Frequency: Never     Comment: Stopped when I was put on blood thinners     Family History   Problem  Relation Age of Onset     Cerebrovascular Disease Father         Had multiple strokes while in hospital when he passed     Prostate Cancer Brother         passed away in 2000     Other Cancer Brother         passed away in 2000         Current Outpatient Medications   Medication Sig Dispense Refill     Acetaminophen 325 MG CAPS Take 650 mg by mouth 4 times daily as needed       allopurinol (ZYLOPRIM) 100 MG tablet Take 100 mg by mouth       aspirin 81 MG EC tablet Take 81 mg by mouth       atorvastatin (LIPITOR) 20 MG tablet Take 1 tablet (20 mg) by mouth daily 90 tablet 0     calcium acetate (PHOSLO) 667 MG CAPS capsule Take 4 capsule 3 times a day       fenofibrate (TRICOR) 145 MG tablet Take 1 tablet (145 mg) by mouth daily 90 tablet 3     furosemide (LASIX) 80 MG tablet Take 1 tablet (80 mg) by mouth daily 90 tablet 0     gabapentin (NEURONTIN) 100 MG capsule Take 1 capsule (100 mg) by mouth 2 times daily 60 capsule 1     gentamicin (GARAMYCIN) 0.3 % ophthalmic solution Apply 1-2 drops to PD catheter exit site once daily after cleaning it.       SODIUM BICARBONATE PO Take 10 g by mouth daily       warfarin ANTICOAGULANT (COUMADIN) 2 MG tablet Take daily as directed. Current dose 2 mg M,W,F  and 4 mg rest of week days. 180 tablet 0     allopurinol (ZYLOPRIM) 300 MG tablet Take 2 tablets (600 mg) by mouth daily (Patient not taking: Reported on 7/27/2020) 180 tablet 3     blood glucose (NO BRAND SPECIFIED) test strip Use to test blood sugar 1 times daily or as directed.  Per insurance and patient preference. States had had one touch ultra 2 prior 100 each 1     blood glucose monitoring (NO BRAND SPECIFIED) meter device kit Use to test blood sugar 1 times daily or as directed.  Per insurance and patient preference; patient would like the one touch ultra 2 if insurance covers 1 kit 0     Lancets 30G MISC Use once daily to test blood sugar 100 each 1     Misc Natural Products (OSTEO BI-FLEX TRIPLE STRENGTH) TABS Take  3 tablets by mouth 2 times daily       order for DME Equipment being ordered: Digital home blood pressure monitor kit 1 kit 0     triamcinolone (KENALOG) 0.5 % external ointment Apply to the skin of lower leg 2 times daily for 5 days (Patient not taking: Reported on 7/27/2020) 1 Tube 0     Allergies   Allergen Reactions     Penicillins      Recent Labs   Lab Test 06/01/20  1623 04/07/20  1636 02/25/20  0836 11/21/19  0916  07/28/19  1224   A1C 5.4  --  5.4 5.4  --  5.4   LDL  --   --   --   --   --  57   HDL  --   --   --   --   --  41   TRIG  --   --   --   --   --  267*   ALT 17 15  --  18  --  21   CR 12.80* 8.87*  --  8.62*   < > 9.16*   GFRESTIMATED 4* 5*  --  6*   < > 5*   GFRESTBLACK 4* 6*  --  7*   < > 6*   POTASSIUM 5.7* 4.1  --  5.5*   < > 4.0   TSH  --   --   --   --   --  4.31*    < > = values in this interval not displayed.      BP Readings from Last 3 Encounters:   07/27/20 104/62   07/02/20 120/58   06/01/20 130/64    Wt Readings from Last 3 Encounters:   07/27/20 131.7 kg (290 lb 6.4 oz)   07/02/20 132 kg (291 lb)   06/01/20 138.3 kg (305 lb)                    Reviewed and updated as needed this visit by Provider         Review of Systems   Constitutional, HEENT, cardiovascular, pulmonary, GI, , musculoskeletal, neuro, skin, endocrine and psych systems are negative, except as otherwise noted.      Objective    /62   Pulse 79   Temp 98  F (36.7  C) (Tympanic)   Resp 20   Wt 131.7 kg (290 lb 6.4 oz)   SpO2 96%   BMI 40.50 kg/m    Body mass index is 40.5 kg/m .  Physical Exam   Eye exam - right eye normal lid, conjunctiva, cornea, pupil and fundus, left eye normal lid, conjunctiva, cornea, pupil and fundus.  CHEST:chest clear to IPPA, no tachypnea, retractions or cyanosis and S1, S2 normal, no murmur, no gallop, rate regular.  Left leg edema.  Area of redness and tenderness involving left shin.  Some some slightly purulent discharge from some blistery lesions.     Leif was seen today  for cellulitis.    Diagnoses and all orders for this visit:    Left leg swelling  -     US Lower Extremity Venous Duplex Left; Future    Cellulitis of left lower extremity  -     US Lower Extremity Venous Duplex Left; Future  -     cephALEXin (KEFLEX) 250 MG capsule; Take 1 capsule (250 mg) by mouth 2 times daily for 10 days  -     sulfamethoxazole-trimethoprim (BACTRIM) 400-80 MG tablet; Take 1 tablet by mouth daily for 10 days      Advised supportive and symptomatic treatment.  Follow up with Provider - if condition persists or worsens.

## 2020-07-27 NOTE — PROGRESS NOTES
ANTICOAGULATION FOLLOW-UP CLINIC VISIT    Patient Name:  Leif Ariza  Date:  2020  Contact Type:  Telephone    SUBJECTIVE:  Patient Findings     Positives:   Change in health, Change in medications    Comments:   Spoke with patient. He was seen today and diagnosed with cellulitis, started on Keflex and Bactrim  INR in clinic - 4.5  Instructed patient to hold his dose tonight, take 2mg Tuesday and Wednesday.   Patient has home care nurse coming out today to draw INR  Call to Jose Raul home care nurse, given dosing instructions and to recheck INR on Thursday.    Sonya COBURN, RN, CPN           Clinical Outcomes     Comments:   Spoke with patient. He was seen today and diagnosed with cellulitis, started on Keflex and Bactrim  INR in clinic - 4.5  Instructed patient to hold his dose tonight, take 2mg Tuesday and Wednesday.   Patient has home care nurse coming out today to draw INR  Call to Jose Raul home care nurse, given dosing instructions and to recheck INR on Thursday.    Sonya COBURN, RN, CPN              OBJECTIVE    Recent labs: (last 7 days)     20  1200   INR 4.50*       ASSESSMENT / PLAN  INR assessment SUPRA    Recheck INR In: 3 DAYS    INR Location Clinic      Anticoagulation Summary  As of 2020    INR goal:   2.0-3.0   TTR:   49.5 % (10.3 mo)   INR used for dosin.50! (2020)   Warfarin maintenance plan:   2 mg (2 mg x 1) every Mon, Wed, Fri; 4 mg (2 mg x 2) all other days   Full warfarin instructions:   : Hold; : 2 mg; Otherwise 2 mg every Mon, Wed, Fri; 4 mg all other days   Weekly warfarin total:   22 mg   Plan last modified:   Oma Taylor RN (2020)   Next INR check:   2020   Priority:   High   Target end date:       Indications    Atrial fibrillation status post cardioversion (H) [I48.91]  Deep vein thrombosis (DVT) (H) [I82.409]  Long term (current) use of anticoagulants [Z79.01]             Anticoagulation Episode Summary     INR check location:        Preferred lab:       Send INR reminders to:   PEARL CORNEJO    Comments:   Has Dialysis Monday Wednesday and Friday.      Anticoagulation Care Providers     Provider Role Specialty Phone number    Nguyen Torres, APRN CNP Referring Nurse Practitioner - Family [phone]            See the Encounter Report to view Anticoagulation Flowsheet and Dosing Calendar (Go to Encounters tab in chart review, and find the Anticoagulation Therapy Visit)        Sonya Lopez RN

## 2020-07-27 NOTE — TELEPHONE ENCOUNTER
Patient seen today and started on Keflex. Patient will need INR checked in a few days as Keflex can increase risk of bleeding  Patient gets INR done by home care nurse  Home care will be drawing INR tomorrow, 7/28    Sonya PACHECON, RN, CPN

## 2020-07-30 ENCOUNTER — TELEPHONE (OUTPATIENT)
Dept: FAMILY MEDICINE | Facility: CLINIC | Age: 70
End: 2020-07-30

## 2020-07-30 ENCOUNTER — ANTICOAGULATION THERAPY VISIT (OUTPATIENT)
Dept: NURSING | Facility: CLINIC | Age: 70
End: 2020-07-30
Payer: MEDICARE

## 2020-07-30 DIAGNOSIS — I82.402 ACUTE DEEP VEIN THROMBOSIS (DVT) OF LEFT LOWER EXTREMITY, UNSPECIFIED VEIN (H): ICD-10-CM

## 2020-07-30 DIAGNOSIS — I48.91 ATRIAL FIBRILLATION STATUS POST CARDIOVERSION (H): ICD-10-CM

## 2020-07-30 DIAGNOSIS — Z79.01 LONG TERM (CURRENT) USE OF ANTICOAGULANTS: ICD-10-CM

## 2020-07-30 LAB — INR PPP: 4.3 (ref 0.9–1.1)

## 2020-07-30 PROCEDURE — 99207 ZZC NO CHARGE NURSE ONLY: CPT

## 2020-07-30 NOTE — PROGRESS NOTES
ANTICOAGULATION FOLLOW-UP CLINIC VISIT    Patient Name:  Leif Ariza  Date:  2020  Contact Type:  Telephone    SUBJECTIVE:  Patient Findings     Positives:   Change in medications    Comments:   See encounter on 20        Clinical Outcomes     Comments:   See encounter on 20           OBJECTIVE    Recent labs: (last 7 days)     20   INR 4.3*       ASSESSMENT / PLAN  INR assessment SUPRA    Recheck INR In: 4 DAYS    [facility] Location [facility]      Anticoagulation Summary  As of 2020    INR goal:   2.0-3.0   TTR:   49.1 % (10.4 mo)   INR used for dosin.3! (2020)   Warfarin maintenance plan:   2 mg (2 mg x 1) every Mon, Wed, Fri; 4 mg (2 mg x 2) all other days   Full warfarin instructions:   : Hold; : 4 mg; Otherwise 2 mg every Mon, Wed, Fri; 4 mg all other days   Weekly warfarin total:   22 mg   Plan last modified:   Oma Taylor RN (2020)   Next INR check:   8/3/2020   Priority:   High   Target end date:       Indications    Atrial fibrillation status post cardioversion (H) [I48.91]  Deep vein thrombosis (DVT) (H) [I82.409]  Long term (current) use of anticoagulants [Z79.01]             Anticoagulation Episode Summary     INR check location:       Preferred lab:       Send INR reminders to:   PEARL CORNEJO    Comments:   Has Dialysis  and Friday.      Anticoagulation Care Providers     Provider Role Specialty Phone number    Nguyen Torres, APRN CNP Referring Nurse Practitioner - Family [phone]            See the Encounter Report to view Anticoagulation Flowsheet and Dosing Calendar (Go to Encounters tab in chart review, and find the Anticoagulation Therapy Visit)      Ayaka Arce, SE

## 2020-07-30 NOTE — TELEPHONE ENCOUNTER
See 7/27 telephone encounter  Left detailed message on Peggy's voice mail aware of meds patient is on and interaction with coumadin. Patient getting INR checked today    Sonya PACHECON, RN, CPN

## 2020-07-30 NOTE — TELEPHONE ENCOUNTER
Home care nurse given dosing instructions, hold today 2 mg on Friday and 4 mg daily sa, alarcon with recheck on Monday.    Pt and home care nurse notified of dosing.  Ayaka PACHECON, RN

## 2020-07-30 NOTE — TELEPHONE ENCOUNTER
Reason for Call:  Other call back    Detailed comments: need dosing.  4.3 inr today.  dosed coumadin last 3 day of 2 mg  None today.  No changes recorded  Please call ara  Phone Number Patient can be reached at: Other phone number:  ara*    Best Time: any    Can we leave a detailed message on this number? YES    Call taken on 7/30/2020 at 12:00 PM by Yaima Bermeo

## 2020-07-30 NOTE — TELEPHONE ENCOUNTER
Reason for Call:  Other fyi    Detailed comments: started 2 new antibiotics and can have an interaction with coumadin rx's are:  bactrim and keflex    Phone Number Patient can be reached at: Other phone number:  ara*    Best Time: any    Can we leave a detailed message on this number? YES    Call taken on 7/30/2020 at 11:23 AM by Yaima Bermeo

## 2020-08-03 ENCOUNTER — TELEPHONE (OUTPATIENT)
Dept: FAMILY MEDICINE | Facility: CLINIC | Age: 70
End: 2020-08-03

## 2020-08-03 ENCOUNTER — ANTICOAGULATION THERAPY VISIT (OUTPATIENT)
Dept: NURSING | Facility: CLINIC | Age: 70
End: 2020-08-03
Payer: MEDICARE

## 2020-08-03 ENCOUNTER — DOCUMENTATION ONLY (OUTPATIENT)
Dept: TRANSPLANT | Facility: CLINIC | Age: 70
End: 2020-08-03

## 2020-08-03 DIAGNOSIS — I82.402 ACUTE DEEP VEIN THROMBOSIS (DVT) OF LEFT LOWER EXTREMITY, UNSPECIFIED VEIN (H): ICD-10-CM

## 2020-08-03 DIAGNOSIS — Z79.01 LONG TERM (CURRENT) USE OF ANTICOAGULANTS: ICD-10-CM

## 2020-08-03 DIAGNOSIS — I48.91 ATRIAL FIBRILLATION STATUS POST CARDIOVERSION (H): ICD-10-CM

## 2020-08-03 LAB — INR PPP: 4.2 (ref 0.9–1.1)

## 2020-08-03 PROCEDURE — 99207 ZZC NO CHARGE NURSE ONLY: CPT

## 2020-08-03 NOTE — TELEPHONE ENCOUNTER
Reason for Call:  Home Health Care    Jose Raul with [facility] called regarding (reason for call): INR     Orders are needed for this patient.  INR 4.2 no changes in medication no changes in health status no changes in diet and no signs of bruising or bleeding        Pt Provider: yoel    Phone Number Homecare Nurse can be reached at: [phone]    Can we leave a detailed message on this number? YES    Phone number patient can be reached at: Home number on file [phone] (home)    Best Time: any      Call taken on 8/3/2020 at 1:22 PM by Helena Naranjo

## 2020-08-03 NOTE — PROGRESS NOTES
ANTICOAGULATION FOLLOW-UP CLINIC VISIT    Patient Name:  Leif Ariza  Date:  8/3/2020  Contact Type:  Telephone    SUBJECTIVE:  Patient Findings     Comments:   Supra at 4.2 per home care nurse with no concerns or changes reported. Continues on keflex and bactrim for 3 more days. Verbal orders given to nurse Blunt.          Clinical Outcomes     Comments:   Supra at 4.2 per home care nurse with no concerns or changes reported. Continues on keflex and bactrim for 3 more days. Verbal orders given to nurse Blunt.             OBJECTIVE    Recent labs: (last 7 days)     20   INR 4.2*       ASSESSMENT / PLAN  INR assessment SUPRA    Recheck INR In: 4 DAYS    [facility] Location [facility]      Anticoagulation Summary  As of 8/3/2020    INR goal:   2.0-3.0   TTR:   48.4 % (10.5 mo)   INR used for dosin.2! (8/3/2020)   Warfarin maintenance plan:   2 mg (2 mg x 1) every Mon, Wed, Fri; 4 mg (2 mg x 2) all other days   Full warfarin instructions:   8/3: Hold; : 2 mg; : 2 mg; Otherwise 2 mg every Mon, Wed, Fri; 4 mg all other days   Weekly warfarin total:   22 mg   Plan last modified:   Oma Taylor, RN (2020)   Next INR check:   2020   Priority:   High   Target end date:       Indications    Atrial fibrillation status post cardioversion (H) [I48.91]  Deep vein thrombosis (DVT) (H) [I82.409]  Long term (current) use of anticoagulants [Z79.01]             Anticoagulation Episode Summary     INR check location:       Preferred lab:       Send INR reminders to:   PEARL CORNEJO    Comments:   Does peritoneal dialysis daily at home      Anticoagulation Care Providers     Provider Role Specialty Phone number    Nguyen Torres APRN CNP Referring Nurse Practitioner - Family [phone]            See the Encounter Report to view Anticoagulation Flowsheet and Dosing Calendar (Go to Encounters tab in chart review, and find the Anticoagulation Therapy Visit)    Dosage adjustment made based on physician  directed care plan.    Oma Taylor RN

## 2020-08-03 NOTE — TELEPHONE ENCOUNTER
Left message on voicemail for home care nurse with plan to hold warfarin today and give 2 mg tue and wed and recheck INR on Thursday.  To call me back if other concerns or questions and let me know if received this message.  Oma Taylor RN

## 2020-08-04 ENCOUNTER — MEDICAL CORRESPONDENCE (OUTPATIENT)
Dept: HEALTH INFORMATION MANAGEMENT | Facility: CLINIC | Age: 70
End: 2020-08-04

## 2020-08-05 ENCOUNTER — MEDICAL CORRESPONDENCE (OUTPATIENT)
Dept: HEALTH INFORMATION MANAGEMENT | Facility: CLINIC | Age: 70
End: 2020-08-05

## 2020-08-06 ENCOUNTER — MEDICAL CORRESPONDENCE (OUTPATIENT)
Dept: HEALTH INFORMATION MANAGEMENT | Facility: CLINIC | Age: 70
End: 2020-08-06

## 2020-08-06 ENCOUNTER — HOSPITAL ENCOUNTER (OUTPATIENT)
Dept: ULTRASOUND IMAGING | Facility: CLINIC | Age: 70
Discharge: HOME | End: 2020-08-06
Attending: NURSE PRACTITIONER | Admitting: NURSE PRACTITIONER
Payer: MEDICARE

## 2020-08-06 ENCOUNTER — OFFICE VISIT (OUTPATIENT)
Dept: FAMILY MEDICINE | Facility: CLINIC | Age: 70
End: 2020-08-06
Payer: MEDICARE

## 2020-08-06 VITALS
SYSTOLIC BLOOD PRESSURE: 121 MMHG | DIASTOLIC BLOOD PRESSURE: 66 MMHG | TEMPERATURE: 99.7 F | HEART RATE: 72 BPM | WEIGHT: 297.18 LBS | BODY MASS INDEX: 41.45 KG/M2

## 2020-08-06 DIAGNOSIS — L03.116 CELLULITIS OF LEFT LOWER EXTREMITY: Primary | ICD-10-CM

## 2020-08-06 DIAGNOSIS — I82.402 ACUTE DEEP VEIN THROMBOSIS (DVT) OF LEFT LOWER EXTREMITY, UNSPECIFIED VEIN (H): ICD-10-CM

## 2020-08-06 DIAGNOSIS — M79.89 LEFT LEG SWELLING: ICD-10-CM

## 2020-08-06 DIAGNOSIS — L03.116 CELLULITIS OF LEFT LOWER EXTREMITY: ICD-10-CM

## 2020-08-06 PROCEDURE — 93971 EXTREMITY STUDY: CPT | Mod: LT

## 2020-08-06 PROCEDURE — 99214 OFFICE O/P EST MOD 30 MIN: CPT | Performed by: NURSE PRACTITIONER

## 2020-08-06 RX ORDER — SULFAMETHOXAZOLE AND TRIMETHOPRIM 400; 80 MG/1; MG/1
1 TABLET ORAL DAILY
Qty: 10 TABLET | Refills: 0 | Status: SHIPPED | OUTPATIENT
Start: 2020-08-06 | End: 2020-09-21

## 2020-08-06 RX ORDER — VANCOMYCIN HYDROCHLORIDE 250 MG/1
250 CAPSULE ORAL 4 TIMES DAILY
Qty: 40 CAPSULE | Refills: 0 | Status: SHIPPED | OUTPATIENT
Start: 2020-08-06 | End: 2020-08-06

## 2020-08-06 ASSESSMENT — PAIN SCALES - GENERAL: PAINLEVEL: SEVERE PAIN (6)

## 2020-08-06 NOTE — NURSING NOTE
"Initial /66 (BP Location: Left arm, Patient Position: Chair, Cuff Size: Adult Large)   Pulse 72   Temp 99.7  F (37.6  C) (Tympanic)   Wt 134.8 kg (297 lb 2.9 oz)   BMI 41.45 kg/m   Estimated body mass index is 41.45 kg/m  as calculated from the following:    Height as of 8/3/20: 1.803 m (5' 11\").    Weight as of this encounter: 134.8 kg (297 lb 2.9 oz). .    Velma Choe CMA (AAMA)    "

## 2020-08-06 NOTE — PATIENT INSTRUCTIONS
You can get the ultrasound done at  [facility]    They can get you in at  1 PM     They will have you stay there and call with the results.  I will call your cell phone with the results.       Continue to work with the INR clinic     Will continue with antibiotic     If the red line increases over the weekend , pain increases,  or if the knee becomes more swollen

## 2020-08-06 NOTE — PROGRESS NOTES
"Subjective     Leif Ariza is a 69 year old male who presents to clinic today for the following health issues:    HPI     *Did have lower extremity US on 07/27, results as follows:     \"FINDINGS: Exam includes the common femoral, femoral, popliteal, and  contralateral common femoral veins as well as segmentally visualized  deep calf veins and greater saphenous vein.      LEFT: Calf edema. Nonocclusive thrombus is seen within the popliteal,  distal femoral, and mid femoral veins. The calf veins are not well  seen due to edema, with no additional thrombus appreciated. No  superficial thrombophlebitis. No popliteal cyst.                                                                      IMPRESSION: Positive for deep vein thrombosis.    NENA ROMERO MD\"    Thigh Pain    Onset: Tuesday Morning    Description:   Location: upper left leg  Character: constant, ache    Intensity: severe    Progression of Symptoms: worse    Accompanying Signs & Symptoms:  Other symptoms: swelling, increased SOB    History:   Previous similar pain: no       Precipitating factors:   Trauma or overuse: YES- patient started new exercises at PT on Tuesday r/t total knee replacement, pain started about 4-5 hours later.    Alleviating factors:  Improved by: nothing    Therapies Tried and outcome: None    Patient Active Problem List   Diagnosis     Essential hypertension     Mixed hyperlipidemia     CKD (chronic kidney disease) stage 5, GFR less than 15 ml/min (H)     ESRD (end stage renal disease) on dialysis (H)     Type 2 diabetes mellitus, without long-term current use of insulin (H)     Atrial fibrillation status post cardioversion (H)     Hyperuricemia     DORI (obstructive sleep apnea)     Morbid obesity (H)     Deep vein thrombosis (DVT) (H)     Long term (current) use of anticoagulants     Secondary hyperparathyroidism, renal (H)     Cellulitis     Hyponatremia     Left leg pain     Sepsis (H)     UTI (urinary tract infection)     " Restless legs syndrome     Past Surgical History:   Procedure Laterality Date     ABDOMEN SURGERY  inserted catheter for PD    Everyuthing going fine     ANESTH,UPPER ARM AV FISTULA REPAIR Left 2018     APPENDECTOMY  1958     BIOPSY  07/2020    colon polyp-     C REPAIR CRUCIATE LIGAMENT,KNEE Left 1976    knee     CARDIOVERSION  2000     carpel tunnel surgery Left 2000     COLONOSCOPY  07/2020    [facility] ([facility])      GI SURGERY  07/2020    [facility] ([facility]     ORTHOPEDIC SURGERY Left 06/09/2020    left [facility] (Dr. Wanda ROCK)     SINUS SURGERY  1997     VASCULAR SURGERY      peritoneal dialysis 4/2020,left arm fistula       Social History     Tobacco Use     Smoking status: Never Smoker     Smokeless tobacco: Never Used   Substance Use Topics     Alcohol use: Not Currently     Frequency: Never     Comment: Stopped when I was put on blood thinners     Family History   Problem Relation Age of Onset     Cerebrovascular Disease Father         Had multiple strokes while in hospital when he passed     Prostate Cancer Brother         passed away in 2000     Other Cancer Brother         passed away in 2000         Current Outpatient Medications   Medication Sig Dispense Refill     Acetaminophen 325 MG CAPS Take 650 mg by mouth 4 times daily as needed       allopurinol (ZYLOPRIM) 100 MG tablet Take 100 mg by mouth       aspirin 81 MG EC tablet Take 81 mg by mouth       atorvastatin (LIPITOR) 20 MG tablet Take 1 tablet (20 mg) by mouth daily 90 tablet 0     blood glucose (NO BRAND SPECIFIED) test strip Use to test blood sugar 1 times daily or as directed.  Per insurance and patient preference. States had had one touch ultra 2 prior 100 each 1     blood glucose monitoring (NO BRAND SPECIFIED) meter device kit Use to test blood sugar 1 times daily or as directed.  Per insurance and patient preference; patient would like the one touch ultra 2 if insurance covers 1 kit 0     calcium  acetate (PHOSLO) 667 MG CAPS capsule Take 4 capsule 3 times a day       cephALEXin (KEFLEX) 250 MG capsule Take 1 capsule (250 mg) by mouth 2 times daily for 10 days 20 capsule 0     fenofibrate (TRICOR) 145 MG tablet Take 1 tablet (145 mg) by mouth daily 90 tablet 3     furosemide (LASIX) 80 MG tablet Take 1 tablet (80 mg) by mouth daily 90 tablet 0     gabapentin (NEURONTIN) 100 MG capsule Take 1 capsule (100 mg) by mouth 2 times daily 60 capsule 1     gentamicin (GARAMYCIN) 0.3 % ophthalmic solution Apply 1-2 drops to PD catheter exit site once daily after cleaning it.       Lancets 30G MISC Use once daily to test blood sugar 100 each 1     Misc Natural Products (OSTEO BI-FLEX TRIPLE STRENGTH) TABS Take 3 tablets by mouth 2 times daily       order for DME Equipment being ordered: Digital home blood pressure monitor kit 1 kit 0     SODIUM BICARBONATE PO Take 10 g by mouth daily       sulfamethoxazole-trimethoprim (BACTRIM) 400-80 MG tablet Take 1 tablet by mouth daily for 10 days 10 tablet 0     warfarin ANTICOAGULANT (COUMADIN) 2 MG tablet Take daily as directed. Current dose 2 mg M,W,F  and 4 mg rest of week days. 180 tablet 0     allopurinol (ZYLOPRIM) 300 MG tablet Take 2 tablets (600 mg) by mouth daily (Patient not taking: Reported on 7/27/2020) 180 tablet 3     triamcinolone (KENALOG) 0.5 % external ointment Apply to the skin of lower leg 2 times daily for 5 days (Patient not taking: Reported on 7/27/2020) 1 Tube 0     Allergies   Allergen Reactions     Penicillins      Other reaction(s): Unknown Reaction     BP Readings from Last 3 Encounters:   08/06/20 121/66   07/27/20 104/62   07/02/20 120/58    Wt Readings from Last 3 Encounters:   08/06/20 134.8 kg (297 lb 2.9 oz)   08/03/20 133.4 kg (294 lb)   07/27/20 131.7 kg (290 lb 6.4 oz)                    Reviewed and updated as needed this visit by Provider  Allergies  Meds         Review of Systems   CONSTITUTIONAL: NEGATIVE for fever, chills, change in  weight -   INTEGUMENTARY/SKIN: POSITIVE for Monday 7/27  saw Mikael Fitzpatrick, had US and started on Keflex and Bactrim for the cellulitis  The rash./ cellulitis started to clear and was doing well , but yesterday his inner thigh started to swell and now is red and warm to the touch   on Tuesday he had physical therapy and they had him do  2 new exercises .  The exercises that they had him do use the inner thigh muscles.   Walking is painful   ( total left knee replacement in June)   The pain in the thigh is greater than his knee pain    The left inner thigh is now swollen and red     ENT/MOUTH: NEGATIVE for ear, mouth and throat problems  RESP: NEGATIVE for significant cough or SOB  CV: NEGATIVE for chest pain, palpitations or peripheral edema  MUSCULOSKELETAL: POSITIVE  fsome left knee pain bur feels that the joint is feeling great,  Is doing his physical therapy . Noticed the redness is moving up the inner thigh  And is having tenderness with palpation         Objective    /66 (BP Location: Left arm, Patient Position: Chair, Cuff Size: Adult Large)   Pulse 72   Temp 99.7  F (37.6  C) (Tympanic)   Wt 134.8 kg (297 lb 2.9 oz)   BMI 41.45 kg/m    Body mass index is 41.45 kg/m .  Physical Exam   GENERAL: morbidly obese, alert and no distress  RESP: lungs clear to auscultation - no rales, rhonchi or wheezes  CV: regular rate and rhythm, normal S1 S2, no S3 or S4, no murmur, click or rub, no peripheral edema and peripheral pulses strong  MS: no gross musculoskeletal defects noted, does have recent knee replacement  Does have  edema around the knee , he does have flexion and extension with the knee   SKIN: does have erthyerma now up the left inner thigh , NO blisters , no lesions      Diagnostic Test Results:    He will have to go to [facility] for the ultrasound    And they will call and hold           ASSESSMENT/PLAN:      ICD-10-CM    1. Cellulitis of left lower extremity  L03.116 US Lower Extremity  Venous Duplex Left     cephALEXin (KEFLEX) 250 MG capsule     sulfamethoxazole-trimethoprim (BACTRIM) 400-80 MG tablet     DISCONTINUED: vancomycin (VANCOCIN) 250 MG capsule     CANCELED: US Lower Extremity Venous Duplex Left   2. Left leg swelling  M79.89 US Lower Extremity Venous Duplex Left     CANCELED: US Lower Extremity Venous Duplex Left   3. Acute deep vein thrombosis (DVT) of left lower extremity, unspecified vein (H)  I82.402 US Lower Extremity Venous Duplex Left     CANCELED: US Lower Extremity Venous Duplex Left       Patient Instructions   You can get the ultrasound done at  [facility]    They can get you in at  1 PM     They will have you stay there and call with the results.  I will call your cell phone with the results.       Continue to work with the INR clinic     Will continue with antibiotics of Keflex  250 mg  1 tablet twice per day   Bactrim  400-80  1 tablet daily       If the red line increases over the weekend , pain increases,  or if the knee becomes more swollen return the clinic or go to e ER if over the weekend /evening

## 2020-08-07 ENCOUNTER — DOCUMENTATION ONLY (OUTPATIENT)
Dept: NURSING | Facility: CLINIC | Age: 70
End: 2020-08-07

## 2020-08-07 ENCOUNTER — TELEPHONE (OUTPATIENT)
Dept: FAMILY MEDICINE | Facility: CLINIC | Age: 70
End: 2020-08-07

## 2020-08-07 ENCOUNTER — ANTICOAGULATION THERAPY VISIT (OUTPATIENT)
Dept: NURSING | Facility: CLINIC | Age: 70
End: 2020-08-07
Payer: MEDICARE

## 2020-08-07 DIAGNOSIS — Z79.01 LONG TERM (CURRENT) USE OF ANTICOAGULANTS: ICD-10-CM

## 2020-08-07 DIAGNOSIS — I82.402 ACUTE DEEP VEIN THROMBOSIS (DVT) OF LEFT LOWER EXTREMITY, UNSPECIFIED VEIN (H): ICD-10-CM

## 2020-08-07 DIAGNOSIS — I48.91 ATRIAL FIBRILLATION STATUS POST CARDIOVERSION (H): ICD-10-CM

## 2020-08-07 LAB — INR PPP: 4.4 (ref 0.9–1.1)

## 2020-08-07 PROCEDURE — 99207 ZZC NO CHARGE NURSE ONLY: CPT

## 2020-08-07 NOTE — TELEPHONE ENCOUNTER
Reason for Call:  INR    Who is calling?  Home Care: [facility]    Phone number:  [phone]    Fax number:      Name of caller: Jose Raul    INR Value:  4.4    Are there any other concerns:  Patient taking Keflex and Bactrim for another week.     Can we leave a detailed message on this number? YES    Phone number patient can be reached at: Other phone number:        Call taken on 8/7/2020 at 1:24 PM by Zuri Taylor

## 2020-08-07 NOTE — PROGRESS NOTES
ANTICOAGULATION  MANAGEMENT     Interacting Medication Review    Interacting medication(s): Sulfamethoxazole-Trimethoprim (Bactrim) and Keflex with warfarin.    Duration: continue both antibiotics    New medication?: Has been on both antibiotics and due to worsening cellulitis will continue both       PLAN     Continue current warfarin dose. Recommend to check INR on has INR due today per home care nurse. Will assess need for adjustment when results available.     Patient was not contacted    No adjustment to Anticoagulation Calendar was required    Oma Taylor RN

## 2020-08-07 NOTE — TELEPHONE ENCOUNTER
Left message on Jose Raul RN vm that pt should hold today, hold sat, take 2 mg on Sun and retest on Monday. Requested call back confirming he received this information.  Ayaka PACHECON, RN

## 2020-08-07 NOTE — PROGRESS NOTES
ANTICOAGULATION FOLLOW-UP CLINIC VISIT    Patient Name:  Leif Ariza  Date:  2020  Contact Type:  Telephone    SUBJECTIVE:  Patient Findings     Positives:   Change in medications    Comments:   Pt continuing on keflex and bactrim for one more week        Clinical Outcomes     Comments:   Pt continuing on keflex and bactrim for one more week           OBJECTIVE    Recent labs: (last 7 days)     20   INR 4.4*       ASSESSMENT / PLAN  INR assessment SUPRA    Recheck INR In: 3 DAYS    INR Location Homecare INR      Anticoagulation Summary  As of 2020    INR goal:   2.0-3.0   TTR:   47.8 % (10.7 mo)   INR used for dosin.4! (2020)   Warfarin maintenance plan:   2 mg (2 mg x 1) every Mon, Wed, Fri; 4 mg (2 mg x 2) all other days   Full warfarin instructions:   : Hold; : Hold; : 2 mg; Otherwise 2 mg every Mon, Wed, Fri; 4 mg all other days   Weekly warfarin total:   22 mg   Plan last modified:   Oma Taylor RN (2020)   Next INR check:   8/10/2020   Priority:   High   Target end date:       Indications    Atrial fibrillation status post cardioversion (H) [I48.91]  Deep vein thrombosis (DVT) (H) [I82.409]  Long term (current) use of anticoagulants [Z79.01]             Anticoagulation Episode Summary     INR check location:       Preferred lab:       Send INR reminders to:   PEARL CORNEJO    Comments:   Does peritoneal dialysis daily at home      Anticoagulation Care Providers     Provider Role Specialty Phone number    Nguyen Torres, APRN CNP Referring Nurse Practitioner - Family [phone]            See the Encounter Report to view Anticoagulation Flowsheet and Dosing Calendar (Go to Encounters tab in chart review, and find the Anticoagulation Therapy Visit)      Ayaka Arce, SE

## 2020-08-07 NOTE — TELEPHONE ENCOUNTER
Patient calling was seen in clinic yesterday and a script was sent to [facility] pharmacy for vancomycin, states to expensive. Would like script faxed to his mail order pharmacy [facility] at [phone]. Patient requesting a call or text when this has been sent to pharmacy please.

## 2020-08-08 ENCOUNTER — TELEPHONE (OUTPATIENT)
Dept: NURSING | Facility: CLINIC | Age: 70
End: 2020-08-08

## 2020-08-08 NOTE — TELEPHONE ENCOUNTER
Patient calling to find out if his Vancomycin got sent to the mail order pharmacy that he requested. I am not able to tell. It appears that it was cancelled. Please call him at [phone]. He is quite upset about this.  Bambi Lorenzana RN  [facility]

## 2020-08-10 ENCOUNTER — TELEPHONE (OUTPATIENT)
Dept: FAMILY MEDICINE | Facility: CLINIC | Age: 70
End: 2020-08-10

## 2020-08-10 ENCOUNTER — ANTICOAGULATION THERAPY VISIT (OUTPATIENT)
Dept: NURSING | Facility: CLINIC | Age: 70
End: 2020-08-10
Payer: MEDICARE

## 2020-08-10 DIAGNOSIS — I48.91 ATRIAL FIBRILLATION STATUS POST CARDIOVERSION (H): ICD-10-CM

## 2020-08-10 DIAGNOSIS — Z79.01 LONG TERM (CURRENT) USE OF ANTICOAGULANTS: ICD-10-CM

## 2020-08-10 DIAGNOSIS — I82.402 ACUTE DEEP VEIN THROMBOSIS (DVT) OF LEFT LOWER EXTREMITY, UNSPECIFIED VEIN (H): ICD-10-CM

## 2020-08-10 LAB — INR PPP: 3.9 (ref 0.9–1.1)

## 2020-08-10 PROCEDURE — 99207 ZZC NO CHARGE NURSE ONLY: CPT

## 2020-08-10 NOTE — TELEPHONE ENCOUNTER
Reason for Call:  INR    Who is calling?  Home [facility]: [facility]    Phone number:  [phone]    Fax number:      Name of caller: Jose Raul    INR Value:  3.9    Are there any other concerns:  Yes: Patient will be starting Vancomycin in addition to the other antibiotic's that he is on.  They are just waiting for the pharmacy to get it.    Can we leave a detailed message on this number? YES    Call taken on 8/10/2020 at 1:19 PM by Heather Oconnell

## 2020-08-10 NOTE — PROGRESS NOTES
ANTICOAGULATION FOLLOW-UP CLINIC VISIT    Patient Name:  Leif Ariza  Date:  8/10/2020  Contact Type:  Telephone    SUBJECTIVE:  Patient Findings     Positives:   Change in medications    Comments:   Reports starting vancomycin 250 mg 4 times daily for 10 days. Also still continues on bactrim and keflex.         Clinical Outcomes     Comments:   Reports starting vancomycin 250 mg 4 times daily for 10 days. Also still continues on bactrim and keflex.            OBJECTIVE    Recent labs: (last 7 days)     08/10/20   INR 3.9*       ASSESSMENT / PLAN  INR assessment SUPRA    Recheck INR In: 4 DAYS    INR Location Clinic      Anticoagulation Summary  As of 8/10/2020    INR goal:   2.0-3.0   TTR:   47.4 % (10.8 mo)   INR used for dosing:   3.9! (8/10/2020)   Warfarin maintenance plan:   2 mg (2 mg x 1) every Mon, Wed, Fri; 4 mg (2 mg x 2) all other days   Full warfarin instructions:   8/10: Hold; 8/11: 1 mg; 8/13: 1 mg; Otherwise 2 mg every Mon, Wed, Fri; 4 mg all other days   Weekly warfarin total:   22 mg   Plan last modified:   Oma Taylor RN (2/6/2020)   Next INR check:   8/14/2020   Priority:   High   Target end date:       Indications    Atrial fibrillation status post cardioversion (H) [I48.91]  Deep vein thrombosis (DVT) (H) [I82.409]  Long term (current) use of anticoagulants [Z79.01]             Anticoagulation Episode Summary     INR check location:       Preferred lab:       Send INR reminders to:   PEARL CORNEJO    Comments:   Does peritoneal dialysis daily at home      Anticoagulation Care Providers     Provider Role Specialty Phone number    Nguyen Torres, APRN CNP Referring Nurse Practitioner - Family [phone]            See the Encounter Report to view Anticoagulation Flowsheet and Dosing Calendar (Go to Encounters tab in chart review, and find the Anticoagulation Therapy Visit)    Dosage adjustment made based on physician directed care plan.    Oma Taylor, RN

## 2020-08-10 NOTE — TELEPHONE ENCOUNTER
Noted below and chart reviewed. Keflex and Bactrim were reordered at  Office visit on 8/6. It appears that vanco was ordered and then cancelled. Left message for nurse to call me [phone]. Oma Taylor RN

## 2020-08-10 NOTE — TELEPHONE ENCOUNTER
Spoke with home care nurse Jose Raul. Reports patient states his wife is picking up rx for him at this time for Vancomycin 250 mg 4 times a day for 10 days. I gave Jose Raul warfarin dose instructions and also spoke with patient who confirmed his wife was picking up this prescription for him. He was given warfarin dose instructions and will have next INR per home care on Friday. See anticoagulation encounter. Oma Taylor RN

## 2020-08-11 ENCOUNTER — TRANSFERRED RECORDS (OUTPATIENT)
Dept: HEALTH INFORMATION MANAGEMENT | Facility: CLINIC | Age: 70
End: 2020-08-11

## 2020-08-14 ENCOUNTER — ANTICOAGULATION THERAPY VISIT (OUTPATIENT)
Dept: NURSING | Facility: CLINIC | Age: 70
End: 2020-08-14
Payer: MEDICARE

## 2020-08-14 ENCOUNTER — MEDICAL CORRESPONDENCE (OUTPATIENT)
Dept: HEALTH INFORMATION MANAGEMENT | Facility: CLINIC | Age: 70
End: 2020-08-14

## 2020-08-14 ENCOUNTER — TELEPHONE (OUTPATIENT)
Dept: FAMILY MEDICINE | Facility: CLINIC | Age: 70
End: 2020-08-14

## 2020-08-14 DIAGNOSIS — I82.402 ACUTE DEEP VEIN THROMBOSIS (DVT) OF LEFT LOWER EXTREMITY, UNSPECIFIED VEIN (H): ICD-10-CM

## 2020-08-14 DIAGNOSIS — Z79.01 LONG TERM (CURRENT) USE OF ANTICOAGULANTS: ICD-10-CM

## 2020-08-14 DIAGNOSIS — I48.91 ATRIAL FIBRILLATION STATUS POST CARDIOVERSION (H): ICD-10-CM

## 2020-08-14 LAB — INR PPP: 2.3 (ref 0.9–1.1)

## 2020-08-14 PROCEDURE — 99207 ZZC NO CHARGE NURSE ONLY: CPT

## 2020-08-14 NOTE — TELEPHONE ENCOUNTER
I called carlos and he is not with pt. Pt has had his last home care visit.  Advised I would call pt with instructions and schedule next lab follow up appointment.  I contacted pt, see [facility] encounter for details.  Ayaka PACHECON, RN

## 2020-08-14 NOTE — TELEPHONE ENCOUNTER
Reason for Call:  Other INR    Detailed comments: Jose Raul calling with INR results today 2.3  Thank you  [phone]    Best Time: any    Can we leave a detailed message on this number? YES    Call taken on 8/14/2020 at 2:19 PM by Albina Aragon

## 2020-08-14 NOTE — PROGRESS NOTES
ANTICOAGULATION FOLLOW-UP CLINIC VISIT    Patient Name:  Leif Ariza  Date:  2020  Contact Type:  Telephone    SUBJECTIVE:  Patient Findings     Positives:   Change in medications    Comments:   See previous anticoag encounters and note on anticoag track from today.  Pt last day of home care was today.        Clinical Outcomes     Comments:   See previous anticoag encounters and note on anticoag track from today.  Pt last day of home care was today.           OBJECTIVE    Recent labs: (last 7 days)     20   INR 2.3*       ASSESSMENT / PLAN  INR assessment THER    Recheck INR In: 4 DAYS    INR Location Clinic      Anticoagulation Summary  As of 2020    INR goal:   2.0-3.0   TTR:   47.3 % (10.9 mo)   INR used for dosin.3 (2020)   Warfarin maintenance plan:   2 mg (2 mg x 1) every Mon, Wed, Fri; 4 mg (2 mg x 2) all other days   Full warfarin instructions:   : 1 mg; 15: 1 mg; 16: 1 mg; 17: 1 mg; Otherwise 2 mg every Mon, Wed, Fri; 4 mg all other days   Weekly warfarin total:   22 mg   Plan last modified:   Oma Taylor RN (2020)   Next INR check:   2020   Priority:   High   Target end date:       Indications    Atrial fibrillation status post cardioversion (H) [I48.91]  Deep vein thrombosis (DVT) (H) [I82.409]  Long term (current) use of anticoagulants [Z79.01]             Anticoagulation Episode Summary     INR check location:       Preferred lab:       Send INR reminders to:   PEARL CORNEJO    Comments:   Does peritoneal dialysis daily at home      Anticoagulation Care Providers     Provider Role Specialty Phone number    Nguyen Torres, APRN CNP Referring Nurse Practitioner - Family [phone]            See the Encounter Report to view Anticoagulation Flowsheet and Dosing Calendar (Go to Encounters tab in chart review, and find the Anticoagulation Therapy Visit)      Ayaka Arce, SE

## 2020-08-18 ENCOUNTER — ANTICOAGULATION THERAPY VISIT (OUTPATIENT)
Dept: NURSING | Facility: CLINIC | Age: 70
End: 2020-08-18
Payer: MEDICARE

## 2020-08-18 ENCOUNTER — OFFICE VISIT (OUTPATIENT)
Dept: URGENT CARE | Facility: URGENT CARE | Age: 70
End: 2020-08-18
Payer: MEDICARE

## 2020-08-18 VITALS — BODY MASS INDEX: 41.42 KG/M2 | TEMPERATURE: 98.5 F | HEART RATE: 73 BPM | WEIGHT: 297 LBS | OXYGEN SATURATION: 99 %

## 2020-08-18 DIAGNOSIS — L03.116 LEFT LEG CELLULITIS: Primary | ICD-10-CM

## 2020-08-18 DIAGNOSIS — Z79.01 ANTICOAGULATED ON COUMADIN: ICD-10-CM

## 2020-08-18 DIAGNOSIS — Z86.718 PERSONAL HISTORY OF DVT (DEEP VEIN THROMBOSIS): ICD-10-CM

## 2020-08-18 DIAGNOSIS — Z79.01 LONG TERM (CURRENT) USE OF ANTICOAGULANTS: ICD-10-CM

## 2020-08-18 DIAGNOSIS — I82.402 ACUTE DEEP VEIN THROMBOSIS (DVT) OF LEFT LOWER EXTREMITY, UNSPECIFIED VEIN (H): ICD-10-CM

## 2020-08-18 DIAGNOSIS — I82.409 DEEP VEIN THROMBOSIS (DVT) (H): ICD-10-CM

## 2020-08-18 DIAGNOSIS — Z96.652 STATUS POST TOTAL KNEE REPLACEMENT, LEFT: ICD-10-CM

## 2020-08-18 DIAGNOSIS — I48.91 ATRIAL FIBRILLATION STATUS POST CARDIOVERSION (H): ICD-10-CM

## 2020-08-18 LAB
CAPILLARY BLOOD COLLECTION: NORMAL
INR PPP: 2.2 (ref 0.86–1.14)

## 2020-08-18 PROCEDURE — 36416 COLLJ CAPILLARY BLOOD SPEC: CPT | Performed by: NURSE PRACTITIONER

## 2020-08-18 PROCEDURE — 85610 PROTHROMBIN TIME: CPT | Performed by: NURSE PRACTITIONER

## 2020-08-18 PROCEDURE — 99207 ZZC NO CHARGE NURSE ONLY: CPT

## 2020-08-18 PROCEDURE — 99214 OFFICE O/P EST MOD 30 MIN: CPT | Performed by: PHYSICIAN ASSISTANT

## 2020-08-18 RX ORDER — HYDROCODONE BITARTRATE AND ACETAMINOPHEN 5; 325 MG/1; MG/1
1 TABLET ORAL EVERY 6 HOURS PRN
Qty: 8 TABLET | Refills: 0 | Status: SHIPPED | OUTPATIENT
Start: 2020-08-18 | End: 2020-08-21

## 2020-08-18 NOTE — PROGRESS NOTES
S: 69-year-old male on Coumadin for prior DVT presents for evaluation of left thigh cellulitis.  He had a left knee replacement in June.  2 weeks ago he was given PT exercises to do at home.  He flexes his foot into the step and leans forward to stretch the knee.  He noticed swelling in the medial thigh with redness and tenderness.  He was seen at [facility] 2 weeks ago and he states ultrasound was done on both legs and were negative for DVT.  He was started on Bactrim and Keflex on 7/27 for cellulitis.  He was given a second course starting August 6.  He states he was also given oral vancomycin.  He will finish the antibiotic tomorrow and the cellulitis is not any better.  He denies chest pain or shortness of breath.  Chart is reviewed and I do not see vancomycin listed in his medication list.  He insists that he is on a third antibiotic and it looks different than the other 2 antibiotics.  No fever.  INR was 2.3 today, therapeutic.     Allergies   Allergen Reactions     Penicillins      Other reaction(s): Unknown Reaction       Past Medical History:   Diagnosis Date     Arthritis      Atrial fibrillation (H)      CKD (chronic kidney disease) stage 5, GFR less than 15 ml/min (H) 2017     DVT (deep venous thrombosis) (H) 2000     Gout      History of blood transfusion 2016 and 2020    [place] , [facility]     HLD (hyperlipidemia)      HTN (hypertension)      Kidney stone 2016     Type 2 diabetes mellitus (H)     no longer on insulin or oral med       Acetaminophen 325 MG CAPS, Take 650 mg by mouth 4 times daily as needed  allopurinol (ZYLOPRIM) 100 MG tablet, Take 100 mg by mouth  allopurinol (ZYLOPRIM) 300 MG tablet, Take 2 tablets (600 mg) by mouth daily  aspirin 81 MG EC tablet, Take 81 mg by mouth  atorvastatin (LIPITOR) 20 MG tablet, Take 1 tablet (20 mg) by mouth daily  blood glucose (NO BRAND SPECIFIED) test strip, Use to test blood sugar 1 times daily or as directed.  Per  insurance and patient preference. States had had one touch ultra 2 prior  blood glucose monitoring (NO BRAND SPECIFIED) meter device kit, Use to test blood sugar 1 times daily or as directed.  Per insurance and patient preference; patient would like the one touch ultra 2 if insurance covers  calcium acetate (PHOSLO) 667 MG CAPS capsule, Take 4 capsule 3 times a day  cephALEXin (KEFLEX) 250 MG capsule, Take 1 capsule (250 mg) by mouth 2 times daily  fenofibrate (TRICOR) 145 MG tablet, Take 1 tablet (145 mg) by mouth daily  furosemide (LASIX) 80 MG tablet, Take 1 tablet (80 mg) by mouth daily  gabapentin (NEURONTIN) 100 MG capsule, Take 1 capsule (100 mg) by mouth 2 times daily  gentamicin (GARAMYCIN) 0.3 % ophthalmic solution, Apply 1-2 drops to PD catheter exit site once daily after cleaning it.  Lancets 30G MISC, Use once daily to test blood sugar  Misc Natural Products (OSTEO BI-FLEX TRIPLE STRENGTH) TABS, Take 3 tablets by mouth 2 times daily  order for DME, Equipment being ordered: Digital home blood pressure monitor kit  SODIUM BICARBONATE PO, Take 10 g by mouth daily  sulfamethoxazole-trimethoprim (BACTRIM) 400-80 MG tablet, Take 1 tablet by mouth daily  triamcinolone (KENALOG) 0.5 % external ointment, Apply to the skin of lower leg 2 times daily for 5 days  warfarin ANTICOAGULANT (COUMADIN) 2 MG tablet, Take daily as directed. Current dose 2 mg M,W,F  and 4 mg rest of week days.  [] cephALEXin (KEFLEX) 250 MG capsule, Take 1 capsule (250 mg) by mouth 2 times daily for 10 days  [] HYDROmorphone (DILAUDID) 2 MG tablet, Take 1 tablet (2 mg) by mouth every 6 hours as needed for pain  [] sulfamethoxazole-trimethoprim (BACTRIM) 400-80 MG tablet, Take 1 tablet by mouth daily for 10 days    betamethasone acet & sod phos (CELESTONE) injection 6 mg  betamethasone acet & sod phos (CELESTONE) injection 6 mg  ropivacaine (NAROPIN) injection 2 mL  ropivacaine (NAROPIN) injection 2 mL        Social  History     Tobacco Use     Smoking status: Never Smoker     Smokeless tobacco: Never Used   Substance Use Topics     Alcohol use: Not Currently     Frequency: Never     Comment: Stopped when I was put on blood thinners       ROS:  CONSTITUTIONAL: Negative for fatigue or fever.  EYES: Negative for eye problems.  ENT: As above.  RESP: As above.  CV: Negative for chest pains.  GI: Negative for vomiting.  MUSCULOSKELETAL:  Negative for significant muscle or joint pains.  NEUROLOGIC: Negative for headaches.  SKIN: Negative for rash.  PSYCH: Normal mentation for age.    OBJECTIVE:  Pulse 73   Temp 98.5  F (36.9  C) (Temporal)   Wt 134.7 kg (297 lb)   SpO2 99%   BMI 41.42 kg/m    GENERAL APPEARANCE: Healthy, alert and no distress.  EYES:Conjunctiva/sclera clear.  NECK: Moving head neck freely   RESP: Breathing comfortably   CV: Regular rate and rhythm, normal S1 S2, no murmur noted.  NEURO: Awake, alert    SKIN: He has confluent redness, swelling, warmth to palpation, firmness, tenderness from the left medial upper thigh all the way down to the left medial knee.  The knee has swelling from the surgery which appears normal to me.  No calf tenderness.          ASSESSMENT:     ICD-10-CM    1. Left leg cellulitis  L03.116 HYDROcodone-acetaminophen (NORCO) 5-325 MG tablet   2. Status post total knee replacement, left  Z96.652    3. Anticoagulated on Coumadin  Z79.01    4. Personal history of DVT (deep vein thrombosis)  Z86.718            PLAN: The leg does appear cellulitic to me. Another possibility would be lymphedema.  It is firm, warm and tender.  I recommend he go to the ER for possible repeat ultrasound to rule out abscess. Unlikely clot as he is anticoagulated in therapeutic range.  He states he saw his orthopedic surgeon 2 days ago and he did not seem concerned about the way his leg looked.  He has been on Keflex, Bactrim and ? vancomycin.  We do not have any advanced imaging here.  He may require IV antibiotics.   He requests some pain medication and will contact his orthopedic surgeon tomorrow.  Again he refuses to go to the emergency room against my medical advise as he has had recent bad experience there.    I have discussed clinical findings with patient.      Lor Flores PA-C

## 2020-08-18 NOTE — PROGRESS NOTES
ANTICOAGULATION FOLLOW-UP CLINIC VISIT    Patient Name:  Leif Ariza  Date:  2020  Contact Type:  Telephone    SUBJECTIVE:  Patient Findings     Comments:   States continues on vancomycin for 3 more days and still has a few days left of keflex. Done with bactrim. States has apt at [facility] on Friday for pre op physical and covid test and also.apt with provider here for follow up of cellulitis.Having surgery on  for knee manipulation and states does not need to hold warfarin for this. Check INR on Thursday as this is only time that works for him and lab available. Anticipate INR to trend down when off antibiotic.         Clinical Outcomes     Comments:   States continues on vancomycin for 3 more days and still has a few days left of keflex. Done with bactrim. States has apt at [facility] on Friday for pre op physical and covid test and also.apt with provider here for follow up of cellulitis.Having surgery on  for knee manipulation and states does not need to hold warfarin for this. Check INR on Thursday as this is only time that works for him and lab available. Anticipate INR to trend down when off antibiotic.            OBJECTIVE    Recent labs: (last 7 days)     20  1357   INR 2.20*       ASSESSMENT / PLAN  INR assessment THER    Recheck INR In: 2 DAYS    INR Location Clinic      Anticoagulation Summary  As of 2020    INR goal:   2.0-3.0   TTR:   48.1 % (11 mo)   INR used for dosin.20 (2020)   Warfarin maintenance plan:   2 mg (2 mg x 1) every Mon, Wed, Fri; 4 mg (2 mg x 2) all other days   Full warfarin instructions:   : 2 mg; : 1 mg; Otherwise 2 mg every Mon, Wed, Fri; 4 mg all other days   Weekly warfarin total:   22 mg   Plan last modified:   Oma Taylor RN (2020)   Next INR check:   2020   Priority:   High   Target end date:       Indications    Atrial fibrillation status post cardioversion (H) [I48.91]  Deep vein thrombosis (DVT) (H) [I82.409]  Long term  (current) use of anticoagulants [Z79.01]             Anticoagulation Episode Summary     INR check location:       Preferred lab:       Send INR reminders to:   PEARL CORNEJO    Comments:   Does peritoneal dialysis daily at home      Anticoagulation Care Providers     Provider Role Specialty Phone number    Nguyen Torres, APRN CNP Referring Nurse Practitioner - Family [phone]            See the Encounter Report to view Anticoagulation Flowsheet and Dosing Calendar (Go to Encounters tab in chart review, and find the Anticoagulation Therapy Visit)    Dosage adjustment made based on physician directed care plan.    Oma Taylor RN

## 2020-08-20 ENCOUNTER — ANTICOAGULATION THERAPY VISIT (OUTPATIENT)
Dept: NURSING | Facility: CLINIC | Age: 70
End: 2020-08-20

## 2020-08-20 DIAGNOSIS — I82.409 DEEP VEIN THROMBOSIS (DVT) (H): ICD-10-CM

## 2020-08-20 DIAGNOSIS — Z79.01 LONG TERM (CURRENT) USE OF ANTICOAGULANTS: ICD-10-CM

## 2020-08-20 DIAGNOSIS — I48.91 ATRIAL FIBRILLATION STATUS POST CARDIOVERSION (H): ICD-10-CM

## 2020-08-20 DIAGNOSIS — I82.402 ACUTE DEEP VEIN THROMBOSIS (DVT) OF LEFT LOWER EXTREMITY, UNSPECIFIED VEIN (H): ICD-10-CM

## 2020-08-20 LAB
CAPILLARY BLOOD COLLECTION: NORMAL
INR PPP: 2.4 (ref 0.86–1.14)

## 2020-08-20 PROCEDURE — 99207 ZZC NO CHARGE NURSE ONLY: CPT

## 2020-08-20 PROCEDURE — 36416 COLLJ CAPILLARY BLOOD SPEC: CPT | Performed by: NURSE PRACTITIONER

## 2020-08-20 PROCEDURE — 85610 PROTHROMBIN TIME: CPT | Performed by: NURSE PRACTITIONER

## 2020-08-20 NOTE — PROGRESS NOTES
ANTICOAGULATION FOLLOW-UP CLINIC VISIT    Patient Name:  Leif Ariza  Date:  2020  Contact Type:  Telephone    SUBJECTIVE:  Patient Findings     Positives:   Change in medications    Comments:   Patient finished his antibiotics yesterday.     Sonya COBURN, RN, CPN          Clinical Outcomes     Comments:   Patient finished his antibiotics yesterday.     Sonya CBOURN, RN, CPN             OBJECTIVE    Recent labs: (last 7 days)     20  1259   INR 2.40*       ASSESSMENT / PLAN  INR assessment THER    Recheck INR In: 4 DAYS    INR Location Clinic      Anticoagulation Summary  As of 2020    INR goal:   2.0-3.0   TTR:   48.4 % (11.1 mo)   INR used for dosin.40 (2020)   Warfarin maintenance plan:   2 mg (2 mg x 1) every Tue; 1 mg (2 mg x 0.5) all other days   Full warfarin instructions:   : 2 mg; Otherwise 2 mg every Tue; 1 mg all other days   Weekly warfarin total:   8 mg   Plan last modified:   Sonya Lopez RN (2020)   Next INR check:   2020   Priority:   High   Target end date:       Indications    Atrial fibrillation status post cardioversion (H) [I48.91]  Deep vein thrombosis (DVT) (H) [I82.409]  Long term (current) use of anticoagulants [Z79.01]             Anticoagulation Episode Summary     INR check location:       Preferred lab:       Send INR reminders to:   PEARL CORNEJO    Comments:   Does peritoneal dialysis daily at home      Anticoagulation Care Providers     Provider Role Specialty Phone number    Nguyen Torres, APRN CNP Referring Nurse Practitioner - Family [phone]            See the Encounter Report to view Anticoagulation Flowsheet and Dosing Calendar (Go to Encounters tab in chart review, and find the Anticoagulation Therapy Visit)    Dosage adjustment made based on physician directed care plan.    Sonya Lopez RN

## 2020-08-21 ENCOUNTER — OFFICE VISIT (OUTPATIENT)
Dept: FAMILY MEDICINE | Facility: CLINIC | Age: 70
End: 2020-08-21
Payer: MEDICARE

## 2020-08-21 VITALS
DIASTOLIC BLOOD PRESSURE: 67 MMHG | TEMPERATURE: 98.5 F | WEIGHT: 296 LBS | HEART RATE: 79 BPM | BODY MASS INDEX: 41.28 KG/M2 | SYSTOLIC BLOOD PRESSURE: 110 MMHG

## 2020-08-21 DIAGNOSIS — M79.89 PAIN AND SWELLING OF LOWER EXTREMITY, LEFT: Primary | ICD-10-CM

## 2020-08-21 DIAGNOSIS — M79.605 PAIN AND SWELLING OF LOWER EXTREMITY, LEFT: Primary | ICD-10-CM

## 2020-08-21 PROCEDURE — 36415 COLL VENOUS BLD VENIPUNCTURE: CPT | Performed by: NURSE PRACTITIONER

## 2020-08-21 PROCEDURE — 85025 COMPLETE CBC W/AUTO DIFF WBC: CPT | Performed by: NURSE PRACTITIONER

## 2020-08-21 PROCEDURE — 99214 OFFICE O/P EST MOD 30 MIN: CPT | Performed by: NURSE PRACTITIONER

## 2020-08-21 RX ORDER — ATORVASTATIN CALCIUM 80 MG/1
80 TABLET, FILM COATED ORAL DAILY
COMMUNITY
Start: 2020-08-11

## 2020-08-21 ASSESSMENT — ENCOUNTER SYMPTOMS
ARTHRALGIAS: 1
MYALGIAS: 1
COLOR CHANGE: 1
CHILLS: 0
FEVER: 0

## 2020-08-21 NOTE — PROGRESS NOTES
"Subjective     Leif Ariza is a 69 year old male who presents to clinic today for the following health issues:    HPI     Jean presents for follow-up of left leg cellulitis.      He had a left knee replacement in June.    7/27/2020- he presented with pain/swelling.  Ultrasound revealed DVT popliteal, distal femoral, and mid femoral veins.  He was started on Bactrim and Keflex.    8/6/2020- repeat ultrasound revealed improved DVT of mid femoral vein and resolution of popliteal and distal femoral veins.  He was given another round of Keflex and Bactrim with Vancomycin.      Overall Jean reports no improvement in symptoms with the multiple rounds of antibiotics.  Rates pain \"4-5\".  He was scheduled to have manipulation procedure of the left knee, however he reports not being cleared by Cardiology and will have to postpone this procedure.        Review of Systems   Constitutional: Negative for chills and fever.   Cardiovascular: Negative for chest pain.   Musculoskeletal: Positive for arthralgias and myalgias.   Skin: Positive for color change.            Objective    /67   Pulse 79   Temp 98.5  F (36.9  C) (Oral)   Wt 134.3 kg (296 lb)   BMI 41.28 kg/m    Body mass index is 41.28 kg/m .  Physical Exam  Vitals signs reviewed.   Cardiovascular:      Rate and Rhythm: Normal rate and regular rhythm.   Pulmonary:      Effort: Pulmonary effort is normal.      Breath sounds: Normal breath sounds.   Musculoskeletal:         General: Tenderness present.        Legs:       Comments: Erythema and edema of medial left upper thigh.  Warm to touch.  Appears to be cellulitic.  Incision left knee c/d/i healed nicely   Skin:     General: Skin is warm and dry.      Findings: Erythema present.   Neurological:      Mental Status: He is alert and oriented to person, place, and time.   Psychiatric:         Mood and Affect: Mood normal.         Behavior: Behavior normal.                Assessment & Plan     1. Pain and swelling " of lower extremity, left  - CBC with platelets and differential    At this time I cannot determine if symptoms are due to cellulitis as I would expect he would have had some improvement after the multiple rounds of antibiotcs (Keflex x 2, Bactrim x 2, Vancomycin x 1).  He is anticoagulated with Warfarin, recent INR 2.4.  Will start with labs.  I have advised him to contact his Orthopedics for follow-up.        Return in about 2 weeks (around 9/4/2020).    JOYA Motta [facility]

## 2020-08-22 LAB
ANISOCYTOSIS BLD QL SMEAR: ABNORMAL
BASOPHILS NFR BLD AUTO: 1 %
DIFFERENTIAL METHOD BLD: ABNORMAL
EOSINOPHIL NFR BLD AUTO: 5 %
ERYTHROCYTE [DISTWIDTH] IN BLOOD BY AUTOMATED COUNT: 17.6 % (ref 10–15)
HCT VFR BLD AUTO: 32 % (ref 40–53)
HGB BLD-MCNC: 10.1 G/DL (ref 13.3–17.7)
LYMPHOCYTES NFR BLD AUTO: 25 %
MCH RBC QN AUTO: 33.7 PG (ref 26.5–33)
MCHC RBC AUTO-ENTMCNC: 31.6 G/DL (ref 31.5–36.5)
MCV RBC AUTO: 106 FL (ref 78–100)
MONOCYTES NFR BLD AUTO: 7 %
NEUTROPHILS NFR BLD AUTO: 62 %
NRBC BLD AUTO-RTO: 1 /100
PLATELET # BLD AUTO: 402 10E9/L (ref 150–450)
POLYCHROMASIA BLD QL SMEAR: ABNORMAL
RBC # BLD AUTO: 3 10E12/L (ref 4.4–5.9)
WBC # BLD AUTO: 11.2 10E9/L (ref 4–11)

## 2020-08-24 ENCOUNTER — ANTICOAGULATION THERAPY VISIT (OUTPATIENT)
Dept: NURSING | Facility: CLINIC | Age: 70
End: 2020-08-24

## 2020-08-24 DIAGNOSIS — Z79.01 LONG TERM (CURRENT) USE OF ANTICOAGULANTS: ICD-10-CM

## 2020-08-24 DIAGNOSIS — I82.402 ACUTE DEEP VEIN THROMBOSIS (DVT) OF LEFT LOWER EXTREMITY, UNSPECIFIED VEIN (H): ICD-10-CM

## 2020-08-24 DIAGNOSIS — I82.409 DEEP VEIN THROMBOSIS (DVT) (H): ICD-10-CM

## 2020-08-24 DIAGNOSIS — I87.2 VENOUS STASIS DERMATITIS OF RIGHT LOWER EXTREMITY: ICD-10-CM

## 2020-08-24 DIAGNOSIS — I48.91 ATRIAL FIBRILLATION STATUS POST CARDIOVERSION (H): ICD-10-CM

## 2020-08-24 LAB
CAPILLARY BLOOD COLLECTION: NORMAL
INR PPP: 1.6 (ref 0.86–1.14)

## 2020-08-24 PROCEDURE — 36416 COLLJ CAPILLARY BLOOD SPEC: CPT | Performed by: NURSE PRACTITIONER

## 2020-08-24 PROCEDURE — 99207 ZZC NO CHARGE NURSE ONLY: CPT

## 2020-08-24 PROCEDURE — 85610 PROTHROMBIN TIME: CPT | Performed by: NURSE PRACTITIONER

## 2020-08-24 NOTE — PROGRESS NOTES
ANTICOAGULATION FOLLOW-UP CLINIC VISIT    Patient Name:  Leif Ariza  Date:  2020  Contact Type:  Telephone    SUBJECTIVE:  Patient Findings     Comments:   Trending down. Off al antibiotics. Still having pain and swelling in thigh area. Waiting for call back to see if surgery/ knee manipulation is still on for tomorrow. No warfarin hold was needed for this.         Clinical Outcomes     Comments:   Trending down. Off al antibiotics. Still having pain and swelling in thigh area. Waiting for call back to see if surgery/ knee manipulation is still on for tomorrow. No warfarin hold was needed for this.            OBJECTIVE    Recent labs: (last 7 days)     20  1312   INR 1.60*       ASSESSMENT / PLAN  INR assessment SUB    Recheck INR In: 4 DAYS    INR Location Clinic      Anticoagulation Summary  As of 2020    INR goal:   2.0-3.0   TTR:   48.4 % (11.2 mo)   INR used for dosin.60! (2020)   Warfarin maintenance plan:   2 mg (2 mg x 1) every Tue; 1 mg (2 mg x 0.5) all other days   Full warfarin instructions:   : 2 mg; : 2 mg; Otherwise 2 mg every Tue; 1 mg all other days   Weekly warfarin total:   8 mg   Plan last modified:   Sonya Lopez RN (2020)   Next INR check:   2020   Priority:   High   Target end date:       Indications    Atrial fibrillation status post cardioversion (H) [I48.91]  Deep vein thrombosis (DVT) (H) [I82.409]  Long term (current) use of anticoagulants [Z79.01]             Anticoagulation Episode Summary     INR check location:       Preferred lab:       Send INR reminders to:   PEARL CORNEJO    Comments:   Does peritoneal dialysis daily at home      Anticoagulation Care Providers     Provider Role Specialty Phone number    Nguyen Torres, APRN CNP Referring Nurse Practitioner - Family [phone]            See the Encounter Report to view Anticoagulation Flowsheet and Dosing Calendar (Go to Encounters tab in chart review, and find the  Anticoagulation Therapy Visit)    Dosage adjustment made based on physician directed care plan.    Oma Taylor RN

## 2020-08-25 DIAGNOSIS — R20.9 SENSORY DISORDER: ICD-10-CM

## 2020-08-25 RX ORDER — TRIAMCINOLONE ACETONIDE 5 MG/G
OINTMENT TOPICAL
Qty: 1 TUBE | Refills: 2 | Status: SHIPPED | OUTPATIENT
Start: 2020-08-25 | End: 2020-10-02

## 2020-08-25 NOTE — TELEPHONE ENCOUNTER
Rx Authorization:    Requested Medication/ Dose: Gabapentin 100Mg Caps    Date last refill ordered: 4/27/20    Quantity ordered: 60 caps    # refills: 1    Date of last clinic visit with ordering provider: 4/7/20    Date of next clinic visit with ordering provider: 8/28/20    All pertinent protocol data (lab date/result):     Include pertinent information from patients message:

## 2020-08-26 ENCOUNTER — OFFICE VISIT (OUTPATIENT)
Dept: FAMILY MEDICINE | Facility: CLINIC | Age: 70
End: 2020-08-26
Payer: MEDICARE

## 2020-08-26 VITALS
TEMPERATURE: 98 F | WEIGHT: 292 LBS | OXYGEN SATURATION: 98 % | RESPIRATION RATE: 14 BRPM | HEART RATE: 82 BPM | DIASTOLIC BLOOD PRESSURE: 52 MMHG | SYSTOLIC BLOOD PRESSURE: 90 MMHG | BODY MASS INDEX: 40.73 KG/M2

## 2020-08-26 DIAGNOSIS — E79.0 HYPERURICEMIA: ICD-10-CM

## 2020-08-26 DIAGNOSIS — L03.119 CELLULITIS OF LOWER EXTREMITY, UNSPECIFIED LATERALITY: Primary | ICD-10-CM

## 2020-08-26 DIAGNOSIS — I87.8 VENOUS STASIS: ICD-10-CM

## 2020-08-26 DIAGNOSIS — G47.33 OSA (OBSTRUCTIVE SLEEP APNEA): Chronic | ICD-10-CM

## 2020-08-26 DIAGNOSIS — I82.4Y2 ACUTE DEEP VEIN THROMBOSIS (DVT) OF PROXIMAL VEIN OF LEFT LOWER EXTREMITY (H): ICD-10-CM

## 2020-08-26 DIAGNOSIS — E66.01 MORBID OBESITY (H): Chronic | ICD-10-CM

## 2020-08-26 DIAGNOSIS — Z79.01 LONG TERM (CURRENT) USE OF ANTICOAGULANTS: Chronic | ICD-10-CM

## 2020-08-26 PROCEDURE — 99214 OFFICE O/P EST MOD 30 MIN: CPT | Performed by: FAMILY MEDICINE

## 2020-08-26 RX ORDER — CLINDAMYCIN HCL 150 MG
150 CAPSULE ORAL 4 TIMES DAILY
Qty: 40 CAPSULE | Refills: 0 | Status: SHIPPED | OUTPATIENT
Start: 2020-08-26 | End: 2020-08-26

## 2020-08-26 RX ORDER — CEPHALEXIN 500 MG/1
CAPSULE ORAL
Qty: 20 CAPSULE | Refills: 0 | Status: SHIPPED | OUTPATIENT
Start: 2020-08-26 | End: 2020-08-26

## 2020-08-26 RX ORDER — HYDROCODONE BITARTRATE AND ACETAMINOPHEN 5; 325 MG/1; MG/1
TABLET ORAL
COMMUNITY
Start: 2020-08-19 | End: 2020-09-30

## 2020-08-26 RX ORDER — CEPHALEXIN 500 MG/1
500 CAPSULE ORAL 3 TIMES DAILY
Qty: 30 CAPSULE | Refills: 0 | Status: SHIPPED | OUTPATIENT
Start: 2020-08-26 | End: 2020-08-26

## 2020-08-26 RX ORDER — CEPHALEXIN 500 MG/1
CAPSULE ORAL
Qty: 20 CAPSULE | Refills: 0 | Status: SHIPPED | OUTPATIENT
Start: 2020-08-26 | End: 2020-09-21

## 2020-08-26 RX ORDER — GABAPENTIN 100 MG/1
CAPSULE ORAL
Qty: 60 CAPSULE | Refills: 0 | Status: SHIPPED | OUTPATIENT
Start: 2020-08-26 | End: 2020-10-21

## 2020-08-26 RX ORDER — CLINDAMYCIN HCL 150 MG
150 CAPSULE ORAL 4 TIMES DAILY
Qty: 40 CAPSULE | Refills: 0 | Status: SHIPPED | OUTPATIENT
Start: 2020-08-26 | End: 2020-09-21

## 2020-08-26 RX ORDER — HYDROCODONE BITARTRATE AND ACETAMINOPHEN 5; 325 MG/1; MG/1
TABLET ORAL
Status: CANCELLED | OUTPATIENT
Start: 2020-08-26

## 2020-08-26 ASSESSMENT — PAIN SCALES - GENERAL: PAINLEVEL: NO PAIN (0)

## 2020-08-26 NOTE — PROGRESS NOTES
Subjective     Leif Ariza is a 69 year old male who presents to clinic today for the following health issues:    HPI     70 yo white  male with known History of ESRD-on dialysis, sleep apnea, morbid obesity presents with leg swelling and redness in his LE  He is s/p Left TA and is in PT  Concern - Leg problem  Onset: 2 weeks ago  Description: bilateral leg pain and swelling LE-has been Treated for cellulitis and  DVT-currently on coumadin  Pt still has some Redness  He wants referral for Lymphedema therapy-was seen by his surgeon this week and was recommended this  Left Knee replacement is doing well and he is In PT  Intensity: Patient currently with pain medication is a 5 with out pain medication is 7-8/10  Progression of Symptoms:  worsening  Accompanying Signs & Symptoms: no fever, no recent illness,   Previous history of similar problem: yes see epic.  Precipitating factors:        Worsened by: none  Alleviating factors:        Improved by: none  Therapies tried and outcome: Vancomycin, Bactrim, keflex, Lidocaine cream not helping and has spread to right leg  Previous Notes reviewed       Review of Systems   CONSTITUTIONAL: NEGATIVE for fever, chills, change in weight  ENT/MOUTH: NEGATIVE for ear, mouth and throat problems  RESP:pt has chronic sob which is at baseline,no cough  CV: NEGATIVE for chest pain, palpitations or peripheral edema  GI: NEGATIVE for nausea, abdominal pain, heartburn, or change in bowel habits  MUSCULOSKELETAL: as above    .restrros  Pt on Dialysis  Objective    BP 90/52   Pulse 82   Temp 98  F (36.7  C) (Oral)   Resp 14   Wt 132.5 kg (292 lb)   SpO2 98%   BMI 40.73 kg/m    Body mass index is 40.73 kg/m .  Physical Exam   GENERAL: alert, no distress and obese  RESP: lungs clear to auscultation - no rales, rhonchi or wheezes  CV: regular rate and rhythm, normal S1 S2, no S3 or S4, no murmur, click or rub, no peripheral edema and peripheral pulses strong  ABDOMEN: obese  Left  Extremilt-erythema medial aspect left Thigh with mild tenderness  Knee Looks Fine  1 + pedal edema  Right LE-erythema Lower leg anteriorly  with venous stasis  No calf swelling    Reviewed         Assessment & Plan   (L03.119) Cellulitis of lower extremity, unspecified laterality  (primary encounter diagnosis)  Comment:   Plan:clindamycin        (CLEOCIN) 150 MG capsule, DISCONTINUED:         cephALEXin (KEFLEX) 500 MG capsule,               SEE EPIC care orders  The potential side effects of this medication have been discussed with the patient.  Call if any significant problems with these are experienced.  Keep legs elevated  Wear compression stockings    (I87.8) Venous stasis  Comment: levate  Plan: LYMPHEDEMA THERAPY REFERRAL            (Z79.01) Long term (current) use of anticoagulants  Comment: advised Get INR check 2-3 days  Plan:     (I82.4Y2) Acute deep vein thrombosis (DVT) of proximal vein of left lower extremity (H)  Comment: on coumadin  Plan: INR is Therapeutic    (E66.01) Morbid obesity (H)  Comment:   Plan:     (G47.33) DORI (obstructive sleep apnea)  Comment: sats Good          Return in about 1 week (around 9/2/2020) for recheck.    Amber Capellan MD  [facility]

## 2020-08-26 NOTE — PATIENT INSTRUCTIONS
Please Keep Your legs elevated  Wear compression stockings  I have Given you  Clindamycin  Get INR check in 3 days as You are Taking Keflex  Follow up 1 week if not better/sooner if worse  Sincerely,  Amber Capellan MD

## 2020-08-27 DIAGNOSIS — E66.01 MORBID OBESITY (H): ICD-10-CM

## 2020-08-27 DIAGNOSIS — N18.6 END STAGE RENAL DISEASE (H): ICD-10-CM

## 2020-08-27 DIAGNOSIS — Z76.82 ORGAN TRANSPLANT CANDIDATE: ICD-10-CM

## 2020-08-27 DIAGNOSIS — I10 HYPERTENSION: Primary | ICD-10-CM

## 2020-08-27 DIAGNOSIS — Z01.818 PRE-TRANSPLANT EVALUATION FOR KIDNEY TRANSPLANT: ICD-10-CM

## 2020-08-28 ENCOUNTER — TELEPHONE (OUTPATIENT)
Dept: TRANSPLANT | Facility: CLINIC | Age: 70
End: 2020-08-28

## 2020-08-28 ENCOUNTER — ANTICOAGULATION THERAPY VISIT (OUTPATIENT)
Dept: NURSING | Facility: CLINIC | Age: 70
End: 2020-08-28

## 2020-08-28 DIAGNOSIS — I82.402 ACUTE DEEP VEIN THROMBOSIS (DVT) OF LEFT LOWER EXTREMITY, UNSPECIFIED VEIN (H): ICD-10-CM

## 2020-08-28 DIAGNOSIS — I82.409 DEEP VEIN THROMBOSIS (DVT) (H): ICD-10-CM

## 2020-08-28 DIAGNOSIS — I48.91 ATRIAL FIBRILLATION STATUS POST CARDIOVERSION (H): ICD-10-CM

## 2020-08-28 DIAGNOSIS — Z79.01 LONG TERM (CURRENT) USE OF ANTICOAGULANTS: ICD-10-CM

## 2020-08-28 LAB
CAPILLARY BLOOD COLLECTION: NORMAL
INR PPP: 3 (ref 0.86–1.14)

## 2020-08-28 PROCEDURE — 36416 COLLJ CAPILLARY BLOOD SPEC: CPT | Performed by: NURSE PRACTITIONER

## 2020-08-28 PROCEDURE — 85610 PROTHROMBIN TIME: CPT | Performed by: NURSE PRACTITIONER

## 2020-08-28 PROCEDURE — 99207 ZZC NO CHARGE NURSE ONLY: CPT

## 2020-08-28 NOTE — PROGRESS NOTES
ANTICOAGULATION FOLLOW-UP CLINIC VISIT    Patient Name:  Leif Ariza  Date:  8/28/2020  Contact Type:  Telephone    SUBJECTIVE:  Patient Findings     Positives:   Change in medications    Comments:   Had procedure cancelled due to continued cellulitis in leg. Saw provider 2 days ago and ordered Keflex and cleocin for 10 days.         Clinical Outcomes     Comments:   Had procedure cancelled due to continued cellulitis in leg. Saw provider 2 days ago and ordered Keflex and cleocin for 10 days.            OBJECTIVE    Recent labs: (last 7 days)     08/28/20  1151   INR 3.00*       ASSESSMENT / PLAN  INR assessment THER    Recheck INR In: 5 DAYS    INR Location Clinic      Anticoagulation Summary  As of 8/28/2020    INR goal:   2.0-3.0   TTR:   48.7 % (11.4 mo)   INR used for dosing:   3.00 (8/28/2020)   Warfarin maintenance plan:   2 mg (2 mg x 1) every Tue; 1 mg (2 mg x 0.5) all other days   Full warfarin instructions:   8/31: 2 mg; 9/1: 1 mg; Otherwise 2 mg every Tue; 1 mg all other days   Weekly warfarin total:   8 mg   Plan last modified:   Sonya Lopez, RN (8/20/2020)   Next INR check:   9/2/2020   Priority:   High   Target end date:       Indications    Atrial fibrillation status post cardioversion (H) [I48.91]  Deep vein thrombosis (DVT) (H) [I82.409]  Long term (current) use of anticoagulants [Z79.01]             Anticoagulation Episode Summary     INR check location:       Preferred lab:       Send INR reminders to:   PEARL CORNEJO    Comments:   Does peritoneal dialysis daily at home      Anticoagulation Care Providers     Provider Role Specialty Phone number    Nguyen Torres, JOYA CNP Referring Nurse Practitioner - Family [phone]            See the Encounter Report to view Anticoagulation Flowsheet and Dosing Calendar (Go to Encounters tab in chart review, and find the Anticoagulation Therapy Visit)    Dosage adjustment made based on physician directed care plan.    Oma Taylor,  RN

## 2020-08-28 NOTE — TELEPHONE ENCOUNTER
Called patient to schedule for Wt Loss Mgmt appts and he declined, he thought by losing 100# that he would qualify for a kidney.

## 2020-08-30 ENCOUNTER — TRANSFERRED RECORDS (OUTPATIENT)
Dept: HEALTH INFORMATION MANAGEMENT | Facility: CLINIC | Age: 70
End: 2020-08-30

## 2020-08-30 LAB
CREAT SERPL-MCNC: 10.98 MG/DL (ref 0.72–1.25)
GFR SERPL CREATININE-BSD FRML MDRD: 5 ML/MIN/1.73M2
GLUCOSE SERPL-MCNC: 118 MG/DL (ref 65–100)
INR PPP: 2.8
POTASSIUM SERPL-SCNC: 5.1 MMOL/L (ref 3.5–5)

## 2020-09-01 ENCOUNTER — MEDICAL CORRESPONDENCE (OUTPATIENT)
Dept: HEALTH INFORMATION MANAGEMENT | Facility: CLINIC | Age: 70
End: 2020-09-01

## 2020-09-04 ENCOUNTER — TELEPHONE (OUTPATIENT)
Dept: FAMILY MEDICINE | Facility: CLINIC | Age: 70
End: 2020-09-04

## 2020-09-04 ENCOUNTER — MEDICAL CORRESPONDENCE (OUTPATIENT)
Dept: HEALTH INFORMATION MANAGEMENT | Facility: CLINIC | Age: 70
End: 2020-09-04

## 2020-09-04 LAB
ALT SERPL-CCNC: 12 IU/L (ref 8–45)
AST SERPL-CCNC: 41 IU/L (ref 2–40)
CREAT SERPL-MCNC: 10.65 MG/DL (ref 0.72–1.25)
GFR SERPL CREATININE-BSD FRML MDRD: 6 ML/MIN/1.73M2
GLUCOSE SERPL-MCNC: 103 MG/DL (ref 65–100)
POTASSIUM SERPL-SCNC: 4.7 MMOL/L (ref 3.5–5)

## 2020-09-04 NOTE — TELEPHONE ENCOUNTER
No show for INR apt on 9/2. Follow up call made and he reports went to [facility] ED on Sunday evening and was admitted to hospital for ongoing leg pain. Still in hospital. Reports is a little groggy from pain meds so not up to talking at this time. Advised he can call to update me when discharged.  Oma Taylor RN

## 2020-09-06 LAB — INR PPP: 2.3

## 2020-09-08 ENCOUNTER — PATIENT OUTREACH (OUTPATIENT)
Dept: CARE COORDINATION | Facility: CLINIC | Age: 70
End: 2020-09-08

## 2020-09-08 DIAGNOSIS — L03.119 CELLULITIS OF LOWER EXTREMITY, UNSPECIFIED LATERALITY: ICD-10-CM

## 2020-09-08 DIAGNOSIS — L03.90 CELLULITIS, UNSPECIFIED CELLULITIS SITE: Primary | ICD-10-CM

## 2020-09-08 DIAGNOSIS — Z96.652 S/P TOTAL KNEE ARTHROPLASTY, LEFT: ICD-10-CM

## 2020-09-08 NOTE — PROGRESS NOTES
Clinic Care Coordination Contact  Care Coordination Transition Communication    Clinical Data: Patient was hospitalized at [facility] from 8/30 to 9/6 with diagnosis of Cellulitis, S/P total knee arthroplasty- left, constipation, Atrial fibrillation, GERD, Pain in thighs, Cellulitis of lower extremity, unspecified laterality. ERSD, PD    Transition to Facility:              Facility Name: [facility]              Contact name and phone number/fax: Jennifer VARGAS, [phone]    Plan: RN/SW Care Coordinator will await notification from facility staff informing RN/SW Care Coordinator of patient's discharge plans/needs. RN/SW Care Coordinator will review chart and outreach to facility staff every 4 weeks and as needed.     IRISH Mclaughlin RN Clinic Care Coordinator   [place], [place], [place]  Phone: [phone]

## 2020-09-16 ENCOUNTER — TELEPHONE (OUTPATIENT)
Dept: FAMILY MEDICINE | Facility: CLINIC | Age: 70
End: 2020-09-16

## 2020-09-16 DIAGNOSIS — L03.119 CELLULITIS OF LOWER EXTREMITY, UNSPECIFIED LATERALITY: Primary | ICD-10-CM

## 2020-09-16 NOTE — TELEPHONE ENCOUNTER
Jennifer states patient will be leaving re-hab post hospitalization for cellulitis, chronic leg pain etc (info can be found in Care Everywhere) and she will be making a referral to  Home Care for skilled nursing care and PT for strength and conditioning, but she was told if you also made a referral to  Home Care it will most likely go through and wonders if you will be willing to do it.    Thank you.

## 2020-09-21 ENCOUNTER — TELEPHONE (OUTPATIENT)
Dept: FAMILY MEDICINE | Facility: CLINIC | Age: 70
End: 2020-09-21

## 2020-09-21 ENCOUNTER — ANTICOAGULATION THERAPY VISIT (OUTPATIENT)
Dept: NURSING | Facility: CLINIC | Age: 70
End: 2020-09-21
Payer: MEDICARE

## 2020-09-21 ENCOUNTER — MEDICAL CORRESPONDENCE (OUTPATIENT)
Dept: HEALTH INFORMATION MANAGEMENT | Facility: CLINIC | Age: 70
End: 2020-09-21

## 2020-09-21 DIAGNOSIS — I82.402 ACUTE DEEP VEIN THROMBOSIS (DVT) OF LEFT LOWER EXTREMITY, UNSPECIFIED VEIN (H): ICD-10-CM

## 2020-09-21 DIAGNOSIS — E83.59 CALCIPHYLAXIS: Primary | ICD-10-CM

## 2020-09-21 DIAGNOSIS — I48.91 ATRIAL FIBRILLATION STATUS POST CARDIOVERSION (H): ICD-10-CM

## 2020-09-21 DIAGNOSIS — Z79.01 LONG TERM (CURRENT) USE OF ANTICOAGULANTS: ICD-10-CM

## 2020-09-21 LAB — INR PPP: 1.7 (ref 0.9–1.1)

## 2020-09-21 PROCEDURE — 99207 ZZC NO CHARGE NURSE ONLY: CPT

## 2020-09-21 NOTE — TELEPHONE ENCOUNTER
Reason for Call:  Other call back    Detailed comments: Dr. Irby needs to talk with Nguyen regarding Jean switching back to dialysis. He needs to talk about a condition Jean has. Please call. Thank you    Phone Number Patient can be reached at: Other phone number:  [phone] Michael    Best Time: ASAP will only be in this morning    Can we leave a detailed message on this number? Not Applicable    Call taken on 9/21/2020 at 9:53 AM by Jada Lanza

## 2020-09-21 NOTE — TELEPHONE ENCOUNTER
Reason for Call: Request for an order or referral:    Order or referral being requested:verbal orders: continued phyiscal therapy 1 x a week for 4 weeks.  Please call farhat back    Date needed: as soon as possible    Has the patient been seen by the PCP for this problem? Not Applicable    Additional comments: none    Phone number Patient can be reached at:  Other phone number:  farhat*    Best Time:  any    Can we leave a detailed message on this number?  YES    Call taken on 9/21/2020 at 3:32 PM by Yaima Bermeo

## 2020-09-21 NOTE — TELEPHONE ENCOUNTER
Released from hospital for cellulitis of both legs, started home care services over the weekend for skilled nursing and PT.     Will be faxing over authorization for medication as patient is on multiple meds that could be interacting.     Sarah Recio TC/Pt Rep

## 2020-09-21 NOTE — TELEPHONE ENCOUNTER
Please advise as soon as possible.  He is scheduled for appointment with LEAH Torres on Wednesday at 11am but he will be out of his 3 requested meds before then.  [facility].   Patient states is needing his prescription's refilled for pain control.  States Dr. Irby (dialysis) states will need to have PCP address them.  1.  Hydroxyzine HCL 10mg; take one tab po q 6hr prn with dilaudid for better pain control.  2.  * Patient states he doesn't have a prescription bottle for Dilaudid; thinks he last used it at rehab facility.  Med rec shows 9/17/20 hydromorphone 2mg tab; take 1 tab po q 6hrs as needed for severe pain with #12.   He states maybe that is why his pain is worsening again; because he hasn't had the dilaudid (hydromorphone).  ? If can get refill.  3.  Methocarbamine 500mg tab; take 1 tab 3x/day.  Has only 4 left.  He states is a muscle relaxant that is also supposed to help with his pain.  He only has a few of these left also.  He states the areas of pain are where his lesions are.  He was told that he will more than likely continue to get more of the lesions.  Rates pain at a 4 on 0-10 pain scale with meds on board.  States shoots up to 7-8 at least twice a day.     Patient states just discharged from rehab.  At home now.  Diagnosed with Calciphylaxis.  He states  Told him due to elevated phosphorus levels.  States level was up to 11 and should be under 5.  Reports level now at 8.  He is on a low phosphorus diet and the peritoneal dialysis has been stopped (felt was a causing factor).  He will start regular dialysis again tomorrow at center in [place].  He thinks will be going 3x/week again but will no more tomorrow.  They are hoping switching types of dialysis will also improve his condition.  He states mortality rate with this condition is 1-5 years if not controlled.  He states does have home care.  Monitoring wounds but no need for wound care at this time.  States has lesions in multiple  "locations:  1.  Inside of right thigh: looks like a bruise approx 3/4 \" by      1.5\".  Not open.  2.  Back of right calf:  No open; scab formation.  2\" x 3\"  3.  Left thigh: 3/4\" x 1.5\".  Not open; has scab formation.  States this is the newest one.    He is walking with a walker (states did use one before as well)  States able to walk up/down stairs but more painful than before.  Is doing exercises as prescribed by doctor.  Does have home care.  [facility] is his pharmacy.  Patient is aware pcp out of clinic til Wednesday morning.  Will route to her to address.    "

## 2020-09-21 NOTE — PROGRESS NOTES
ANTICOAGULATION FOLLOW-UP CLINIC VISIT    Patient Name:  Leif Ariza  Date:  2020  Contact Type:  Telephone    SUBJECTIVE:  Patient Findings     Comments:   Spoke with nurse Hemant and patient on speaker phone. Patient has been treated for past 1-2 months for cellulitis and was in hospital - and then to rehab in [place]. Was discharged home on . Now getting home care visits. Past dose of warfarin was 2 mg tues/ 1 mg rest of wk days. As of today was told of new diagnosis of Calciphylaxis. Also will be going back to dialysis tomorrow instead of doing home peritoneal dialysis. Dose instructions given for warfarin and recheck in 3 days.         Clinical Outcomes     Comments:   Spoke with nurse Hemant and patient on speaker phone. Patient has been treated for past 1-2 months for cellulitis and was in hospital - and then to rehab in [place]. Was discharged home on . Now getting home care visits. Past dose of warfarin was 2 mg tues/ 1 mg rest of wk days. As of today was told of new diagnosis of Calciphylaxis. Also will be going back to dialysis tomorrow instead of doing home peritoneal dialysis. Dose instructions given for warfarin and recheck in 3 days.            OBJECTIVE    Recent labs: (last 7 days)     20   INR 1.7*       ASSESSMENT / PLAN  INR assessment SUB    Recheck INR In: 3 DAYS    INR Location [facility]      Anticoagulation Summary  As of 2020    INR goal:   2.0-3.0   TTR:   49.9 % (1 y)   INR used for dosin.7! (2020)   Warfarin maintenance plan:   2 mg (2 mg x 1) every Tue; 1 mg (2 mg x 0.5) all other days   Full warfarin instructions:   : 2 mg; Otherwise 2 mg every Tue; 1 mg all other days   Weekly warfarin total:   8 mg   Plan last modified:   Sonya Lopez RN (2020)   Next INR check:   2020   Priority:   High   Target end date:       Indications    Atrial fibrillation status post cardioversion (H) [I48.91]  Deep vein thrombosis (DVT)  (H) [I82.409]  Long term (current) use of anticoagulants [Z79.01]             Anticoagulation Episode Summary     INR check location:       Preferred lab:       Send INR reminders to:   PEARL CORNEJO    Comments:   Does peritoneal dialysis daily at home      Anticoagulation Care Providers     Provider Role Specialty Phone number    Nguyen Torres, APRN CNP Referring Nurse Practitioner - Family [phone]            See the Encounter Report to view Anticoagulation Flowsheet and Dosing Calendar (Go to Encounters tab in chart review, and find the Anticoagulation Therapy Visit)    Dosage adjustment made based on physician directed care plan.    Oma Taylor RN

## 2020-09-21 NOTE — TELEPHONE ENCOUNTER
Spoke with nurse Marcos and patient on speaker phone. Patient has been treated for past 1-2 months for cellulitis and was in hospital 8/30-9/4 and then to rehab in [place]. Was discharged home on 9/17. Now getting home care visits. Past dose of warfarin was 2 mg tues/ 1 mg rest of wk days. As of today was told of new diagnosis of Calciphylaxis. Also will be going back to dialysis tomorrow instead of doing home peritoneal dialysis. See anticoagulation encounter.  Oma Taylor RN

## 2020-09-21 NOTE — TELEPHONE ENCOUNTER
Reason for call:  Other   Patient called regarding (reason for call): call back  Additional comments: Patient is calling because he just got out of rehab and wants to discuss pain meds for his chronic pain, please call back     Phone number to reach patient:  Home number on file [phone] (home)    Best Time:  any    Can we leave a detailed message on this number?  YES    Travel screening: Not Applicable

## 2020-09-21 NOTE — TELEPHONE ENCOUNTER
Reason for Call:  INR    Who is calling?  Home Care: [facility]    Phone number:  [phone]    Fax number:      Name of caller: Hemant    INR Value:  1.7    Are there any other concerns:  No  Patient has a new diagnosis of Calciphylaxis associated with dialysis and long term coumadin use    Can we leave a detailed message on this number? YES    Call taken on 9/21/2020 at 3:37 PM by Heather Oconnell

## 2020-09-23 ENCOUNTER — TELEPHONE (OUTPATIENT)
Dept: FAMILY MEDICINE | Facility: CLINIC | Age: 70
End: 2020-09-23

## 2020-09-23 ENCOUNTER — ANTICOAGULATION THERAPY VISIT (OUTPATIENT)
Dept: NURSING | Facility: CLINIC | Age: 70
End: 2020-09-23
Payer: MEDICARE

## 2020-09-23 ENCOUNTER — MEDICAL CORRESPONDENCE (OUTPATIENT)
Dept: HEALTH INFORMATION MANAGEMENT | Facility: CLINIC | Age: 70
End: 2020-09-23

## 2020-09-23 ENCOUNTER — OFFICE VISIT (OUTPATIENT)
Dept: FAMILY MEDICINE | Facility: CLINIC | Age: 70
End: 2020-09-23
Payer: MEDICARE

## 2020-09-23 VITALS
DIASTOLIC BLOOD PRESSURE: 57 MMHG | WEIGHT: 277.6 LBS | TEMPERATURE: 98.1 F | HEART RATE: 78 BPM | SYSTOLIC BLOOD PRESSURE: 108 MMHG | BODY MASS INDEX: 38.72 KG/M2

## 2020-09-23 DIAGNOSIS — E83.59 CALCIPHYLAXIS WITH NONHEALING ULCER OF LEG (H): Primary | ICD-10-CM

## 2020-09-23 DIAGNOSIS — I82.402 ACUTE DEEP VEIN THROMBOSIS (DVT) OF LEFT LOWER EXTREMITY, UNSPECIFIED VEIN (H): ICD-10-CM

## 2020-09-23 DIAGNOSIS — I87.8 VENOUS STASIS: ICD-10-CM

## 2020-09-23 DIAGNOSIS — Z79.01 LONG TERM (CURRENT) USE OF ANTICOAGULANTS: ICD-10-CM

## 2020-09-23 DIAGNOSIS — L97.909 CALCIPHYLAXIS WITH NONHEALING ULCER OF LEG (H): Primary | ICD-10-CM

## 2020-09-23 DIAGNOSIS — I48.91 ATRIAL FIBRILLATION STATUS POST CARDIOVERSION (H): ICD-10-CM

## 2020-09-23 DIAGNOSIS — L03.119 CELLULITIS OF LOWER EXTREMITY, UNSPECIFIED LATERALITY: Primary | ICD-10-CM

## 2020-09-23 LAB — INR PPP: 1.8 (ref 0.9–1.1)

## 2020-09-23 PROCEDURE — 99214 OFFICE O/P EST MOD 30 MIN: CPT | Performed by: NURSE PRACTITIONER

## 2020-09-23 PROCEDURE — 99207 ZZC NO CHARGE NURSE ONLY: CPT

## 2020-09-23 RX ORDER — HYDROMORPHONE HYDROCHLORIDE 2 MG/1
2 TABLET ORAL
COMMUNITY
Start: 2020-09-06 | End: 2020-10-02

## 2020-09-23 RX ORDER — METHOCARBAMOL 500 MG/1
500 TABLET, FILM COATED ORAL 3 TIMES DAILY
Qty: 90 TABLET | Refills: 0 | Status: SHIPPED | OUTPATIENT
Start: 2020-09-23 | End: 2020-10-18

## 2020-09-23 RX ORDER — HYDROXYZINE HYDROCHLORIDE 10 MG/1
10 TABLET, FILM COATED ORAL EVERY 6 HOURS PRN
Qty: 90 TABLET | Refills: 0 | Status: SHIPPED | OUTPATIENT
Start: 2020-09-23 | End: 2020-10-15

## 2020-09-23 RX ORDER — OXYCODONE AND ACETAMINOPHEN 5; 325 MG/1; MG/1
1-2 TABLET ORAL EVERY 6 HOURS PRN
Qty: 30 TABLET | Refills: 0 | Status: SHIPPED | OUTPATIENT
Start: 2020-09-23 | End: 2020-09-30

## 2020-09-23 RX ORDER — LIDOCAINE/PRILOCAINE 2.5 %-2.5%
CREAM (GRAM) TOPICAL
COMMUNITY
Start: 2019-04-16 | End: 2020-10-02

## 2020-09-23 ASSESSMENT — ENCOUNTER SYMPTOMS
DIFFICULTY URINATING: 0
SHORTNESS OF BREATH: 0
CONSTIPATION: 0
WEAKNESS: 1
CHILLS: 0
WOUND: 1
COLOR CHANGE: 1
COUGH: 0
ARTHRALGIAS: 1
ABDOMINAL PAIN: 0
FEVER: 0
FATIGUE: 1

## 2020-09-23 NOTE — Clinical Note
Jean is using [facility] currently for PT.  He needs Wound Care moving forward.  Can we contact them at [phone] to add Wound Care.  Please let me know if they any additional information from me.  This will likely be a chronic problem moving forward.

## 2020-09-23 NOTE — TELEPHONE ENCOUNTER
I called and spoke with Dr. Irby this morning, who is Leif's Nephrologist.  He will be switching Jean from peritoneal dialysis back to hemodialysis due to Calciphylaxis.  Discussed Pain Management and Wound Care referral.  I will be following up with Leif today in clinic.      JOYA Motta CNP

## 2020-09-23 NOTE — TELEPHONE ENCOUNTER
Nguyen Torres, APRN CNP  P An Odalys Pena is using [facility] currently for PT.  He needs Wound Care moving forward.  Can we contact them at [phone] to add Wound Care.  Please let me know if they any additional information from me.  This will likely be a chronic problem moving forward.

## 2020-09-23 NOTE — PROGRESS NOTES
Subjective     Leif Ariza is a 69 year old male who presents to clinic today for the following health issues:    VERÓNICA Yadav is a 69 y.o male with multiple chronic conditions including ESRD on dialysis, recurrent deep venous thrombosis, on Coumadin, paroxysmal atrial fibrillation, non-insulin-dependent diabetes, hypertension, and hyperphosphatemia.  He was hospitalized 8/30 and again on 9/6 with recurrent cellulitis.  Prior to admission he has been treated with multiple antibiotics and did not respond to therapy.  During hospitalization he was treated with broad spectrum abx.  He followed up with ID on 9/17 with recurrent pain and swelling.  It was determined he suffers from Calciphylaxis.  He is stopping peritoneal dialysis and started back on hemodialysis yesterday.  Home Health is coming to his house for PT.    Today Jean reports doing ok.  He is taking Dilaudid PO daily for pain.  He took one dose yesterday and one dose this morning, which was his last.  Reports the red areas and ulcerations are very painful.  Bowels moving ok, no constipation.        Review of Systems   Constitutional: Positive for fatigue. Negative for chills and fever.   Respiratory: Negative for cough and shortness of breath.    Cardiovascular: Negative for chest pain.   Gastrointestinal: Negative for abdominal pain and constipation.   Genitourinary: Negative for difficulty urinating.   Musculoskeletal: Positive for arthralgias.   Skin: Positive for color change and wound.   Neurological: Positive for weakness.            Objective    /57   Pulse 78   Temp 98.1  F (36.7  C) (Oral)   Wt 125.9 kg (277 lb 9.6 oz)   BMI 38.72 kg/m    Body mass index is 38.72 kg/m .  Physical Exam  Vitals signs reviewed.   Constitutional:       Appearance: He is well-developed.   HENT:      Head: Normocephalic.   Cardiovascular:      Rate and Rhythm: Normal rate and regular rhythm.   Pulmonary:      Effort: Pulmonary effort is normal.       Breath sounds: Normal breath sounds.   Skin:     General: Skin is warm and dry.             Comments: Ulceration to right calf and right medial thigh.  Redness and tenderness to left medial thigh.  Pictures uploaded to Media tab of chart.    Neurological:      Mental Status: He is alert and oriented to person, place, and time.   Psychiatric:         Mood and Affect: Mood normal.         Behavior: Behavior normal.                Assessment & Plan     1. Calciphylaxis with nonhealing ulcer of leg (H)  - PAIN MANAGEMENT REFERRAL; Future  - HOME CARE NURSING REFERRAL  - oxyCODONE-acetaminophen (PERCOCET) 5-325 MG tablet; Take 1-2 tablets by mouth every 6 hours as needed for pain  Dispense: 30 tablet; Refill: 0  - Robaxin and Hydroxyzine refilled this morning.    - Will refer to Pain Mgt- Discussed with Nephrologist this morning that condition may worsen before it gets better.  - Refer for Home Health Wound Care  - Follow low phosphorus diet.        Return in about 3 months (around 12/23/2020).    JOYA Motta [facility]

## 2020-09-23 NOTE — TELEPHONE ENCOUNTER
I called patient's  RN, Hemant.   He said that [facility] DO[facility] offer wound care. He just needs order faxed over.     This RN will complete the order when back in clinic 9/24/20.  Save this message in team basket for my return.     Daja Dumont BSN, RN

## 2020-09-23 NOTE — PROGRESS NOTES
ANTICOAGULATION FOLLOW-UP CLINIC VISIT    Patient Name:  Leif Ariza  Date:  2020  Contact Type:  Telephone    SUBJECTIVE:  Patient Findings     Comments:   See comments in findings earlier this week        Clinical Outcomes     Comments:   See comments in findings earlier this week           OBJECTIVE    Recent labs: (last 7 days)     20   INR 1.8*       ASSESSMENT / PLAN  INR assessment SUB    Recheck INR In: 5 DAYS    INR Location [facility]      Anticoagulation Summary  As of 2020    INR goal:   2.0-3.0   TTR:   49.6 % (1 y)   INR used for dosin.8! (2020)   Warfarin maintenance plan:   2 mg (2 mg x 1) every Tue; 1 mg (2 mg x 0.5) all other days   Full warfarin instructions:   : 2 mg; Otherwise 2 mg every Tue; 1 mg all other days   Weekly warfarin total:   8 mg   Plan last modified:   Sonya Lopez RN (2020)   Next INR check:   2020   Priority:   High   Target end date:       Indications    Atrial fibrillation status post cardioversion (H) [I48.91]  Deep vein thrombosis (DVT) (H) [I82.409]  Long term (current) use of anticoagulants [Z79.01]             Anticoagulation Episode Summary     INR check location:       Preferred lab:       Send INR reminders to:   PEARL CORNEJO    Comments:   Does peritoneal dialysis daily at home      Anticoagulation Care Providers     Provider Role Specialty Phone number    Nguyen Torres, APRN CNP Referring Nurse Practitioner - Family [phone]            See the Encounter Report to view Anticoagulation Flowsheet and Dosing Calendar (Go to Encounters tab in chart review, and find the Anticoagulation Therapy Visit)      Ayaka Arce RN

## 2020-09-24 NOTE — TELEPHONE ENCOUNTER
Allergies: Care Instructions  Your Care Instructions    Allergies occur when your body's defense system (immune system) overreacts to certain substances. The immune system treats a harmless substance as if it were a harmful germ or virus. Many things can cause this overreaction, including pollens, medicine, food, dust, animal dander, and mold. Allergies can be mild or severe. Mild allergies can be managed with home treatment. But medicine may be needed to prevent problems. Managing your allergies is an important part of staying healthy. Your doctor may suggest that you have allergy testing to help find out what is causing your allergies. When you know what things trigger your symptoms, you can avoid them. This can prevent allergy symptoms and other health problems. For severe allergies that cause reactions that affect your whole body (anaphylactic reactions), your doctor may prescribe a shot of epinephrine to carry with you in case you have a severe reaction. Learn how to give yourself the shot and keep it with you at all times. Make sure it is not . Follow-up care is a key part of your treatment and safety. Be sure to make and go to all appointments, and call your doctor if you are having problems. It's also a good idea to know your test results and keep a list of the medicines you take. How can you care for yourself at home? · If you have been told by your doctor that dust or dust mites are causing your allergy, decrease the dust around your bed:  ? Wash sheets, pillowcases, and other bedding in hot water every week. ? Use dust-proof covers for pillows, duvets, and mattresses. Avoid plastic covers because they tear easily and do not \"breathe. \" Wash as instructed on the label. ? Do not use any blankets and pillows that you do not need. ? Use blankets that you can wash in your washing machine. ? Consider removing drapes and carpets, which attract and hold dust, from your bedroom.   · If you are Wound care order signed. Please see note below for faxing.     JOYA Motta CNP    allergic to house dust and mites, do not use home humidifiers. Your doctor can suggest ways you can control dust and mites. · Look for signs of cockroaches. Cockroaches cause allergic reactions. Use cockroach baits to get rid of them. Then, clean your home well. Cockroaches like areas where grocery bags, newspapers, empty bottles, or cardboard boxes are stored. Do not keep these inside your home, and keep trash and food containers sealed. Seal off any spots where cockroaches might enter your home. · If you are allergic to mold, get rid of furniture, rugs, and drapes that smell musty. Check for mold in the bathroom. · If you are allergic to outdoor pollen or mold spores, use air-conditioning. Change or clean all filters every month. Keep windows closed. · If you are allergic to pollen, stay inside when pollen counts are high. Use a vacuum  with a HEPA filter or a double-thickness filter at least two times each week. · Stay inside when air pollution is bad. Avoid paint fumes, perfumes, and other strong odors. · Avoid conditions that make your allergies worse. Stay away from smoke. Do not smoke or let anyone else smoke in your house. Do not use fireplaces or wood-burning stoves. · If you are allergic to your pets, change the air filter in your furnace every month. Use high-efficiency filters. · If you are allergic to pet dander, keep pets outside or out of your bedroom. Old carpet and cloth furniture can hold a lot of animal dander. You may need to replace them. When should you call for help? Give an epinephrine shot if:    · You think you are having a severe allergic reaction.     · You have symptoms in more than one body area, such as mild nausea and an itchy mouth.    After giving an epinephrine shot call 911, even if you feel better.   Call 911 if:    · You have symptoms of a severe allergic reaction. These may include:  ? Sudden raised, red areas (hives) all over your body. ?  Swelling of the throat, mouth, lips, or tongue. ? Trouble breathing. ? Passing out (losing consciousness). Or you may feel very lightheaded or suddenly feel weak, confused, or restless.     · You have been given an epinephrine shot, even if you feel better.    Call your doctor now or seek immediate medical care if:    · You have symptoms of an allergic reaction, such as:  ? A rash or hives (raised, red areas on the skin). ? Itching. ? Swelling. ? Belly pain, nausea, or vomiting.    Watch closely for changes in your health, and be sure to contact your doctor if:    · You do not get better as expected. Where can you learn more? Go to http://christina-lyn.info/. Enter J054 in the search box to learn more about \"Allergies: Care Instructions. \"  Current as of: April 7, 2019  Content Version: 12.2  © 7179-6160 [employer]. Care instructions adapted under license by [employer] (which disclaims liability or warranty for this information). If you have questions about a medical condition or this instruction, always ask your healthcare professional. [address] any warranty or liability for your use of this information.

## 2020-09-24 NOTE — TELEPHONE ENCOUNTER
RN pended wound care referral for [facility].   Routing to PCP to sign.     TC, please fax the order to:  [facility]  Attn:  SE Marcos  [phone].      Daja PACHECON, RN

## 2020-09-28 ENCOUNTER — TELEPHONE (OUTPATIENT)
Dept: FAMILY MEDICINE | Facility: CLINIC | Age: 70
End: 2020-09-28

## 2020-09-28 ENCOUNTER — MYC REFILL (OUTPATIENT)
Dept: FAMILY MEDICINE | Facility: CLINIC | Age: 70
End: 2020-09-28

## 2020-09-28 ENCOUNTER — ANTICOAGULATION THERAPY VISIT (OUTPATIENT)
Dept: NURSING | Facility: CLINIC | Age: 70
End: 2020-09-28
Payer: MEDICARE

## 2020-09-28 DIAGNOSIS — E83.59 CALCIPHYLAXIS WITH NONHEALING ULCER OF LEG (H): Primary | ICD-10-CM

## 2020-09-28 DIAGNOSIS — Z79.01 LONG TERM (CURRENT) USE OF ANTICOAGULANTS: ICD-10-CM

## 2020-09-28 DIAGNOSIS — I82.4Y2 ACUTE DEEP VEIN THROMBOSIS (DVT) OF PROXIMAL VEIN OF LEFT LOWER EXTREMITY (H): Primary | ICD-10-CM

## 2020-09-28 DIAGNOSIS — I82.402 ACUTE DEEP VEIN THROMBOSIS (DVT) OF LEFT LOWER EXTREMITY, UNSPECIFIED VEIN (H): ICD-10-CM

## 2020-09-28 DIAGNOSIS — I48.91 ATRIAL FIBRILLATION STATUS POST CARDIOVERSION (H): ICD-10-CM

## 2020-09-28 DIAGNOSIS — L97.909 CALCIPHYLAXIS WITH NONHEALING ULCER OF LEG (H): Primary | ICD-10-CM

## 2020-09-28 DIAGNOSIS — E78.2 MIXED HYPERLIPIDEMIA: ICD-10-CM

## 2020-09-28 LAB — INR PPP: 1.7 (ref 0.9–1.1)

## 2020-09-28 PROCEDURE — 99207 ZZC NO CHARGE NURSE ONLY: CPT

## 2020-09-28 NOTE — TELEPHONE ENCOUNTER
Routing refill request to provider for review/approval because:  Labs not current:  Lipids    Daja Dumont BSN, RN

## 2020-09-28 NOTE — TELEPHONE ENCOUNTER
FYI i think this is for you? Reason for Call:  INR    Who is calling?  Home Care: [facility]    Phone number:  [phone]    Fax number:      Name of caller: Hemant    INR Value:  1.7    Are there any other concerns:  No, just need updated dosing     Can we leave a detailed message on this number? YES  Call taken on 9/28/2020 at 12:14 PM by Heather Oconnell

## 2020-09-28 NOTE — TELEPHONE ENCOUNTER
Patient calling. He wants to discuss his wound care. He thinks a diagnosis of cellulitis is incorrect. Please call.

## 2020-09-28 NOTE — TELEPHONE ENCOUNTER
Routing to PCP to advise on diagnosis for wound care referral.   The referral signed has cellulitis and venous stasis.    Daja Dumont BSN, RN

## 2020-09-28 NOTE — TELEPHONE ENCOUNTER
ANTICOAGULATION MANAGEMENT      Leif Ariza due for annual renewal of referral to anticoagulation monitoring. Order pended for your review and signature.      ANTICOAGULATION SUMMARY      Warfarin indication(s)     DVT    Heart valve present?  NO       Current goal range   INR: 2.0-3.0     Goal appropriate for indication? Yes, INR 2-3 appropriate for hx of DVT, PE, hypercoagulable state, Afib, LVAD, or bileaflet AVR without risk factors     Current duration of therapy Indefinite/long term therapy   Time in Therapeutic Range (TTR)  (Goal > 60%) 49.2 %       Office visit with referring provider's group within last year yes on 9/23/20       Oma Taylor RN

## 2020-09-28 NOTE — PROGRESS NOTES
ANTICOAGULATION FOLLOW-UP CLINIC VISIT    Patient Name:  Leif Ariza  Date:  2020  Contact Type:  Telephone    SUBJECTIVE:  Patient Findings     Comments:   Home care nurse reports INR 1.7. Took 2 mg dose on .  Doing dialysis Tu,Th and Sat each week. No improvement in patient condition and wounds at this time.         Clinical Outcomes     Comments:   Home care nurse reports INR 1.7. Took 2 mg dose on .  Doing dialysis Tu,Th and Sat each week. No improvement in patient condition and wounds at this time.            OBJECTIVE    Recent labs: (last 7 days)     20   INR 1.7*       ASSESSMENT / PLAN  INR assessment SUB    Recheck INR In: 2 DAYS    INR Location [facility]R      Anticoagulation Summary  As of 2020    INR goal:   2.0-3.0   TTR:   49.2 % (1 y)   INR used for dosin.7! (2020)   Warfarin maintenance plan:   1 mg (2 mg x 0.5) every Sun, Thu, Sat; 2 mg (2 mg x 1) all other days   Full warfarin instructions:   : 3 mg; Otherwise 1 mg every Sun, Thu, Sat; 2 mg all other days   Weekly warfarin total:   11 mg   Plan last modified:   Oma Taylor RN (2020)   Next INR check:   2020   Priority:   High   Target end date:       Indications    Atrial fibrillation status post cardioversion (H) [I48.91]  Deep vein thrombosis (DVT) (H) [I82.409]  Long term (current) use of anticoagulants [Z79.01]             Anticoagulation Episode Summary     INR check location:       Preferred lab:       Send INR reminders to:   PEARL CORNEJO    Comments:   Does peritoneal dialysis daily at home      Anticoagulation Care Providers     Provider Role Specialty Phone number    Nguyen Torres APRN CNP Referring Nurse Practitioner - Family [phone]            See the Encounter Report to view Anticoagulation Flowsheet and Dosing Calendar (Go to Encounters tab in chart review, and find the Anticoagulation Therapy Visit)    Dosage adjustment made based on physician  directed care plan.    Oma Taylor RN

## 2020-09-28 NOTE — TELEPHONE ENCOUNTER
Hemant from [facility] is calling to further discuss the wound care orders.  Please call to advise [phone]

## 2020-09-28 NOTE — TELEPHONE ENCOUNTER
Done.  [employer] message sent to patient.      TC, please fax new wound care referral for wound care to [facility]  Fax:  [phone]  Attn:  SE MarcosN, RN

## 2020-09-28 NOTE — TELEPHONE ENCOUNTER
"Hemant, home care RN with [facility] states that he is needing more guidance as to what you are actually wanting done with wound care.  He states currently seeing patient 3x/week for INR management and will assess wounds at this visit. He states otherwise when INR appts decrease then will check wounds/sores weekly for dx calciphylaxis. .   He states that patient has a pressure sore on his bottom that he is currently applying a barrier cream to and covering with bandages.  He states that the sore does appear to be healing well at this time.   Patient had come home from rehab with 2 wounds; one on right inner thigh and one left posterior calf.  Both are not open.  No drainage.  He has 3 more developing now.  1. Left inner thigh 2.  Couple bigger ones; one on each \"hip pockets.  States one he couldn't cover the darkened area of skin with palm of his hand and tissue below feels \"wooden\"  Second one is 1 cm darkened center with 10 cm reddened area around it.  That one also feel \"wooden\".  He states is at a loss as far as what he can actually do for these sites since are not open or draining.  Patient's primary concern has been the pain that occurs in these areas.  He states he understands that the pain that occurs with these wounds/sores is a primary concern.  He states he's been reading and from what he can decipher is that covering the wounds/sores or doing wet to dry dressings don't seem to help promote wound healing.  He states he's read where hyperbaric chambers are used to help promote healing but not an option with home care.  He states wonders if patient wouldn't benefit from having a wound clinic/nurse consult.  ([facility] does not have one)  He states can continue his visits (at least weekly) to assess wounds, document any new wounds or changes, evaluate pain, and offer emotional support.  He is wanting to know if there is any specific wound care he is to be doing on these sores/wounds that are " developing randomly due to the calciphylaxis?  States provider can call him also with any questions or ideas.  OK to hold for PCP.  Aware not in clinic Tuesday. [place]

## 2020-09-28 NOTE — TELEPHONE ENCOUNTER
The Home Care referral to include wound care was associated with the dx Calciphylaxis with nonhealing ulcer of leg.  Can we change the dx Wound Care referral to match the dx above?  I believe he is using [facility].     JOYA Motta CNP

## 2020-09-29 RX ORDER — FENOFIBRATE 145 MG/1
145 TABLET, COATED ORAL DAILY
Qty: 90 TABLET | Refills: 3 | Status: SHIPPED | OUTPATIENT
Start: 2020-09-29 | End: 2021-01-01

## 2020-09-30 ENCOUNTER — ANTICOAGULATION THERAPY VISIT (OUTPATIENT)
Dept: NURSING | Facility: CLINIC | Age: 70
End: 2020-09-30
Payer: MEDICARE

## 2020-09-30 ENCOUNTER — TELEPHONE (OUTPATIENT)
Dept: FAMILY MEDICINE | Facility: CLINIC | Age: 70
End: 2020-09-30

## 2020-09-30 DIAGNOSIS — I82.402 ACUTE DEEP VEIN THROMBOSIS (DVT) OF LEFT LOWER EXTREMITY, UNSPECIFIED VEIN (H): ICD-10-CM

## 2020-09-30 DIAGNOSIS — I48.91 ATRIAL FIBRILLATION STATUS POST CARDIOVERSION (H): ICD-10-CM

## 2020-09-30 DIAGNOSIS — E83.59 CALCIPHYLAXIS WITH NONHEALING ULCER OF LEG (H): ICD-10-CM

## 2020-09-30 DIAGNOSIS — L97.909 CALCIPHYLAXIS WITH NONHEALING ULCER OF LEG (H): ICD-10-CM

## 2020-09-30 DIAGNOSIS — I82.4Y2 ACUTE DEEP VEIN THROMBOSIS (DVT) OF PROXIMAL VEIN OF LEFT LOWER EXTREMITY (H): ICD-10-CM

## 2020-09-30 DIAGNOSIS — Z79.01 LONG TERM (CURRENT) USE OF ANTICOAGULANTS: ICD-10-CM

## 2020-09-30 LAB — INR PPP: 2.2 (ref 0.9–1.1)

## 2020-09-30 PROCEDURE — 99207 ZZC NO CHARGE NURSE ONLY: CPT

## 2020-09-30 RX ORDER — HYDROCODONE BITARTRATE AND ACETAMINOPHEN 5; 325 MG/1; MG/1
TABLET ORAL
Status: CANCELLED | OUTPATIENT
Start: 2020-09-30

## 2020-09-30 RX ORDER — OXYCODONE AND ACETAMINOPHEN 5; 325 MG/1; MG/1
1-2 TABLET ORAL EVERY 6 HOURS PRN
Qty: 30 TABLET | Refills: 0 | Status: SHIPPED | OUTPATIENT
Start: 2020-09-30 | End: 2020-10-07

## 2020-09-30 NOTE — TELEPHONE ENCOUNTER
Spoke with nurse [place]. Result managed by [facility]. See anticoagulation encounter.  Oma Taylor RN

## 2020-09-30 NOTE — TELEPHONE ENCOUNTER
Home care nurse calling with inr results drawn today was 2.2 please call with verbal orders or to advise.

## 2020-09-30 NOTE — TELEPHONE ENCOUNTER
Reason for Call:  Medication or medication refill:    Do you use a [facility]?  Name of the pharmacy and phone number for the current request:  [facility] - [place], [place] - [address]  [phone]    Name of the medication requested: HYDROcodone-acetaminophen (NORCO) 5-325 MG tablet    Other request: patient is calling for refill. Thank you.    Can we leave a detailed message on this number? YES    Phone number patient can be reached at: Home number on file [phone] (home)    Best Time:     Call taken on 9/30/2020 at 11:20 AM by Stephany Valenzuela

## 2020-09-30 NOTE — TELEPHONE ENCOUNTER
I see we received a request for Norco (hydrocodone/APAP) from pharmacy.  At his last office visit I had ordered him Percocet (Oxycodone/APAP).  Please clarify what pain medication he is taking/needing.      JOYA Motta CNP

## 2020-09-30 NOTE — PROGRESS NOTES
ANTICOAGULATION FOLLOW-UP CLINIC VISIT    Patient Name:  Leif Ariza  Date:  2020  Contact Type:  Telephone    SUBJECTIVE:  Patient Findings     Comments:   Home care nurse reports INR 2.2. No changes in meds or care to report today. Continues on dialysis on ,. Nurse will visit again in 2 days and check INR at that time.         Clinical Outcomes     Negatives:   Major bleeding event, Thromboembolic event, Anticoagulation-related hospital admission, Anticoagulation-related ED visit, Anticoagulation-related fatality    Comments:   Home care nurse reports INR 2.2. No changes in meds or care to report today. Continues on dialysis on ,,. Nurse will visit again in 2 days and check INR at that time.            OBJECTIVE    Recent labs: (last 7 days)     20   INR 2.2*       ASSESSMENT / PLAN  INR assessment THER    Recheck INR In: 2 DAYS    INR Location [facility] INR      Anticoagulation Summary  As of 2020    INR goal:   2.0-3.0   TTR:   49.4 % (1 y)   INR used for dosin.2 (2020)   Warfarin maintenance plan:   1 mg (2 mg x 0.5) every Sun, Thu; 2 mg (2 mg x 1) all other days   Full warfarin instructions:   1 mg every Sun, Thu; 2 mg all other days   Weekly warfarin total:   12 mg   Plan last modified:   Oma Taylor RN (2020)   Next INR check:   10/2/2020   Priority:   High   Target end date:   Indefinite    Indications    Atrial fibrillation status post cardioversion (H) [I48.91]  Deep vein thrombosis (DVT) (H) [I82.409]  Long term (current) use of anticoagulants [Z79.01]  Acute deep vein thrombosis (DVT) of proximal vein of left lower extremity (H) [I82.4Y2]             Anticoagulation Episode Summary     INR check location:       Preferred lab:       Send INR reminders to:   PEARL CORNEJO    Comments:   20 switched from daily peritonel dialysis at home to dialysis at clinic ,,Sat, Home care doing INR.   referral renewed 2020      Anticoagulation Care  Providers     Provider Role Specialty Phone number    Nguyen Torres, APRN CNP Referring Nurse Practitioner - Family [phone]            See the Encounter Report to view Anticoagulation Flowsheet and Dosing Calendar (Go to Encounters tab in chart review, and find the Anticoagulation Therapy Visit)    Dosage adjustment made based on physician directed care plan.    Oma Taylor RN

## 2020-09-30 NOTE — TELEPHONE ENCOUNTER
Yes I agree that Leif would benefit from seeing a Wound Clinic/Specialist.  Please see if he has a preference in where we send the referral.  This is a complicated problem and I think we need to get the specialist recommendation in how to best treat the ulcers moving forward.     JOYA Motta CNP

## 2020-10-02 ENCOUNTER — TELEPHONE (OUTPATIENT)
Dept: FAMILY MEDICINE | Facility: CLINIC | Age: 70
End: 2020-10-02

## 2020-10-02 ENCOUNTER — ANTICOAGULATION THERAPY VISIT (OUTPATIENT)
Dept: NURSING | Facility: CLINIC | Age: 70
End: 2020-10-02
Payer: MEDICARE

## 2020-10-02 DIAGNOSIS — Z79.01 LONG TERM (CURRENT) USE OF ANTICOAGULANTS: ICD-10-CM

## 2020-10-02 DIAGNOSIS — I48.91 ATRIAL FIBRILLATION STATUS POST CARDIOVERSION (H): ICD-10-CM

## 2020-10-02 DIAGNOSIS — I82.402 ACUTE DEEP VEIN THROMBOSIS (DVT) OF LEFT LOWER EXTREMITY, UNSPECIFIED VEIN (H): ICD-10-CM

## 2020-10-02 DIAGNOSIS — I82.4Y2 ACUTE DEEP VEIN THROMBOSIS (DVT) OF PROXIMAL VEIN OF LEFT LOWER EXTREMITY (H): ICD-10-CM

## 2020-10-02 LAB — INR PPP: 2.4 (ref 0.9–1.1)

## 2020-10-02 PROCEDURE — 99207 PR NO CHARGE NURSE ONLY: CPT

## 2020-10-02 RX ORDER — HYDROMORPHONE HYDROCHLORIDE 2 MG/1
2 TABLET ORAL EVERY 4 HOURS PRN
COMMUNITY
Start: 2020-10-02

## 2020-10-02 NOTE — TELEPHONE ENCOUNTER
Reason for Call:  Form, our goal is to have forms completed with 72 hours, however, some forms may require a visit or additional information.    Type of letter, form or note:  Home Health Certification    Who is the form from?: Home care    Where did the form come from: form was faxed in    What clinic location was the form placed at?: [place]    Where the form was placed: RN [facility] Box/Folder    What number is listed as a contact on the form?: [phone]       Additional comments:       Call taken on 10/2/2020 at 9:56 AM by Kateryna Prado

## 2020-10-02 NOTE — PROGRESS NOTES
ANTICOAGULATION FOLLOW-UP CLINIC VISIT    Patient Name:  Leif Ariza  Date:  10/2/2020  Contact Type:  Telephone    SUBJECTIVE:  Patient Findings     Comments:  The patient was assessed for diet, medication, and activity level changes, missed or extra doses, bruising or bleeding, with no problem findings.           Clinical Outcomes     Negatives:  Major bleeding event, Thromboembolic event, Anticoagulation-related hospital admission, Anticoagulation-related ED visit, Anticoagulation-related fatality    Comments:  The patient was assessed for diet, medication, and activity level changes, missed or extra doses, bruising or bleeding, with no problem findings.              OBJECTIVE    Recent labs: (last 7 days)     10/02/20   INR 2.4*       ASSESSMENT / PLAN  INR assessment THER    Recheck INR In: 5 DAYS    INR Location [facility] INR      Anticoagulation Summary  As of 10/2/2020    INR goal:  2.0-3.0   TTR:  49.5 % (1 y)   INR used for dosin.4 (10/2/2020)   Warfarin maintenance plan:  1 mg (2 mg x 0.5) every Sun, Thu; 2 mg (2 mg x 1) all other days   Full warfarin instructions:  1 mg every Sun, Thu; 2 mg all other days   Weekly warfarin total:  12 mg   No change documented:  Ayaka Arce RN   Plan last modified:  Oma Taylor RN (2020)   Next INR check:  10/7/2020   Priority:  High   Target end date:  Indefinite    Indications    Atrial fibrillation status post cardioversion (H) [I48.91]  Deep vein thrombosis (DVT) (H) [I82.409]  Long term (current) use of anticoagulants [Z79.01]  Acute deep vein thrombosis (DVT) of proximal vein of left lower extremity (H) [I82.4Y2]             Anticoagulation Episode Summary     INR check location:      Preferred lab:      Send INR reminders to:  PEARL CORNEJO    Comments:  20 switched from daily peritonel dialysis at home to dialysis at clinic ,,Sat, Home care doing INR.   referral renewed 2020      Anticoagulation Care Providers      Provider Role Specialty Phone number    Nguyen Torres, APRN CNP Referring Nurse Practitioner - Family [phone]            See the Encounter Report to view Anticoagulation Flowsheet and Dosing Calendar (Go to Encounters tab in chart review, and find the Anticoagulation Therapy Visit)      Ayaka Arce RN

## 2020-10-02 NOTE — TELEPHONE ENCOUNTER
Home care nurse notified of dosing and follow up instructions.  See anticoag encounter.  Ayaka PACHECON, RN

## 2020-10-02 NOTE — TELEPHONE ENCOUNTER
Reason for Call:  INR    Who is calling?  Home Care: [facility]    Phone number:  [phone]    Fax number:  na    Name of caller: Mariana    INR Value:  2.4    Are there any other concerns:  No    Can we leave a detailed message on this number? YES        Call taken on 10/2/2020 at 11:22 AM by Albina Aragon

## 2020-10-07 ENCOUNTER — TELEPHONE (OUTPATIENT)
Dept: FAMILY MEDICINE | Facility: CLINIC | Age: 70
End: 2020-10-07

## 2020-10-07 ENCOUNTER — ANTICOAGULATION THERAPY VISIT (OUTPATIENT)
Dept: NURSING | Facility: CLINIC | Age: 70
End: 2020-10-07
Payer: MEDICARE

## 2020-10-07 ENCOUNTER — MYC MEDICAL ADVICE (OUTPATIENT)
Dept: FAMILY MEDICINE | Facility: CLINIC | Age: 70
End: 2020-10-07

## 2020-10-07 ENCOUNTER — MYC REFILL (OUTPATIENT)
Dept: NURSING | Facility: CLINIC | Age: 70
End: 2020-10-07

## 2020-10-07 DIAGNOSIS — I82.402 ACUTE DEEP VEIN THROMBOSIS (DVT) OF LEFT LOWER EXTREMITY, UNSPECIFIED VEIN (H): ICD-10-CM

## 2020-10-07 DIAGNOSIS — I48.91 ATRIAL FIBRILLATION STATUS POST CARDIOVERSION (H): Chronic | ICD-10-CM

## 2020-10-07 DIAGNOSIS — I82.4Y2 ACUTE DEEP VEIN THROMBOSIS (DVT) OF PROXIMAL VEIN OF LEFT LOWER EXTREMITY (H): ICD-10-CM

## 2020-10-07 DIAGNOSIS — Z79.01 LONG TERM (CURRENT) USE OF ANTICOAGULANTS: ICD-10-CM

## 2020-10-07 DIAGNOSIS — Z79.01 LONG TERM (CURRENT) USE OF ANTICOAGULANTS: Chronic | ICD-10-CM

## 2020-10-07 DIAGNOSIS — I82.5Y9 CHRONIC DEEP VEIN THROMBOSIS (DVT) OF PROXIMAL VEIN OF LOWER EXTREMITY, UNSPECIFIED LATERALITY (H): Primary | ICD-10-CM

## 2020-10-07 DIAGNOSIS — E83.59 CALCIPHYLAXIS WITH NONHEALING ULCER OF LEG (H): ICD-10-CM

## 2020-10-07 DIAGNOSIS — I48.91 ATRIAL FIBRILLATION STATUS POST CARDIOVERSION (H): ICD-10-CM

## 2020-10-07 DIAGNOSIS — L97.909 CALCIPHYLAXIS WITH NONHEALING ULCER OF LEG (H): ICD-10-CM

## 2020-10-07 DIAGNOSIS — I82.5Y9 CHRONIC DEEP VEIN THROMBOSIS (DVT) OF PROXIMAL VEIN OF LOWER EXTREMITY, UNSPECIFIED LATERALITY (H): ICD-10-CM

## 2020-10-07 DIAGNOSIS — I82.409 DEEP VEIN THROMBOSIS (DVT) (H): ICD-10-CM

## 2020-10-07 LAB — INR PPP: 1.9 (ref 0.9–1.1)

## 2020-10-07 PROCEDURE — 99207 PR NO CHARGE NURSE ONLY: CPT

## 2020-10-07 RX ORDER — HEPARIN SODIUM 20000 [USP'U]/ML
20000 INJECTION INTRAVENOUS; SUBCUTANEOUS EVERY 12 HOURS
Qty: 14 VIAL | Refills: 1 | Status: SHIPPED | OUTPATIENT
Start: 2020-10-07 | End: 2020-11-10

## 2020-10-07 RX ORDER — OXYCODONE AND ACETAMINOPHEN 5; 325 MG/1; MG/1
1-2 TABLET ORAL EVERY 6 HOURS PRN
Qty: 30 TABLET | Refills: 0 | Status: SHIPPED | OUTPATIENT
Start: 2020-10-07 | End: 2020-10-13

## 2020-10-07 RX ORDER — HEPARIN SODIUM 20000 [USP'U]/ML
20000 INJECTION INTRAVENOUS; SUBCUTANEOUS EVERY 12 HOURS
Qty: 14 VIAL | Refills: 1 | Status: SHIPPED | OUTPATIENT
Start: 2020-10-07 | End: 2020-10-07

## 2020-10-07 NOTE — TELEPHONE ENCOUNTER
Please contact Leif with the following instructions:    Procedure date:10/14/2020.     10/9- Hold Warfarin     10/11- Start Heparin 20,0000 units subcutaneous eery 12 hours.             Last dose 10/13 PM.     Resume Home Warfarin dose on night of procedure.       Resume Heparin 24-72 hours after procedure when ok with the procedure provider.       Continue until INR >=2.3     Recheck INR 5-6 days.    JOYA Motta CNP

## 2020-10-07 NOTE — TELEPHONE ENCOUNTER
Reason for Call:  Other prescription/Heparin Sodium, Porcine, 09727 UNIT/ML SOLN prescribed today.     Detailed comments: Pharmacy is NOT able to fill this prescription. They state it is more for a Hospital pharmacy and not covered by the patient's insurance. Please advise    Phone Number Patient can be reached at: Other phone number:  [facility] # [phone]      Best Time: Anytime    Can we leave a detailed message on this number? NO    Call taken on 10/7/2020 at 5:04 PM by Michelle M. Hanson    Caller informed that calls received after 3pm may not be returned same day.

## 2020-10-07 NOTE — PROGRESS NOTES
OBJECTIVE:  Recent labs: (last 7 days)     10/02/20   INR 2.4*         No question data found.  Anticoagulation Summary  As of 10/7/2020    INR goal:  2.0-3.0   TTR:  49.7 % (1 y)   INR used for dosing:     Plan last modified:  Oma Taylor RN (9/30/2020)   Next INR check:     Target end date:  Indefinite    Indications    Atrial fibrillation status post cardioversion (H) [I48.91]  Deep vein thrombosis (DVT) (H) [I82.409]  Long term (current) use of anticoagulants [Z79.01]  Acute deep vein thrombosis (DVT) of proximal vein of left lower extremity (H) [I82.4Y2]             Anticoagulation Episode Summary     INR check location:      Preferred lab:      Send INR reminders to:  PEARL CORNEJO    Comments:  9/28/20 switched from daily peritonel dialysis at home to dialysis at clinic Tu,Th,Sat, Home care doing INR.   referral renewed 9/28/2020      Anticoagulation Care Providers     Provider Role Specialty Phone number    Nguyen Torres, JOYA CNP Referring Nurse Practitioner - Family [phone]         ANTICOAGULATION FOLLOW-UP CLINIC VISIT    Patient Name:  Leif Ariza  Date:  10/7/2020  Contact Type:  Telephone    SUBJECTIVE:  Patient Findings     Positives:  Upcoming invasive procedure, Missed doses    Comments:  I spoke with patient about new orders for warfarin hold and bridging received today. Vascular procedure scheduled 10/14. Hold 5 days and bridge with heparin. See tel encounter. Instructions sent to patient on my chart. Patient also reported his INR was done today by home care nurse Hemant and was 1.9. had one missed dose. I called Hemant at [phone] and he reported INR was 1.9. patient continues to have more wounds due to calciphylaxis and now has 7 wounds. Hemant reports patient will be getting daily visits from nurse for now. Will recheck INR next week day before procedure.         Clinical Outcomes     Comments:  I spoke with patient about new orders for warfarin hold and bridging received  today. Vascular procedure scheduled 10/14. Hold 5 days and bridge with heparin. See tel encounter. Instructions sent to patient on my chart. Patient also reported his INR was done today by home care nurse Hemant and was 1.9. had one missed dose. I called Hemant at [phone] and he reported INR was 1.9. patient continues to have more wounds due to calciphylaxis and now has 7 wounds. Hemant reports patient will be getting daily visits from nurse for now. Will recheck INR next week day before procedure.            OBJECTIVE    Recent labs: (last 7 days)     10/07/20   INR 1.9*       ASSESSMENT / PLAN  INR assessment SUB    Recheck INR In: 6 DAYS    INR Location [facility] INR      Anticoagulation Summary  As of 10/7/2020    INR goal:  2.0-3.0   TTR:  50.1 % (1 y)   INR used for dosin.9 (10/7/2020)   Warfarin maintenance plan:  1 mg (2 mg x 0.5) every Sun, Thu; 2 mg (2 mg x 1) all other days   Full warfarin instructions:  10/8: 2 mg; 10/9: Hold; 10/10: Hold; 10/11: Hold; 10/12: Hold; 10/13: Hold; Otherwise 1 mg every Sun, Thu; 2 mg all other days   Weekly warfarin total:  12 mg   Plan last modified:  Oma Taylor RN (2020)   Next INR check:  10/13/2020   Priority:  High   Target end date:  Indefinite    Indications    Atrial fibrillation status post cardioversion (H) [I48.91]  Deep vein thrombosis (DVT) (H) [I82.409]  Long term (current) use of anticoagulants [Z79.01]  Acute deep vein thrombosis (DVT) of proximal vein of left lower extremity (H) [I82.4Y2]             Anticoagulation Episode Summary     INR check location:      Preferred lab:      Send INR reminders to:  PEARL CORNEJO    Comments:  20 switched from daily peritonel dialysis at home to dialysis at clinic ,,Sat, Home care doing INR.   referral renewed 2020      Anticoagulation Care Providers     Provider Role Specialty Phone number    Nguyen Torres, JOYA CNP Referring Nurse Practitioner - Family [phone]            See the  Encounter Report to view Anticoagulation Flowsheet and Dosing Calendar (Go to Encounters tab in chart review, and find the Anticoagulation Therapy Visit)    Dosage adjustment made based on physician directed care plan.    Oma Taylor RN

## 2020-10-07 NOTE — TELEPHONE ENCOUNTER
Controlled Substance Refill Request for Percocet  Problem List Complete:  No     PROVIDER TO CONSIDER COMPLETION OF PROBLEM LIST AND OVERVIEW/CONTROLLED SUBSTANCE AGREEMENT    Last Written Prescription Date:  9/30/20  Last Fill Quantity: 30,   # refills: 0    Last Office Visit with [facility] primary care provider: 9/23/20      Controlled substance agreement:   Encounter-Level CSA:    There are no encounter-level csa.     Patient-Level CSA:    There are no patient-level csa.         Last Urine Drug Screen: No results found for: CDAUT, No results found for: COMDAT, No results found for: THC13, PCP13, COC13, MAMP13, OPI13, AMP13, BZO13, TCA13, MTD13, BAR13, OXY13, PPX13, BUP13        [email or web address]       checked in past 3 months?  Yes 10/7/20, no concerns     Sonya PACHECON, RN, CPN

## 2020-10-07 NOTE — TELEPHONE ENCOUNTER
----- Message from Amy Ahn [facility] sent at 10/7/2020  2:48 PM CDT -----  Regarding: RE: Coag Question  That would be correct for the directions, Oma OK to refill RX from previous order.    Amy Ahn PharmD [facility]  Anticoagulation Clinical Pharmacist  ----- Message -----  From: Oma Taylor RN  Sent: 10/7/2020   1:13 PM CDT  To: Amy Ahn [facility]  Subject: FW: Coag Question                                  ----- Message -----  From: Nguyen Torres APRN CNP  Sent: 10/7/2020  12:41 PM CDT  To: Oma Taylor RN  Subject: Coag Question                                    Edmar Ernandez,     VESTA received a message stating Jean is going to have his dialysis catheter removed on 10/14 and is needing instructions for his Warfarin.  It appears on 4/16 he was given instructions when he had the catheter placed.  Do you mind to review these instructions?      Procedure 10/14/2020.    10/9- Hold Warfarin    10/11- Start Heparin 20,0000 units subcutaneous eery 12 hours.             Last dose 10/13 PM.    Resume Home Warfarin dose on night of procedure.      Resume Heparin 24-72 hours after procedure when ok with the procedure provider.      Continue until INR >=2.3    Recheck INR 5-6 days.    If ok, do you mind to pend the Heparin prescription?    Thank you for our help!

## 2020-10-07 NOTE — TELEPHONE ENCOUNTER
PD cathider removed next wedensday , 10/14, need Vince approval for 5 day hold. Last time wated pt to be bridged. Nurse from vascular clinic called. Call back number is [phone]

## 2020-10-07 NOTE — TELEPHONE ENCOUNTER
To JOYA Barahona CNP.  Per Vascular Clinic patient is having his Peritoneal Dialysis Catheter removed on 10/14/20.  Will need orders to hold his warfarin for 5 days prior to procedure.  Patient was bridged with heparin when procedure was done in April.  Patient will need orders per his PCP.  Please call Vascular Clinic back with orders as well.  Zo Bowers RN  ##SEE 4/16 MYCHART ENCOUNTER FOR PREVIOUS ORDERS.

## 2020-10-07 NOTE — TELEPHONE ENCOUNTER
Spoke with Hemant from [facility]. States they have wound care orders from Dr. Javier Sanon at [facility]. Will be seeing patient daily for wound care. To provider as ZEYNEP. Sarah Recio TC/Pt Rep

## 2020-10-07 NOTE — TELEPHONE ENCOUNTER
This is for bridging for a procedure. Will route to [facility] nurse    Sonya Lopez BSN, RN, CPN

## 2020-10-07 NOTE — TELEPHONE ENCOUNTER
I spoke with Bianka at Vascular clinic and gave her the ok for hold and bridging orders below.   I also spoke with patient and informed him of orders below. He was not aware the procedure had even been scheduled. He is not happy about doing the heparin injections but agrees and has done them before. He will follow up with pharmacy about the rx due to difficult time getting it filled last time. He will call if further questions. Would like instructions sent on my chart. Oma Taylor RN

## 2020-10-07 NOTE — TELEPHONE ENCOUNTER
Will resend to [facility] pharmacy where rx was approved last time heparin was needed.  Oma Taylor RN

## 2020-10-08 RX ORDER — WARFARIN SODIUM 2 MG/1
TABLET ORAL
Qty: 180 TABLET | Refills: 0 | Status: SHIPPED | OUTPATIENT
Start: 2020-10-08 | End: 2020-11-10

## 2020-10-08 NOTE — TELEPHONE ENCOUNTER
Spoke with pharmacy and gave verbal order for syringes to be used for Heparin sub Q. Oam Taylor RN

## 2020-10-08 NOTE — TELEPHONE ENCOUNTER
This RN sent another [facility] message to patient asking about his medications.   Daja Dumont BSN, RN

## 2020-10-09 ENCOUNTER — TELEPHONE (OUTPATIENT)
Dept: FAMILY MEDICINE | Facility: CLINIC | Age: 70
End: 2020-10-09

## 2020-10-09 ENCOUNTER — PATIENT OUTREACH (OUTPATIENT)
Dept: CARE COORDINATION | Facility: CLINIC | Age: 70
End: 2020-10-09

## 2020-10-09 NOTE — TELEPHONE ENCOUNTER
Reason for Call:  Form, our goal is to have forms completed with 72 hours, however, some forms may require a visit or additional information.    Type of letter, form or note:  Home Health Certification    Who is the form from?: Home care    Where did the form come from: form was faxed in    What clinic location was the form placed at?: [place]    Where the form was placed: Given to MA/RN    What number is listed as a contact on the form?: [phone]       Additional comments: Placed in RN C folder    Call taken on 10/9/2020 at 9:19 AM by Kateryna Prado

## 2020-10-09 NOTE — PROGRESS NOTES
Clinic Care Coordination Contact  [facility]       Clinical Data: Care Coordinator Outreach  Outreach attempted x 1.  Phone rang, then disconnected. Unable to leave a VM   Plan: Care Coordinator will try to reach patient again in 1-2 business days.    TARA MclaughlinN RN Clinic Care Coordinator   [place], [place], [place]  Phone: [phone]

## 2020-10-12 ENCOUNTER — TELEPHONE (OUTPATIENT)
Dept: FAMILY MEDICINE | Facility: CLINIC | Age: 70
End: 2020-10-12

## 2020-10-12 ENCOUNTER — MEDICAL CORRESPONDENCE (OUTPATIENT)
Dept: HEALTH INFORMATION MANAGEMENT | Facility: CLINIC | Age: 70
End: 2020-10-12

## 2020-10-12 ENCOUNTER — PATIENT OUTREACH (OUTPATIENT)
Dept: NURSING | Facility: CLINIC | Age: 70
End: 2020-10-12
Payer: MEDICARE

## 2020-10-12 DIAGNOSIS — L97.909 CALCIPHYLAXIS WITH NONHEALING ULCER OF LEG (H): ICD-10-CM

## 2020-10-12 DIAGNOSIS — E83.59 CALCIPHYLAXIS WITH NONHEALING ULCER OF LEG (H): ICD-10-CM

## 2020-10-12 SDOH — ECONOMIC STABILITY: FOOD INSECURITY: WITHIN THE PAST 12 MONTHS, YOU WORRIED THAT YOUR FOOD WOULD RUN OUT BEFORE YOU GOT MONEY TO BUY MORE.: NEVER TRUE

## 2020-10-12 SDOH — ECONOMIC STABILITY: TRANSPORTATION INSECURITY
IN THE PAST 12 MONTHS, HAS THE LACK OF TRANSPORTATION KEPT YOU FROM MEDICAL APPOINTMENTS OR FROM GETTING MEDICATIONS?: NO

## 2020-10-12 SDOH — ECONOMIC STABILITY: TRANSPORTATION INSECURITY
IN THE PAST 12 MONTHS, HAS LACK OF TRANSPORTATION KEPT YOU FROM MEETINGS, WORK, OR FROM GETTING THINGS NEEDED FOR DAILY LIVING?: NO

## 2020-10-12 SDOH — ECONOMIC STABILITY: FOOD INSECURITY: WITHIN THE PAST 12 MONTHS, THE FOOD YOU BOUGHT JUST DIDN'T LAST AND YOU DIDN'T HAVE MONEY TO GET MORE.: NEVER TRUE

## 2020-10-12 SDOH — ECONOMIC STABILITY: INCOME INSECURITY: HOW HARD IS IT FOR YOU TO PAY FOR THE VERY BASICS LIKE FOOD, HOUSING, MEDICAL CARE, AND HEATING?: NOT HARD AT ALL

## 2020-10-12 ASSESSMENT — ACTIVITIES OF DAILY LIVING (ADL)
DEPENDENT_IADLS:: TRANSPORTATION
DEPENDENT_IADLS:: TRANSPORTATION

## 2020-10-12 NOTE — PROGRESS NOTES
Clinic Care Coordination Contact    Clinic Care Coordination Contact  OUTREACH    Referral Information:  Referral Source: [facility]    Primary Diagnosis: Other (include Comment box)(Calciphylaxis with nonhealing ulcer of leg)    Chief Complaint   Patient presents with     Clinic Care Coordination - Initial     RN     [place] Utilization: [facility] for emergency care  Clinic Utilization  No PCP office visit in Past Year: No  Utilization    Last refreshed: 10/12/2020  3:34 PM: Hospital Admissions 0           Last refreshed: 10/12/2020  3:34 PM: ED Visits 0           Last refreshed: 10/12/2020  3:34 PM: No Show Count (past year) 2              Current as of: 10/12/2020  3:34 PM          Clinical Concerns:  Current Medical Concerns:  Introduced self and role. Patient is following PCP for Calciphylaxis with nonhealing ulcer of leg. Patient saw PCP 9/23, and vascular today. Patient has wound care through [facility] coming out to see him daily for dressing changes. Patient has hemodialysis T/TH/SA. He had a dialysis med change. Sodium Thiosulfphate 25 G x 30 treatments to help reduce the ulcer reoccurrences. He was advised to see pain management, he had an appointment  That had to be cancelled due to a procedure. Patient has not yet rescheduled. Patient asked CC RN to send PCP a refill request for percocet. This was done in a refill encounter. Patients main support person is his wife. He declined barriers to care. He feels confident to reach out to the clinic and specialty as needed going forward. He declined a need for CC services at this time.   Current Behavioral Concerns: none  Education Provided to patient: RN CC educated about Care Coordination Services, and his last OV with PCP-per the AVS.    Pain  Pain (GOAL):: Yes  Type: Acute (<3mo)  Location of chronic pain:: ulcers on penis, and LE  Radiating: No  Progression: Unchanged  Health Maintenance Reviewed:    Clinical Pathway: None    Medication  Management:  NA, and as above.      Functional Status:  Dependent IADLs:: Transportation  Bed or wheelchair confined:: No  Mobility Status: Independent w/Device    Living Situation:  Current living arrangement:: I live in a private home with spouse    Lifestyle & Psychosocial Needs:     Social Needs     Financial resource strain: Not hard at all     Food insecurity     Worry: Never true     Inability: Never true     Transportation needs     Medical: No     Non-medical: No     Transportation means:: Regular car  Informal Support system:: Spouse   Socioeconomic History     Marital status:      Spouse name: Not on file     Number of children: Not on file     Years of education: Not on file     Highest education level: Not on file     Tobacco Use     Smoking status: Never Smoker     Smokeless tobacco: Never Used   Substance and Sexual Activity     Alcohol use: Not Currently     Frequency: Never     Comment: Stopped when I was put on blood thinners     Drug use: Never     Sexual activity: Yes     Partners: Female     Birth control/protection: Post-menopausal      Resources and Interventions:  Current Resources: PCP, specialty   List of home care services:: Skilled Nursing  Community Resources: None  Supplies used at home:: Wound Care Supplies  Advance Care Plan/Directive  Advanced Care Plans/Directives on file:: No  Advanced Care Plan/Directive Status: Not Applicable   Goals: NA  Patient/Caregiver understanding: yes  Plan: 1. Patient will follow PCP's AVS.  2. Patient will follow up sooner should symptoms worsen, change or patient feels there is a need further care.  3. No further Care Coordination needs identified at this time. Patient may be referred to Care Coordination in the future if additional needs arise. Encouraged to the clinic if situation changes and assistance is needed. No follow-up planned.  4. Refill request was sent to PCP at patients request.     IRISH Mclaughlin RN Clinic Care Coordinator    [place]  Phone: [phone]

## 2020-10-12 NOTE — TELEPHONE ENCOUNTER
Patient still not responding to MyChart messages.   See other encounter for HHC.    Daja Dumont BSN, RN

## 2020-10-12 NOTE — TELEPHONE ENCOUNTER
Patient asking for refill of Percocet. He is going through #30 tabs in 5 days for Calciphylaxis with nonhealing ulcer of leg.   Patient is asking for a refill and with a quantity that will last at least 2 weeks to  how often he needs to call for a refill.   Patient was advised to follow up with pain management per  OV with Nguyen. He stated he will need to reschedule this appointment due to an upcoming procedure.     CC RN sending patients request to PCP. Please see CC patient out reach encounter for additional content if needed.     Thank you,   Monica Dailey, TARAN RN Clinic Care Coordinator   [place], [place], [place]  Phone: [phone]

## 2020-10-12 NOTE — TELEPHONE ENCOUNTER
Yes I am.   Patient has read my messages on his MyChart but is not responding.   I will document that and have provider sign off.     Form put in PCP's box to sign.  Please route this message back to TC along with form.     Daja PACHECON, RN

## 2020-10-12 NOTE — TELEPHONE ENCOUNTER
Message: Pt req we contact you for 90 days  Heparin Sodium 85112 Unit/ML.    Please send new script.

## 2020-10-12 NOTE — TELEPHONE ENCOUNTER
Reason for Call:  Other devon needs faxes signed and  Send back    Detailed comments: please sign and fax the faxes to [facility].  Any issues call      Phone Number Patient can be reached at: Other phone number:  devon*    Best Time: anhy    Can we leave a detailed message on this number? YES    Call taken on 10/12/2020 at 1:11 PM by Yaima Bermeo

## 2020-10-12 NOTE — TELEPHONE ENCOUNTER
Team RN, message is regarding HHC from 09/20/2020-11/18/2020. Are you still waiting on patient to update medications?  HUI Fortune

## 2020-10-13 RX ORDER — OXYCODONE AND ACETAMINOPHEN 5; 325 MG/1; MG/1
1-2 TABLET ORAL EVERY 6 HOURS PRN
Qty: 30 TABLET | Refills: 0 | Status: SHIPPED | OUTPATIENT
Start: 2020-10-13 | End: 2020-10-19

## 2020-10-13 NOTE — TELEPHONE ENCOUNTER
Refilled for #30 only, PCP is out of office and patient will need to get additional prescriptions when she returns. He should make sure he makes an appointment with pain management if that is what PCP requested.  Stefani Ndiaye, CNP

## 2020-10-14 ENCOUNTER — TELEPHONE (OUTPATIENT)
Dept: FAMILY MEDICINE | Facility: CLINIC | Age: 70
End: 2020-10-14

## 2020-10-14 DIAGNOSIS — I82.409 DEEP VEIN THROMBOSIS (DVT) (H): Primary | ICD-10-CM

## 2020-10-14 DIAGNOSIS — E83.59 CALCIPHYLAXIS: ICD-10-CM

## 2020-10-14 RX ORDER — SEVELAMER CARBONATE 800 MG/1
TABLET, FILM COATED ORAL
COMMUNITY
Start: 2020-09-28

## 2020-10-14 NOTE — TELEPHONE ENCOUNTER
Home care nurse Hemant informed of lab orders to be done at visit tomorrow for Venous INR, Factor 10 chromogenic and Heparin 10A . Oma Taylor RN

## 2020-10-14 NOTE — TELEPHONE ENCOUNTER
Chart reviewed with ACC RN, recommend anti Xa to assess if heparin levels out of range (supra), since could influence INR, and chromogenic factor X.    Highly recommend hematology consult due to new DX.  Hematology was consulted while inpatient at [facility] in 2019, but does not appear extensive work up, and in light of new DX, need to assess if warfarin still safe to use per below:     per Up To Date:   Studies have suggested the following risk factors for the development of calciphylaxis in end-stage kidney disease (ESKD) patients:  ?Hyperphosphatemia [23,44]    ?Medications including warfarin [20,23,35,44,45], calcium-based binders and vitamin D analogs [3], nutritional vitamin D [23], and systemic glucocorticoids [5,38]  ]?Oesity (body mass index [BMI] >30) [21,23,47,48]    ?Hypercoagulable states, such as protein C and S deficiency and antiphospholipid syndrome [49,50]    ?Hypoalbuminemia [35]    ?Diabetes [3,21,23]    ?Longer dialysis vintage [43]    ?Inflammatory and autoimmune conditions [5,6,30,38-41]    ?Recurrent skin trauma [23]    Agree with plan to hold anticoagulation today, and draw labs tomorrow.    Amy Ahn, PharmD BCACP  Anticoagulation Clinical Pharmacist

## 2020-10-14 NOTE — TELEPHONE ENCOUNTER
Patient had procedure scheduled today for removal of peritoneal catheter at vascular center. Home care nurse calling today to report that procedure was not done due to INR of 5.0. Patient has held warfarin as directed and last dose of warfarin was on the 10/9.Bridging with heparin and last dose was last night. Denies any bleeding or bruising. Recent medication changes in dialysis include Sodium Thiosulfate and sevelamer carbonate.   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 Reports 6 wounds at this time. Not improving but stable in past few days. Patient states his phosphorous level was high last week at 10.5 and yesterday down to 5.3. these labs are done at dialysis and not available in this chart.   Advised to hold warfarin and heparin today. Home care nurse will check INR again tomorrow.   Will forward to provider and pharmacist for further advice.  Oma Taylor RN

## 2020-10-14 NOTE — TELEPHONE ENCOUNTER
Nurse from [facility] calling, patient had to cancel his  cath change that was scheduled for today 10/14/20. He is to continue with sub-Q heparin and stay off coumadin for now. Cath procedure has been re-scheduled to Monday 10/19/20.

## 2020-10-15 DIAGNOSIS — I82.409 DEEP VEIN THROMBOSIS (DVT) (H): ICD-10-CM

## 2020-10-15 DIAGNOSIS — Z79.01 LONG TERM (CURRENT) USE OF ANTICOAGULANTS: ICD-10-CM

## 2020-10-15 LAB
INR PPP: 4.38 (ref 0.86–1.14)
LMWH PPP CHRO-ACNC: <0.1 IU/ML

## 2020-10-15 PROCEDURE — 85610 PROTHROMBIN TIME: CPT | Performed by: NURSE PRACTITIONER

## 2020-10-15 PROCEDURE — 36415 COLL VENOUS BLD VENIPUNCTURE: CPT | Performed by: NURSE PRACTITIONER

## 2020-10-15 PROCEDURE — 85520 HEPARIN ASSAY: CPT | Performed by: NURSE PRACTITIONER

## 2020-10-15 PROCEDURE — 85260 CLOT FACTOR X STUART-POWER: CPT | Performed by: NURSE PRACTITIONER

## 2020-10-15 RX ORDER — HYDROXYZINE HYDROCHLORIDE 10 MG/1
10 TABLET, FILM COATED ORAL EVERY 6 HOURS PRN
Qty: 90 TABLET | Refills: 0 | Status: SHIPPED | OUTPATIENT
Start: 2020-10-15 | End: 2020-11-11

## 2020-10-15 NOTE — TELEPHONE ENCOUNTER
Routing refill request to provider for review/approval because:  Drug not on the [facility] refill protocol   Ayaka Arce BSN, RN

## 2020-10-16 ENCOUNTER — ANTICOAGULATION THERAPY VISIT (OUTPATIENT)
Dept: NURSING | Facility: CLINIC | Age: 70
End: 2020-10-16

## 2020-10-16 DIAGNOSIS — Z79.01 LONG TERM (CURRENT) USE OF ANTICOAGULANTS: ICD-10-CM

## 2020-10-16 DIAGNOSIS — I82.4Y2 ACUTE DEEP VEIN THROMBOSIS (DVT) OF PROXIMAL VEIN OF LEFT LOWER EXTREMITY (H): ICD-10-CM

## 2020-10-16 DIAGNOSIS — I48.91 ATRIAL FIBRILLATION STATUS POST CARDIOVERSION (H): ICD-10-CM

## 2020-10-16 DIAGNOSIS — I82.402 ACUTE DEEP VEIN THROMBOSIS (DVT) OF LEFT LOWER EXTREMITY, UNSPECIFIED VEIN (H): ICD-10-CM

## 2020-10-16 LAB — FACT X ACT/NOR PPP CHRO: 18 % (ref 70–130)

## 2020-10-16 NOTE — PROGRESS NOTES
ANTICOAGULATION MANAGEMENT     Patient Name:  Leif Ariza  Date:  10/16/2020    ASSESSMENT /SUBJECTIVE:     INR result of 4.3 on 10/15. Factor 10 =18% supratherapeutic. Goal INR of 2.0-3.0      Warfarin dose taken: reports has held warfarin as directed since 10/9    Diet: No new diet changes affecting INR    Medication changes/ interactions: none known    Previous INR: Subtherapeutic INR was reported as 5.0 on 10/14 prior to procedure and therefore procedure was cancelled and rescheduled for 10/19.    S/S of bleeding or thromboembolism: Yes: home care nurse reports some increase in local bruising on arms    New injury or illness: No    Upcoming surgery, procedure or cardioversion: Yes: rescheduled procedure with vascular clinic on 10/19 at 10:45 am for removal of dialysis port    Additional findings: see notes.     Spoke with home care nurse and patient on phone and nurse reviewed medications and looked at each bottle and medication and reports all pills seem to be as noted on bottle. Verified patient has warfarin 2 mg tablets and has one partial bottle and one sealed unopened bottle. He checked patient's set up pill boxes and noted no errors.       PLAN:    Telephone call with [facility] regarding INR result and instructed:     Warfarin Dosing Instructions: continue to hold warfarin   Vitamin k 3 mg ordered to be taken today.informed of rx sent to this pharmacy and patient states has someone available to pick it up. He will resume heparin subcutaneous bridging tomorrow with last dose Sunday evening.     Instructed patient to follow up no later than: 3 days will have INR done prior to procedure  Orders given to  Homecare nurse/facility to recheck at visit on Monday or can call with plan after procedure.    Education provided: spoke with patient       Jean verbalizes understanding and agrees to warfarin dosing plan. I also called home care nurse Hemant and he was given current orders and plan.     Instructed to call the  Anticoagulation Clinic for any changes, questions or concerns. (#[phone])        Oma Taylor RN      OBJECTIVE:  Recent labs: (last 7 days)     10/15/20  1100   INR 4.38*   [id number] 18*   AXA <0.10         No question data found.  Anticoagulation Summary  As of 10/16/2020    INR goal:  2.0-3.0   TTR:  51.1 % (1 y)   INR used for dosin.38 (10/15/2020)   Warfarin maintenance plan:  1 mg (2 mg x 0.5) every Sun, Thu; 2 mg (2 mg x 1) all other days   Full warfarin instructions:  10/16: Hold; Otherwise 1 mg every Sun, Thu; 2 mg all other days   Weekly warfarin total:  12 mg   Plan last modified:  Oma Taylor RN (2020)   Next INR check:     Priority:  High   Target end date:  Indefinite    Indications    Atrial fibrillation status post cardioversion (H) [I48.91]  Deep vein thrombosis (DVT) (H) [I82.409]  Long term (current) use of anticoagulants [Z79.01]  Acute deep vein thrombosis (DVT) of proximal vein of left lower extremity (H) [I82.4Y2]             Anticoagulation Episode Summary     INR check location:      Preferred lab:      Send INR reminders to:  PEARL CORNEJO    Comments:  20 switched from daily peritonel dialysis at home to dialysis at clinic ,Th,Sat, Home care doing INR.   referral renewed 2020      Anticoagulation Care Providers     Provider Role Specialty Phone number    Nguyen Torres, APRN CNP Referring Nurse Practitioner - Family [phone]

## 2020-10-16 NOTE — PROGRESS NOTES
Discussed case with Dante Love at [facility].  Needed to rule out any incidental ingestion of warfarin, med errors or interacting meds.    Home care nurse assisted with med reconciliation and verified no additional doses in pill box.      Plan for reversal with 3mg PO vitamin K, resume Heparin subcutaneous next AM after dose.    Amy Ahn, PharmD [facility]  Anticoagulation Clinical Pharmacist

## 2020-10-18 RX ORDER — METHOCARBAMOL 500 MG/1
500 TABLET, FILM COATED ORAL 3 TIMES DAILY
Qty: 90 TABLET | Refills: 0 | Status: SHIPPED | OUTPATIENT
Start: 2020-10-18 | End: 2020-11-18

## 2020-10-19 DIAGNOSIS — L97.909 CALCIPHYLAXIS WITH NONHEALING ULCER OF LEG (H): ICD-10-CM

## 2020-10-19 DIAGNOSIS — E83.59 CALCIPHYLAXIS WITH NONHEALING ULCER OF LEG (H): ICD-10-CM

## 2020-10-19 RX ORDER — OXYCODONE AND ACETAMINOPHEN 5; 325 MG/1; MG/1
1-2 TABLET ORAL EVERY 6 HOURS PRN
Qty: 30 TABLET | Refills: 0 | Status: SHIPPED | OUTPATIENT
Start: 2020-10-19 | End: 2020-11-19

## 2020-10-19 NOTE — TELEPHONE ENCOUNTER
Controlled Substance Refill Request for Percocet  Problem List Complete:  No     PROVIDER TO CONSIDER COMPLETION OF PROBLEM LIST AND OVERVIEW/CONTROLLED SUBSTANCE AGREEMENT    Last Written Prescription Date:  10/13/20  Last Fill Quantity: 30,   # refills: 0    THE MOST RECENT OFFICE VISIT MUST BE WITHIN THE PAST 3 MONTHS. AT LEAST ONE FACE TO FACE VISIT MUST OCCUR EVERY 6 MONTHS. ADDITIONAL VISITS CAN BE VIRTUAL.  (THIS STATEMENT SHOULD BE DELETED.)    Last Office Visit with [facility] primary care provider: 9/23/20    Future Office visit: 10/28/20 with Pain Management    Controlled substance agreement:   Encounter-Level CSA:    There are no encounter-level csa.     Patient-Level CSA:    There are no patient-level csa.         Last Urine Drug Screen: No results found for: CDAUT, No results found for: COMDAT, No results found for: THC13, PCP13, COC13, MAMP13, OPI13, AMP13, BZO13, TCA13, MTD13, BAR13, OXY13, PPX13, BUP13     Processing:       [email or web address]       checked in past 3 months?  Yes 10/19/20   Argelia Mcdonald RN

## 2020-10-19 NOTE — TELEPHONE ENCOUNTER
Patient is calling stating that he needs this refilled today.  He will be out of his medication.  Can someone refill in Nguyen's absence.  Please call to advise.  Thank you

## 2020-10-21 ENCOUNTER — TELEPHONE (OUTPATIENT)
Dept: FAMILY MEDICINE | Facility: CLINIC | Age: 70
End: 2020-10-21

## 2020-10-21 ENCOUNTER — MEDICAL CORRESPONDENCE (OUTPATIENT)
Dept: HEALTH INFORMATION MANAGEMENT | Facility: CLINIC | Age: 70
End: 2020-10-21

## 2020-10-21 DIAGNOSIS — Z53.9 DIAGNOSIS NOT YET DEFINED: Primary | ICD-10-CM

## 2020-10-21 PROCEDURE — G0180 MD CERTIFICATION HHA PATIENT: HCPCS | Performed by: NURSE PRACTITIONER

## 2020-10-21 NOTE — TELEPHONE ENCOUNTER
[facility] faxed to [facility] at [phone]  and to Stat scanning.  HUI Fortune

## 2020-10-21 NOTE — TELEPHONE ENCOUNTER
Hemant from [facility] called stating was unable to get INR yesterday. Attempted 5 times and unable to get enough blood for the strip, Was going to try again today but patient has cancelled all home visits for this week. Advised to have patient go to lab while at office visit on Friday or if able come into lab to have INR drawn tomorrow. Hemant will reach out and advise patient, number [phone] given for patient to call to schedule.    Paz Albert RN - [facility]

## 2020-10-22 ENCOUNTER — ANTICOAGULATION THERAPY VISIT (OUTPATIENT)
Dept: FAMILY MEDICINE | Facility: CLINIC | Age: 70
End: 2020-10-22

## 2020-10-22 DIAGNOSIS — I82.4Y2 ACUTE DEEP VEIN THROMBOSIS (DVT) OF PROXIMAL VEIN OF LEFT LOWER EXTREMITY (H): ICD-10-CM

## 2020-10-22 DIAGNOSIS — I82.409 DEEP VEIN THROMBOSIS (DVT) (H): ICD-10-CM

## 2020-10-22 DIAGNOSIS — I48.91 ATRIAL FIBRILLATION STATUS POST CARDIOVERSION (H): ICD-10-CM

## 2020-10-22 DIAGNOSIS — Z79.01 LONG TERM (CURRENT) USE OF ANTICOAGULANTS: ICD-10-CM

## 2020-10-22 LAB — INR PPP: 3.2 (ref 0.9–1.1)

## 2020-10-22 NOTE — PROGRESS NOTES
"ANTICOAGULATION MANAGEMENT     Patient Name:  Leif Ariza  Date:  10/22/2020    ASSESSMENT /SUBJECTIVE:    Today's INR result of 3.2 is supratherapeutic. Goal INR of 2.0-3.0      Warfarin dose taken: Patient has been taking his medications \"always\" as 1 mg MWF; 2 mg ROW= 11 mg /wk  which is a 8.3% decrease from Epic maintenance.    Diet: No new diet changes affecting INR    Medication changes/ interactions: No new medications/supplements affecting INR    Previous INR: Supratherapeutic     S/S of bleeding or thromboembolism: No    New injury or illness: No    Upcoming surgery, procedure or cardioversion: No    Additional findings: Procedure on 10/14/20 postponed due to supra INR (after 5 day hold)    Reversed with Vit K 3 mg on 10/16/20    Held continued through 10/19 with Heparin        PLAN:    Telephone call with home care nurse Hemant regarding INR result and instructed:     Warfarin Dosing Instructions: Hold tomorrow; change maintenance to 2 mg T,Th, Sat; 1 mg ROW (8.4% decrease)    Instructed patient to follow up no later than: Monday 10/26/20  Orders given to  Homecare nurse/facility to recheck    Education provided: Monitoring for bleeding signs and symptoms and When to seek medical attention/emergency care      Hemant verbalizes understanding and agrees to warfarin dosing plan.    Instructed to call the Anticoagulation Clinic for any changes, questions or concerns. (#[phone])        Keri Vega RN      OBJECTIVE:  Recent labs: (last 7 days)     10/22/20   INR 3.2*         No question data found.  Anticoagulation Summary  As of 10/22/2020    INR goal:  2.0-3.0   TTR:  49.5 % (1 y)   INR used for dosing:  3.2 (10/22/2020)   Warfarin maintenance plan:  2 mg (2 mg x 1) every Tue, Thu, Sat; 1 mg (2 mg x 0.5) all other days   Full warfarin instructions:  10/22: 2 mg; 10/23: Hold; 10/30: 2 mg; Otherwise 2 mg every Tue, Thu, Sat; 1 mg all other days   Weekly warfarin total:  10 mg   Plan last modified:  " Letty Sierra, RN (10/22/2020)   Next INR check:  10/26/2020   Priority:  High   Target end date:  Indefinite    Indications    Atrial fibrillation status post cardioversion (H) [I48.91]  Deep vein thrombosis (DVT) (H) [I82.409]  Long term (current) use of anticoagulants [Z79.01]  Acute deep vein thrombosis (DVT) of proximal vein of left lower extremity (H) [I82.4Y2]             Anticoagulation Episode Summary     INR check location:      Preferred lab:      Send INR reminders to:  PEARL CORNEJO    Comments:  9/28/20 switched from daily peritonel dialysis at home to dialysis at clinic Tu,Th,Sat, Home care doing INR.   referral renewed 9/28/2020      Anticoagulation Care Providers     Provider Role Specialty Phone number    Nguyen Torres, APRN CNP Referring Nurse Practitioner - Family [phone]

## 2020-10-23 ENCOUNTER — TELEPHONE (OUTPATIENT)
Dept: FAMILY MEDICINE | Facility: CLINIC | Age: 70
End: 2020-10-23

## 2020-10-23 DIAGNOSIS — I82.409 DEEP VEIN THROMBOSIS (DVT) (H): ICD-10-CM

## 2020-10-23 DIAGNOSIS — Z79.01 LONG TERM (CURRENT) USE OF ANTICOAGULANTS: ICD-10-CM

## 2020-10-23 DIAGNOSIS — I82.4Y2 ACUTE DEEP VEIN THROMBOSIS (DVT) OF PROXIMAL VEIN OF LEFT LOWER EXTREMITY (H): ICD-10-CM

## 2020-10-23 DIAGNOSIS — I48.91 ATRIAL FIBRILLATION STATUS POST CARDIOVERSION (H): ICD-10-CM

## 2020-10-23 NOTE — TELEPHONE ENCOUNTER
Started Doxycyline 100 mg bid #28- started today.  Per Micromedex there is a fair interaction, increased risk for bleeding. Home Care already scheduled to recheck Monday. Will continue current dose and recheck in 3 days as planned.    Elida Ellsworth RN on 10/23/2020 at 2:42 PM

## 2020-10-23 NOTE — TELEPHONE ENCOUNTER
Patient calling. He went to an urgent care today. They put him on an antibiotic. They wanted him to let you know that it may interfere with his INR. Please call and advise.

## 2020-10-24 ENCOUNTER — MEDICAL CORRESPONDENCE (OUTPATIENT)
Dept: HEALTH INFORMATION MANAGEMENT | Facility: CLINIC | Age: 70
End: 2020-10-24

## 2020-10-26 ENCOUNTER — ANTICOAGULATION THERAPY VISIT (OUTPATIENT)
Dept: FAMILY MEDICINE | Facility: CLINIC | Age: 70
End: 2020-10-26

## 2020-10-26 DIAGNOSIS — I82.409 DEEP VEIN THROMBOSIS (DVT) (H): ICD-10-CM

## 2020-10-26 DIAGNOSIS — I48.91 ATRIAL FIBRILLATION STATUS POST CARDIOVERSION (H): ICD-10-CM

## 2020-10-26 DIAGNOSIS — I82.4Y2 ACUTE DEEP VEIN THROMBOSIS (DVT) OF PROXIMAL VEIN OF LEFT LOWER EXTREMITY (H): ICD-10-CM

## 2020-10-26 DIAGNOSIS — Z79.01 LONG TERM (CURRENT) USE OF ANTICOAGULANTS: ICD-10-CM

## 2020-10-26 LAB — INR PPP: 4.5 (ref 0.9–1.1)

## 2020-10-26 NOTE — PROGRESS NOTES
ANTICOAGULATION MANAGEMENT     Patient Name:  Leif Ariza  Date:  10/26/2020    ASSESSMENT /SUBJECTIVE:    Today's INR result of 4.5 is supratherapeutic. Goal INR of 2.0-3.0      Warfarin dose taken: Warfarin taken as instructed    Diet: No new diet changes affecting INR    Medication changes/ interactions: Interaction between doxycline started on 10/23/20 and warfarin may be affecting INR    Previous INR: Supratherapeutic     S/S of bleeding or thromboembolism: No    New injury or illness: No    Upcoming surgery, procedure or cardioversion: No    Additional findings: None      PLAN:    Telephone call with home care nurse Hemant regarding INR result and instructed:     Warfarin Dosing Instructions: hold today and tomorrow; resume current dosing of 2 mg T, TH, Sat, I mg ROW    Instructed patient to follow up no later than: 4 days; Friday 10/30/20  Orders given to  Homecare nurse/facility to recheck    Education provided: None required      Hemant   verbalizes understanding and agrees to warfarin dosing plan.    Instructed to call the Anticoagulation Clinic for any changes, questions or concerns. (#[phone])        Keri Vega RN      OBJECTIVE:  Recent labs: (last 7 days)     10/22/20 10/26/20   INR 3.2* 4.5*         No question data found.  Anticoagulation Summary  As of 10/26/2020    INR goal:  2.0-3.0   TTR:  49.3 % (1 y)   INR used for dosin.5 (10/26/2020)   Warfarin maintenance plan:  2 mg (2 mg x 1) every Tue, Thu, Sat; 1 mg (2 mg x 0.5) all other days   Full warfarin instructions:  10/26: Hold; 10/27: Hold; 10/30: 2 mg; Otherwise 2 mg every Tue, Thu, Sat; 1 mg all other days   Weekly warfarin total:  10 mg   Plan last modified:  Letty Sierra RN (10/22/2020)   Next INR check:  10/30/2020   Priority:  High   Target end date:  Indefinite    Indications    Atrial fibrillation status post cardioversion (H) [I48.91]  Deep vein thrombosis (DVT) (H) [I82.409]  Long term (current) use of  anticoagulants [Z79.01]  Acute deep vein thrombosis (DVT) of proximal vein of left lower extremity (H) [I82.4Y2]             Anticoagulation Episode Summary     INR check location:      Preferred lab:      Send INR reminders to:  PEARL WALLACE    Comments:  9/28/20 switched from daily peritonel dialysis at home to dialysis at clinic Tu,Th,Sat, Home care doing INR.   referral renewed 9/28/2020      Anticoagulation Care Providers     Provider Role Specialty Phone number    Nguyen Torres, APRN CNP Referring Nurse Practitioner - Family [phone]

## 2020-10-28 ENCOUNTER — MEDICAL CORRESPONDENCE (OUTPATIENT)
Dept: HEALTH INFORMATION MANAGEMENT | Facility: CLINIC | Age: 70
End: 2020-10-28

## 2020-10-28 ENCOUNTER — VIRTUAL VISIT (OUTPATIENT)
Dept: PALLIATIVE MEDICINE | Facility: CLINIC | Age: 70
End: 2020-10-28
Payer: MEDICARE

## 2020-10-28 ENCOUNTER — TELEPHONE (OUTPATIENT)
Dept: FAMILY MEDICINE | Facility: CLINIC | Age: 70
End: 2020-10-28

## 2020-10-28 DIAGNOSIS — Z53.9 NO SHOW: Primary | ICD-10-CM

## 2020-10-28 DIAGNOSIS — Z53.9 DIAGNOSIS NOT YET DEFINED: Primary | ICD-10-CM

## 2020-10-28 PROCEDURE — G0180 MD CERTIFICATION HHA PATIENT: HCPCS | Performed by: NURSE PRACTITIONER

## 2020-10-28 NOTE — TELEPHONE ENCOUNTER
Reason for Call:  Other FYI    Detailed comments: home care is calling to let provider know patient had a fall during visit on Monday. States looks like patients walker collapsed on patient while home care was in home doing wound care. Thank you.    Phone Number Patient can be reached at: [phone]    Best Time:     Can we leave a detailed message on this number? YES    Call taken on 10/28/2020 at 12:13 PM by Stephany Valenzuela

## 2020-10-28 NOTE — TELEPHONE ENCOUNTER
Home care RN called to report a fall patient had 2 days ago.   Patient was standing with a walker when the right side of the walker collapsed.  Patient fell and scrapped his L knee and R forearm.  Bleeding was controlled with a band-aide on each scrape.   No other injuries.     Routing to PCP as OZZY PACHECON, RN

## 2020-10-28 NOTE — PATIENT INSTRUCTIONS
----------------------------------------------------------------  [facility] Number:  [phone]     Call with any questions about your care and for scheduling assistance.     Calls are returned Monday through Friday between 8 AM and 4:30 PM. We usually get back to you within 2 business days depending on the issue/request.    If we are prescribing your medications:    For opioid medication refills, call the clinic or send a [employer] message 7 days in advance.  Please include:    Name of requested medication    Name of the pharmacy.    For non-opioid medications, call your pharmacy directly to request a refill. Please allow 3-4 days to be processed.     Per [place] State Law:    All controlled substance prescriptions must be filled within 30 days of being written.      For those controlled substances allowing refills, pickup must occur within 30 days of last fill.      We believe regular attendance is key to your success in our program!      Any time you are unable to keep your appointment we ask that you call us at least 24 hours in advance to cancel.This will allow us to offer the appointment time to another patient.     Multiple missed appointments may lead to dismissal from the clinic.

## 2020-10-28 NOTE — PROGRESS NOTES
Greater than 30 minutes was spent preparing for appointment    This patient was a no show for this scheduled appointment.

## 2020-10-29 ENCOUNTER — TRANSFERRED RECORDS (OUTPATIENT)
Dept: MULTI SPECIALTY CLINIC | Facility: CLINIC | Age: 70
End: 2020-10-29

## 2020-10-29 LAB
ALT SERPL-CCNC: 15 IU/L (ref 8–45)
AST SERPL-CCNC: 31 IU/L (ref 2–40)
CREAT SERPL-MCNC: 3.87 MG/DL (ref 0.72–1.25)
GFR SERPL CREATININE-BSD FRML MDRD: 16 ML/MIN/1.73M2
GLUCOSE SERPL-MCNC: 100 MG/DL (ref 65–100)
HBA1C MFR BLD: 4.7 % (ref 0–5.7)
INR PPP: 4.3
POTASSIUM SERPL-SCNC: 4.2 MMOL/L (ref 3.5–5)

## 2020-10-29 NOTE — TELEPHONE ENCOUNTER
[facility] faxed to [facility] at [phone]  and to Stat scanning.  HUI Fortune

## 2020-11-09 ENCOUNTER — TELEPHONE (OUTPATIENT)
Dept: CARE COORDINATION | Facility: CLINIC | Age: 70
End: 2020-11-09

## 2020-11-09 ENCOUNTER — PATIENT OUTREACH (OUTPATIENT)
Dept: NURSING | Facility: CLINIC | Age: 70
End: 2020-11-09
Payer: MEDICARE

## 2020-11-09 ENCOUNTER — TELEPHONE (OUTPATIENT)
Dept: FAMILY MEDICINE | Facility: CLINIC | Age: 70
End: 2020-11-09

## 2020-11-09 DIAGNOSIS — I48.91 ATRIAL FIBRILLATION WITH SLOW VENTRICULAR RESPONSE (H): ICD-10-CM

## 2020-11-09 DIAGNOSIS — E83.59 CALCIPHYLAXIS: ICD-10-CM

## 2020-11-09 DIAGNOSIS — Z79.01 LONG TERM (CURRENT) USE OF ANTICOAGULANTS: ICD-10-CM

## 2020-11-09 DIAGNOSIS — L03.116 LEFT LEG CELLULITIS: ICD-10-CM

## 2020-11-09 DIAGNOSIS — S22.32XA CLOSED FRACTURE OF ONE RIB OF LEFT SIDE: Primary | ICD-10-CM

## 2020-11-09 DIAGNOSIS — I48.91 ATRIAL FIBRILLATION STATUS POST CARDIOVERSION (H): ICD-10-CM

## 2020-11-09 DIAGNOSIS — E83.51 HYPOCALCEMIA: ICD-10-CM

## 2020-11-09 DIAGNOSIS — I82.409 DEEP VEIN THROMBOSIS (DVT) (H): ICD-10-CM

## 2020-11-09 DIAGNOSIS — I82.4Y2 ACUTE DEEP VEIN THROMBOSIS (DVT) OF PROXIMAL VEIN OF LEFT LOWER EXTREMITY (H): ICD-10-CM

## 2020-11-09 ASSESSMENT — ACTIVITIES OF DAILY LIVING (ADL): DEPENDENT_IADLS:: TRANSPORTATION;MEAL PREPARATION;SHOPPING;LAUNDRY;COOKING;CLEANING

## 2020-11-09 NOTE — TELEPHONE ENCOUNTER
"Received phone call from [facility] RN, Hemant, ([phone]). He had a question if INR was needed today since the patient was switched from warfarin to Eliquis while in hospital at [facility] ([facility]).   Per our protocol, patient's INR would need to be less than 2.0 before starting Eliquis. Hemant states the patient has already stopped warfarin and is currently taking Eliquis, 5 mg, twice daily. So no INR advised today.    Per review of discharge note. Patient was discharged with apixaban and warfarin.   Also: \"Prescriptions for these medicines or supplies were NOT printed nor sent to your preferred pharmacy. Check with your doctor if you have questions.   Check with your doctor if you have questions.    apixaban 5 mg tablet\"   Also, along with apixaban and warfarin listed as \"continue taking these medications\", was \"heparin 10,000 unit/mL injection  Inject 2 mL (20,000 Units) as directed every 12 hours per INR nurse directions for bridging while not taking warfarin.\"     Called Hemant back to clarify. Hemant/patient state Jean hasn't taken coumadin in at least a week, he started apixaban in hospital last Tuesday or Wednesday. He has \"heparin syringes\" left at home, but hasn't used them since before his procedure. Hemant will also follow up with PCP for orders.     Routing to PCP as OZZY SIMMONS RN  Anticoagulation Clinic                "

## 2020-11-09 NOTE — TELEPHONE ENCOUNTER
Reason for call:  Other   Patient called regarding (reason for call): call back  Additional comments: Hemant from [employer] is calling with some questions as well as some information regarding the patient. Please call back to discuss.    Phone number to reach patient:  Other phone number:  [phone]    Best Time:  Any     Can we leave a detailed message on this number?  YES    Travel screening: Not Applicable

## 2020-11-09 NOTE — LETTER
[place] CARE COORDINATION  [address] [facility] [address]    November 9, 2020    Leif Ariza  [address] [facility] [address]      Dear Leif,    I am a clinic care coordinator who works with JOYA Motta CNP at [facility]. I wanted to thank you for spending the time to talk with me.  Below is a description of clinic care coordination and how I can further assist you.      The clinic care coordination team is made up of a registered nurse,  and community health worker who understand the health care system. The goal of clinic care coordination is to help you manage your health and improve access to the health care system in the most efficient manner. The team can assist you in meeting your health care goals by providing education, coordinating services, strengthening the communication among your providers and supporting you with any resource needs.    Please feel free to contact me at [phone] with any questions or concerns. We are focused on providing you with the highest-quality healthcare experience possible and that all starts with you.     Sincerely,     Diego Parra MSN, RN, PHN, CCM   Primary Care Clinical RN Care Coordinator  [facility]  11/9/2020   3:19 PM  [email or web address]  Office: [phone]      Enclosed: I have enclosed a copy of the Complex Care Plan. This has helpful information and goals that we have talked about. Please keep this in an easy to access place to use as needed.

## 2020-11-09 NOTE — TELEPHONE ENCOUNTER
MORPHINE 0.1% IN INTRASITE GEL ([facility] AMB MIX)    The patient received this medication during his last hospitalization.  Currently the patient has to send his son down to [facility] every 2 weeks to get this refilled.  I spoke with our compounding pharmacy and they can make it although they need a current order from the PCP.  Is this a possibility for you to place an order for this with the compounding pharmacy.  The patient is willing to have his sons pick it up at the [facility] pharmacy.  The patient uses this gel 2 times per day and is available on the medication updates from [facility].        Diego Parra MSN, RN, PHN, [facility]   Primary Care Clinical RN Care Coordinator  [facility]  11/9/2020   2:33 PM  [email or web address]  Office: [phone]\

## 2020-11-09 NOTE — PROGRESS NOTES
Clinic Care Coordination Contact    Clinic Care Coordination Contact  OUTREACH    Referral Information:  Referral Source: Non-[facility]    Primary Diagnosis: CKD(Calciphylaxis with nonhealing ulcer of leg)    Chief Complaint   Patient presents with     Clinic Care Coordination - Post Hospital     Diego Parra RN nurse care coordinator phone visit for initial assessment        Universal Utilization: The patient uses the [facility] system and the [facility] system.  Clinic Utilization  Difficulty keeping appointments:: No  Compliance Concerns: No  No-Show Concerns: No  No PCP office visit in Past Year: No  Utilization    Last refreshed: 11/9/2020  1:53 PM: Hospital Admissions 0           Last refreshed: 11/9/2020  1:53 PM: ED Visits 0           Last refreshed: 11/9/2020  1:53 PM: No Show Count (past year) 2              Current as of: 11/9/2020  1:53 PM              Clinical Concerns:  Current Medical Concerns: The patient has been in the hospital recently for treatment of a wound as well as closed fracture of one rib on the left side.  The patient came home with an appointment to get into palliative care the appointment is not until 9 December.  The patient states that the morphine gel that he puts on the wounds has really helped the pain level reduce by some.  Although at this point in time the only place he can get it is down at [facility] at their compounding pharmacy.  A message was sent to the PCP to see if she is able to order this for the patient at [facility]'s compounding pharmacy, so we will wait and see.  The patient states that he is bed ridden due to the pain and the constant infections in his wounds.    Medication reconciliation was completed with the patient and marked as reviewed.  Health maintenance was reviewed and questions were answered with the patient.  The assessment for hypertension was completed with the patient today as well as the social determinants of health and they  were marked as reviewed.    Medication reconciliation status: Medications reviewed and reconciled.  Continue medications without change.     Patient Active Problem List   Diagnosis     Essential hypertension     Mixed hyperlipidemia     CKD (chronic kidney disease) stage 5, GFR less than 15 ml/min (H)     ESRD (end stage renal disease) on dialysis (H)     Type 2 diabetes mellitus, without long-term current use of insulin (H)     Atrial fibrillation status post cardioversion (H)     Hyperuricemia     DORI (obstructive sleep apnea)     Morbid obesity (H)     Deep vein thrombosis (DVT) (H)     Long term (current) use of anticoagulants     Secondary hyperparathyroidism, renal (H)     Cellulitis     Hyponatremia     Left leg pain     Sepsis (H)     UTI (urinary tract infection)     Restless legs syndrome     Calciphylaxis with nonhealing ulcer of leg (H)     Acute deep vein thrombosis (DVT) of proximal vein of left lower extremity (H)       Current Behavioral Concerns: None currently noted  Education Provided to patient: Options for care coordination.  Pain  Pain (GOAL):: Yes  Type: Acute (<3mo)  Location of chronic pain:: ulcers on penis, and LE  Radiating: No  Progression: Unchanged  Description of pain: Aching, Burning  Chronic pain severity:: 7  Limitation of routine activities due to chronic pain:: Yes  Description: Unable to perform most daily activities (chores, hobbies, social activities, driving)  Alleviating Factors: Rest, Pain Medication  Aggravating Factors: Activity, Positioning  Health Maintenance Reviewed: Due/Overdue   Health Maintenance Due   Topic Date Due     URINE DRUG SCREEN  1950     DTAP/TDAP/TD IMMUNIZATION (1 - Tdap) 12/16/1977     ZOSTER IMMUNIZATION (1 of 2) 12/16/2002     Pneumococcal Vaccine: Pediatrics (0 to 5 Years) and At-Risk Patients (6 to 64 Years) (2 of 3 - PCV13) 07/20/2008     Pneumococcal Vaccine: 65+ Years (1 of 1 - PPSV23) 12/16/2017     LIPID  07/28/2020     MICROALBUMIN   "07/28/2020     MEDICARE ANNUAL WELLNESS VISIT  08/22/2020     DIABETIC FOOT EXAM  08/22/2020     FALL RISK ASSESSMENT  08/22/2020     A1C  09/01/2020     EYE EXAM  10/08/2020       Clinical Pathway: Clinic Care Coordination Hypertension Assessment    Discharge:  Hospital summary: closed fracture of left rib  Day of hospital discharge:  11/7/2020  What recommendations were made for follow up after your recent hospitalization? Follow up with palliative and the PCP.  Have the follow up appointments been scheduled? Yes  If not, can I help you set up these appointments? N/A     Do you have after-hour contact numbers for your providers? yes     Hypertension:                      Symptom Review:       Medications:   \"How many new medications are you on since your hospitalization?\"           0 - 1  \"How many of your current medicines changed (dose, timing, name, etc.) while you were in the hospital?\" 0 - 1  \"Do you have questions about your medications?\" No  For patients on insulin: \"Did you start on insulin in the hospital or did you have your insulin dose changed?\"No  Medication reconciliation completed? Yes  Was MTM referral placed (*Make sure to put transitions as reason for referral)?  No     Activity:  Patient currently is not engaged in exercise:   Patient is not able to perform ADLS/IADLS without assistance.  Patient referred to NONE.    Diet:  Reviewed well balanced diet with consideration to medical conditions/limitations.   Reviewed low sodium diet guidelines appropriate for patient.    Referred to NONE.    Emotions:  Patient does have adequate support.  Patient is not feeling down or depressed.    Follow up:   Referred patient to NONE.      Medication Management:  Patient is knowledgeable on medications and is adherent.  No financial concerns reported at this time.  Medication reconciliation was completed with the patient and there are no questions or concerns regarding his medications    Functional " Status:  Dependent ADLs:: Bathing, Dressing, Eating, Grooming, Incontinence, Positioning, Transfers, Toileting  Dependent IADLs:: Transportation, Meal Preparation, Shopping, Laundry, Cooking, Cleaning  Bed or wheelchair confined:: Yes  Mobility Status: Independent w/Device  Fallen 2 or more times in the past year?: No  Any fall with injury in the past year?: No    Living Situation:  Current living arrangement:: I live in a private home with spouse  Type of residence:: Private home - stairs    Lifestyle & Psychosocial Needs:     Social Needs     Financial resource strain: Not hard at all     Food insecurity     Worry: Never true     Inability: Never true     Transportation needs     Medical: No     Non-medical: No     Diet:: Regular  Inadequate nutrition (GOAL):: No  Tube Feeding: No  Inadequate activity/exercise (GOAL):: No  Significant changes in sleep pattern (GOAL): No  Transportation means:: Regular car, Family     Jew or spiritual beliefs that impact treatment:: No  Mental health management concern (GOAL):: No  Informal Support system:: Spouse, Children   Socioeconomic History     Marital status:      Spouse name: Not on file     Number of children: Not on file     Years of education: Not on file     Highest education level: Not on file     Tobacco Use     Smoking status: Never Smoker     Smokeless tobacco: Never Used   Substance and Sexual Activity     Alcohol use: Not Currently     Frequency: Never     Comment: Stopped when I was put on blood thinners     Drug use: Never     Sexual activity: Yes     Partners: Female     Birth control/protection: Post-menopausal               Resources and Interventions:  Current Resources:   List of home care services:: Skilled Nursing  Community Resources: None  Supplies used at home:: Wound Care Supplies     Employment Status: disabled)   )    Advance Care Plan/Directive  Advanced Care Plans/Directives on file:: No  Advanced Care Plan/Directive Status: Not  Applicable    Referrals Placed: Palliative Care     Goals:   Goals        General    #1  Pain Management (pt-stated)     Notes - Note edited  11/9/2020  3:18 PM by Diego Parra, RN    Goal Statement: I will work on keeping my pain level from 7 and up/10 to closer to 5-6/10.  Date Goal set: 11/9/2020  Barriers: Bedridden with wound pain  Strengths: Engaged in care coordination  Date to Achieve By: 5/9/2021  Patient expressed understanding of goal: Yes  Action steps to achieve this goal:  1. I will use the medications as prescribed by the providers.  2. I will attempt to get the specialty max from [facility] compounded at the [facility] pharmacy.  3. I will make sure that I renew my medications with plenty of time to pick them up.              Patient/Caregiver understanding: Patient has a very good understanding of the disease process    Outreach Frequency: 2 weeks      Plan: 1.  The patient will try and get his specialty compounded medication from [facility] at the [facility] pharmacy.  2.  The patient will try to lower his pain level from 7/10-5/10 by using medications heat, ice, anything that may help lower his pain level.  3.  The RN CC will send a message to the PCP regarding ordering the new medication he was given from the [facility] pharmacy.          Diego Parra MSN, RN, PHN, CCM   Primary Care Clinical RN Care Coordinator  [facility]  11/9/2020   4:04 PM  [email or web address]  Office: [phone]

## 2020-11-09 NOTE — LETTER
[facility]  Complex Care Plan  About Me:    Patient Name:  Leif Pritchard    YOB: 1950  Age:         69 year old   [place] MRN:    [id number] Telephone Information:  Home Phone [phone]   Mobile [phone]       Address:  [address] [place] [address] Email address:  [email or web address]      Emergency Contact(s)    Name Relationship Lgl Grd Work Phone Home Phone Mobile Phone   1. RANDY PRITCHARD Spouse   [phone]   2. REGINO PRITCHARD Son   [phone]            Primary language:  English     needed? No   [employer]:  [phone] op. 1  Other communication barriers: Physical impairment  Preferred Method of Communication:     Current living arrangement: I live in a private home with spouse  Mobility Status/ Medical Equipment: Independent w/Device    Health Maintenance  Health Maintenance Reviewed: Due/Overdue   Health Maintenance Due   Topic Date Due     URINE DRUG SCREEN  1950     DTAP/TDAP/TD IMMUNIZATION (1 - Tdap) 12/16/1977     ZOSTER IMMUNIZATION (1 of 2) 12/16/2002     Pneumococcal Vaccine: Pediatrics (0 to 5 Years) and At-Risk Patients (6 to 64 Years) (2 of 3 - PCV13) 07/20/2008     Pneumococcal Vaccine: 65+ Years (1 of 1 - PPSV23) 12/16/2017     LIPID  07/28/2020     MICROALBUMIN  07/28/2020     MEDICARE ANNUAL WELLNESS VISIT  08/22/2020     DIABETIC FOOT EXAM  08/22/2020     FALL RISK ASSESSMENT  08/22/2020     A1C  09/01/2020     EYE EXAM  10/08/2020         My Access Plan  Medical Emergency 911   Primary Clinic Line [facility] - [phone]   24 Hour Appointment Line [phone] or  [phone]) (toll-free)   24 Hour Nurse Line [phone] (toll-free)   [facility], [phone]   [facility]  [phone]   Preferred Pharmacy [facility] Mail Delivery - [facility] [address]      Behavioral Health Crisis Line The National Suicide Prevention Lifeline at 1-809.720.2423 or 911             My Care Team Members  Patient Care Team       Relationship Specialty Notifications Start End    Nguyen Torres APRN CNP PCP - General Nurse Practitioner - Family  8/13/20     Phone: [phone] Fax: [phone] [address] [facility] [address]    Joe Bee MD Nephrologist Nephrology  8/3/20     Phone: [phone] Fax: [phone]        [facility]   [address]    [place] [address]      Joe Irby MD MD Nephrology  8/3/20     Phone: [phone] Fax: [phone]         [facility] [address]  [facility] [address]    Nguyen Torres APRN CNP Assigned PCP   9/27/20     Phone: [phone] Fax: [phone] [address] [facility] [address]    Irineo Chan DPM Assigned Musculoskeletal Provider   10/23/20     Phone: [phone] Fax: [phone]         [address] [facility] [address]    Arvind Trujillo MD Assigned Neuroscience Provider   10/23/20     Phone: [phone] Fax: [phone]         [address] [facility] [address] [facility] [address]    Diego Marie, RN Lead Care Coordinator Primary Care - CC Admissions 11/9/20     Phone: [phone] Fax: [phone]                My Care Plans  Self Management and Treatment Plan  Goals and (Comments)  Goals        General    #1  Pain Management (pt-stated)     Notes - Note edited  11/9/2020  3:18 PM by Diego Marie, RN    Goal Statement: I will work on keeping my pain level from 7 and up/10 to closer to 5-6/10.  Date Goal set: 11/9/2020  Barriers: Bedridden with wound pain  Strengths: Engaged in care coordination  Date to Achieve By: 5/9/2021  Patient expressed understanding of goal: Yes  Action steps to achieve this goal:  1. I will use the medications as prescribed by the providers.  2. I will attempt to get the  specialty max from [facility] compounded at the [facility] pharmacy.  3. I will make sure that I renew my medications with plenty of time to pick them up.               Action Plans on File:                       Advance Care Plans/Directives Type:        My Medical and Care Information  Problem List   Patient Active Problem List   Diagnosis     Essential hypertension     Mixed hyperlipidemia     CKD (chronic kidney disease) stage 5, GFR less than 15 ml/min (H)     ESRD (end stage renal disease) on dialysis (H)     Type 2 diabetes mellitus, without long-term current use of insulin (H)     Atrial fibrillation status post cardioversion (H)     Hyperuricemia     DORI (obstructive sleep apnea)     Morbid obesity (H)     Deep vein thrombosis (DVT) (H)     Long term (current) use of anticoagulants     Secondary hyperparathyroidism, renal (H)     Cellulitis     Hyponatremia     Left leg pain     Sepsis (H)     UTI (urinary tract infection)     Restless legs syndrome     Calciphylaxis with nonhealing ulcer of leg (H)     Acute deep vein thrombosis (DVT) of proximal vein of left lower extremity (H)      Current Medications and Allergies:  See printed Medication Report.    Care Coordination Start Date: 11/9/2020   Frequency of Care Coordination: 2 weeks   Form Last Updated: 11/09/2020

## 2020-11-09 NOTE — TELEPHONE ENCOUNTER
It was my impression based on his recent discharge summary that Leif would be following up with palliative as an outpatient for pain control with Dr. Sanchez at [facility].  Is there a way to contact this provider to request his medication be sent to [facility] pharmacy for compounding.  I was under the impression that I would not be prescribing his pain medications in the future.      JOYA Motta CNP

## 2020-11-10 DIAGNOSIS — E83.59 CALCIPHYLAXIS: ICD-10-CM

## 2020-11-10 NOTE — TELEPHONE ENCOUNTER
Returned call to Hemant.    He said patient was recently in hospital for fall.  Warfarin was stopped due to continually having increased INRs and he was started on Eliquis. (This RN removed warfarin). Patient is also off heparin so I removed this also.  Patient will continue to have wound care daily (7 days/ week).    Informed Hemant that patient has pending appointment in 3 days for post-hospital check with covering provider since Nguyen Torres NP is out of the office this week.   Routing to PCP as ZEYNEP.    Daja Dumont BSN, RN

## 2020-11-11 RX ORDER — METHOCARBAMOL 500 MG/1
500 TABLET, FILM COATED ORAL 3 TIMES DAILY
Qty: 90 TABLET | Refills: 0 | OUTPATIENT
Start: 2020-11-11

## 2020-11-11 RX ORDER — HYDROXYZINE HYDROCHLORIDE 10 MG/1
10 TABLET, FILM COATED ORAL EVERY 6 HOURS PRN
Qty: 90 TABLET | Refills: 0 | Status: SHIPPED | OUTPATIENT
Start: 2020-11-11 | End: 2021-04-21

## 2020-11-11 NOTE — TELEPHONE ENCOUNTER
There should be enough medication for another week if he is taking it as prescribed?    Shamika Barker MD

## 2020-11-12 ENCOUNTER — PATIENT OUTREACH (OUTPATIENT)
Dept: NURSING | Facility: CLINIC | Age: 70
End: 2020-11-12
Payer: MEDICARE

## 2020-11-12 ASSESSMENT — ACTIVITIES OF DAILY LIVING (ADL): DEPENDENT_IADLS:: TRANSPORTATION;MEAL PREPARATION;SHOPPING;LAUNDRY;COOKING;CLEANING

## 2020-11-12 NOTE — PROGRESS NOTES
Subjective     Leif Ariza is a 69 year old male who presents to clinic today for the following health issues:    HPI           Hospital Follow-up Visit:    Hospital/Nursing Home/IP Rehab Facility: [facility]  Date of Admission: 10/29/2020  Date of Discharge: 11/12/2020  Reason(s) for Admission: Closed fracture of one rib of left side, initial encounter , ESRD (end stage renal disease) on dialysis (HC)  , Calciphylaxis d/t peritoneal dialysis- very painful, onset 6-8 weeks ago.  At this time with the morphine and lidocaine he reports his pain is tolerable.  He has 6 lesions, he reports it appears 3 have gotten smaller with the wound care and nurse visits. He thinks the sodium thiosulfate is helping. He reports his  is back into PT mode. He reports his strength is down from being in the hospital.  He would like to do home PT through [facility] like his home care. He is not sure if he has a palliative appointment with Dr. Sanchez scheduled. He would like to talk to his wound care doctor to see if the dressing can be changed every other day, like in the hospital as that didn't irritate him as much as the daily dressing changes.       Was your hospitalization related to COVID-19? No   Problems taking medications regularly:  None  Medication changes since discharge: None  Problems adhering to non-medication therapy:  None    Summary of hospitalization:  CareEverywhere information obtained and reviewed  Diagnostic Tests/Treatments reviewed.  Follow up needed: wound care, palliative pain management, dialysis 3 times per week- Tues, Thurs, Sat  Other Healthcare Providers Involved in Patient s Care:         Homecare and Physical Therapy  Update since discharge: improved. Post Discharge Medication Reconciliation: discharge medications reconciled, continue medications without change.  Plan of care communicated with patient and family          Review of Systems   Constitutional, HEENT, cardiovascular, pulmonary, GI, ,  "musculoskeletal, neuro, skin, endocrine and psych systems are negative, except as otherwise noted.      Objective    BP (!) 87/47   Pulse 85   Temp 97.7  F (36.5  C) (Tympanic)   Resp 20   SpO2 95%   There is no height or weight on file to calculate BMI.  Physical Exam   GENERAL: healthy, alert and no distress  RESP: lungs clear to auscultation - no rales, rhonchi or wheezes  CV: regular rate and rhythm, normal S1 S2, no S3 or S4, no murmur, click or rub, no peripheral edema and peripheral pulses strong  SKIN: POSITIVE for 6 skin lesions- hips, penis covered with dressing.   PSYCH: mentation appears normal, affect normal/bright    See orders        Assessment & Plan     Leif was seen today for hospital f/u.    Diagnoses and all orders for this visit:    Hospital discharge follow-up    Type 2 diabetes mellitus with chronic kidney disease on chronic dialysis, without long-term current use of insulin (H)  -     Albumin Random Urine Quantitative with Creat Ratio    Atrial fibrillation status post cardioversion (H)    ESRD (end stage renal disease) on dialysis (H)  -     Ambulatory Referral to Home Health    Fall, subsequent encounter  -     Ambulatory Referral to Home Health    Orthostatic hypotension  -     Ambulatory Referral to Home Health    Other chronic pain  -     Drug Abuse Screen Panel 13, Urine (Pain Care Package)    Hyperlipidemia LDL goal <70  -     Lipid panel reflex to direct LDL Fasting         BMI:   Estimated body mass index is 38.72 kg/m  as calculated from the following:    Height as of 8/3/20: 1.803 m (5' 11\").    Weight as of 9/23/20: 125.9 kg (277 lb 9.6 oz).   Weight management plan: Discussed healthy diet and exercise guidelines         See Patient Instructions:   Keep your appointment with Dr. Berta Meléndez Palliative [facility] Pain Management 12/9/20.  Continue with home care and start physical therapy. Ask wound care about changing dressing schedule.  Follow up as needed.     Return in about " 4 weeks (around 12/11/2020), or if symptoms worsen or fail to improve.    CHINO Proctor  [facility]

## 2020-11-12 NOTE — LETTER
[place] CARE COORDINATION  [address] [address] [address]       November 12, 2020    Leif Ariza  [address] [address] [address]      Dear Leif,    Here is the Consent to Communicate form.  Please fill it out and mail to the clinic.    I enjoyed speaking with you and your son.      Thanks,    Diego Parra MSN, RN, PHN, [facility]   Primary Care Clinical RN Care Coordinator  [facility]  11/12/2020   8:28 AM  [email or web address]  Office: [phone]

## 2020-11-12 NOTE — PROGRESS NOTES
Clinic Care Coordination Contact    Follow Up Progress Note      Assessment: The patient and his son Valentin contacted the RN CC by phone.  There were multiple questions to answer regarding palliative care and the offerings.  The RN CC encouraged the patient to call for an appointment as soon as he can so that his narcotic medications can be refilled prior to his needing the refill.  The patient stated understanding and will call today.  All of the questions were answered to the satisfaction of the patient and his son, and to the best of the ability of the RN CC.  The palliative program is through [facility].  The son was very concerned that this is marking the end of life for his father.  Reassurance was given by the RN CC that this is a program to help with pain control in a patient with intractable pain.  The son stated reassurance and will reach out with further questions.  A consent to communicate will be sent to the patient to fill out and sign with an envelop to mail back to the clinic.      Goals addressed this encounter:   Goals Addressed                 This Visit's Progress      #1  Pain Management (pt-stated)   10%     Goal Statement: I will work on keeping my pain level from 7 and up/10 to closer to 5-6/10.  Date Goal set: 11/9/2020  Barriers: Bedridden with wound pain  Strengths: Engaged in care coordination  Date to Achieve By: 5/9/2021  Patient expressed understanding of goal: Yes  Action steps to achieve this goal:  1. I will use the medications as prescribed by the providers.  2. I will attempt to get the specialty max from [employer] compounded at the [facility] pharmacy.  3. I will make sure that I renew my medications with plenty of time to pick them up.               Intervention/Education provided during outreach: Rationale for needing the consent to communicate turned in to the clinic.     Outreach Frequency: 2 weeks    Plan:   1.  The patient will fill out and turn in  consent to communicate.  2.  The patient will call and make an appointment with the palliative clinic.  3.  The  RN CC will remain available for the patient and his family.     RN CC Nurse Care Coordinator will follow up in 2 weeks.          Diego Parra MSN, RN, PHN, [facility]   Primary Care Clinical RN Care Coordinator  [facility]  11/12/2020   8:19 AM  [email or web address]  Office: [phone]

## 2020-11-13 ENCOUNTER — OFFICE VISIT (OUTPATIENT)
Dept: FAMILY MEDICINE | Facility: CLINIC | Age: 70
End: 2020-11-13
Payer: MEDICARE

## 2020-11-13 VITALS
RESPIRATION RATE: 20 BRPM | OXYGEN SATURATION: 95 % | DIASTOLIC BLOOD PRESSURE: 47 MMHG | TEMPERATURE: 97.7 F | SYSTOLIC BLOOD PRESSURE: 87 MMHG | HEART RATE: 85 BPM

## 2020-11-13 DIAGNOSIS — I95.1 ORTHOSTATIC HYPOTENSION: ICD-10-CM

## 2020-11-13 DIAGNOSIS — I48.91 ATRIAL FIBRILLATION STATUS POST CARDIOVERSION (H): Chronic | ICD-10-CM

## 2020-11-13 DIAGNOSIS — G89.29 OTHER CHRONIC PAIN: ICD-10-CM
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
## 2020-11-13 DIAGNOSIS — Z09 HOSPITAL DISCHARGE FOLLOW-UP: Primary | ICD-10-CM

## 2020-11-13 DIAGNOSIS — N18.6 ESRD (END STAGE RENAL DISEASE) ON DIALYSIS (H): Chronic | ICD-10-CM

## 2020-11-13 DIAGNOSIS — N18.6 TYPE 2 DIABETES MELLITUS WITH CHRONIC KIDNEY DISEASE ON CHRONIC DIALYSIS, WITHOUT LONG-TERM CURRENT USE OF INSULIN (H): Chronic | ICD-10-CM

## 2020-11-13 DIAGNOSIS — Z99.2 ESRD (END STAGE RENAL DISEASE) ON DIALYSIS (H): Chronic | ICD-10-CM

## 2020-11-13 DIAGNOSIS — E11.22 TYPE 2 DIABETES MELLITUS WITH CHRONIC KIDNEY DISEASE ON CHRONIC DIALYSIS, WITHOUT LONG-TERM CURRENT USE OF INSULIN (H): Chronic | ICD-10-CM

## 2020-11-13 DIAGNOSIS — Z99.2 TYPE 2 DIABETES MELLITUS WITH CHRONIC KIDNEY DISEASE ON CHRONIC DIALYSIS, WITHOUT LONG-TERM CURRENT USE OF INSULIN (H): Chronic | ICD-10-CM

## 2020-11-13 DIAGNOSIS — E78.5 HYPERLIPIDEMIA LDL GOAL <70: ICD-10-CM

## 2020-11-13 PROCEDURE — 99214 OFFICE O/P EST MOD 30 MIN: CPT | Performed by: NURSE PRACTITIONER

## 2020-11-13 RX ORDER — LIDOCAINE/PRILOCAINE 2.5 %-2.5%
CREAM (GRAM) TOPICAL
COMMUNITY
Start: 2019-04-16 | End: 2020-11-18

## 2020-11-13 NOTE — PATIENT INSTRUCTIONS
Nicolas Rain MD      Keep your appointment with Dr. Berta Meléndez Palliative Care Pain Management 12/9/20.  Continue with home care and start physical therapy. Ask wound care about changing dressing schedule.  Follow up as needed.

## 2020-11-16 ENCOUNTER — TELEPHONE (OUTPATIENT)
Dept: FAMILY MEDICINE | Facility: CLINIC | Age: 70
End: 2020-11-16

## 2020-11-16 NOTE — TELEPHONE ENCOUNTER
\Reason for Call: Request for an order or referral:    Order or referral being requested:verbal orders: continue pt for 2x week for 4 weeks    Date needed: as soon as possible    Has the patient been seen by the PCP for this problem? Not Applicable    Additional comments: Caller informed that calls received after 3pm may not be returned same day.      Phone number Patient can be reached at:  Other phone number:  [email or web address]    Best Time:  any    Can we leave a detailed message on this number?  YES    Call taken on 11/16/2020 at 3:32 PM by Yaima Bermeo

## 2020-11-17 ENCOUNTER — TELEPHONE (OUTPATIENT)
Dept: FAMILY MEDICINE | Facility: CLINIC | Age: 70
End: 2020-11-17

## 2020-11-17 NOTE — TELEPHONE ENCOUNTER
Reason for Call:  Form, our goal is to have forms completed with 72 hours, however, some forms may require a visit or additional information.    Type of letter, form or note:  Home Health Certification    Who is the form from?: Home care    Where did the form come from: form was faxed in    What clinic location was the form placed at?: [place]    Where the form was placed: Given to MA/RN    What number is listed as a contact on the form?:        Additional comments:     Call taken on 11/17/2020 at 3:44 PM by Nellie Landry

## 2020-11-18 ENCOUNTER — TELEPHONE (OUTPATIENT)
Dept: FAMILY MEDICINE | Facility: CLINIC | Age: 70
End: 2020-11-18

## 2020-11-18 DIAGNOSIS — E83.59 CALCIPHYLAXIS: ICD-10-CM

## 2020-11-18 RX ORDER — METHOCARBAMOL 500 MG/1
500 TABLET, FILM COATED ORAL 3 TIMES DAILY
Qty: 90 TABLET | Refills: 2 | Status: SHIPPED | OUTPATIENT
Start: 2020-11-18

## 2020-11-18 NOTE — TELEPHONE ENCOUNTER
Didn't get a refill request before today.   Routing to provider to advise.  Daja Dumont BSN, RN

## 2020-11-18 NOTE — TELEPHONE ENCOUNTER
This RN reconciled medication list from [facility] form against our [facility] medication list and there are several discrepancies.  They are:        1.  These medications are on the [facility] but not in Epic:     -cyanocobalamin 1000 mcg daily   -doxycycline 1 twice daily   -glucosemine chondroitin 116.7- 133.3 mg twice daily   -metoprolol succ ER 25 mg daily   -pantoprazole 20 mg daily      Would you like me to add the above medications to Epic?    2.  Patient was switched from warfarin to Eliquis while hospitalized but this medication is not on [facility].  Ok for RN to add?    3.  Hydroxyzine 10 mg taken with pain medication is on Epic medication list but not on [facility]. Ok for RN to add?    4.  EMLA cream is on Epic medication list but not on [facility]. Ok for RN to add?    5.  Percocet is no Epic medication list but not on [facility]. Ok for RN to add?      Daja Dumont BSN, RN

## 2020-11-18 NOTE — TELEPHONE ENCOUNTER
Reason for call:  Other   Patient called regarding (reason for call): call back  Additional comments: Stacey from [facility] pharmacy is calling on the status of the medication methocarbamol (ROBAXIN) 500 MG tablet, please call back     Phone number to reach patient:  Other phone number:  [phone]    Best Time:  any    Can we leave a detailed message on this number?  YES    Travel screening: Not Applicable

## 2020-11-19 ENCOUNTER — MEDICAL CORRESPONDENCE (OUTPATIENT)
Dept: HEALTH INFORMATION MANAGEMENT | Facility: CLINIC | Age: 70
End: 2020-11-19

## 2020-11-19 RX ORDER — METOPROLOL SUCCINATE 25 MG/1
25 TABLET, EXTENDED RELEASE ORAL DAILY
COMMUNITY
Start: 2020-11-19 | End: 2021-04-21

## 2020-11-19 RX ORDER — LANOLIN ALCOHOL/MO/W.PET/CERES
1000 CREAM (GRAM) TOPICAL DAILY
COMMUNITY
Start: 2020-11-19

## 2020-11-19 RX ORDER — PANTOPRAZOLE SODIUM 20 MG/1
20 TABLET, DELAYED RELEASE ORAL DAILY
COMMUNITY
Start: 2020-11-19 | End: 2021-04-21

## 2020-11-19 NOTE — TELEPHONE ENCOUNTER
This RN made the requested changes  Form put in PCP's box to sign.  Please route this message back to TC along with form.     Daja PACHECON, RN

## 2020-11-19 NOTE — TELEPHONE ENCOUNTER
Please add/update all medications listed except the Percocet- When he was hospitalized his pain medications were changed and I do not know for certain what he is taking for pain as I am not prescribing the Percocet anymore. Based on his hospital discharge OV, it appears he is taking Morphine.    JOYA Motta CNP

## 2020-11-20 NOTE — TELEPHONE ENCOUNTER
This is not a HHC, it is a revision to care. This was faxed to [facility] 11/19/20 @ [phone].Kateryna Prado MA/HUI

## 2020-11-20 NOTE — TELEPHONE ENCOUNTER
I signed forms for Jean and thought I put them in the TC box- I guess it is possible they could be placed in the To Be Scanned on accident.  There were no forms on my desk when I left office.  I can look again when I return to clinic.      JOYA Motta CNP

## 2020-11-24 ENCOUNTER — ANCILLARY PROCEDURE (OUTPATIENT)
Dept: GENERAL RADIOLOGY | Facility: CLINIC | Age: 70
End: 2020-11-24
Attending: NURSE PRACTITIONER
Payer: MEDICARE

## 2020-11-24 ENCOUNTER — OFFICE VISIT (OUTPATIENT)
Dept: FAMILY MEDICINE | Facility: CLINIC | Age: 70
End: 2020-11-24
Payer: MEDICARE

## 2020-11-24 VITALS
SYSTOLIC BLOOD PRESSURE: 111 MMHG | HEART RATE: 68 BPM | TEMPERATURE: 96.9 F | DIASTOLIC BLOOD PRESSURE: 57 MMHG | RESPIRATION RATE: 20 BRPM

## 2020-11-24 DIAGNOSIS — M25.561 ACUTE PAIN OF RIGHT KNEE: Primary | ICD-10-CM

## 2020-11-24 DIAGNOSIS — G89.29 OTHER CHRONIC PAIN: ICD-10-CM

## 2020-11-24 DIAGNOSIS — Z99.2 ESRD (END STAGE RENAL DISEASE) ON DIALYSIS (H): Chronic | ICD-10-CM

## 2020-11-24 DIAGNOSIS — L97.909 CALCIPHYLAXIS WITH NONHEALING ULCER OF LEG (H): ICD-10-CM

## 2020-11-24 DIAGNOSIS — N18.6 ESRD (END STAGE RENAL DISEASE) ON DIALYSIS (H): Chronic | ICD-10-CM

## 2020-11-24 DIAGNOSIS — I10 ESSENTIAL HYPERTENSION: Chronic | ICD-10-CM

## 2020-11-24 DIAGNOSIS — I82.4Y2 ACUTE DEEP VEIN THROMBOSIS (DVT) OF PROXIMAL VEIN OF LEFT LOWER EXTREMITY (H): ICD-10-CM

## 2020-11-24 DIAGNOSIS — M25.561 ACUTE PAIN OF RIGHT KNEE: ICD-10-CM

## 2020-11-24 DIAGNOSIS — E11.22 TYPE 2 DIABETES MELLITUS WITH CHRONIC KIDNEY DISEASE ON CHRONIC DIALYSIS, WITHOUT LONG-TERM CURRENT USE OF INSULIN (H): Chronic | ICD-10-CM

## 2020-11-24 DIAGNOSIS — N18.6 TYPE 2 DIABETES MELLITUS WITH CHRONIC KIDNEY DISEASE ON CHRONIC DIALYSIS, WITHOUT LONG-TERM CURRENT USE OF INSULIN (H): Chronic | ICD-10-CM

## 2020-11-24 DIAGNOSIS — Z99.2 TYPE 2 DIABETES MELLITUS WITH CHRONIC KIDNEY DISEASE ON CHRONIC DIALYSIS, WITHOUT LONG-TERM CURRENT USE OF INSULIN (H): Chronic | ICD-10-CM

## 2020-11-24 DIAGNOSIS — E78.5 HYPERLIPIDEMIA LDL GOAL <70: ICD-10-CM

## 2020-11-24 DIAGNOSIS — E83.59 CALCIPHYLAXIS WITH NONHEALING ULCER OF LEG (H): ICD-10-CM

## 2020-11-24 DIAGNOSIS — Z87.39 HX OF GOUT: ICD-10-CM

## 2020-11-24 LAB
ANISOCYTOSIS BLD QL SMEAR: ABNORMAL
BASOPHILS # BLD AUTO: 0.1 10E9/L (ref 0–0.2)
BASOPHILS NFR BLD AUTO: 1 %
CHOLEST SERPL-MCNC: 84 MG/DL
DIFFERENTIAL METHOD BLD: ABNORMAL
EOSINOPHIL # BLD AUTO: 0.3 10E9/L (ref 0–0.7)
EOSINOPHIL NFR BLD AUTO: 3 %
ERYTHROCYTE [DISTWIDTH] IN BLOOD BY AUTOMATED COUNT: 16.2 % (ref 10–15)
HCT VFR BLD AUTO: 29.9 % (ref 40–53)
HDLC SERPL-MCNC: 33 MG/DL
HGB BLD-MCNC: 9.5 G/DL (ref 13.3–17.7)
INR PPP: 1.58 (ref 0.86–1.14)
LDLC SERPL CALC-MCNC: 29 MG/DL
LYMPHOCYTES # BLD AUTO: 2.6 10E9/L (ref 0.8–5.3)
LYMPHOCYTES NFR BLD AUTO: 30 %
MACROCYTES BLD QL SMEAR: PRESENT
MCH RBC QN AUTO: 34.1 PG (ref 26.5–33)
MCHC RBC AUTO-ENTMCNC: 31.8 G/DL (ref 31.5–36.5)
MCV RBC AUTO: 107 FL (ref 78–100)
MONOCYTES # BLD AUTO: 0.7 10E9/L (ref 0–1.3)
MONOCYTES NFR BLD AUTO: 8 %
NEUTROPHILS # BLD AUTO: 5.1 10E9/L (ref 1.6–8.3)
NEUTROPHILS NFR BLD AUTO: 58 %
NONHDLC SERPL-MCNC: 51 MG/DL
OVALOCYTES BLD QL SMEAR: SLIGHT
PLATELET # BLD AUTO: 383 10E9/L (ref 150–450)
PLATELET # BLD EST: ABNORMAL 10*3/UL
RBC # BLD AUTO: 2.79 10E12/L (ref 4.4–5.9)
SPHEROCYTES BLD QL SMEAR: SLIGHT
STOMATOCYTES BLD QL SMEAR: SLIGHT
TARGETS BLD QL SMEAR: SLIGHT
TRIGL SERPL-MCNC: 111 MG/DL
URATE SERPL-MCNC: 3 MG/DL (ref 3.5–7.2)
WBC # BLD AUTO: 8.8 10E9/L (ref 4–11)

## 2020-11-24 PROCEDURE — 73560 X-RAY EXAM OF KNEE 1 OR 2: CPT | Mod: RT

## 2020-11-24 PROCEDURE — 99214 OFFICE O/P EST MOD 30 MIN: CPT | Performed by: NURSE PRACTITIONER

## 2020-11-24 PROCEDURE — 85610 PROTHROMBIN TIME: CPT | Performed by: NURSE PRACTITIONER

## 2020-11-24 PROCEDURE — 84550 ASSAY OF BLOOD/URIC ACID: CPT | Performed by: NURSE PRACTITIONER

## 2020-11-24 PROCEDURE — 36415 COLL VENOUS BLD VENIPUNCTURE: CPT | Performed by: NURSE PRACTITIONER

## 2020-11-24 PROCEDURE — 85025 COMPLETE CBC W/AUTO DIFF WBC: CPT | Performed by: NURSE PRACTITIONER

## 2020-11-24 PROCEDURE — 80061 LIPID PANEL: CPT | Performed by: NURSE PRACTITIONER

## 2020-11-24 PROCEDURE — 99207 PR FOOT EXAM NO CHARGE: CPT | Performed by: NURSE PRACTITIONER

## 2020-11-24 RX ORDER — CEFDINIR 300 MG/1
CAPSULE ORAL
COMMUNITY
Start: 2020-11-23 | End: 2021-03-11

## 2020-11-24 RX ORDER — CINACALCET 60 MG/1
60 TABLET, FILM COATED ORAL DAILY
COMMUNITY
Start: 2020-11-18 | End: 2022-01-01

## 2020-11-24 RX ORDER — PREDNISONE 20 MG/1
40 TABLET ORAL DAILY
Qty: 10 TABLET | Refills: 0 | Status: SHIPPED | OUTPATIENT
Start: 2020-11-24 | End: 2020-11-27

## 2020-11-24 NOTE — PROGRESS NOTES
Subjective     Leif Ariza is a 69 year old male who presents to clinic today for the following health issues:    HPI             Pain  Onset: 1 week    Description:   Location: right knee pain- inside knee. He has not fallen.  Hx of gout and DVT, on blood thinners. His calciphylaxis is improving. Pain is improved with straightening leg.     Progression of Symptoms: worse    Accompanying Signs & Symptoms:  Other symptoms: none    Precipitating factors:   Trauma or overuse: no   Therapies Tried and outcome: is on pain medication for other wounds       Review of Systems   Constitutional, HEENT, cardiovascular, pulmonary, GI, , musculoskeletal, neuro, skin, endocrine and psych systems are negative, except as otherwise noted.      Objective    /57   Pulse 68   Temp 96.9  F (36.1  C) (Tympanic)   Resp 20   There is no height or weight on file to calculate BMI.  Physical Exam   GENERAL: healthy, alert and no distress  RESP: lungs clear to auscultation - no rales, rhonchi or wheezes  CV: regular rate and rhythm, normal S1 S2, no S3 or S4, no murmur, click or rub, no peripheral edema and peripheral pulses strong  MS: no gross musculoskeletal defects noted, no edema, no redness or warmth of knee.  POSITIVE for pain with palpation of anterior and posterior knee. Pain with dependent ROM, pain improves with leg straight.   SKIN: skin wounds to legs are healing well  PSYCH: mentation appears normal, affect normal/bright    See orders        Assessment & Plan     Leif was seen today for pain.    Diagnoses and all orders for this visit:    Acute pain of right knee  -     XR Knee Right 1/2 Views; Future  -     predniSONE (DELTASONE) 20 MG tablet; Take 2 tablets (40 mg) by mouth daily for 5 days  -     diclofenac (VOLTAREN) 1 % topical gel; Apply 4 g topically 4 times daily  -     CBC with platelets and differential    Type 2 diabetes mellitus with chronic kidney disease on chronic dialysis, without long-term  current use of insulin (H)  -     Albumin Random Urine Quantitative with Creat Ratio  -     FOOT EXAM  -     EYE ADULT REFERRAL; Future    Other chronic pain  -     Drug Abuse Screen Panel 13, Urine (Pain Care Package)    Hyperlipidemia LDL goal <70  -     Lipid panel reflex to direct LDL Fasting    Calciphylaxis with nonhealing ulcer of leg (H)    Acute deep vein thrombosis (DVT) of proximal vein of left lower extremity (H)    Essential hypertension    ESRD (end stage renal disease) on dialysis (H)    Hx of gout  -     Uric acid            See Patient Instructions: follow up if needed for persisting or worsening symptoms. Advised to refrain form PT causing his knee pain to allow for healing. Once pain has improved, may resume leg exercises. If symptoms persist > 2 weeks, will order MRI.     Return in about 2 weeks (around 12/8/2020), or if symptoms worsen or fail to improve.    Sonya Hamilton, CHINO  [facility]

## 2020-11-24 NOTE — RESULT ENCOUNTER NOTE
Edmar Yadav,    Thank you for your recent office visit.    Here are your recent results.  Per radiologist-    IMPRESSION: Prominent chondrocalcinosis of the menisci. Mild medial  compartment joint space narrowing. Lateral compartment joint space is  preserved. Mild to moderate patellofemoral degenerative changes. No  fracture or effusion. Vascular calcifications.    If pain persists, please schedule with orthopedics.     Feel free to contact me via [employer] or call the clinic at [phone].    Sincerely,    Sonya Hamilton, JOYA, FNP-BC

## 2020-11-24 NOTE — PATIENT INSTRUCTIONS
Patient Education     Reducing Knee Pain and Swelling    Many treatments can help reduce pain and swelling in your knee. Your healthcare provider or physical therapist may suggest one or more of the following treatments:     Icing your knee.  This helps reduce swelling. You may be asked to ice your knee once a day or more. Apply ice for about 15 to 20 minutes at a time, with at least 40 minutes between sessions. To make an ice pack, put ice cubes in a plastic bag that seals at the top. Wrap the bag in a clean, thin towel or cloth. Never put ice or an ice pack directly on the skin.    Keeping your leg raised above your heart. This helps excess fluid flow out of your knee joint to reduce swelling.    Compression. This means wrapping an elastic bandage or neoprene sleeve snugly around your knees. It keeps fluid from collecting in and around your knee joint.    Electrical stimulation. This is done by a physical therapist or . It can help reduce excess fluid in your knee joint.    Anti-inflammatory medicines. These may be prescribed by your healthcare provider. You may take pills or get shots (injections) in your knee.    Isometric (radha) exercises. These strengthen the muscles that support your knee joint. They also help reduce excess fluid in your knee.    Massage. This helps fluid drain away from your knee.  [employer] last reviewed this educational content on 5/1/2018 2000-2020 [employer]. [address] [place] [address]. All rights reserved. This information is not intended as a substitute for professional medical care. Always follow your healthcare professional's instructions.           Patient Education     Knee Pain with Uncertain Cause    There are several common causes for knee pain. These can include:    A sprain of the ligaments that support the joint    An injury to the cartilage lining of the joint    Arthritis from wear-and-tear or inflammation  There are  other causes as well. There may also be swelling, reduced movement of the knee joint, and pain with walking. A definite diagnosis will still need to be made. If your symptoms don't improve, further follow-up and testing may be needed.  Home care    Stay off the injured leg as much as possible until pain improves.    Apply an ice pack over the injured area for 15 to 20 minutes every 3 to 6 hours. You should do this for the first 24 to 48 hours. You can make an ice pack by filling a plastic bag that seals at the top with ice cubes and then wrapping it with a thin towel. Continue to use ice packs for relief of pain and swelling as needed. As the ice melts, be careful not to get your wrap, splint, or cast wet. After 48 hours, apply heat (warm shower or warm bath) for 15 to 20 minutes several times a day, or alternate ice and heat. If you have to wear a hook-and-loop knee brace, you can open it to apply the ice pack, or heat, directly to the knee. Never put ice directly on the skin. Always wrap the ice in a towel or other type of cloth.    You may use over-the-counter pain medicine to control pain, unless another pain medicine was prescribed. If you have chronic liver or kidney disease or ever had a stomach ulcer or gastrointestinal bleeding, talk with your healthcare provider before using these medicines.    If crutches or a walker have been recommended, don't put weight on the injured leg until you can do so without pain. Check with your healthcare provider before returning to sports or full work duties.    If you have a hook-and-loop knee brace, you can remove it to bathe and sleep, unless told otherwise.  Follow-up care  Follow up with your healthcare provider as advised. This is usually within 1 to 2 weeks.  If X-rays were taken, you will be told of any new findings that may affect your care  Call 911  Call 911 if you have:    Shortness of breath    Chest pain  When to seek medical advice  Call your healthcare  provider right away if any of these occur:    Toes or foot becomes swollen, cold, blue, numb, or tingly    Pain or swelling spreads over the knee or calf    Warmth or redness appears over the knee or calf    Other joints become painful    Rash appears    Fever of 100.4 F (38 C) or higher, or as directed by your healthcare provider    Lakisha Mcdowell last reviewed this educational content on 5/1/2018 2000-2020 [employer]. [address] [place] [address], [place] [address]. All rights reserved. This information is not intended as a substitute for professional medical care. Always follow your healthcare professional's instructions.           Patient Education     Treating Gout Attacks     Raising the joint above the level of your heart can help reduce gout symptoms.     Gout is a disease that affects the joints. It is caused by excess uric acid in your blood that may lead to crystals forming in your joints. Left untreated, it can lead to painful foot and joint deformities and even kidney problems. But, by treating gout early, you can relieve pain and help prevent future problems. Gout can usually be treated with medicine and proper diet. In severe cases, surgery may be needed.  Gout attacks are painful and often happen more than once. Taking medicines may reduce pain and prevent attacks in the future. There are also some things you can do at home to relieve symptoms.  Medicines for gout  Your healthcare provider may prescribe a daily medicine to reduce levels of uric acid. Reducing your uric acid levels may help prevent gout attacks. Allopurinol is one commonly used medicine taken daily to reduce uric acid levels. Other daily medicines used to reduce uric acid levels include febuxostat, lesinurad, and probencid. Other medicines can help relieve pain and swelling during an acute attack. Medicines such as NSAIDs (nonsteroidal anti-inflammatory medicines), steroids, and colchicine may be prescribed for  intermittent use to relieve an acute gout attack. Be sure to take your medicine as directed.  What you can do  Below are some things you can do at home to relieve gout symptoms. Your healthcare provider may have other tips.    Rest the painful joint as much as you can.    Raise the painful joint so it is at a level higher than your heart.    Use ice for 10 minutes every 1 to 2 hours as possible.  How can I prevent gout?  With a little effort, you may be able to prevent gout attacks in the future. Here are some things you can do:    Don't eat foods high in purines  ? Certain meats (red meat, processed meat, turkey)  ? Organ meats (kidney, liver, sweetbread)  ? Shellfish (lobster, crab, shrimp, scallop, mussel)  ? Certain fish (anchovy, sardine, herring, mackerel)    Take any medicines prescribed by your healthcare provider.    Lose weight if you need to.    Reduce high fructose corn syrup in meals and drinks.    Reduce or cut out alcohol, particularly beer, but also red wine and spirits.    Control blood pressure, diabetes, and cholesterol.    Drink plenty of water to help flush uric acid from your body.  [employer] last reviewed this educational content on 4/1/2018 2000-2020 The [employer]. [address] [place] [address], [place] [address]. All rights reserved. This information is not intended as a substitute for professional medical care. Always follow your healthcare professional's instructions.           Patient Education     Gout Diet  Gout is a painful condition caused by an excess of uric acid, a waste product made by the body. Uric acid forms crystals that collect in the joints. The immune response to these crystals brings on symptoms of joint pain and swelling. This is called a gout attack. Often, medications and diet changes are combined to manage gout. Below are some guidelines for changing your diet to help you manage gout and prevent attacks. Your healthcare provider will help you determine the best  eating plan for you.  Eating to manage gout  Weight loss for those who are overweight may help reduce gout attacks.  Eat less of these foods  Eating too many foods containing purines may raise the levels of uric acid in your body. This raises your risk for a gout attack. Try to limit these foods and drinks:    Alcohol, such as beer and red wine. You may be told to avoid alcohol completely.    Soft drinks that contain sugar or high fructose corn syrup    Certain fish, including anchovies, sardines, fish eggs, and herring    Shellfish    Certain meats, such as red meat, hot dogs, luncheon meats, and turkey    Organ meats, such as liver, kidneys, and sweetbreads    Legumes, such as dried beans and peas    Other high fat foods such as gravy, whole milk, and high fat cheeses    Vegetables such as asparagus, cauliflower, spinach, and mushrooms used to be thought to contribute to an increased risk for a gout attack, but recent studies show that high purine vegetables don't increase the risk for a gout attack.  Eat more of these foods  Other foods may be helpful for people with gout. Add some of these foods to your diet:    Cherries contain chemicals that may lower uric acid.    Omega fatty acids. These are found in some fatty fish such as salmon, certain oils (flax, olive, or nut), and nuts themselves. Omega fatty acids may help prevent inflammation due to gout.    Dairy products that are low-fat or fat-free, such as cheese and yogurt    Complex carbohydrate foods, including whole grains, brown rice, oats, and beans    Coffee, in moderation    Water, approximately 64 ounces per day  Follow-up care  Follow up with your healthcare provider as advised.  When to seek medical advice  Call your healthcare provider right away if any of these occur:    Return of gout symptoms, usually at night:    Severe pain, swelling, and heat in a joint, especially the base of the big toe    Affected joint is hard to move    Skin of the  affected joint is purple or red    Fever of 100.4 F (38 C) or higher    Pain that doesn't get better even with prescribed medicine   July last reviewed this educational content on 6/1/2018 2000-2020 [employer]. [address] [place] [address], [place] [address]. All rights reserved. This information is not intended as a substitute for professional medical care. Always follow your healthcare professional's instructions.

## 2020-11-27 ENCOUNTER — MYC MEDICAL ADVICE (OUTPATIENT)
Dept: FAMILY MEDICINE | Facility: CLINIC | Age: 70
End: 2020-11-27

## 2020-11-27 ENCOUNTER — MYC REFILL (OUTPATIENT)
Dept: FAMILY MEDICINE | Facility: CLINIC | Age: 70
End: 2020-11-27

## 2020-11-27 DIAGNOSIS — M25.561 ACUTE PAIN OF RIGHT KNEE: ICD-10-CM

## 2020-11-27 RX ORDER — PREDNISONE 20 MG/1
40 TABLET ORAL DAILY
Qty: 10 TABLET | Refills: 0 | Status: SHIPPED | OUTPATIENT
Start: 2020-11-27 | End: 2020-11-30

## 2020-11-27 NOTE — TELEPHONE ENCOUNTER
Routing refill request to provider for review/approval because:  Drug not on the [facility] refill protocol   predniSONE (DELTASONE) 20 MG tablet  Last Written Prescription Date:  11/24/20  Last Fill Quantity: 10,  # refills: 0   Last office visit: 11/24/2020 with prescribing provider:  Alfonso   Future Office Visit:        Please see pt message requesting another 5 days.

## 2020-11-30 ENCOUNTER — TELEPHONE (OUTPATIENT)
Dept: FAMILY MEDICINE | Facility: CLINIC | Age: 70
End: 2020-11-30

## 2020-11-30 DIAGNOSIS — L97.909 CALCIPHYLAXIS WITH NONHEALING ULCER OF LEG (H): Primary | ICD-10-CM

## 2020-11-30 DIAGNOSIS — E83.59 CALCIPHYLAXIS WITH NONHEALING ULCER OF LEG (H): Primary | ICD-10-CM

## 2020-11-30 NOTE — TELEPHONE ENCOUNTER
This RN reconciled medication list from [facility] form against our [facility] medication list and there are discrepancies.  They are:        1. Diclofenac 1% topical gel was ordered at 11/24/20 appointment for knee pain. This medication is not on [facility].  Ok for RN to add?    2. The below medications are on [facility] form but not on [facility] medication list.  Ok for RN to add?     Calcium acetate 3 times daily   Calcium carbonate 500 mg daily   Cipro 500 mg daily    Lidocaine 5gm of 2.5% cream to affected areas prior to or with wound treatment         Daja PACHECON, RN

## 2020-11-30 NOTE — TELEPHONE ENCOUNTER
Reason for Call:  Form, our goal is to have forms completed with 72 hours, however, some forms may require a visit or additional information.    Type of letter, form or note:  Home Health Certification    Who is the form from?: Home care    Where did the form come from: form was faxed in    What clinic location was the form placed at?: [facility]    Where the form was placed: Given to MA/RN    What number is listed as a contact on the form?: [phone]       Additional comments: Placed in RN HHC Basket    Call taken on 11/30/2020 at 7:41 AM by Jackie Sky

## 2020-12-01 RX ORDER — CALCIUM CARBONATE 500 MG/1
1 TABLET, CHEWABLE ORAL DAILY
COMMUNITY
Start: 2020-12-01 | End: 2021-04-21

## 2020-12-01 RX ORDER — CALCIUM ACETATE
POWDER (GRAM) MISCELLANEOUS
COMMUNITY
Start: 2020-12-01 | End: 2021-03-11

## 2020-12-01 RX ORDER — CIPROFLOXACIN 500 MG/1
500 TABLET, FILM COATED ORAL DAILY
COMMUNITY
Start: 2020-12-01 | End: 2021-01-08

## 2020-12-02 ENCOUNTER — MEDICAL CORRESPONDENCE (OUTPATIENT)
Dept: HEALTH INFORMATION MANAGEMENT | Facility: CLINIC | Age: 70
End: 2020-12-02

## 2020-12-02 ENCOUNTER — PATIENT OUTREACH (OUTPATIENT)
Dept: NURSING | Facility: CLINIC | Age: 70
End: 2020-12-02
Payer: MEDICARE

## 2020-12-02 ENCOUNTER — TELEPHONE (OUTPATIENT)
Dept: FAMILY MEDICINE | Facility: CLINIC | Age: 70
End: 2020-12-02

## 2020-12-02 DIAGNOSIS — Z53.9 DIAGNOSIS NOT YET DEFINED: Primary | ICD-10-CM

## 2020-12-02 PROCEDURE — G0179 MD RECERTIFICATION HHA PT: HCPCS | Performed by: NURSE PRACTITIONER

## 2020-12-02 ASSESSMENT — ACTIVITIES OF DAILY LIVING (ADL): DEPENDENT_IADLS:: TRANSPORTATION;MEAL PREPARATION;SHOPPING;LAUNDRY;COOKING;CLEANING

## 2020-12-02 NOTE — PROGRESS NOTES
Clinic Care Coordination Contact    Follow Up Progress Note      Assessment: The RN CC nurse care coordinator contacted the patient by phone for a follow up call.  The patient states that most of the wounds are currently healed or close to being healed.  There is one area that appears to be remaining infected.  The patient has an appointment with a provider on Monday and will review those concerns.  The wound that was requiring the morphine gel has currently healed.  The patient states that he is tolerating dialysis very well.    Medication reconciliation was completed with the patient and marked as reviewed.  Health maintenance was reviewed and questions were answered with the patient.  The assessment for hypertension was completed with the patient today as well as the social determinants of health and they were marked as reviewed.      Goals addressed this encounter:   Goals Addressed                 This Visit's Progress      #1  Pain Management (pt-stated)   40%     Goal Statement: I will work on keeping my pain level from 7 and up/10 to closer to 5-6/10.  Date Goal set: 11/9/2020  Barriers: Bedridden with wound pain  Strengths: Engaged in care coordination  Date to Achieve By: 5/9/2021  Patient expressed understanding of goal: Yes  Action steps to achieve this goal:  1. I will use the medications as prescribed by the providers.  2. I will attempt to get the specialty max from [email or web address] compounded at the [facility] pharmacy.  3. I will make sure that I renew my medications with plenty of time to pick them up.               Intervention/Education provided during outreach: The RN CC reviewed with the patient the need to request medication refills early enough to get the clarifications and orders from the providers.     Outreach Frequency: monthly    Plan:   1.  The patient will order refills on his medications ahead of time so that refills may be requested from the providers.  2.  The patient  will monitor his wounds for any signs of infection and report to the provider.  3.  The patient will take all medications as prescribed by the providers.    RN CC Nurse Care Coordinator will follow up in 4 weeks.            Diego Parra MSN, RN, PHN, CCM   Primary Care Clinical RN Care Coordinator  [facility]  12/3/2020   8:26 AM  [email or web address]  Office: [phone]

## 2020-12-06 DIAGNOSIS — E79.0 HYPERURICEMIA: Primary | ICD-10-CM

## 2020-12-08 NOTE — TELEPHONE ENCOUNTER
Routing refill request to provider for review/approval because:  Labs out of range:  Creatinine    Creatinine   Date Value Ref Range Status   10/29/2020 3.87 (A) 0.72 - 1.25 mg/dL Final     Daja Dumont BSN, RN

## 2020-12-09 ENCOUNTER — MEDICAL CORRESPONDENCE (OUTPATIENT)
Dept: HEALTH INFORMATION MANAGEMENT | Facility: CLINIC | Age: 70
End: 2020-12-09

## 2020-12-09 RX ORDER — ALLOPURINOL 300 MG/1
600 TABLET ORAL DAILY
Qty: 180 TABLET | Refills: 1 | Status: SHIPPED | OUTPATIENT
Start: 2020-12-09 | End: 2021-01-01

## 2020-12-11 ENCOUNTER — MEDICAL CORRESPONDENCE (OUTPATIENT)
Dept: HEALTH INFORMATION MANAGEMENT | Facility: CLINIC | Age: 70
End: 2020-12-11

## 2020-12-13 ENCOUNTER — HEALTH MAINTENANCE LETTER (OUTPATIENT)
Age: 70
End: 2020-12-13

## 2020-12-28 DIAGNOSIS — M25.561 ACUTE PAIN OF RIGHT KNEE: ICD-10-CM

## 2020-12-30 DIAGNOSIS — M25.561 ACUTE PAIN OF RIGHT KNEE: ICD-10-CM

## 2020-12-30 RX ORDER — PREDNISONE 20 MG/1
TABLET ORAL
Qty: 10 TABLET | Refills: 0 | OUTPATIENT
Start: 2020-12-30

## 2020-12-30 NOTE — TELEPHONE ENCOUNTER
Routing refill request to provider for review/approval because:  Drug not on the [facility] refill protocol       Olga Lidia Dowell RN

## 2021-01-01 ENCOUNTER — HEALTH MAINTENANCE LETTER (OUTPATIENT)
Age: 71
End: 2021-01-01

## 2021-01-01 ENCOUNTER — LAB (OUTPATIENT)
Dept: LAB | Facility: CLINIC | Age: 71
End: 2021-01-01
Payer: MEDICARE

## 2021-01-01 ENCOUNTER — PATIENT OUTREACH (OUTPATIENT)
Dept: CARE COORDINATION | Facility: CLINIC | Age: 71
End: 2021-01-01

## 2021-01-01 DIAGNOSIS — L98.492: ICD-10-CM

## 2021-01-01 DIAGNOSIS — N18.6 END STAGE RENAL DISEASE (H): ICD-10-CM

## 2021-01-01 DIAGNOSIS — Z99.2 DEPENDENCE ON RENAL DIALYSIS (H): ICD-10-CM

## 2021-01-01 DIAGNOSIS — E79.0 HYPERURICEMIA: ICD-10-CM

## 2021-01-01 DIAGNOSIS — E78.2 MIXED HYPERLIPIDEMIA: ICD-10-CM

## 2021-01-01 DIAGNOSIS — E11.42 DIABETIC POLYNEUROPATHY (H): ICD-10-CM

## 2021-01-01 DIAGNOSIS — E83.59 OTHER DISORDERS OF CALCIUM METABOLISM: Primary | ICD-10-CM

## 2021-01-01 LAB
CRP SERPL-MCNC: 9.4 MG/L (ref 0–8)
ERYTHROCYTE [SEDIMENTATION RATE] IN BLOOD BY WESTERGREN METHOD: 28 MM/HR (ref 0–20)
PROCALCITONIN SERPL-MCNC: 0.44 NG/ML

## 2021-01-01 PROCEDURE — 36415 COLL VENOUS BLD VENIPUNCTURE: CPT

## 2021-01-01 PROCEDURE — 86140 C-REACTIVE PROTEIN: CPT

## 2021-01-01 PROCEDURE — 84145 PROCALCITONIN (PCT): CPT

## 2021-01-01 PROCEDURE — 85652 RBC SED RATE AUTOMATED: CPT

## 2021-01-01 RX ORDER — ALLOPURINOL 300 MG/1
600 TABLET ORAL DAILY
Qty: 60 TABLET | Refills: 1 | Status: SHIPPED | OUTPATIENT
Start: 2021-01-01 | End: 2021-01-01

## 2021-01-01 RX ORDER — ALLOPURINOL 300 MG/1
TABLET ORAL
Qty: 60 TABLET | Refills: 0 | Status: SHIPPED | OUTPATIENT
Start: 2021-01-01 | End: 2022-01-01

## 2021-01-01 RX ORDER — FENOFIBRATE 145 MG/1
145 TABLET, COATED ORAL DAILY
Qty: 90 TABLET | Refills: 0 | Status: SHIPPED | OUTPATIENT
Start: 2021-01-01 | End: 2022-01-01

## 2021-01-04 RX ORDER — PREDNISONE 20 MG/1
40 TABLET ORAL DAILY
Qty: 10 TABLET | Refills: 0 | OUTPATIENT
Start: 2021-01-04

## 2021-01-04 NOTE — TELEPHONE ENCOUNTER
Chart reviewed. RX declined as this was intended for short term use. If patient continues to have pain, recommend follow up. Per note from knee evaluation 11/24/20: MRI will be considered in knee pain is persistent. If this is the case, patient should reach out to prior provider, Sonya Hamilton, for order.     Gely Garcia PA-C on 1/4/2021 at 7:38 AM

## 2021-01-06 ENCOUNTER — MEDICAL CORRESPONDENCE (OUTPATIENT)
Dept: HEALTH INFORMATION MANAGEMENT | Facility: CLINIC | Age: 71
End: 2021-01-06

## 2021-01-06 NOTE — PROGRESS NOTES
Subjective     Leif Ariza is a 70 year old who presents to clinic today for the following health issues     HPI       Following up on: knee pain    Last visit this was discussed: 11/24/2020 with Lisa    Progression of Symptoms:  improving and constant    Accompanying Signs & Symptoms: none    Taking Medication as prescribed: yes    Side Effects:  None    Medication Helping Symptoms:  Yes    Given 5 day course of prednisone and topical diclofenac  Uric acid level low, WBC normal  70% improved, would like a refill on the prednisone and diclofenac     Knee XR:  IMPRESSION: Prominent chondrocalcinosis of the menisci. Mild medial  compartment joint space narrowing. Lateral compartment joint space is  preserved. Mild to moderate patellofemoral degenerative changes. No  fracture or effusion. Vascular calcifications.      Review of Systems   Constitutional, musculoskeletal systems are negative, except as otherwise noted.      Objective    /62   Pulse 81   Temp 98.2  F (36.8  C) (Tympanic)   Resp 16   Wt 114.2 kg (251 lb 12.3 oz)   SpO2 99%   BMI 35.11 kg/m    Body mass index is 35.11 kg/m .  Physical Exam   GENERAL: healthy, alert and no distress  MS: no gross musculoskeletal defects noted, no edema  NEURO: Normal strength and tone, mentation intact and speech normal  PSYCH: mentation appears normal, affect normal/bright    Assessment & Plan     1. Acute pain of right knee    2. Primary osteoarthritis of right knee        Prednisone and diclofenac renewed. I let him know that prednisone is not intended to be used recurrently. We discussed potential s/e of recurrent use. Previous x-ray reviewed today. He is doing physical therapy exercises at home    Review of the result(s) of each unique test - knee XR  Independent interpretation of a test performed by another physician/other qualified health care professional (not separately reported)              Return for Routine Visit with PCP Sonya  Alfonso.    Sarah Mujica PA-C  [facility]

## 2021-01-07 ENCOUNTER — MEDICAL CORRESPONDENCE (OUTPATIENT)
Dept: HEALTH INFORMATION MANAGEMENT | Facility: CLINIC | Age: 71
End: 2021-01-07

## 2021-01-08 ENCOUNTER — TRANSFERRED RECORDS (OUTPATIENT)
Dept: HEALTH INFORMATION MANAGEMENT | Facility: CLINIC | Age: 71
End: 2021-01-08

## 2021-01-08 ENCOUNTER — OFFICE VISIT (OUTPATIENT)
Dept: FAMILY MEDICINE | Facility: CLINIC | Age: 71
End: 2021-01-08
Payer: MEDICARE

## 2021-01-08 VITALS
WEIGHT: 251.77 LBS | TEMPERATURE: 98.2 F | SYSTOLIC BLOOD PRESSURE: 101 MMHG | OXYGEN SATURATION: 99 % | HEART RATE: 81 BPM | RESPIRATION RATE: 16 BRPM | DIASTOLIC BLOOD PRESSURE: 62 MMHG | BODY MASS INDEX: 35.11 KG/M2

## 2021-01-08 DIAGNOSIS — M25.561 ACUTE PAIN OF RIGHT KNEE: Primary | ICD-10-CM

## 2021-01-08 DIAGNOSIS — M17.11 PRIMARY OSTEOARTHRITIS OF RIGHT KNEE: ICD-10-CM

## 2021-01-08 PROCEDURE — 99213 OFFICE O/P EST LOW 20 MIN: CPT | Performed by: PHYSICIAN ASSISTANT

## 2021-01-08 RX ORDER — PREDNISONE 20 MG/1
40 TABLET ORAL DAILY
Qty: 10 TABLET | Refills: 0 | Status: SHIPPED | OUTPATIENT
Start: 2021-01-08 | End: 2021-04-08

## 2021-01-13 ENCOUNTER — PATIENT OUTREACH (OUTPATIENT)
Dept: NURSING | Facility: CLINIC | Age: 71
End: 2021-01-13
Payer: MEDICARE

## 2021-01-13 ASSESSMENT — ACTIVITIES OF DAILY LIVING (ADL): DEPENDENT_IADLS:: TRANSPORTATION;MEAL PREPARATION;SHOPPING;LAUNDRY;COOKING;CLEANING

## 2021-01-13 NOTE — PROGRESS NOTES
Clinic Care Coordination Contact    Follow Up Progress Note      Assessment: The RN CC nurse care coordinator contacted the patient by phone for a follow up phone visit.  The patient states that he is feeling much better.  The information from the wound care staff is that the wounds are all healing and there is only one that is almost complete.  The patient is very grateful for his family who is caring for him.     Medication reconciliation was completed with the patient and marked as reviewed.  Health maintenance was reviewed and questions were answered with the patient.  The assessment for hypertension was completed with the patient today as well as the social determinants of health and they were marked as reviewed.       Goals addressed this encounter:   Goals Addressed                 This Visit's Progress      #1  Pain Management (pt-stated)   50%     Goal Statement: I will work on keeping my pain level from 7 and up/10 to closer to 5-6/10.  Date Goal set: 11/9/2020  Barriers: Bedridden with wound pain  Strengths: Engaged in care coordination  Date to Achieve By: 5/9/2021  Patient expressed understanding of goal: Yes  Action steps to achieve this goal:  1. I will use the medications as prescribed by the providers.  2. I will attempt to get the specialty max from [employer] compounded at the [facility] pharmacy.  3. I will make sure that I renew my medications with plenty of time to pick them up.               Intervention/Education provided during outreach: Review of pain medications and pain management options.     Outreach Frequency: monthly    Plan:   1.  The patient will use the pain medications as prescribed.  2.  The patient will make sure to renew medications on time to get the refills.  3.  The patient will make and attend dialysis appointments.    RN CC Nurse Care Coordinator will follow up in 4 weeks.        Diego Parra MSN, RN, PHN, [facility]   Primary Care Clinical RN Care Coordinator  [facility]  [facility]  1/13/2021   4:58 PM  [email or web address]  Office: [phone]

## 2021-01-15 ENCOUNTER — TELEPHONE (OUTPATIENT)
Dept: FAMILY MEDICINE | Facility: CLINIC | Age: 71
End: 2021-01-15

## 2021-01-15 NOTE — TELEPHONE ENCOUNTER
Trang with [facility] will be faxing over new orders for your review and signature. She states this is just a FYI. If you have any questions or concerns please call her @  [phone].  Thank you,  Michelle Cummings TC   The patient is a 59y Female complaining of cough.

## 2021-01-18 ENCOUNTER — TRANSFERRED RECORDS (OUTPATIENT)
Dept: HEALTH INFORMATION MANAGEMENT | Facility: CLINIC | Age: 71
End: 2021-01-18

## 2021-01-20 DIAGNOSIS — Z99.2 TYPE 2 DIABETES MELLITUS WITH CHRONIC KIDNEY DISEASE ON CHRONIC DIALYSIS, WITHOUT LONG-TERM CURRENT USE OF INSULIN (H): ICD-10-CM

## 2021-01-20 DIAGNOSIS — N18.6 TYPE 2 DIABETES MELLITUS WITH CHRONIC KIDNEY DISEASE ON CHRONIC DIALYSIS, WITHOUT LONG-TERM CURRENT USE OF INSULIN (H): ICD-10-CM

## 2021-01-20 DIAGNOSIS — E11.22 TYPE 2 DIABETES MELLITUS WITH CHRONIC KIDNEY DISEASE ON CHRONIC DIALYSIS, WITHOUT LONG-TERM CURRENT USE OF INSULIN (H): ICD-10-CM

## 2021-01-21 NOTE — TELEPHONE ENCOUNTER
Note: Attn Dr Proctor.    Rx req:  1. Accu-Chek Guide MIS  Sig: Use 1 strip once daily   #100    2. Fastclix Lancets MIS  Sig: Use 1 to check glucose once daily  #102

## 2021-01-25 ENCOUNTER — TRANSFERRED RECORDS (OUTPATIENT)
Dept: HEALTH INFORMATION MANAGEMENT | Facility: CLINIC | Age: 71
End: 2021-01-25

## 2021-01-28 ENCOUNTER — MEDICAL CORRESPONDENCE (OUTPATIENT)
Dept: HEALTH INFORMATION MANAGEMENT | Facility: CLINIC | Age: 71
End: 2021-01-28

## 2021-01-29 ENCOUNTER — TRANSFERRED RECORDS (OUTPATIENT)
Dept: HEALTH INFORMATION MANAGEMENT | Facility: CLINIC | Age: 71
End: 2021-01-29

## 2021-02-06 ENCOUNTER — TELEPHONE (OUTPATIENT)
Dept: FAMILY MEDICINE | Facility: CLINIC | Age: 71
End: 2021-02-06

## 2021-02-09 ENCOUNTER — MEDICAL CORRESPONDENCE (OUTPATIENT)
Dept: HEALTH INFORMATION MANAGEMENT | Facility: CLINIC | Age: 71
End: 2021-02-09

## 2021-02-10 DIAGNOSIS — N18.6 ESRD (END STAGE RENAL DISEASE) ON DIALYSIS (H): Chronic | ICD-10-CM

## 2021-02-10 DIAGNOSIS — Z99.2 ESRD (END STAGE RENAL DISEASE) ON DIALYSIS (H): Chronic | ICD-10-CM

## 2021-02-10 DIAGNOSIS — N18.6 TYPE 2 DIABETES MELLITUS WITH CHRONIC KIDNEY DISEASE ON CHRONIC DIALYSIS, WITHOUT LONG-TERM CURRENT USE OF INSULIN (H): ICD-10-CM

## 2021-02-10 DIAGNOSIS — Z99.2 TYPE 2 DIABETES MELLITUS WITH CHRONIC KIDNEY DISEASE ON CHRONIC DIALYSIS, WITHOUT LONG-TERM CURRENT USE OF INSULIN (H): ICD-10-CM

## 2021-02-10 DIAGNOSIS — E11.22 TYPE 2 DIABETES MELLITUS WITH CHRONIC KIDNEY DISEASE ON CHRONIC DIALYSIS, WITHOUT LONG-TERM CURRENT USE OF INSULIN (H): ICD-10-CM

## 2021-02-10 DIAGNOSIS — L89.322 STAGE II PRESSURE ULCER OF LEFT BUTTOCK (H): ICD-10-CM

## 2021-02-10 DIAGNOSIS — N18.5 CKD (CHRONIC KIDNEY DISEASE) STAGE 5, GFR LESS THAN 15 ML/MIN (H): Primary | ICD-10-CM

## 2021-02-10 DIAGNOSIS — E83.59 OTHER DISORDERS OF CALCIUM METABOLISM: ICD-10-CM

## 2021-02-11 ENCOUNTER — MEDICAL CORRESPONDENCE (OUTPATIENT)
Dept: HEALTH INFORMATION MANAGEMENT | Facility: CLINIC | Age: 71
End: 2021-02-11

## 2021-02-11 DIAGNOSIS — Z53.9 DIAGNOSIS NOT YET DEFINED: Primary | ICD-10-CM

## 2021-02-11 PROCEDURE — G0179 MD RECERTIFICATION HHA PT: HCPCS | Performed by: NURSE PRACTITIONER

## 2021-02-12 DIAGNOSIS — Z53.9 DIAGNOSIS NOT YET DEFINED: Primary | ICD-10-CM

## 2021-02-12 PROCEDURE — G0179 MD RECERTIFICATION HHA PT: HCPCS | Performed by: NURSE PRACTITIONER

## 2021-02-17 ENCOUNTER — PATIENT OUTREACH (OUTPATIENT)
Dept: NURSING | Facility: CLINIC | Age: 71
End: 2021-02-17
Payer: MEDICARE

## 2021-02-17 ASSESSMENT — ACTIVITIES OF DAILY LIVING (ADL): DEPENDENT_IADLS:: TRANSPORTATION;MEAL PREPARATION;SHOPPING;LAUNDRY;COOKING;CLEANING

## 2021-02-17 NOTE — PROGRESS NOTES
Clinic Care Coordination Contact    Follow Up Progress Note      Assessment: The RN CC nurse care coordinator contacted the patient by phone for a follow-up visit.  Patient states that he is doing very well in the healing department of his wounds.  He just went for the last debridement of the last remaining wound.  Patient also underwent an angiogram for dyspnea upon exertion.  The patient is unable to walk from the bed to the bathroom and back to the bed without becoming extremely short of breath.  His last wound is being dressed by a wound care nurse.  The patient also received new shoes that are diabetic shoes to relieve the beginnings of wounds on his big toes.  The patient is wearing them whenever he is up and walking especially when he goes out to the doctor.    Medication reconciliation was completed with the patient and marked as reviewed.  Health maintenance was reviewed and questions were answered with the patient.  The assessment for hypertension was completed with the patient today as well as the social determinants of health and they were marked as reviewed.      Goals addressed this encounter:   Goals Addressed                 This Visit's Progress      #1  Pain Management (pt-stated)   50%     Goal Statement: I will work on keeping my pain level from 7 and up/10 to closer to 5-6/10.  Date Goal set: 11/9/2020  Barriers: Bedridden with wound pain  Strengths: Engaged in care coordination  Date to Achieve By: 5/9/2021  Patient expressed understanding of goal: Yes  Action steps to achieve this goal:  1. I will use the medications as prescribed by the providers.  2. I will attempt to get the specialty max from [employer] compounded at the [facility] pharmacy.  3. I will make sure that I renew my medications with plenty of time to pick them up.          #2  Functional (pt-stated)        Goal Statement: I will continue all of the exercises given to me by physical therapy that I am able to  do especially in bed to try and maintain some muscle.  Date Goal set: 2/17/2021  Barriers: So short of breath he is unable to get out of bed  Strengths: Engaged in care coordination, strong support system.  Date to Achieve By: 8/17/2021  Patient expressed understanding of goal: Yes  Action steps to achieve this goal:  1. I will perform all of the exercises given to me by PT for the action bands.  2. I will sit on the edge of the bed to become oriented again.  3. I will walk to the bathroom and back to the bed with assistance.               Intervention/Education provided during outreach: Reviewed the importance of the exercises from physical therapy especially working with the bands.     Outreach Frequency: monthly    Plan:   1.  The patient will exercise with the pressure bands especially when he is confined to bed.  2.  The patient will work on sitting on the edge of the bed prior to walking to the bathroom and back.  3.  The patient will take all medications as prescribed by the providers.    RN CC Nurse Care Coordinator will follow up in 4 weeks.            Diego Parra MSN, RN, PHN, CCM   Primary Care Clinical RN Care Coordinator  [facility]  2/17/2021   1:33 PM  [email or web address]  Office: [phone]

## 2021-02-19 ENCOUNTER — TRANSFERRED RECORDS (OUTPATIENT)
Dept: HEALTH INFORMATION MANAGEMENT | Facility: CLINIC | Age: 71
End: 2021-02-19

## 2021-02-19 ENCOUNTER — TELEPHONE (OUTPATIENT)
Dept: FAMILY MEDICINE | Facility: CLINIC | Age: 71
End: 2021-02-19

## 2021-02-19 ENCOUNTER — MEDICAL CORRESPONDENCE (OUTPATIENT)
Dept: HEALTH INFORMATION MANAGEMENT | Facility: CLINIC | Age: 71
End: 2021-02-19

## 2021-02-19 ENCOUNTER — MYC MEDICAL ADVICE (OUTPATIENT)
Dept: FAMILY MEDICINE | Facility: CLINIC | Age: 71
End: 2021-02-19

## 2021-02-19 NOTE — TELEPHONE ENCOUNTER
Form to Sonya's in basket for completion, will fax & notify patient when done.  Form will also need to be signed by an MD.

## 2021-02-22 LAB — EJECTION FRACTION: NORMAL %

## 2021-03-06 DIAGNOSIS — R20.9 SENSORY DISORDER: ICD-10-CM

## 2021-03-08 ENCOUNTER — TELEPHONE (OUTPATIENT)
Dept: FAMILY MEDICINE | Facility: CLINIC | Age: 71
End: 2021-03-08

## 2021-03-08 ENCOUNTER — MYC MEDICAL ADVICE (OUTPATIENT)
Dept: FAMILY MEDICINE | Facility: CLINIC | Age: 71
End: 2021-03-08

## 2021-03-08 RX ORDER — GABAPENTIN 100 MG/1
CAPSULE ORAL
Qty: 60 CAPSULE | Refills: 0 | Status: SHIPPED | OUTPATIENT
Start: 2021-03-08 | End: 2021-04-08

## 2021-03-08 NOTE — TELEPHONE ENCOUNTER
Rx Authorization:    Requested Medication/ Dose Gabapentin 100 MG Oral Capsule    Date last refill ordered: 10/21/2020    Quantity ordered: 60 caps    # refills: 2    Date of last clinic visit with ordering provider: 4/7/2020    Date of next clinic visit with ordering provider:     All pertinent protocol data (lab date/result):     Include pertinent information from patients message:

## 2021-03-08 NOTE — TELEPHONE ENCOUNTER
Form completed and faxed on 02/22/21, OV needed for signature to [facility] in basket. Left message for patient to return call.     Home

## 2021-03-09 ENCOUNTER — TELEPHONE (OUTPATIENT)
Dept: NEUROLOGY | Facility: CLINIC | Age: 71
End: 2021-03-09

## 2021-03-09 ENCOUNTER — MEDICAL CORRESPONDENCE (OUTPATIENT)
Dept: HEALTH INFORMATION MANAGEMENT | Facility: CLINIC | Age: 71
End: 2021-03-09

## 2021-03-09 ENCOUNTER — TRANSFERRED RECORDS (OUTPATIENT)
Dept: HEALTH INFORMATION MANAGEMENT | Facility: CLINIC | Age: 71
End: 2021-03-09

## 2021-03-09 NOTE — TELEPHONE ENCOUNTER
----- Message from Javier Cobb MD sent at 3/8/2021  8:24 AM CST -----  Former [facility] patient he needs to follow up.

## 2021-03-09 NOTE — TELEPHONE ENCOUNTER
I called patient and booked 1 hour video appt on 04/08/2021. Pt will use "[employer].    Danna Markham LPN

## 2021-03-10 ENCOUNTER — TRANSFERRED RECORDS (OUTPATIENT)
Dept: HEALTH INFORMATION MANAGEMENT | Facility: CLINIC | Age: 71
End: 2021-03-10

## 2021-03-10 NOTE — TELEPHONE ENCOUNTER
New OV notes need to be sent to [facility] that specifically mention diabetic ulceration and the need for custom diabetic orthotics.   Will fax signed OV notes to to Little @ [phone]/ [phone].   Virtual appt with  on 03/11/21.

## 2021-03-10 NOTE — TELEPHONE ENCOUNTER
Form was signed (MD co-sign) faxed on 02/22/21. It was faxed again on 03/09/21 with OV,patient notified.

## 2021-03-11 ENCOUNTER — VIRTUAL VISIT (OUTPATIENT)
Dept: FAMILY MEDICINE | Facility: CLINIC | Age: 71
End: 2021-03-11
Payer: MEDICARE

## 2021-03-11 ENCOUNTER — TELEPHONE (OUTPATIENT)
Dept: FAMILY MEDICINE | Facility: CLINIC | Age: 71
End: 2021-03-11

## 2021-03-11 DIAGNOSIS — L97.509 DIABETIC ULCER OF TOE ASSOCIATED WITH TYPE 2 DIABETES MELLITUS, UNSPECIFIED LATERALITY, UNSPECIFIED ULCER STAGE (H): Primary | ICD-10-CM

## 2021-03-11 DIAGNOSIS — E11.621 DIABETIC ULCER OF TOE ASSOCIATED WITH TYPE 2 DIABETES MELLITUS, UNSPECIFIED LATERALITY, UNSPECIFIED ULCER STAGE (H): Primary | ICD-10-CM

## 2021-03-11 PROCEDURE — 99207 PR NO BILLABLE SERVICE THIS VISIT: CPT | Mod: 95 | Performed by: FAMILY MEDICINE

## 2021-03-11 NOTE — TELEPHONE ENCOUNTER
Plan of action:    I personally spoke with both the patient and Little from [facility].  Contact info for Little/ or Rachel [phone]    Patient is scheduled F2F with Jovana 3/15/21.  We will re-do forms for [facility] for need of diabetic orthotics due to diabetic ulcers of feet.     - Had blank set of forms faxed by Little from [facility] (in my basket) will also need OV notes for 3/15 attached to these forms and signed by Dr. Whaley.     Forms/ OV notes can be faxed to Little at [phone] when completed.     Spoke with patient- he will contact his wound care provider for records and have them faxed to us at [phone] prior to OV 3/15.     Patient is in agreement with this plan.

## 2021-03-11 NOTE — PROGRESS NOTES
Jean is a 70 year old who is being evaluated via a billable video visit.      How would you like to obtain your AVS? MyChart  If the video visit is dropped, the invitation should be resent by: Text to cell phone: [phone]  Will anyone else be joining your video visit? 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 Unable to assess Foot Exam via Virtual visit. Patient to schedule at earliest convenience.  Plan: A1C FUTURE anytime         Return for Face to Face Visit.    Jackelin Whaley MD  [facility] [place] is a 70 year old who presents for the following health issues     HPI     Follow up- Forms // [facility]   This form has already been completed and signed by Dr. Whaley. Form has been scanned into chart for reference 2/28/21. Insurance is requesting this office visit to discuss diabetic ulceration and the need for custom diabetic orthotics.     Office visits notes would need to be signed by MD and faxed to [facility] [phone].    Patient states that he is currently seeing the Wound clinic and being treated for diabetic foot ulcers on both his big toes and needs custom diabetic shoes and orthotics.    Patient reports that his Diabetes is currently well controlled.   Last A1c in 10/2020 was 4.7  Lab Results   Component Value Date    A1C 4.7 10/29/2020    A1C 5.4 06/01/2020    A1C 5.4 02/25/2020    A1C 5.4 11/21/2019    A1C 5.4 07/28/2019       Review of Systems   Constitutional, HEENT, cardiovascular, pulmonary, gi and gu systems are negative, except as otherwise noted.      Objective           Vitals:  No vitals were obtained today due to virtual  visit.    Physical Exam   GENERAL: Healthy, alert and no distress  EYES: Eyes grossly normal to inspection.  No discharge or erythema, or obvious scleral/conjunctival abnormalities.  RESP: No audible wheeze, cough, or visible cyanosis.  No visible retractions or increased work of breathing.    SKIN: Visible skin clear. No significant rash, abnormal pigmentation or lesions.  NEURO: Cranial nerves grossly intact.  Mentation and speech appropriate for age.  PSYCH: Mentation appears normal, affect normal/bright, judgement and insight intact, normal speech and appearance well-groomed.          Video-Visit Details    Type of service:  Video Visit    Video End Time:10:42 am    Originating Location (pt. Location): Home    Distant Location (provider location):  [facility]     Platform used for Video Visit: Mobibeam

## 2021-03-15 ENCOUNTER — OFFICE VISIT (OUTPATIENT)
Dept: FAMILY MEDICINE | Facility: CLINIC | Age: 71
End: 2021-03-15
Payer: MEDICARE

## 2021-03-15 VITALS
HEART RATE: 94 BPM | SYSTOLIC BLOOD PRESSURE: 108 MMHG | OXYGEN SATURATION: 96 % | TEMPERATURE: 97.7 F | DIASTOLIC BLOOD PRESSURE: 69 MMHG

## 2021-03-15 DIAGNOSIS — L97.509 DIABETIC ULCER OF TOE ASSOCIATED WITH TYPE 2 DIABETES MELLITUS, UNSPECIFIED LATERALITY, UNSPECIFIED ULCER STAGE (H): Primary | ICD-10-CM

## 2021-03-15 DIAGNOSIS — N39.0 URINARY TRACT INFECTION WITH HEMATURIA, SITE UNSPECIFIED: ICD-10-CM

## 2021-03-15 DIAGNOSIS — R31.9 URINARY TRACT INFECTION WITH HEMATURIA, SITE UNSPECIFIED: ICD-10-CM

## 2021-03-15 DIAGNOSIS — Z99.2 TYPE 2 DIABETES MELLITUS WITH CHRONIC KIDNEY DISEASE ON CHRONIC DIALYSIS, WITHOUT LONG-TERM CURRENT USE OF INSULIN (H): ICD-10-CM

## 2021-03-15 DIAGNOSIS — E11.22 TYPE 2 DIABETES MELLITUS WITH CHRONIC KIDNEY DISEASE ON CHRONIC DIALYSIS, WITHOUT LONG-TERM CURRENT USE OF INSULIN (H): ICD-10-CM

## 2021-03-15 DIAGNOSIS — R30.0 DYSURIA: ICD-10-CM

## 2021-03-15 DIAGNOSIS — N18.6 TYPE 2 DIABETES MELLITUS WITH CHRONIC KIDNEY DISEASE ON CHRONIC DIALYSIS, WITHOUT LONG-TERM CURRENT USE OF INSULIN (H): ICD-10-CM

## 2021-03-15 DIAGNOSIS — R82.90 NONSPECIFIC FINDING ON EXAMINATION OF URINE: ICD-10-CM

## 2021-03-15 DIAGNOSIS — E11.621 DIABETIC ULCER OF TOE ASSOCIATED WITH TYPE 2 DIABETES MELLITUS, UNSPECIFIED LATERALITY, UNSPECIFIED ULCER STAGE (H): Primary | ICD-10-CM

## 2021-03-15 LAB
ALBUMIN UR-MCNC: >=300 MG/DL
APPEARANCE UR: ABNORMAL
BACTERIA #/AREA URNS HPF: ABNORMAL /HPF
BILIRUB UR QL STRIP: NEGATIVE
COLOR UR AUTO: YELLOW
GLUCOSE UR STRIP-MCNC: NEGATIVE MG/DL
HBA1C MFR BLD: 5 % (ref 0–5.6)
HGB UR QL STRIP: ABNORMAL
KETONES UR STRIP-MCNC: NEGATIVE MG/DL
LEUKOCYTE ESTERASE UR QL STRIP: ABNORMAL
NITRATE UR QL: NEGATIVE
NON-SQ EPI CELLS #/AREA URNS LPF: ABNORMAL /LPF
PH UR STRIP: 8.5 PH (ref 5–7)
RBC #/AREA URNS AUTO: >100 /HPF
SOURCE: ABNORMAL
SP GR UR STRIP: 1.02 (ref 1–1.03)
URNS CMNT MICRO: ABNORMAL
UROBILINOGEN UR STRIP-ACNC: 0.2 EU/DL (ref 0.2–1)
WBC #/AREA URNS AUTO: >100 /HPF

## 2021-03-15 PROCEDURE — 87186 SC STD MICRODIL/AGAR DIL: CPT | Performed by: FAMILY MEDICINE

## 2021-03-15 PROCEDURE — 81001 URINALYSIS AUTO W/SCOPE: CPT | Performed by: FAMILY MEDICINE

## 2021-03-15 PROCEDURE — 36415 COLL VENOUS BLD VENIPUNCTURE: CPT | Performed by: FAMILY MEDICINE

## 2021-03-15 PROCEDURE — 87086 URINE CULTURE/COLONY COUNT: CPT | Performed by: FAMILY MEDICINE

## 2021-03-15 PROCEDURE — 99214 OFFICE O/P EST MOD 30 MIN: CPT | Performed by: FAMILY MEDICINE

## 2021-03-15 PROCEDURE — 83036 HEMOGLOBIN GLYCOSYLATED A1C: CPT | Performed by: FAMILY MEDICINE

## 2021-03-15 PROCEDURE — 99207 PR FOOT EXAM NO CHARGE: CPT | Performed by: FAMILY MEDICINE

## 2021-03-15 PROCEDURE — 87088 URINE BACTERIA CULTURE: CPT | Performed by: FAMILY MEDICINE

## 2021-03-15 RX ORDER — CIPROFLOXACIN 500 MG/1
500 TABLET, FILM COATED ORAL 2 TIMES DAILY
Qty: 14 TABLET | Refills: 0 | Status: SHIPPED | OUTPATIENT
Start: 2021-03-15 | End: 2021-03-22

## 2021-03-15 NOTE — PROGRESS NOTES
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 ENMANUEL obtained.  - Forms completed    Type 2 diabetes mellitus with chronic kidney disease on chronic dialysis, without long-term current use of insulin (H)  - Hemoglobin A1c  - On HD 3 x/ week- following with Nephrology    Urinary tract infection with hematuria, site unspecified  - Await urine culture.  -  In the interim,start: ciprofloxacin (CIPRO) 500 MG tablet; Take 1 tablet (500 mg) by mouth 2 times daily for 7 days    Dysuria  - Urine Microscopic  - UA reflex to Microscopic and Culture    Nonspecific finding on examination of urine  - Urine Culture Aerobic Bacterial      Return in about 1 month (around 4/15/2021) for Follow up.- Patient wishes to establish care at the next visit.    Jackelin Whaley MD  [facility] [place] is a 70 year old who presents for the following health issues     HPI     Patient is here for a diabetic foot exam and to complete forms.     Reports a history of ESRD on HD 3 x/week.   Has diabetic foot ulcer and is being seen in the Wound Clinic.   Requesting for diabetic shoes with inserts. Has forms for completion.       Diabetes Follow-up  Currently not on any diabetic medications.     How often are you checking your blood sugar? every other day     What concerns do you have today about your diabetes? Diabetic foot wear forms- pt states he did put a request in for records from his wound care- we did not receiving anything.  ENMANUEL signed today      Do you have any of these symptoms? (Select all that apply)  Numbness in feet, Burning in feet and Redness, sores, or blisters on feet    Have you had a diabetic eye exam in the last 12 months? Yes- Date of last eye exam: x2 months ago ,  Location: [facility] eye specialist- [place]        BP Readings from Last 2  Encounters:   03/15/21 108/69   01/08/21 101/62     Hemoglobin A1C (%)   Date Value   03/15/2021 5.0   10/29/2020 4.7     LDL Cholesterol Calculated (mg/dL)   Date Value   11/24/2020 29   07/28/2019 57       ADDITIONAL CONCERN    Urinary Symptoms x3 days  Reporting burning with urination, discoloration of urine.   Has a pink tint to urine, unsure if medication related.   Denies having any fever or chills. No flank pain.   States that he's had a UTI once before, 2 years ago and states that the symptoms are similar.         Review of Systems   Constitutional, HEENT, cardiovascular, pulmonary, gi and gu systems are negative, except as otherwise noted.      Objective    /69   Pulse 94   Temp 97.7  F (36.5  C) (Tympanic)   SpO2 96%   There is no height or weight on file to calculate BMI.  Physical Exam   GENERAL: healthy, alert and no distress  Diabetic foot exam:  Shiny appearance of the skin of the legs and feet. Pedal pulses present.  Pre-ulcerative calluses.  Hammer toes bilaterally with ulcers noted on the bilateral anterior great toes and right 4th toe.   Decreased to no sensation with a monofilament exam on   Left Foot. # 1, 2, 3, 4, 5, 6, 7, 8  Right Foot: 1, 2, 3, 4, 5, 6, 7, 8           DATA  Reviewed and discussed with patient prior to discharge.  Results for orders placed or performed in visit on 03/15/21   Hemoglobin A1c     Status: None   Result Value Ref Range    Hemoglobin A1C 5.0 0 - 5.6 %   UA reflex to Microscopic and Culture     Status: Abnormal    Specimen: Midstream Urine   Result Value Ref Range    Color Urine Yellow     Appearance Urine Turbid     Glucose Urine Negative NEG^Negative mg/dL    Bilirubin Urine Negative NEG^Negative    Ketones Urine Negative NEG^Negative mg/dL    Specific Gravity Urine 1.025 1.003 - 1.035    Blood Urine Large (A) NEG^Negative    pH Urine 8.5 (H) 5.0 - 7.0 pH    Protein Albumin Urine >=300 (A) NEG^Negative mg/dL    Urobilinogen Urine 0.2 0.2 - 1.0 EU/dL     Nitrite Urine Negative NEG^Negative    Leukocyte Esterase Urine Large (A) NEG^Negative    Source Midstream Urine    Urine Microscopic     Status: Abnormal   Result Value Ref Range    WBC Urine >100 (A) OTO5^0 - 5 /HPF    RBC Urine >100 (A) OTO2^O - 2 /HPF    Squamous Epithelial /LPF Urine Few FEW^Few /LPF    Bacteria Urine Many (A) NEG^Negative /HPF    Comment Urine Unconcentrated

## 2021-03-17 LAB
BACTERIA SPEC CULT: ABNORMAL
Lab: ABNORMAL
SPECIMEN SOURCE: ABNORMAL

## 2021-03-18 ENCOUNTER — MYC MEDICAL ADVICE (OUTPATIENT)
Dept: FAMILY MEDICINE | Facility: CLINIC | Age: 71
End: 2021-03-18

## 2021-03-19 NOTE — TELEPHONE ENCOUNTER
Spoke with patient- forms were faxed... I believe either Wednesday or Thursday.  Pt verbalizes understanding- he will check back with [employer] and confirm they receive the paperwork.  He will call back if they have not.

## 2021-03-22 ENCOUNTER — TELEPHONE (OUTPATIENT)
Dept: FAMILY MEDICINE | Facility: CLINIC | Age: 71
End: 2021-03-22

## 2021-03-23 ENCOUNTER — PATIENT OUTREACH (OUTPATIENT)
Dept: CARE COORDINATION | Facility: CLINIC | Age: 71
End: 2021-03-23

## 2021-03-23 ENCOUNTER — MEDICAL CORRESPONDENCE (OUTPATIENT)
Dept: HEALTH INFORMATION MANAGEMENT | Facility: CLINIC | Age: 71
End: 2021-03-23

## 2021-03-23 NOTE — LETTER
[facility]  Complex Care Plan  About Me:    Patient Name:  Leif Pritchard    YOB: 1950  Age:         70 year old   [place] MRN:    [id number] Telephone Information:  Home Phone [phone]   Mobile [phone]       Address:  [address] [place] [address]  [place] [address] Email address:  [email or web address]      Emergency Contact(s)    Name Relationship Lgl Grd Work Phone Home Phone Mobile Phone   1. RANDY PRITCHARD Spouse   [phone]   2. REGINO PRITCHARD Son   [phone]            Primary language:  English     needed? No   [place] [employer]:  [phone] op. 1  Other communication barriers:    Preferred Method of Communication:     Current living arrangement:    Mobility Status/ Medical Equipment:      Health Maintenance  Health Maintenance Reviewed:      My Access Plan  Medical Emergency 911   Primary Clinic Line [facility] [phone]   24 Hour Appointment Line [phone] or  [phone]) (toll-free)   24 Hour Nurse Line [phone] (toll-free)   Preferred Urgent Care     Preferred Hospital     Preferred Pharmacy [facility] - [facility] [address] [facility]     Behavioral Health Crisis Line The National Suicide Prevention Lifeline at 1-353.157.5621 or 911             My Care Team Members  Patient Care Team       Relationship Specialty Notifications Start End    Jackelin Whaley MD PCP - General Family Medicine  3/22/21     Phone: [phone] Fax: [phone]         [address] [place] [address]    Joe Bee MD Nephrologist Nephrology  8/3/20     Phone: [phone] Fax: [phone]        [facility]   [address] [place] [address]      Joe Irby MD MD Nephrology  8/3/20     Phone: [phone] Fax: [phone]         [facility] [address]  [facility] [address]    Dustin  Irineo ALBERT DPM Assigned Musculoskeletal Provider   10/23/20     Phone: [phone] Fax: [phone]         [address] [facility] [address] [place] [address]    Arvind Trujillo MD Assigned Neuroscience Provider   10/23/20     Phone: [phone] Fax: [phone]         [address]  [facility] [address]    Diego Mraie, RN Lead Care Coordinator Primary Care - [facility] Admissions 11/9/20     Phone: [phone] Fax: [phone]        Sonya Hamilton NP Assigned PCP   2/28/21     Phone: [phone] Fax: [phone]         [address] [facility] [place] [address]            My Care Plans  Self Management and Treatment Plan  Goals and (Comments)  Goals        General    #1  Pain Management (pt-stated)     Notes - Note edited  11/9/2020  3:18 PM by Diego Marie, RN    Goal Statement: I will work on keeping my pain level from 7 and up/10 to closer to 5-6/10.  Date Goal set: 11/9/2020  Barriers: Bedridden with wound pain  Strengths: Engaged in care coordination  Date to Achieve By: 5/9/2021  Patient expressed understanding of goal: Yes  Action steps to achieve this goal:  1. I will use the medications as prescribed by the providers.  2. I will attempt to get the specialty max from [employer] compounded at the [facility] pharmacy.  3. I will make sure that I renew my medications with plenty of time to pick them up.        #2  Functional (pt-stated)     Notes - Note created  2/17/2021  1:27 PM by Diego Marie, RN    Goal Statement: I will continue all of the exercises given to me by physical therapy that I am able to do especially in bed to try and maintain some muscle.  Date Goal set: 2/17/2021  Barriers: So short of breath he is unable to get out of bed  Strengths: Engaged in care coordination, strong support system.  Date to Achieve By: 8/17/2021  Patient expressed understanding of goal: Yes  Action steps to achieve this goal:  1. I will perform all of the exercises given to me by PT for the  action bands.  2. I will sit on the edge of the bed to become oriented again.  3. I will walk to the bathroom and back to the bed with assistance.               Action Plans on File:                       Advance Care Plans/Directives Type:        My Medical and Care Information  Problem List   Patient Active Problem List   Diagnosis     Essential hypertension     Hyperlipidemia LDL goal <70     CKD (chronic kidney disease) stage 5, GFR less than 15 ml/min (H)     ESRD (end stage renal disease) on dialysis (H)     Type 2 diabetes mellitus, without long-term current use of insulin (H)     Atrial fibrillation status post cardioversion (H)     Hyperuricemia     DORI (obstructive sleep apnea)     Morbid obesity (H)     Deep vein thrombosis (DVT) (H)     Long term (current) use of anticoagulants     Secondary hyperparathyroidism, renal (H)     Hyponatremia     Sepsis (H)     Restless legs syndrome     Calciphylaxis with nonhealing ulcer of leg (H)     Acute deep vein thrombosis (DVT) of proximal vein of left lower extremity (H)     Orthostatic hypotension     Fall, subsequent encounter      Current Medications and Allergies:  See printed Medication Report.    Care Coordination Start Date: 11/9/2020   Frequency of Care Coordination:     Form Last Updated: 03/23/2021

## 2021-03-23 NOTE — LETTER
[facility] C[facility] COORDINATION  [address] [place] [address]    March 23, 2021    Leif Ariza  [address] [facility]  [place] [address]      Dear Leif,    I am a clinic care coordinator who works with Jackelin Whaley MD at [facility]. I recently tried to call and was unable to reach you. Below is a description of clinic care coordination and how I can further assist you.      The clinic care coordination team is made up of a registered nurse,  and community health worker who understand the health care system. The goal of clinic care coordination is to help you manage your health and improve access to the health care system in the most efficient manner. The team can assist you in meeting your health care goals by providing education, coordinating services, strengthening the communication among your providers and supporting you with any resource needs.    Please feel free to contact me at [phone] with any questions or concerns. We are focused on providing you with the highest-quality healthcare experience possible and that all starts with you.     Sincerely,     Diego Parra MSN, RN, PHN, CCM   Primary Care Clinical RN Care Coordinator  [facility]  3/23/2021   1:33 PM  [email or web address]  Office: [phone]      Enclosed: I have enclosed a copy of the Complex Care Plan. This has helpful information and goals that we have talked about. Please keep this in an easy to access place to use as needed.

## 2021-03-23 NOTE — PROGRESS NOTES
Clinic Care Coordination Contact  [facility]/Voicemail       Clinical Data: Care Coordinator Outreach  Outreach attempted x 1.  Left message on patient's voicemail with call back information and requested return call.  Plan: Care Coordinator sent care coordination introduction letter on 11/9/2020 via [employer]. Care Coordinator will try to reach patient again in 10 business days.          Diego Parra MSN, RN, PHN, [facility]   Primary Care Clinical RN Care Coordinator  [facility]  3/23/2021   1:36 PM  [email or web address]  Office: [phone]

## 2021-04-08 ENCOUNTER — VIRTUAL VISIT (OUTPATIENT)
Dept: NEUROLOGY | Facility: CLINIC | Age: 71
End: 2021-04-08
Payer: MEDICARE

## 2021-04-08 DIAGNOSIS — G25.81 RESTLESS LEGS SYNDROME: Primary | ICD-10-CM

## 2021-04-08 DIAGNOSIS — R20.9 SENSORY DISORDER: ICD-10-CM

## 2021-04-08 PROCEDURE — 99214 OFFICE O/P EST MOD 30 MIN: CPT | Mod: 95 | Performed by: INTERNAL MEDICINE

## 2021-04-08 RX ORDER — GABAPENTIN 100 MG/1
100 CAPSULE ORAL 3 TIMES DAILY
Qty: 270 CAPSULE | Refills: 2 | Status: SHIPPED | OUTPATIENT
Start: 2021-04-08

## 2021-04-08 NOTE — PROGRESS NOTES
[facility] Neurology Consultation    Leif Ariza MRN# [id number]   Age: 70 year old YOB: 1950     Requesting physician: No ref. provider found  Jackelin Whaley     Reason for Consultation: tingling in the feet        History of Presenting Symptoms:   Leif Ariza is a 70 year old male who presents today for evaluation of tingling in the feet.    Patient previously followed with Dr Trujillo. He was last seen in 4/2020. Patient was started on gabapentin for suspected RLS and diabetic polyneuropathy. Dose was increased to 100 mg BID with some improvement.     Patient notes today that symptoms are mildly worse now compared to last visit.    Recently he developed some pressure wounds in the feet. These are healing so he his hoping to be more physically active.     From the ankles down he doesn't have any feeling. He has tingling sensation in both legs. He has discoloration in both legs. This tingling sensation is painful. If he gets up and walks a little bit it can help with the painful tingling.     He has been on dialysis since 2017. Symptoms are about the same on dialysis days. He has to have his legs propped up at night.     He has lost 140 pounds in the last 2 years with exercise. He has been more bidridden as of late due to flare of calcinosis.       Past Medical History:     Patient Active Problem List   Diagnosis     Essential hypertension     Hyperlipidemia LDL goal <70     CKD (chronic kidney disease) stage 5, GFR less than 15 ml/min (H)     ESRD (end stage renal disease) on dialysis (H)     Type 2 diabetes mellitus, without long-term current use of insulin (H)     Atrial fibrillation status post cardioversion (H)     Hyperuricemia     DORI (obstructive sleep apnea)     Morbid obesity (H)     Deep vein thrombosis (DVT) (H)     Long term (current) use of anticoagulants     Secondary hyperparathyroidism, renal (H)     Hyponatremia     Sepsis (H)     Restless legs syndrome     Calciphylaxis with  nonhealing ulcer of leg (H)     Acute deep vein thrombosis (DVT) of proximal vein of left lower extremity (H)     Orthostatic hypotension     Fall, subsequent encounter     Past Medical History:   Diagnosis Date     Arthritis      Atrial fibrillation (H)      CKD (chronic kidney disease) stage 5, GFR less than 15 ml/min (H) 2017     DVT (deep venous thrombosis) (H) 2000     Gout      History of blood transfusion 2016 and 2020    [place] , [facility]     HLD (hyperlipidemia)      HTN (hypertension)      Kidney stone 2016     Type 2 diabetes mellitus (H)     no longer on insulin or oral med        Past Surgical History:     Past Surgical History:   Procedure Laterality Date     ABDOMEN SURGERY  inserted catheter for PD    Everyuthing going fine     ANESTH,UPPER ARM AV FISTULA REPAIR Left 2018     APPENDECTOMY  1958     BIOPSY  07/2020    colon polyp-     C REPAIR CRUCIATE LIGAMENT,KNEE Left 1976    knee     CARDIOVERSION  2000     carpel tunnel surgery Left 2000     COLONOSCOPY  07/2020    [facility] ([facility]      GI SURGERY  07/2020    [facility]     ORTHOPEDIC SURGERY Left 06/09/2020    left [facility] (Dr. Wanda ROCK)     SINUS SURGERY  1997     VASCULAR SURGERY      peritoneal dialysis 4/2020,left arm fistula        Social History:     Social History     Tobacco Use     Smoking status: Never Smoker     Smokeless tobacco: Never Used   Substance Use Topics     Alcohol use: Not Currently     Frequency: Never     Comment: Stopped when I was put on blood thinners     Drug use: Never        Family History:     Family History   Problem Relation Age of Onset     Cerebrovascular Disease Father         Had multiple strokes while in hospital when he passed     Prostate Cancer Brother         passed away in 2000     Other Cancer Brother         passed away in 2000        Medications:     Current Outpatient Medications   Medication Sig     Acetaminophen  325 MG CAPS Take 1,000 mg by mouth every 6 hours as needed     allopurinol (ZYLOPRIM) 300 MG tablet Take 2 tablets (600 mg) by mouth daily     apixaban ANTICOAGULANT (ELIQUIS) 5 MG tablet Take 5 mg by mouth 2 times daily Transitioned from warfarin while at [facility].     aspirin 81 MG EC tablet Take 81 mg by mouth     atorvastatin (LIPITOR) 80 MG tablet Take 80 mg by mouth daily     blood glucose (NO BRAND SPECIFIED) test strip Use to test blood sugar 1 times daily or as directed.  Per insurance and patient preference.     blood glucose monitoring (NO BRAND SPECIFIED) meter device kit Use to test blood sugar 1 times daily or as directed.  Per insurance and patient preference; patient would like the one touch ultra 2 if insurance covers     calcium carbonate (TUMS) 500 MG chewable tablet Take 1 tablet (500 mg) by mouth daily     cinacalcet (SENSIPAR) 60 MG tablet Take 60 mg by mouth daily     cyanocobalamin (VITAMIN B-12) 1000 MCG tablet Take 1 tablet (1,000 mcg) by mouth daily     diclofenac (VOLTAREN) 1 % topical gel Apply 4 g topically 4 times daily     fenofibrate (TRICOR) 145 MG tablet Take 1 tablet (145 mg) by mouth daily     furosemide (LASIX) 80 MG tablet Take 1 tablet (80 mg) by mouth daily     gabapentin (NEURONTIN) 100 MG capsule Take 1 capsule by mouth twice daily     Glucosamine-Chondroitin 166.7-133.3 MG CAPS Take 1 capsule twice daily     HYDROmorphone (DILAUDID) 2 MG tablet Take 1 tablet (2 mg) by mouth every 4 hours as needed for severe pain     hydrOXYzine (ATARAX) 10 MG tablet Take 1 tablet (10 mg) by mouth every 6 hours as needed (with pain medication)     Lancets 30G MISC Use once daily to test blood sugar brand per insurance or pt preference.     methocarbamol (ROBAXIN) 500 MG tablet Take 1 tablet (500 mg) by mouth 3 times daily     metoprolol succinate ER (TOPROL-XL) 25 MG 24 hr tablet Take 1 tablet (25 mg) by mouth daily     morphine 0.1% in intrasite topical gel      order for DME  Equipment being ordered: Digital home blood pressure monitor kit     pantoprazole (PROTONIX) 20 MG EC tablet Take 1 tablet (20 mg) by mouth daily     predniSONE (DELTASONE) 20 MG tablet Take 2 tablets (40 mg) by mouth daily     sevelamer carbonate (RENVELA) 800 MG tablet TAKE 2 TABLETS BY MOUTH THREE TIMES DAILY WITH MEALS     SODIUM BICARBONATE PO Take 10 g by mouth daily     UNABLE TO FIND Lidocaine 2.5% cream/  Use 5gm to affected areas prior to or with treatment     UNABLE TO FIND Lidocaine 5gm of 2.5% cream to affected areas prior to or with wound treatment     No current facility-administered medications for this visit.         Allergies:     Allergies   Allergen Reactions     Penicillins      Other reaction(s): Unknown Reaction        Review of Systems:   As above     Physical Exam:   Vitals: There were no vitals taken for this visit.   General: Seated comfortably in no acute distress.  Lungs: breathing comfortably         Data: Pertinent prior to visit   Imaging:  None    Procedures:  None    Laboratory:  Gabapentin level 5.0 (5/2020)  Iron normal with elevated ferritin (4/2020)         Assessment and Plan:   Assessment:   Leif Ariza is a 70 year old male who presents today for follow-up of diabetic polyneuropathy and suspected RLS. Patient previously followed with Dr Trujillo. At last visit gabapentin dosage was increased to 100 mg BID with some improvement in symptoms. Gabapentin dosage is limited given ESRD. Symptoms are mildly worse today compared to last visit. We discussed increasing gabapentin to 100 mg TID today. On dialysis days he should take medication after dialysis. We will check a gabapentin level in 1 week after increasing dosage.     Plan:  - Increase gabapentin to 100 mg TID  - Gabapentin level in 1 week    Follow up in Neurology clinic in 6 months or earlier as needed should new concerns arise.    Johnson Arango MD   of Neurology  [facility]    The total  time of this encounter amounted to 31 minutes. This time included time spent with the patient, prep work, ordering tests, and performing post visit documentation.

## 2021-04-08 NOTE — LETTER
4/8/2021         RE: Leif Ariza  [id number] [address] [address]        Dear Colleague,    Thank you for referring your patient, Leif Ariza, to the [facility] [place]. Please see a copy of my visit note below.    [facility] Neurology Consultation    Leif Ariza MRN# [id number]   Age: 70 year old YOB: 1950     Requesting physician: No ref. provider found  Jackelin Whaley     Reason for Consultation: tingling in the feet        History of Presenting Symptoms:   Leif Ariza is a 70 year old male who presents today for evaluation of tingling in the feet.    Patient previously followed with Dr Trujillo. He was last seen in 4/2020. Patient was started on gabapentin for suspected RLS and diabetic polyneuropathy. Dose was increased to 100 mg BID with some improvement.     Patient notes today that symptoms are mildly worse now compared to last visit.    Recently he developed some pressure wounds in the feet. These are healing so he his hoping to be more physically active.     From the ankles down he doesn't have any feeling. He has tingling sensation in both legs. He has discoloration in both legs. This tingling sensation is painful. If he gets up and walks a little bit it can help with the painful tingling.     He has been on dialysis since 2017. Symptoms are about the same on dialysis days. He has to have his legs propped up at night.     He has lost 140 pounds in the last 2 years with exercise. He has been more bidridden as of late due to flare of calcinosis.       Past Medical History:     Patient Active Problem List   Diagnosis     Essential hypertension     Hyperlipidemia LDL goal <70     CKD (chronic kidney disease) stage 5, GFR less than 15 ml/min (H)     ESRD (end stage renal disease) on dialysis (H)     Type 2 diabetes mellitus, without long-term current use of insulin (H)     Atrial fibrillation status post cardioversion (H)     Hyperuricemia      DORI (obstructive sleep apnea)     Morbid obesity (H)     Deep vein thrombosis (DVT) (H)     Long term (current) use of anticoagulants     Secondary hyperparathyroidism, renal (H)     Hyponatremia     Sepsis (H)     Restless legs syndrome     Calciphylaxis with nonhealing ulcer of leg (H)     Acute deep vein thrombosis (DVT) of proximal vein of left lower extremity (H)     Orthostatic hypotension     Fall, subsequent encounter     Past Medical History:   Diagnosis Date     Arthritis      Atrial fibrillation (H)      CKD (chronic kidney disease) stage 5, GFR less than 15 ml/min (H) 2017     DVT (deep venous thrombosis) (H) 2000     Gout      History of blood transfusion 2016 and [place]    [place] , [facility]     HLD (hyperlipidemia)      HTN (hypertension)      Kidney stone 2016     Type 2 diabetes mellitus (H)     no longer on insulin or oral med        Past Surgical History:     Past Surgical History:   Procedure Laterality Date     ABDOMEN SURGERY  inserted catheter for PD    Everyuthing going fine     ANESTH,UPPER ARM AV FISTULA REPAIR Left 2018     APPENDECTOMY  1958     BIOPSY  07/2020    colon polyp-     C REPAIR CRUCIATE LIGAMENT,KNEE Left 1976    knee     CARDIOVERSION  2000     carpel tunnel surgery Left 2000     COLONOSCOPY  07/2020    [facility]      GI SURGERY  07/2020    [facility]     ORTHOPEDIC SURGERY Left 06/09/2020    left [facility] (Dr. Wanda ROCK)     SINUS SURGERY  1997     VASCULAR SURGERY      peritoneal dialysis 4/2020,left arm fistula        Social History:     Social History     Tobacco Use     Smoking status: Never Smoker     Smokeless tobacco: Never Used   Substance Use Topics     Alcohol use: Not Currently     Frequency: Never     Comment: Stopped when I was put on blood thinners     Drug use: Never        Family History:     Family History   Problem Relation Age of Onset     Cerebrovascular Disease  Father         Had multiple strokes while in hospital when he passed     Prostate Cancer Brother         passed away in 2000     Other Cancer Brother         passed away in 2000        Medications:     Current Outpatient Medications   Medication Sig     Acetaminophen 325 MG CAPS Take 1,000 mg by mouth every 6 hours as needed     allopurinol (ZYLOPRIM) 300 MG tablet Take 2 tablets (600 mg) by mouth daily     apixaban ANTICOAGULANT (ELIQUIS) 5 MG tablet Take 5 mg by mouth 2 times daily Transitioned from warfarin while at [facility].     aspirin 81 MG EC tablet Take 81 mg by mouth     atorvastatin (LIPITOR) 80 MG tablet Take 80 mg by mouth daily     blood glucose (NO BRAND SPECIFIED) test strip Use to test blood sugar 1 times daily or as directed.  Per insurance and patient preference.     blood glucose monitoring (NO BRAND SPECIFIED) meter device kit Use to test blood sugar 1 times daily or as directed.  Per insurance and patient preference; patient would like the one touch ultra 2 if insurance covers     calcium carbonate (TUMS) 500 MG chewable tablet Take 1 tablet (500 mg) by mouth daily     cinacalcet (SENSIPAR) 60 MG tablet Take 60 mg by mouth daily     cyanocobalamin (VITAMIN B-12) 1000 MCG tablet Take 1 tablet (1,000 mcg) by mouth daily     diclofenac (VOLTAREN) 1 % topical gel Apply 4 g topically 4 times daily     fenofibrate (TRICOR) 145 MG tablet Take 1 tablet (145 mg) by mouth daily     furosemide (LASIX) 80 MG tablet Take 1 tablet (80 mg) by mouth daily     gabapentin (NEURONTIN) 100 MG capsule Take 1 capsule by mouth twice daily     Glucosamine-Chondroitin 166.7-133.3 MG CAPS Take 1 capsule twice daily     HYDROmorphone (DILAUDID) 2 MG tablet Take 1 tablet (2 mg) by mouth every 4 hours as needed for severe pain     hydrOXYzine (ATARAX) 10 MG tablet Take 1 tablet (10 mg) by mouth every 6 hours as needed (with pain medication)     Lancets 30G MISC Use once daily to test blood sugar brand per insurance  or pt preference.     methocarbamol (ROBAXIN) 500 MG tablet Take 1 tablet (500 mg) by mouth 3 times daily     metoprolol succinate ER (TOPROL-XL) 25 MG 24 hr tablet Take 1 tablet (25 mg) by mouth daily     morphine 0.1% in intrasite topical gel      order for DME Equipment being ordered: Digital home blood pressure monitor kit     pantoprazole (PROTONIX) 20 MG EC tablet Take 1 tablet (20 mg) by mouth daily     predniSONE (DELTASONE) 20 MG tablet Take 2 tablets (40 mg) by mouth daily     sevelamer carbonate (RENVELA) 800 MG tablet TAKE 2 TABLETS BY MOUTH THREE TIMES DAILY WITH MEALS     SODIUM BICARBONATE PO Take 10 g by mouth daily     UNABLE TO FIND Lidocaine 2.5% cream/  Use 5gm to affected areas prior to or with treatment     UNABLE TO FIND Lidocaine 5gm of 2.5% cream to affected areas prior to or with wound treatment     No current facility-administered medications for this visit.         Allergies:     Allergies   Allergen Reactions     Penicillins      Other reaction(s): Unknown Reaction        Review of Systems:   As above     Physical Exam:   Vitals: There were no vitals taken for this visit.   General: Seated comfortably in no acute distress.  Lungs: breathing comfortably         Data: Pertinent prior to visit   Imaging:  None    Procedures:  None    Laboratory:  Gabapentin level 5.0 (5/2020)  Iron normal with elevated ferritin (4/2020)         Assessment and Plan:   Assessment:   Leif Ariza is a 70 year old male who presents today for follow-up of diabetic polyneuropathy and suspected RLS. Patient previously followed with Dr Trujillo. At last visit gabapentin dosage was increased to 100 mg BID with some improvement in symptoms. Gabapentin dosage is limited given ESRD. Symptoms are mildly worse today compared to last visit. We discussed increasing gabapentin to 100 mg TID today. On dialysis days he should take medication after dialysis. We will check a gabapentin level in 1 week after increasing  dosage.     Plan:  - Increase gabapentin to 100 mg TID  - Gabapentin level in 1 week    Follow up in Neurology clinic in 6 months or earlier as needed should new concerns arise.    Johnson Arango MD   of Neurology  [facility]    The total time of this encounter amounted to 31 minutes. This time included time spent with the patient, prep work, ordering tests, and performing post visit documentation.      Jean is a 70 year old who is being evaluated via a billable video visit.      How would you like to obtain your AVS? Mail a copy  If the video visit is dropped, the invitation should be resent by: Text to cell phone: [phone]  Will anyone else be joining your video visit? No      Video start: 9:40 AM  Video end: 9:55 AM      Again, thank you for allowing me to participate in the care of your patient.        Sincerely,        Johnson Arango MD

## 2021-04-08 NOTE — PROGRESS NOTES
Jean is a 70 year old who is being evaluated via a billable video visit.      How would you like to obtain your AVS? Mail a copy  If the video visit is dropped, the invitation should be resent by: Text to cell phone: [phone]  Will anyone else be joining your video visit? No      Video start: 9:40 AM  Video end: 9:55 AM

## 2021-04-09 ENCOUNTER — TRANSFERRED RECORDS (OUTPATIENT)
Dept: HEALTH INFORMATION MANAGEMENT | Facility: CLINIC | Age: 71
End: 2021-04-09

## 2021-04-13 ENCOUNTER — PATIENT OUTREACH (OUTPATIENT)
Dept: CARE COORDINATION | Facility: CLINIC | Age: 71
End: 2021-04-13

## 2021-04-13 NOTE — PROGRESS NOTES
Clinic Care Coordination Contact  RUST/Voicemail       Clinical Data: Care Coordinator Outreach  Outreach attempted x 3.  Left message on patient's voicemail with call back information and requested return call.  Plan: Care Coordinator sent care coordination introduction letter on 11/9/20 via [employer]. Care Coordinator will try to reach patient again in 1 month.          Diego Parra MSN, RN, PHN, CCM   Primary Care Clinical RN Care Coordinator  [facility]  4/13/2021   3:11 PM  [email or web address]  Office: [phone]

## 2021-04-19 ENCOUNTER — TRANSFERRED RECORDS (OUTPATIENT)
Dept: HEALTH INFORMATION MANAGEMENT | Facility: CLINIC | Age: 71
End: 2021-04-19

## 2021-04-21 ENCOUNTER — OFFICE VISIT (OUTPATIENT)
Dept: FAMILY MEDICINE | Facility: CLINIC | Age: 71
End: 2021-04-21
Payer: MEDICARE

## 2021-04-21 VITALS
HEART RATE: 77 BPM | TEMPERATURE: 97.8 F | OXYGEN SATURATION: 95 % | BODY MASS INDEX: 35.01 KG/M2 | SYSTOLIC BLOOD PRESSURE: 113 MMHG | RESPIRATION RATE: 18 BRPM | WEIGHT: 251 LBS | DIASTOLIC BLOOD PRESSURE: 66 MMHG

## 2021-04-21 DIAGNOSIS — E66.01 MORBID OBESITY (H): ICD-10-CM

## 2021-04-21 DIAGNOSIS — I25.10 CORONARY ARTERY DISEASE INVOLVING NATIVE CORONARY ARTERY OF NATIVE HEART WITHOUT ANGINA PECTORIS: ICD-10-CM

## 2021-04-21 DIAGNOSIS — E11.22 TYPE 2 DIABETES MELLITUS WITH CHRONIC KIDNEY DISEASE ON CHRONIC DIALYSIS, WITHOUT LONG-TERM CURRENT USE OF INSULIN (H): Primary | ICD-10-CM

## 2021-04-21 DIAGNOSIS — I10 HYPERTENSION GOAL BP (BLOOD PRESSURE) < 130/80: ICD-10-CM

## 2021-04-21 DIAGNOSIS — G47.33 OSA (OBSTRUCTIVE SLEEP APNEA): ICD-10-CM

## 2021-04-21 DIAGNOSIS — E11.621 DIABETIC ULCER OF TOE ASSOCIATED WITH TYPE 2 DIABETES MELLITUS, UNSPECIFIED LATERALITY, UNSPECIFIED ULCER STAGE (H): ICD-10-CM

## 2021-04-21 DIAGNOSIS — I48.91 ATRIAL FIBRILLATION STATUS POST CARDIOVERSION (H): ICD-10-CM

## 2021-04-21 DIAGNOSIS — E78.2 MIXED HYPERLIPIDEMIA: ICD-10-CM

## 2021-04-21 DIAGNOSIS — L97.509 DIABETIC ULCER OF TOE ASSOCIATED WITH TYPE 2 DIABETES MELLITUS, UNSPECIFIED LATERALITY, UNSPECIFIED ULCER STAGE (H): ICD-10-CM

## 2021-04-21 DIAGNOSIS — Z79.01 LONG TERM CURRENT USE OF ANTICOAGULANT THERAPY: ICD-10-CM

## 2021-04-21 DIAGNOSIS — N18.6 TYPE 2 DIABETES MELLITUS WITH CHRONIC KIDNEY DISEASE ON CHRONIC DIALYSIS, WITHOUT LONG-TERM CURRENT USE OF INSULIN (H): Primary | ICD-10-CM

## 2021-04-21 DIAGNOSIS — Z99.2 TYPE 2 DIABETES MELLITUS WITH CHRONIC KIDNEY DISEASE ON CHRONIC DIALYSIS, WITHOUT LONG-TERM CURRENT USE OF INSULIN (H): Primary | ICD-10-CM

## 2021-04-21 DIAGNOSIS — N25.81 SECONDARY HYPERPARATHYROIDISM, RENAL (H): ICD-10-CM

## 2021-04-21 PROBLEM — I82.409 DEEP VEIN THROMBOSIS (DVT) (H): Status: RESOLVED | Noted: 2019-09-13 | Resolved: 2021-04-21

## 2021-04-21 PROBLEM — I82.4Y2 ACUTE DEEP VEIN THROMBOSIS (DVT) OF PROXIMAL VEIN OF LEFT LOWER EXTREMITY (H): Status: RESOLVED | Noted: 2020-09-28 | Resolved: 2021-04-21

## 2021-04-21 LAB
T4 FREE SERPL-MCNC: 0.9 NG/DL (ref 0.76–1.46)
TSH SERPL DL<=0.005 MIU/L-ACNC: 6.58 MU/L (ref 0.4–4)

## 2021-04-21 PROCEDURE — 84439 ASSAY OF FREE THYROXINE: CPT | Performed by: FAMILY MEDICINE

## 2021-04-21 PROCEDURE — 36415 COLL VENOUS BLD VENIPUNCTURE: CPT | Performed by: FAMILY MEDICINE

## 2021-04-21 PROCEDURE — 99214 OFFICE O/P EST MOD 30 MIN: CPT | Performed by: FAMILY MEDICINE

## 2021-04-21 PROCEDURE — 84443 ASSAY THYROID STIM HORMONE: CPT | Performed by: FAMILY MEDICINE

## 2021-04-21 RX ORDER — MIDODRINE HYDROCHLORIDE 10 MG/1
TABLET ORAL
COMMUNITY
Start: 2020-11-11

## 2021-04-21 NOTE — PROGRESS NOTES
Assessment & Plan     Type 2 diabetes mellitus with chronic kidney disease on chronic dialysis, without long-term current use of insulin (H)  - On HD 3 x/week- Tue/Thur/Sat.Following with Nephrology  - TSH WITH FREE T4 REFLEX  - T4 free    Diabetic ulcer of toe associated with type 2 diabetes mellitus, unspecified laterality, unspecified ulcer stage (H)  - Following with Podiatry. ENMANUEL requested.     Coronary artery disease involving native coronary artery of native heart without angina pectoris, stable  - Following with Cardiology at [facility]. Recent visit 4/19/2021- see Careeverywhere for details.     Hypertension goal BP (blood pressure) < 130/80, controlled  - Continue current management.    Secondary hyperparathyroidism, renal (H)  - Following with Nephrology    Mixed hyperlipidemia, on Lipitor  - Continue high intensity statin    Atrial fibrillation status post cardioversion (H) on Long term current use of anticoagulant therapy  - Continue current management.      DORI (obstructive sleep apnea)  - SLEEP EVALUATION & MANAGEMENT REFERRAL - [facility] [place]  [phone] (Age 15 and up)    Morbid obesity (H)  Weight management plan: Discussed healthy diet and exercise guidelines        [facility]  Review of external notes as documented elsewhere in note  15 minutes spent on the date of the encounter doing chart review, review of outside records, patient visit and documentation          Return in about 3 months (around 7/21/2021) for Annual Wellness Visit.    Jackelin Whaley MD  [facility] NAVARRO Pena is a 70 year old who presents for the following health issues     HPI     New Patient/Transfer of Care  Previously seen by Sonya Hamilton NP, would like to establish care with me today.     He is not in need of any refills today.     Has multiple medical medical issues and seeing  multiple specialists.     Has a history of End Stage Renal Disease, on HD Tues/Thurs/Sat. Following with Nephrology.    History of Calciphylaxis secondary to inadequate dialysis with peritoneal dialysis on her left lower extremity wounds. Currently following with the Wound clinic    DIABETES - Patient has a longstanding history of DiabetesType Type II .Diet controlled and denies significant side effects. Control has been good. Complicating factors include but are not limited to: hypertension, hyperlipidemia, neuropathy, foot ulcer, chronic kidney disease, CAD/PVD and morbid obesity .     A-FIB - history of lone A-fib   Had direct current cardioversion in 2009.   Current treatment regimen includes Apixaban for stroke prevention and denies significant symptoms of lightheadedness, palpitations or dyspnea.     Had a history of recurrent DVT, currently on chronic anticoagulation.     History of GI bleeding in 7/2020. 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     CAD - Patient has a longstanding history of moderate-severe CAD with extensive coronary calcification. Patient denies recent chest pain or NTG use, denies exercise induced dyspnea or PND. Last Stress test 7/2020,  Inferior ischemia. Delayed cath secondary to ongoing cellulitis.   Had a Cath in 1/2021. mLAD 30 %; pRCA 40 %d RCA 50 %  Currently on medication management, following with Cardiology at [facility].     HFpEF - currently stable.   Had an Echo in 7/2020.  This revealed abnormal relaxation and hyperdynamic.   Following with Cardiology.      HYPERLIPIDEMIA - Patient has a long history of significant Hyperlipidemia requiring medication for treatment with recent good control. Patient reports no problems or side effects with the medication.   Last lipid panel-  Recent Labs   Lab Test 11/24/20  0926 07/28/19  1224   CHOL 84 151   HDL 33* 41   LDL 29 57   TRIG 111 267*       Obstructive sleep apnea, states that in the past he had a CPAP  machine. Reports that he lost a lot of weight in the past. Overdue for a Sleep medicine follow up visit.     Will obtain ROIs today     HEALTH CARE MAINTENANCE:     Review of Systems   Constitutional, HEENT, cardiovascular, pulmonary, gi and gu systems are negative, except as otherwise noted.      Objective    /66   Pulse 77   Temp 97.8  F (36.6  C) (Tympanic)   Resp 18   Wt 113.9 kg (251 lb)   SpO2 95%   BMI 35.01 kg/m    Body mass index is 35.01 kg/m .  Physical Exam   GENERAL: healthy, alert and no distress. Patient in a wheel chair.   PSYCH: mentation appears normal, affect normal/bright    DATA  Previous records and labs reviewed

## 2021-04-27 ENCOUNTER — TRANSFERRED RECORDS (OUTPATIENT)
Dept: HEALTH INFORMATION MANAGEMENT | Facility: CLINIC | Age: 71
End: 2021-04-27

## 2021-04-27 LAB — RETINOPATHY: NORMAL

## 2021-04-28 PROBLEM — F11.90 CHRONIC, CONTINUOUS USE OF OPIOIDS: Status: ACTIVE | Noted: 2021-04-28

## 2021-04-29 ENCOUNTER — TELEPHONE (OUTPATIENT)
Dept: FAMILY MEDICINE | Facility: CLINIC | Age: 71
End: 2021-04-29

## 2021-04-29 NOTE — TELEPHONE ENCOUNTER
Patient Quality Outreach  Patient is due/failing the following:   Patient is on Chronic Pain Management List.   Due/Failing the following;  -Controlled Substance Agreement  -Tox screen  -PHQ9  -GAD7      Annual wellness, date due: overdue  Health Maintenance Due   Topic Date Due     JESUSITA ASSESSMENT  Never done     URINE DRUG SCREEN  Never done     ANNUAL REVIEW OF HM ORDERS  Never done     PHQ-9  Never done     TREATMENT AGREEMENT FOR CHRONIC PAIN MANAGEMENT  Never done     DTAP/TDAP/TD IMMUNIZATION (1 - Tdap) Never done     ZOSTER IMMUNIZATION (1 of 2) Never done     MICROALBUMIN  07/28/2020     MEDICARE ANNUAL WELLNESS VISIT  08/22/2020     EYE EXAM  10/08/2020      Type of outreach:    patient has medicare wellness visit [facility] 7/21. Will send to provider as FYI to complete CSA and due items    Sent mychart with PHQ9, GAD7, eye exam  Questions for provider review:    None                                                                                                                        Gabrielle So MA       Chart routed to Provider.    Summary:    Patient has the following on his problem list/HM:     Diabetes    Last A1C:  Lab Results   Component Value Date    A1C 5.0 03/15/2021    A1C 4.7 10/29/2020       Last LDL:    Lab Results   Component Value Date    LDL 29 11/24/2020       Is the patient on a Statin? Yes          Is the patient on Aspirin? Yes    Medications     HMG CoA Reductase Inhibitors     atorvastatin (LIPITOR) 80 MG tablet       Salicylates     aspirin 81 MG EC tablet             Last three blood pressure readings:  BP Readings from Last 3 Encounters:   04/21/21 113/66   03/15/21 108/69   01/08/21 101/62            Tobacco Use      Smoking status: Never Smoker      Smokeless tobacco: Never Used        IVD     ASA: Passed    Last LDL:    Lab Results   Component Value Date    CHOL 84 11/24/2020     Lab Results   Component Value Date    HDL 33 11/24/2020     Lab Results   Component Value Date     LDL 29 11/24/2020     Lab Results   Component Value Date    TRIG 111 11/24/2020      No results found for: CHOLHDLRATIO     Is the patient on a Statin? Yes   Is the patient on Aspirin? Yes                  Medications     HMG CoA Reductase Inhibitors     atorvastatin (LIPITOR) 80 MG tablet       Salicylates     aspirin 81 MG EC tablet             Last three blood pressure readings:  BP Readings from Last 3 Encounters:   04/21/21 113/66   03/15/21 108/69   01/08/21 101/62        Tobacco History:       Tobacco Use      Smoking status: Never Smoker      Smokeless tobacco: Never Used      Hypertension   Last three blood pressure readings:  BP Readings from Last 3 Encounters:   04/21/21 113/66   03/15/21 108/69   01/08/21 101/62     Blood pressure: Passed    HTN Guidelines:  ? 139/89

## 2021-05-13 ENCOUNTER — PATIENT OUTREACH (OUTPATIENT)
Dept: CARE COORDINATION | Facility: CLINIC | Age: 71
End: 2021-05-13

## 2021-05-13 NOTE — PROGRESS NOTES
Clinic Care Coordination Contact  [facility]/Voicemail       Clinical Data: Care Coordinator Outreach  Outreach attempted x 1.  Left message on patient's voicemail with call back information and requested return call.  Plan: Care Coordinator sent care coordination introduction letter on  via [employer]. Care Coordinator will try to reach patient again in 10 business days.    The patient has dialysis Tues, Thurs, Sat.      Scheduled for a Wed call back.    Diego Parra MSN, RN, PHN, CCM   Primary Care Clinical RN Care Coordinator  [facility]  5/13/2021   9:36 AM  [email or web address]  Office: [phone]

## 2021-06-07 NOTE — PROGRESS NOTES
Clinic Care Coordination Contact  [facility]/Voicemail       Clinical Data: Care Coordinator Outreach  Outreach attempted x 5.  Left message on patient's voicemail with call back information and requested return call.  Plan: Care Coordinator sent care coordination introduction letter on 11/9/2020 via [email or web address]. Care Coordinator will do no further outreaches at this time.            Diego Parra MSN, RN, PHN, CCM   Primary Care Clinical RN Care Coordinator  [facility]  6/7/2021   10:32 AM  [email or web address]  Office: [phone]

## 2022-01-01 ENCOUNTER — ANCILLARY PROCEDURE (OUTPATIENT)
Dept: CT IMAGING | Facility: CLINIC | Age: 72
End: 2022-01-01
Attending: FAMILY MEDICINE
Payer: MEDICARE

## 2022-01-01 ENCOUNTER — LAB (OUTPATIENT)
Dept: LAB | Facility: CLINIC | Age: 72
End: 2022-01-01
Payer: MEDICARE

## 2022-01-01 ENCOUNTER — TELEPHONE (OUTPATIENT)
Dept: FAMILY MEDICINE | Facility: CLINIC | Age: 72
End: 2022-01-01

## 2022-01-01 ENCOUNTER — HEALTH MAINTENANCE LETTER (OUTPATIENT)
Age: 72
End: 2022-01-01

## 2022-01-01 ENCOUNTER — TELEPHONE (OUTPATIENT)
Dept: UROLOGY | Facility: CLINIC | Age: 72
End: 2022-01-01
Payer: MEDICARE

## 2022-01-01 ENCOUNTER — VIRTUAL VISIT (OUTPATIENT)
Dept: FAMILY MEDICINE | Facility: CLINIC | Age: 72
End: 2022-01-01
Payer: MEDICARE

## 2022-01-01 ENCOUNTER — OFFICE VISIT (OUTPATIENT)
Dept: NEUROLOGY | Facility: CLINIC | Age: 72
End: 2022-01-01
Payer: MEDICARE

## 2022-01-01 ENCOUNTER — OFFICE VISIT (OUTPATIENT)
Dept: FAMILY MEDICINE | Facility: CLINIC | Age: 72
End: 2022-01-01
Payer: MEDICARE

## 2022-01-01 ENCOUNTER — VIRTUAL VISIT (OUTPATIENT)
Dept: UROLOGY | Facility: CLINIC | Age: 72
End: 2022-01-01
Payer: MEDICARE

## 2022-01-01 ENCOUNTER — MYC MEDICAL ADVICE (OUTPATIENT)
Dept: FAMILY MEDICINE | Facility: CLINIC | Age: 72
End: 2022-01-01

## 2022-01-01 ENCOUNTER — OFFICE VISIT (OUTPATIENT)
Dept: UROLOGY | Facility: CLINIC | Age: 72
End: 2022-01-01
Payer: MEDICARE

## 2022-01-01 VITALS
BODY MASS INDEX: 36.04 KG/M2 | HEART RATE: 72 BPM | SYSTOLIC BLOOD PRESSURE: 103 MMHG | RESPIRATION RATE: 16 BRPM | DIASTOLIC BLOOD PRESSURE: 59 MMHG | TEMPERATURE: 98.8 F | WEIGHT: 258.38 LBS | OXYGEN SATURATION: 97 %

## 2022-01-01 VITALS
WEIGHT: 254.19 LBS | DIASTOLIC BLOOD PRESSURE: 59 MMHG | SYSTOLIC BLOOD PRESSURE: 87 MMHG | BODY MASS INDEX: 35.45 KG/M2 | HEART RATE: 63 BPM

## 2022-01-01 VITALS
DIASTOLIC BLOOD PRESSURE: 55 MMHG | WEIGHT: 253.97 LBS | OXYGEN SATURATION: 100 % | HEART RATE: 77 BPM | BODY MASS INDEX: 35.42 KG/M2 | SYSTOLIC BLOOD PRESSURE: 91 MMHG

## 2022-01-01 DIAGNOSIS — R31.0 GROSS HEMATURIA: ICD-10-CM

## 2022-01-01 DIAGNOSIS — E11.42: ICD-10-CM

## 2022-01-01 DIAGNOSIS — M86.179: ICD-10-CM

## 2022-01-01 DIAGNOSIS — R25.2 CRAMP OF LIMB: ICD-10-CM

## 2022-01-01 DIAGNOSIS — L98.492: ICD-10-CM

## 2022-01-01 DIAGNOSIS — N18.6 TYPE 2 DIABETES MELLITUS WITH CHRONIC KIDNEY DISEASE ON CHRONIC DIALYSIS, WITHOUT LONG-TERM CURRENT USE OF INSULIN (H): ICD-10-CM

## 2022-01-01 DIAGNOSIS — L97.509 DIABETES MELLITUS WITH ULCER OF FOOT (H): ICD-10-CM

## 2022-01-01 DIAGNOSIS — E11.621 DIABETES MELLITUS WITH ULCER OF FOOT (H): ICD-10-CM

## 2022-01-01 DIAGNOSIS — R39.9: ICD-10-CM

## 2022-01-01 DIAGNOSIS — R39.9 URINARY TRACT INFECTION SYMPTOMS: ICD-10-CM

## 2022-01-01 DIAGNOSIS — N39.0 RECURRENT UTI: Primary | ICD-10-CM

## 2022-01-01 DIAGNOSIS — E11.42 DIABETIC POLYNEUROPATHY ASSOCIATED WITH TYPE 2 DIABETES MELLITUS (H): Primary | ICD-10-CM

## 2022-01-01 DIAGNOSIS — R31.9 URINARY TRACT INFECTION WITH HEMATURIA, SITE UNSPECIFIED: Primary | ICD-10-CM

## 2022-01-01 DIAGNOSIS — E78.2 MIXED HYPERLIPIDEMIA: ICD-10-CM

## 2022-01-01 DIAGNOSIS — N18.6 ESRD (END STAGE RENAL DISEASE) ON DIALYSIS (H): ICD-10-CM

## 2022-01-01 DIAGNOSIS — Z99.2 ESRD (END STAGE RENAL DISEASE) ON DIALYSIS (H): ICD-10-CM

## 2022-01-01 DIAGNOSIS — Z99.2 DEPENDENCE ON RENAL DIALYSIS (H): ICD-10-CM

## 2022-01-01 DIAGNOSIS — E83.50: Primary | ICD-10-CM

## 2022-01-01 DIAGNOSIS — R07.0 THROAT PAIN: ICD-10-CM

## 2022-01-01 DIAGNOSIS — N18.6 END STAGE RENAL DISEASE (H): ICD-10-CM

## 2022-01-01 DIAGNOSIS — M86.179: Primary | ICD-10-CM

## 2022-01-01 DIAGNOSIS — E83.50: ICD-10-CM

## 2022-01-01 DIAGNOSIS — E83.59 OTHER DISORDERS OF CALCIUM METABOLISM: ICD-10-CM

## 2022-01-01 DIAGNOSIS — Z99.2 TYPE 2 DIABETES MELLITUS WITH CHRONIC KIDNEY DISEASE ON CHRONIC DIALYSIS, WITHOUT LONG-TERM CURRENT USE OF INSULIN (H): ICD-10-CM

## 2022-01-01 DIAGNOSIS — R05.9 COUGH: Primary | ICD-10-CM

## 2022-01-01 DIAGNOSIS — N20.0 CALCULUS OF KIDNEY: ICD-10-CM

## 2022-01-01 DIAGNOSIS — B96.20 E. COLI UTI (URINARY TRACT INFECTION): Primary | ICD-10-CM

## 2022-01-01 DIAGNOSIS — N39.0 URINARY TRACT INFECTION WITH HEMATURIA, SITE UNSPECIFIED: Primary | ICD-10-CM

## 2022-01-01 DIAGNOSIS — E78.5 HYPERLIPIDEMIA LDL GOAL <70: ICD-10-CM

## 2022-01-01 DIAGNOSIS — R20.9 SENSORY DISORDER: ICD-10-CM

## 2022-01-01 DIAGNOSIS — E11.22 TYPE 2 DIABETES MELLITUS WITH CHRONIC KIDNEY DISEASE ON CHRONIC DIALYSIS, WITHOUT LONG-TERM CURRENT USE OF INSULIN (H): ICD-10-CM

## 2022-01-01 DIAGNOSIS — E79.0 HYPERURICEMIA: ICD-10-CM

## 2022-01-01 DIAGNOSIS — G25.81 RESTLESS LEGS SYNDROME: ICD-10-CM

## 2022-01-01 DIAGNOSIS — N39.0 E. COLI UTI (URINARY TRACT INFECTION): Primary | ICD-10-CM

## 2022-01-01 LAB
ALBUMIN UR-MCNC: >=300 MG/DL
AMORPH CRY #/AREA URNS HPF: ABNORMAL /HPF
AMORPH CRY #/AREA URNS HPF: ABNORMAL /HPF
ANION GAP SERPL CALCULATED.3IONS-SCNC: 18 MMOL/L (ref 3–14)
APPEARANCE UR: ABNORMAL
BACTERIA #/AREA URNS HPF: ABNORMAL /HPF
BACTERIA UR CULT: ABNORMAL
BACTERIA UR CULT: NORMAL
BACTERIA UR CULT: NORMAL
BILIRUB UR QL STRIP: ABNORMAL
BILIRUB UR QL STRIP: NEGATIVE
BUN SERPL-MCNC: 30 MG/DL (ref 7–30)
CALCIUM SERPL-MCNC: 8.3 MG/DL (ref 8.5–10.1)
CHLORIDE BLD-SCNC: 91 MMOL/L (ref 94–109)
CHOLEST SERPL-MCNC: 56 MG/DL
CO2 SERPL-SCNC: 26 MMOL/L (ref 20–32)
COLOR UR AUTO: ABNORMAL
COLOR UR AUTO: YELLOW
CREAT SERPL-MCNC: 5.48 MG/DL (ref 0.66–1.25)
CREAT UR-MCNC: 15 MG/DL
CRP SERPL-MCNC: 102 MG/L (ref 0–8)
CRP SERPL-MCNC: 13.3 MG/L (ref 0–8)
CRP SERPL-MCNC: 17.4 MG/L (ref 0–8)
CRP SERPL-MCNC: 35.8 MG/L (ref 0–8)
ERYTHROCYTE [SEDIMENTATION RATE] IN BLOOD BY WESTERGREN METHOD: 26 MM/HR (ref 0–20)
ERYTHROCYTE [SEDIMENTATION RATE] IN BLOOD BY WESTERGREN METHOD: 52 MM/HR (ref 0–20)
ERYTHROCYTE [SEDIMENTATION RATE] IN BLOOD BY WESTERGREN METHOD: 69 MM/HR (ref 0–20)
ERYTHROCYTE [SEDIMENTATION RATE] IN BLOOD BY WESTERGREN METHOD: 86 MM/HR (ref 0–20)
FASTING STATUS PATIENT QL REPORTED: ABNORMAL
GFR SERPL CREATININE-BSD FRML MDRD: 11 ML/MIN/1.73M2
GLUCOSE BLD-MCNC: 104 MG/DL (ref 70–99)
GLUCOSE UR STRIP-MCNC: 100 MG/DL
GLUCOSE UR STRIP-MCNC: NEGATIVE MG/DL
HBA1C MFR BLD: 5.1 % (ref 0–5.6)
HDLC SERPL-MCNC: 21 MG/DL
HGB UR QL STRIP: ABNORMAL
KETONES UR STRIP-MCNC: NEGATIVE MG/DL
LDLC SERPL CALC-MCNC: 12 MG/DL
LEUKOCYTE ESTERASE UR QL STRIP: ABNORMAL
MICROALBUMIN UR-MCNC: 2750 MG/L
MICROALBUMIN/CREAT UR: ABNORMAL MG/G CR (ref 0–17)
NITRATE UR QL: NEGATIVE
NITRATE UR QL: NEGATIVE
NITRATE UR QL: POSITIVE
NITRATE UR QL: POSITIVE
NONHDLC SERPL-MCNC: 35 MG/DL
PH UR STRIP: 7 [PH] (ref 5–7)
PH UR STRIP: 7.5 [PH] (ref 5–7)
PH UR STRIP: 7.5 [PH] (ref 5–7)
PH UR STRIP: 8.5 [PH] (ref 5–7)
POTASSIUM BLD-SCNC: 4.2 MMOL/L (ref 3.4–5.3)
RBC #/AREA URNS AUTO: >100 /HPF
RBC #/AREA URNS AUTO: ABNORMAL /HPF
SODIUM SERPL-SCNC: 135 MMOL/L (ref 133–144)
SP GR UR STRIP: 1.01 (ref 1–1.03)
SP GR UR STRIP: 1.02 (ref 1–1.03)
SQUAMOUS #/AREA URNS AUTO: ABNORMAL /LPF
SQUAMOUS #/AREA URNS AUTO: ABNORMAL /LPF
T4 FREE SERPL-MCNC: 1.15 NG/DL (ref 0.76–1.46)
TRIGL SERPL-MCNC: 117 MG/DL
TSH SERPL DL<=0.005 MIU/L-ACNC: 6.2 MU/L (ref 0.4–4)
UROBILINOGEN UR STRIP-ACNC: 0.2 E.U./DL
UROBILINOGEN UR STRIP-ACNC: 1 E.U./DL
WBC # BLD AUTO: 8.5 10E3/UL (ref 4–11)
WBC #/AREA URNS AUTO: >100 /HPF

## 2022-01-01 PROCEDURE — 99213 OFFICE O/P EST LOW 20 MIN: CPT | Mod: 95 | Performed by: PHYSICIAN ASSISTANT

## 2022-01-01 PROCEDURE — 99214 OFFICE O/P EST MOD 30 MIN: CPT | Mod: 95 | Performed by: FAMILY MEDICINE

## 2022-01-01 PROCEDURE — 99203 OFFICE O/P NEW LOW 30 MIN: CPT | Mod: 25 | Performed by: STUDENT IN AN ORGANIZED HEALTH CARE EDUCATION/TRAINING PROGRAM

## 2022-01-01 PROCEDURE — 87086 URINE CULTURE/COLONY COUNT: CPT | Performed by: FAMILY MEDICINE

## 2022-01-01 PROCEDURE — 80048 BASIC METABOLIC PNL TOTAL CA: CPT | Performed by: FAMILY MEDICINE

## 2022-01-01 PROCEDURE — 82043 UR ALBUMIN QUANTITATIVE: CPT | Performed by: FAMILY MEDICINE

## 2022-01-01 PROCEDURE — 85652 RBC SED RATE AUTOMATED: CPT

## 2022-01-01 PROCEDURE — 86140 C-REACTIVE PROTEIN: CPT

## 2022-01-01 PROCEDURE — 85048 AUTOMATED LEUKOCYTE COUNT: CPT

## 2022-01-01 PROCEDURE — 87086 URINE CULTURE/COLONY COUNT: CPT | Performed by: STUDENT IN AN ORGANIZED HEALTH CARE EDUCATION/TRAINING PROGRAM

## 2022-01-01 PROCEDURE — 83036 HEMOGLOBIN GLYCOSYLATED A1C: CPT | Performed by: FAMILY MEDICINE

## 2022-01-01 PROCEDURE — 74178 CT ABD&PLV WO CNTR FLWD CNTR: CPT | Mod: TC | Performed by: RADIOLOGY

## 2022-01-01 PROCEDURE — 36415 COLL VENOUS BLD VENIPUNCTURE: CPT | Performed by: FAMILY MEDICINE

## 2022-01-01 PROCEDURE — 80061 LIPID PANEL: CPT | Performed by: FAMILY MEDICINE

## 2022-01-01 PROCEDURE — G1004 CDSM NDSC: HCPCS | Mod: TC | Performed by: RADIOLOGY

## 2022-01-01 PROCEDURE — 81001 URINALYSIS AUTO W/SCOPE: CPT

## 2022-01-01 PROCEDURE — 99214 OFFICE O/P EST MOD 30 MIN: CPT | Performed by: FAMILY MEDICINE

## 2022-01-01 PROCEDURE — 81001 URINALYSIS AUTO W/SCOPE: CPT | Performed by: FAMILY MEDICINE

## 2022-01-01 PROCEDURE — 36415 COLL VENOUS BLD VENIPUNCTURE: CPT

## 2022-01-01 PROCEDURE — 99214 OFFICE O/P EST MOD 30 MIN: CPT | Mod: 95 | Performed by: UROLOGY

## 2022-01-01 PROCEDURE — 87086 URINE CULTURE/COLONY COUNT: CPT | Mod: GA

## 2022-01-01 PROCEDURE — 84439 ASSAY OF FREE THYROXINE: CPT | Performed by: FAMILY MEDICINE

## 2022-01-01 PROCEDURE — 99214 OFFICE O/P EST MOD 30 MIN: CPT | Performed by: INTERNAL MEDICINE

## 2022-01-01 PROCEDURE — 87186 SC STD MICRODIL/AGAR DIL: CPT | Performed by: FAMILY MEDICINE

## 2022-01-01 PROCEDURE — 81001 URINALYSIS AUTO W/SCOPE: CPT | Performed by: STUDENT IN AN ORGANIZED HEALTH CARE EDUCATION/TRAINING PROGRAM

## 2022-01-01 PROCEDURE — 51798 US URINE CAPACITY MEASURE: CPT | Performed by: STUDENT IN AN ORGANIZED HEALTH CARE EDUCATION/TRAINING PROGRAM

## 2022-01-01 PROCEDURE — 84443 ASSAY THYROID STIM HORMONE: CPT | Performed by: FAMILY MEDICINE

## 2022-01-01 PROCEDURE — 87086 URINE CULTURE/COLONY COUNT: CPT

## 2022-01-01 PROCEDURE — 87186 SC STD MICRODIL/AGAR DIL: CPT

## 2022-01-01 RX ORDER — ALLOPURINOL 300 MG/1
TABLET ORAL
Qty: 60 TABLET | Refills: 0 | Status: SHIPPED | OUTPATIENT
Start: 2022-01-01 | End: 2022-01-01

## 2022-01-01 RX ORDER — NORTRIPTYLINE HCL 10 MG
10 CAPSULE ORAL AT BEDTIME
Qty: 90 CAPSULE | Refills: 1 | Status: SHIPPED | OUTPATIENT
Start: 2022-01-01

## 2022-01-01 RX ORDER — CIPROFLOXACIN 500 MG/1
500 TABLET, FILM COATED ORAL 2 TIMES DAILY
Qty: 14 TABLET | Refills: 0 | Status: SHIPPED | OUTPATIENT
Start: 2022-01-01 | End: 2022-01-01

## 2022-01-01 RX ORDER — CIPROFLOXACIN 250 MG/1
250 TABLET, FILM COATED ORAL DAILY
Qty: 14 TABLET | Refills: 0 | Status: SHIPPED | OUTPATIENT
Start: 2022-01-01 | End: 2022-01-01

## 2022-01-01 RX ORDER — FENOFIBRATE 145 MG/1
TABLET, COATED ORAL
Qty: 90 TABLET | Refills: 0 | Status: SHIPPED | OUTPATIENT
Start: 2022-01-01

## 2022-01-01 RX ORDER — ALLOPURINOL 300 MG/1
600 TABLET ORAL DAILY
Qty: 180 TABLET | Refills: 0 | Status: SHIPPED | OUTPATIENT
Start: 2022-01-01

## 2022-01-01 RX ORDER — CIPROFLOXACIN 250 MG/1
1 TABLET, FILM COATED ORAL
COMMUNITY
Start: 2021-01-20

## 2022-01-01 RX ORDER — IOPAMIDOL 755 MG/ML
100 INJECTION, SOLUTION INTRAVASCULAR ONCE
Status: COMPLETED | OUTPATIENT
Start: 2022-01-01 | End: 2022-01-01

## 2022-01-01 RX ORDER — BENZONATATE 100 MG/1
100-200 CAPSULE ORAL 3 TIMES DAILY PRN
Qty: 40 CAPSULE | Refills: 0 | Status: SHIPPED | OUTPATIENT
Start: 2022-01-01

## 2022-01-01 RX ORDER — LEVOFLOXACIN 250 MG/1
250 TABLET, FILM COATED ORAL DAILY
Qty: 10 TABLET | Refills: 0 | Status: SHIPPED | OUTPATIENT
Start: 2022-01-01 | End: 2022-01-01

## 2022-01-01 RX ADMIN — IOPAMIDOL 100 ML: 755 INJECTION, SOLUTION INTRAVASCULAR at 10:07

## 2022-01-01 ASSESSMENT — ANXIETY QUESTIONNAIRES
7. FEELING AFRAID AS IF SOMETHING AWFUL MIGHT HAPPEN: NOT AT ALL
IF YOU CHECKED OFF ANY PROBLEMS ON THIS QUESTIONNAIRE, HOW DIFFICULT HAVE THESE PROBLEMS MADE IT FOR YOU TO DO YOUR WORK, TAKE CARE OF THINGS AT HOME, OR GET ALONG WITH OTHER PEOPLE: NOT DIFFICULT AT ALL
6. BECOMING EASILY ANNOYED OR IRRITABLE: NOT AT ALL
GAD7 TOTAL SCORE: 0
2. NOT BEING ABLE TO STOP OR CONTROL WORRYING: NOT AT ALL
GAD7 TOTAL SCORE: 0
5. BEING SO RESTLESS THAT IT IS HARD TO SIT STILL: NOT AT ALL
1. FEELING NERVOUS, ANXIOUS, OR ON EDGE: NOT AT ALL
3. WORRYING TOO MUCH ABOUT DIFFERENT THINGS: NOT AT ALL

## 2022-01-01 ASSESSMENT — PAIN SCALES - GENERAL
PAINLEVEL: MILD PAIN (3)
PAINLEVEL: MILD PAIN (3)

## 2022-01-01 ASSESSMENT — PATIENT HEALTH QUESTIONNAIRE - PHQ9
5. POOR APPETITE OR OVEREATING: NOT AT ALL
SUM OF ALL RESPONSES TO PHQ QUESTIONS 1-9: 2

## 2022-01-13 NOTE — TELEPHONE ENCOUNTER
"Requested Prescriptions   Pending Prescriptions Disp Refills     allopurinol (ZYLOPRIM) 300 MG tablet [Pharmacy Med Name: ALLOPURINOL 300 MG Tablet] 60 tablet 0     Sig: TAKE 2 TABLETS EVERY DAY (NEED MD APPOINTMENT FOR REFILLS)       Gout Agents Protocol Failed - 1/13/2022 10:57 AM        Failed - CBC on file in past 12 months     Recent Labs   Lab Test 11/24/20  0926   WBC 8.8   RBC 2.79*   HGB 9.5*   HCT 29.9*                    Failed - ALT on file in past 12 months     Recent Labs   Lab Test 10/29/20  0000   ALT 15             Failed - Has Uric Acid on file in past 12 months and value is less than 6     Recent Labs   Lab Test 11/24/20  0926   URIC 3.0*     If level is 6mg/dL or greater, ok to refill one time and refer to provider.           Failed - Normal serum creatinine on file in the past 12 months     Recent Labs   Lab Test 10/29/20  0000   CR 3.87*       Ok to refill medication if creatinine is low          Passed - Recent (12 mo) or future (30 days) visit within the authorizing provider's specialty     Patient has had an office visit with the authorizing provider or a provider within the authorizing providers department within the previous 12 mos or has a future within next 30 days. See \"Patient Info\" tab in inbasket, or \"Choose Columns\" in Meds & Orders section of the refill encounter.              Passed - Medication is active on med list        Passed - Patient is age 18 or older             Daja COBURN, RN    "

## 2022-02-02 NOTE — PROGRESS NOTES
[facility] Neurology Follow Up Visit    Leif Ariza MRN# [id number]   Age: 71 year old YOB: 1950     Brief history of symptoms: The patient was initially seen in neurologic consultation on 4/8/2021 for evaluation of tingling in the feet. Please see the comprehensive neurologic consultation note from that date in the Epic records for details.     Interval history:   He takes gabapentin 100 mg BID prn. He also methocarbamol with the gabapentin. He usually takes them 3-5 days per week. The pain he gets is burning/tingling in the feet. He thinks it helps a little with the burning, but doesn't completely take the symptoms away.      He went to a chiropractor who treated him with some LED, laser light, and creams. After about 4 weeks he has started to get some feeling back.     The thing that is bothering him the most currently is cramps. He gets the cramps in the legs, stomach, and hands. This tends to worse after dialysis. He thinks it has been worse too since starting vancomycin for toe infection.     He uses a walker to get around when at home.     Restless leg symptoms occasionally occur. Unclear if they respond to gabapentin.       Past Medical History:     Patient Active Problem List   Diagnosis     Essential hypertension     Hyperlipidemia LDL goal <70     CKD (chronic kidney disease) stage 5, GFR less than 15 ml/min (H)     ESRD (end stage renal disease) on dialysis (H)     Type 2 diabetes mellitus, without long-term current use of insulin (H)     Atrial fibrillation status post cardioversion (H)     Hyperuricemia     DORI (obstructive sleep apnea)     Morbid obesity (H)     Long term (current) use of anticoagulants     Secondary hyperparathyroidism, renal (H)     Hyponatremia     Sepsis (H)     Restless legs syndrome     Calciphylaxis with nonhealing ulcer of leg (H)     Orthostatic hypotension     Fall, subsequent encounter     Chronic, continuous use of opioids     Past Medical History:    Diagnosis Date     Arthritis      Atrial fibrillation (H)      CKD (chronic kidney disease) stage 5, GFR less than 15 ml/min (H) 2017     DVT (deep venous thrombosis) (H) 2000     Gout      History of blood transfusion 2016 and 2020    [place] , [facility]     HLD (hyperlipidemia)      HTN (hypertension)      Kidney stone 2016     Type 2 diabetes mellitus (H)     no longer on insulin or oral med        Past Surgical History:     Past Surgical History:   Procedure Laterality Date     ABDOMEN SURGERY  inserted catheter for PD    Everyuthing going fine     ANESTH,UPPER ARM AV FISTULA REPAIR Left 2018     APPENDECTOMY OPEN  1958     AS TOTAL KNEE ARTHROPLASTY Left      BIOPSY  07/2020    colon polyp-     CARDIOVERSION  2000     carpel tunnel surgery Left 2000     COLONOSCOPY  07/2020    [facility] ([facility])      GI SURGERY  07/2020    [facility]     ORTHOPEDIC SURGERY Left 06/09/2020    left knee- [facility] (Dr. Wanda ROCK)     SINUS SURGERY  1997     VASCULAR SURGERY      peritoneal dialysis 4/2020,left arm fistula     ZZC REPAIR CRUCIATE LIGAMENT,KNEE Left 1976    knee        Social History:     Social History     Tobacco Use     Smoking status: Never Smoker     Smokeless tobacco: Never Used   Substance Use Topics     Alcohol use: Not Currently     Comment: Stopped when I was put on blood thinners     Drug use: Never        Family History:     Family History   Problem Relation Age of Onset     Cerebrovascular Disease Father         Had multiple strokes while in hospital when he passed     Prostate Cancer Brother         passed away in 2000     Other Cancer Brother         passed away in 2000        Medications:     Current Outpatient Medications   Medication Sig     Acetaminophen 325 MG CAPS Take 1,000 mg by mouth every 6 hours as needed     allopurinol (ZYLOPRIM) 300 MG tablet TAKE 2 TABLETS EVERY DAY (NEED MD APPOINTMENT FOR REFILLS)     apixaban  ANTICOAGULANT (ELIQUIS) 5 MG tablet Take 5 mg by mouth 2 times daily Transitioned from warfarin while at [facility].     aspirin 81 MG EC tablet Take 81 mg by mouth     atorvastatin (LIPITOR) 80 MG tablet Take 80 mg by mouth daily     cinacalcet (SENSIPAR) 60 MG tablet Take 60 mg by mouth daily     cyanocobalamin (VITAMIN B-12) 1000 MCG tablet Take 1 tablet (1,000 mcg) by mouth daily     fenofibrate (TRICOR) 145 MG tablet Take 1 tablet (145 mg) by mouth daily Needs to be seen for further refills     furosemide (LASIX) 80 MG tablet Take 1 tablet (80 mg) by mouth daily     gabapentin (NEURONTIN) 100 MG capsule Take 1 capsule (100 mg) by mouth 3 times daily     Glucosamine-Chondroitin 166.7-133.3 MG CAPS Take 1 capsule twice daily     HYDROmorphone (DILAUDID) 2 MG tablet Take 1 tablet (2 mg) by mouth every 4 hours as needed for severe pain     methocarbamol (ROBAXIN) 500 MG tablet Take 1 tablet (500 mg) by mouth 3 times daily     midodrine (PROAMATINE) 10 MG tablet Take two tablets  30-60 minutes prior to end of dialysis run and daily prn hypotension.     sevelamer carbonate (RENVELA) 800 MG tablet TAKE 2 TABLETS BY MOUTH THREE TIMES DAILY WITH MEALS     SODIUM THIOSULFATE IV      UNABLE TO FIND Lidocaine 5gm of 2.5% cream to affected areas prior to or with wound treatment     VANCOMYCIN HCL IV      blood glucose (NO BRAND SPECIFIED) test strip Use to test blood sugar 1 times daily or as directed.  Per insurance and patient preference.     blood glucose monitoring (NO BRAND SPECIFIED) meter device kit Use to test blood sugar 1 times daily or as directed.  Per insurance and patient preference; patient would like the one touch ultra 2 if insurance covers     diclofenac (VOLTAREN) 1 % topical gel Apply 4 g topically 4 times daily (Patient not taking: Reported on 2/2/2022)     Lancets 30G MISC Use once daily to test blood sugar brand per insurance or pt preference.     morphine 0.1% in intrasite topical gel  (Patient  not taking: Reported on 2/2/2022)     order for DME Equipment being ordered: Digital home blood pressure monitor kit     SODIUM BICARBONATE PO Take 10 g by mouth daily (Patient not taking: Reported on 2/2/2022)     No current facility-administered medications for this visit.        Allergies:     Allergies   Allergen Reactions     Penicillins      Other reaction(s): Unknown Reaction        Review of Systems:   As abovec     Physical Exam:   Vitals: BP (!) 87/59 (BP Location: Right arm, Patient Position: Sitting, Cuff Size: Adult Regular)   Pulse 63   Wt 115.3 kg (254 lb 3.1 oz)   BMI 35.45 kg/m     General: Seated comfortably in no acute distress.  Lungs: breathing comfortably  Extremities: mild pedal edema bilaterally  Skin: Redness in the bilateral shins and feet, left > right  Neurologic:     Mental Status: Fully alert, attentive. Normal memory and fund of knowledge. Language normal, speech clear and fluent, no paraphasic errors.      Cranial Nerves: Visual fields intact. PERRL. EOMI with normal smooth pursuit. Facial sensation intact/symmetric. Facial movements symmetric. Hearing not formally tested but intact to conversation. Palate elevation symmetric, uvula midline. No dysarthria. Shoulder shrug strong bilaterally. Tongue protrusion midline.     Motor: No tremors or other abnormal movements observed. Muscle tone normal throughout. With exception of possible right hip flexion weakness 4/5 (pain limited) and 2/5 toe flexion/extension, strength 5/5 throughout upper and lower extremities.     Deep Tendon Reflexes: 1+/symmetric throughout upper extremities and patella, trace ankle jerks.     Sensory: Decreased sensation to light touch, temperature, vibration and proprioception in the bilateral feet. Vibration and proprioception are absent at the toes. Vibration is ~4 seconds in the ankles and normal in the hands.      Gait: Deferred due to safety     Data reviewed on previous visits    Laboratory:  Gabapentin  level 5.0 (5/2020)  Iron normal with elevated ferritin (4/2020)    Pertinent Investigations since last visit:   None         Assessment and Plan:   Assessment:  Leif Ariza is a 70 year old male who presents today for follow-up of diabetic polyneuropathy and possible RLS. Patient previously followed with Dr Trujillo. He is currently taking gabapentin 100 mg BID prn with incomplete relief of symptoms. Gabapentin dosage limited given ESRD. We discussed trial of nortriptyline 10 mg nightly to help with neuropathic pain. This may also help cramping. Patient should also discuss cramping with nephrologist as it appears to be related to timing of dialysis. Worsening of RLS symptoms will have to be monitored for in the setting of nortriptyline.      Plan:  - Continue gabapentin 100 mg BID prn  - Start nortriptyline 10 mg nightly     Follow up in Neurology clinic in 3-4 months or earlier as needed should new concerns arise.    Johnson Arango MD   of Neurology  [facility]    The total time of this encounter today amounted to 36 minutes. This time included time spent with the patient, prep work, ordering tests, and performing post visit documentation.

## 2022-02-02 NOTE — LETTER
2/2/2022         RE: Leif Ariza  [id number] [facility] [address]        Dear Colleague,    Thank you for referring your patient, Leif Ariza, to the [facility] NEUROLOGY [facility] [place]. Please see a copy of my visit note below.    [facility] Neurology Follow Up Visit    Leif Ariza MRN# [id number]   Age: 71 year old YOB: 1950     Brief history of symptoms: The patient was initially seen in neurologic consultation on 4/8/2021 for evaluation of tingling in the feet. Please see the comprehensive neurologic consultation note from that date in the Epic records for details.     Interval history:   He takes gabapentin 100 mg BID prn. He also methocarbamol with the gabapentin. He usually takes them 3-5 days per week. The pain he gets is burning/tingling in the feet. He thinks it helps a little with the burning, but doesn't completely take the symptoms away.      He went to a chiropractor who treated him with some LED, laser light, and creams. After about 4 weeks he has started to get some feeling back.     The thing that is bothering him the most currently is cramps. He gets the cramps in the legs, stomach, and hands. This tends to worse after dialysis. He thinks it has been worse too since starting vancomycin for toe infection.     He uses a walker to get around when at home.     Restless leg symptoms occasionally occur. Unclear if they respond to gabapentin.       Past Medical History:     Patient Active Problem List   Diagnosis     Essential hypertension     Hyperlipidemia LDL goal <70     CKD (chronic kidney disease) stage 5, GFR less than 15 ml/min (H)     ESRD (end stage renal disease) on dialysis (H)     Type 2 diabetes mellitus, without long-term current use of insulin (H)     Atrial fibrillation status post cardioversion (H)     Hyperuricemia     DORI (obstructive sleep apnea)     Morbid obesity (H)     Long term (current) use of anticoagulants     Secondary  hyperparathyroidism, renal (H)     Hyponatremia     Sepsis (H)     Restless legs syndrome     Calciphylaxis with nonhealing ulcer of leg (H)     Orthostatic hypotension     Fall, subsequent encounter     Chronic, continuous use of opioids     Past Medical History:   Diagnosis Date     Arthritis      Atrial fibrillation (H)      CKD (chronic kidney disease) stage 5, GFR less than 15 ml/min (H) 2017     DVT (deep venous thrombosis) (H) 2000     Gout      History of blood transfusion 2016 and 2020    [place] , [facility]     HLD (hyperlipidemia)      HTN (hypertension)      Kidney stone 2016     Type 2 diabetes mellitus (H)     no longer on insulin or oral med        Past Surgical History:     Past Surgical History:   Procedure Laterality Date     ABDOMEN SURGERY  inserted catheter for PD    Everyuthing going fine     ANESTH,UPPER ARM AV FISTULA REPAIR Left 2018     APPENDECTOMY OPEN  1958     AS TOTAL KNEE ARTHROPLASTY Left      BIOPSY  07/2020    colon polyp-     CARDIOVERSION  2000     carpel tunnel surgery Left 2000     COLONOSCOPY  07/2020    [facility] ([facility])      GI SURGERY  07/2020    [facility] ([facility]     ORTHOPEDIC SURGERY Left 06/09/2020    left knee- [facility] (Dr. Wanda ROCK)     SINUS SURGERY  1997     VASCULAR SURGERY      peritoneal dialysis 4/2020,left arm fistula     ZZC REPAIR CRUCIATE LIGAMENT,KNEE Left 1976    knee        Social History:     Social History     Tobacco Use     Smoking status: Never Smoker     Smokeless tobacco: Never Used   Substance Use Topics     Alcohol use: Not Currently     Comment: Stopped when I was put on blood thinners     Drug use: Never        Family History:     Family History   Problem Relation Age of Onset     Cerebrovascular Disease Father         Had multiple strokes while in hospital when he passed     Prostate Cancer Brother         passed away in 2000     Other Cancer Brother         passed away in  2000        Medications:     Current Outpatient Medications   Medication Sig     Acetaminophen 325 MG CAPS Take 1,000 mg by mouth every 6 hours as needed     allopurinol (ZYLOPRIM) 300 MG tablet TAKE 2 TABLETS EVERY DAY (NEED MD APPOINTMENT FOR REFILLS)     apixaban ANTICOAGULANT (ELIQUIS) 5 MG tablet Take 5 mg by mouth 2 times daily Transitioned from warfarin while at [facility].     aspirin 81 MG EC tablet Take 81 mg by mouth     atorvastatin (LIPITOR) 80 MG tablet Take 80 mg by mouth daily     cinacalcet (SENSIPAR) 60 MG tablet Take 60 mg by mouth daily     cyanocobalamin (VITAMIN B-12) 1000 MCG tablet Take 1 tablet (1,000 mcg) by mouth daily     fenofibrate (TRICOR) 145 MG tablet Take 1 tablet (145 mg) by mouth daily Needs to be seen for further refills     furosemide (LASIX) 80 MG tablet Take 1 tablet (80 mg) by mouth daily     gabapentin (NEURONTIN) 100 MG capsule Take 1 capsule (100 mg) by mouth 3 times daily     Glucosamine-Chondroitin 166.7-133.3 MG CAPS Take 1 capsule twice daily     HYDROmorphone (DILAUDID) 2 MG tablet Take 1 tablet (2 mg) by mouth every 4 hours as needed for severe pain     methocarbamol (ROBAXIN) 500 MG tablet Take 1 tablet (500 mg) by mouth 3 times daily     midodrine (PROAMATINE) 10 MG tablet Take two tablets  30-60 minutes prior to end of dialysis run and daily prn hypotension.     sevelamer carbonate (RENVELA) 800 MG tablet TAKE 2 TABLETS BY MOUTH THREE TIMES DAILY WITH MEALS     SODIUM THIOSULFATE IV      UNABLE TO FIND Lidocaine 5gm of 2.5% cream to affected areas prior to or with wound treatment     VANCOMYCIN HCL IV      blood glucose (NO BRAND SPECIFIED) test strip Use to test blood sugar 1 times daily or as directed.  Per insurance and patient preference.     blood glucose monitoring (NO BRAND SPECIFIED) meter device kit Use to test blood sugar 1 times daily or as directed.  Per insurance and patient preference; patient would like the one touch ultra 2 if insurance covers      diclofenac (VOLTAREN) 1 % topical gel Apply 4 g topically 4 times daily (Patient not taking: Reported on 2/2/2022)     Lancets 30G MISC Use once daily to test blood sugar brand per insurance or pt preference.     morphine 0.1% in intrasite topical gel  (Patient not taking: Reported on 2/2/2022)     order for DME Equipment being ordered: Digital home blood pressure monitor kit     SODIUM BICARBONATE PO Take 10 g by mouth daily (Patient not taking: Reported on 2/2/2022)     No current facility-administered medications for this visit.        Allergies:     Allergies   Allergen Reactions     Penicillins      Other reaction(s): Unknown Reaction        Review of Systems:   As abovec     Physical Exam:   Vitals: BP (!) 87/59 (BP Location: Right arm, Patient Position: Sitting, Cuff Size: Adult Regular)   Pulse 63   Wt 115.3 kg (254 lb 3.1 oz)   BMI 35.45 kg/m     General: Seated comfortably in no acute distress.  Lungs: breathing comfortably  Extremities: mild pedal edema bilaterally  Skin: Redness in the bilateral shins and feet, left > right  Neurologic:     Mental Status: Fully alert, attentive. Normal memory and fund of knowledge. Language normal, speech clear and fluent, no paraphasic errors.      Cranial Nerves: Visual fields intact. PERRL. EOMI with normal smooth pursuit. Facial sensation intact/symmetric. Facial movements symmetric. Hearing not formally tested but intact to conversation. Palate elevation symmetric, uvula midline. No dysarthria. Shoulder shrug strong bilaterally. Tongue protrusion midline.     Motor: No tremors or other abnormal movements observed. Muscle tone normal throughout. With exception of possible right hip flexion weakness 4/5 (pain limited) and 2/5 toe flexion/extension, strength 5/5 throughout upper and lower extremities.     Deep Tendon Reflexes: 1+/symmetric throughout upper extremities and patella, trace ankle jerks.     Sensory: Decreased sensation to light touch, temperature,  vibration and proprioception in the bilateral feet. Vibration and proprioception are absent at the toes. Vibration is ~4 seconds in the ankles and normal in the hands.      Gait: Deferred due to safety     Data reviewed on previous visits    Laboratory:  Gabapentin level 5.0 (5/2020)  Iron normal with elevated ferritin (4/2020)    Pertinent Investigations since last visit:   None         Assessment and Plan:   Assessment:  Leif Ariza is a 70 year old male who presents today for follow-up of diabetic polyneuropathy and possible RLS. Patient previously followed with Dr Trujillo. He is currently taking gabapentin 100 mg BID prn with incomplete relief of symptoms. Gabapentin dosage limited given ESRD. We discussed trial of nortriptyline 10 mg nightly to help with neuropathic pain. This may also help cramping. Patient should also discuss cramping with nephrologist as it appears to be related to timing of dialysis. Worsening of RLS symptoms will have to be monitored for in the setting of nortriptyline.      Plan:  - Continue gabapentin 100 mg BID prn  - Start nortriptyline 10 mg nightly     Follow up in Neurology clinic in 3-4 months or earlier as needed should new concerns arise.    Johnson Arango MD   of Neurology  [facility]    The total time of this encounter today amounted to 36 minutes. This time included time spent with the patient, prep work, ordering tests, and performing post visit documentation.        Again, thank you for allowing me to participate in the care of your patient.        Sincerely,        Johnson Arango MD

## 2022-02-25 NOTE — TELEPHONE ENCOUNTER
Notified patient.    Patient stated understanding and agreeable with the plan of care.     Leeann RN,BSN  Triage Nurse  [facility]: [facility]

## 2022-02-25 NOTE — TELEPHONE ENCOUNTER
Patient calling back, I see last routine visit was 4/21/21.   I advised he is due to see her; he says the last time he was seen for Medicare Wellness visit he ended up being charged $400 when he was told it would be free, says he argued with billing to no avail.   I advised only preventative care is covered by insurance, other issues may incur a charge.    At any rate, he says he sees doctors frequently, sometimes several times a week, he does not feel he would benefit from another visit with Dr. Whaley.    Says he has one week of his allopurinol left, hoping Dr. Whaley will refill a while longer without a visit.    Routed to Dr. Whaley to address.    Mary Marrufo RN  [facility]

## 2022-02-25 NOTE — TELEPHONE ENCOUNTER
Routing refill request to provider for review/approval because:  Marine given x1 and patient did not follow up, please advise  Gout Agents Protocol Failed 02/22/2022 10:56 AM   Protocol Details  CBC on file in past 12 months    ALT on file in past 12 months    Has Uric Acid on file in past 12 months and value is less than 6    Normal serum creatinine on file in the past 12 months

## 2022-03-18 NOTE — TELEPHONE ENCOUNTER
Routing refill request to provider for review/approval because:  Labs not current:  Lipid Panel     Pamela PACHECON, RN

## 2022-03-18 NOTE — TELEPHONE ENCOUNTER
"Patient calling, says Dr. Whaley wanted to see him in person in a few weeks, he cannot find any openings.    He cannot come in Tues/Thurs due to dialysis.   M/W only very early in the AM.   Friday anytime is okay.    Between patient and provider scheduling constraints, ended up with Friday, 4/15 visit.    Routed to PCP, if he needs to be sooner, please have team reach out to patient (maybe use a \"same day\"?).    If okay as scheduled, can close encounter, no need to call patient back.    Mary Marrufo RN  [facility]      "

## 2022-03-18 NOTE — PROGRESS NOTES
Jean is a 71 year old who is being evaluated via a billable telephone visit.      What phone number would you like to be contacted at? [phone]  How would you like to obtain your AVS? MyChart    Assessment & Plan     E. coli UTI (urinary tract infection)  -Recent urine culture reviewed-noted E. Coli x 2.  Patient is still symptomatic and not yet treated.  Sensitivity panel reviewed.  Will start a fluoroquinolone and repeat urinalysis in 2 to 3 weeks.  - ciprofloxacin (CIPRO) 500 MG tablet  Dispense: 14 tablet; Refill: 0  - Repeat UA Macro with Reflex to Micro and Culture - lab collect in 2 to 3-week    ESRD (end stage renal disease) on dialysis (H)  3 x /week-   Following with Nephrology.       Return in about 2 weeks (around 4/1/2022), or if symptoms worsen or fail to improve.    Jackelin Whaley MD  [facility] NAVARRO Pena is a 71 year old who presents for the following health issues     HPI     Genitourinary - Male- UTI    States that hiss surgeon recently ordered a urinalysis , found to have a UTI with a positive urine culture with E coli  4 days ago but did not receive treatment.   Has a known history of end-stage renal disease currently on hemodialysis 3 times per week    Still having symptoms.   Onset/Duration: 1-2 weeks  Description:   Dysuria (painful urination): YES  Hematuria (blood in urine): no  Frequency: no  Waking at night to urinate: no  Hesitancy (delay in urine): no  Retention (unable to empty): no  Decrease in urinary flow: no  Incontinence: no  Progression of Symptoms:  same  Accompanying Signs & Symptoms: constipation   Fever: no  Back/Flank pain: no  Urethral discharge: no  Testicle lumps/masses/pain: no  Nausea and/or vomiting: no  Abdominal pain: no  History:   History of frequent UTI s: every 1-2 years   History of kidney stones: no  History of hernias: no  Personal or Family history of Prostate problems: no  Sexually active: YES  Precipitating or  alleviating factors: Dialysis patient  Therapies tried and outcome: Antibiotics left over from old prescription seemed to help while taking    Chart review: Patient is overdue for chronic disease management and labs.  We will schedule this at his earliest convenience.    Review of Systems   Constitutional, HEENT, cardiovascular, pulmonary, gi and gu systems are negative, except as otherwise noted.      Objective           Vitals:  No vitals were obtained today due to virtual visit.    Physical Exam   PSYCH: Alert and oriented times 3; coherent speech, normal   rate and volume, able to articulate logical thoughts, able   to abstract reason, no tangential thoughts, no hallucinations   or delusions  His affect is normal  RESP: No cough, no audible wheezing, able to talk in full sentences  Remainder of exam unable to be completed due to telephone visits    DATA  Recent labs reviewed.  Component      Latest Ref Rng & Units 3/14/2022   Color Urine      Colorless, Straw, Light Yellow, Yellow Brown (A)   Appearance Urine      Clear Turbid (A)   Glucose Urine      Negative mg/dL Negative   Bilirubin Urine      Negative Negative   Ketones Urine      Negative mg/dL Negative   Specific Gravity Urine      1.003 - 1.035 1.015   Blood Urine      Negative Large (A)   pH Urine      5.0 - 7.0 7.5 (H)   Protein Albumin Urine      Negative mg/dL >=300 (A)   Urobilinogen Urine      0.2, 1.0 E.U./dL 0.2   Nitrite Urine      Negative Negative   Leukocyte Esterase Urine      Negative Large (A)   Bacteria Urine      None Seen /HPF Few (A)   RBC Urine      0-2 /HPF /HPF 2-5 (A)   WBC Urine      0-5 /HPF /HPF >100 (A)   Amorphous Crystals      None Seen /HPF Many (A)     100,000 CFU/mL Escherichia coli Abnormal        >100,000 CFU/mL Escherichia coli Abnormal              Phone call duration: 15 minutes

## 2022-04-15 NOTE — PROGRESS NOTES
Assessment & Plan     Urinary tract infection with hematuria, site unspecified  - levofloxacin (LEVAQUIN) 250 MG tablet  Dispense: 10 tablet; Refill: 0    Urinary tract infection symptoms  - Urine Microscopic Exam  - Urine Culture    Gross hematuria  - CT Abdomen Pelvis w Contrast  - Adult Urology Referral    ESRD (end stage renal disease) on dialysis (H) 3 x /week  - BASIC METABOLIC PANEL  - Following with Nephrology    Type 2 diabetes mellitus with chronic kidney disease on chronic dialysis, without long-term current use of insulin (H), diet controlled.   - Albumin Random Urine Quantitative with Creat Ratio  - HEMOGLOBIN A1C  - TSH WITH FREE T4 REFLEX  - BASIC METABOLIC PANEL      Hyperlipidemia LDL goal <70  - Lipid panel reflex to direct LDL Fasting      Patient left the clinic soon after returning from lab and was unable to wait for results.    Attempted to call patient with results x 3 times. No answer. Sent results via [employer] with instructions- see Epic for details.       Return in about 2 weeks (around 4/29/2022), or if symptoms worsen or fail to improve.    Jackelin Whaley MD  [facility] NAVARRO Pena is a 71 year old who presents for the following health issues  accompanied by his son.    HPI     Genitourinary - Male  Onset/Duration: x1 month   He had a virtual visit & completed a follow up urine test. Urine culture was negative but patient reports having persistent symptoms.  Description:   Dysuria (painful urination): YES  Hematuria (blood in urine): YES- dark brown   Frequency: no  Waking at night to urinate: YES- 1x  Hesitancy (delay in urine): YES  Retention (unable to empty): no  Decrease in urinary flow: no  Incontinence: no  Progression of Symptoms:  worsening  Accompanying Signs & Symptoms:  Fever: no  Back/Flank pain: YES- back pain radiating into right flank  Urethral discharge: no  Testicle lumps/masses/pain: no  Nausea and/or vomiting: YES- dialysis    Abdominal pain: no  History:   History of frequent UTI s: YES 1x/ year; he completed recent treatment for e coli   History of kidney stones: YES  History of hernias: YES  Personal or Family history of Prostate problems: no  Sexually active: YES  Precipitating or alleviating factors: None  Therapies tried and outcome: none    Has a history of ESRD on HD 3 x /week, following with a Nephrologist.     Type 2 diabetes- diet controlled. Due for follow up HgbA1c.     HYPERLIPIDEMIA - Patient has a long history of significant Hyperlipidemia requiring medication for treatment with recent good control. Patient reports no problems or side effects with the medication- Atorvastatin 80 mg/day.     Has chronic back pain- states that he did not take his pain medication prior to coming into the clinic and is feeling uncomfortable.         Review of Systems   Constitutional, HEENT, cardiovascular, pulmonary, gi and gu systems are negative, except as otherwise noted.      Objective    /59   Pulse 72   Temp 98.8  F (37.1  C) (Tympanic)   Resp 16   Wt 117.2 kg (258 lb 6.1 oz)   SpO2 97%   BMI 36.04 kg/m    Body mass index is 36.04 kg/m .  Physical Exam   GENERAL: healthy, alert and no distress. In a wheel chair.  PSYCH: mentation appears normal.    DATA  Results for orders placed or performed in visit on 04/15/22   UA Macro with Reflex to Micro and Culture - lab collect     Status: Abnormal    Specimen: Urine, Midstream   Result Value Ref Range    Color Urine Light Brown (A) Colorless, Straw, Light Yellow, Yellow    Appearance Urine Turbid (A) Clear    Glucose Urine Negative Negative mg/dL    Bilirubin Urine Negative Negative    Ketones Urine Negative Negative mg/dL    Specific Gravity Urine 1.025 1.003 - 1.035    Blood Urine Large (A) Negative    pH Urine 7.0 5.0 - 7.0    Protein Albumin Urine >=300 (A) Negative mg/dL    Urobilinogen Urine 0.2 0.2, 1.0 E.U./dL    Nitrite Urine Negative Negative    Leukocyte Esterase Urine  Large (A) Negative   Albumin Random Urine Quantitative with Creat Ratio     Status: Abnormal   Result Value Ref Range    Creatinine Urine mg/dL 15 mg/dL    Albumin Urine mg/L 2,750 mg/L    Albumin Urine mg/g Cr 18,333.33 (H) 0.00 - 17.00 mg/g Cr   HEMOGLOBIN A1C     Status: Normal   Result Value Ref Range    Hemoglobin A1C 5.1 0.0 - 5.6 %   TSH WITH FREE T4 REFLEX     Status: Abnormal   Result Value Ref Range    TSH 6.20 (H) 0.40 - 4.00 mU/L   BASIC METABOLIC PANEL     Status: Abnormal   Result Value Ref Range    Sodium 135 133 - 144 mmol/L    Potassium 4.2 3.4 - 5.3 mmol/L    Chloride 91 (L) 94 - 109 mmol/L    Carbon Dioxide (CO2) 26 20 - 32 mmol/L    Anion Gap 18 (H) 3 - 14 mmol/L    Urea Nitrogen 30 7 - 30 mg/dL    Creatinine 5.48 (HH) 0.66 - 1.25 mg/dL    Calcium 8.3 (L) 8.5 - 10.1 mg/dL    Glucose 104 (H) 70 - 99 mg/dL    GFR Estimate 11 (L) >60 mL/min/1.73m2   Lipid panel reflex to direct LDL Fasting     Status: Abnormal   Result Value Ref Range    Cholesterol 56 <200 mg/dL    Triglycerides 117 <150 mg/dL    Direct Measure HDL 21 (L) >=40 mg/dL    LDL Cholesterol Calculated 12 <=100 mg/dL    Non HDL Cholesterol 35 <130 mg/dL    Patient Fasting > 8hrs? Unknown     Narrative    Cholesterol  Desirable:  <200 mg/dL    Triglycerides  Normal:  Less than 150 mg/dL  Borderline High:  150-199 mg/dL  High:  200-499 mg/dL  Very High:  Greater than or equal to 500 mg/dL    Direct Measure HDL  Female:  Greater than or equal to 50 mg/dL   Male:  Greater than or equal to 40 mg/dL    LDL Cholesterol  Desirable:  <100mg/dL  Above Desirable:  100-129 mg/dL   Borderline High:  130-159 mg/dL   High:  160-189 mg/dL   Very High:  >= 190 mg/dL    Non HDL Cholesterol  Desirable:  130 mg/dL  Above Desirable:  130-159 mg/dL  Borderline High:  160-189 mg/dL  High:  190-219 mg/dL  Very High:  Greater than or equal to 220 mg/dL   Urine Microscopic Exam     Status: Abnormal   Result Value Ref Range    Bacteria Urine Many (A) None Seen /HPF     RBC Urine 5-10 (A) 0-2 /HPF /HPF    WBC Urine >100 (A) 0-5 /HPF /HPF    Squamous Epithelials Urine Few (A) None Seen /LPF   T4 free     Status: Normal   Result Value Ref Range    Free T4 1.15 0.76 - 1.46 ng/dL   Urine Culture     Status: Abnormal (Preliminary result)    Specimen: Urine, Midstream   Result Value Ref Range    Culture >100,000 CFU/mL Escherichia coli (A)     Culture >100,000 CFU/mL Escherichia coli (A)

## 2022-04-28 NOTE — PROGRESS NOTES
Chief Complaint:   Kidney stones         History of Present Illness:   Leif Ariza is a 71 year old male with a history of orthostatic hypotension, calciphylaxis, secondary hyperparathyroidism, hyponatremia, HTN, HLD, type 2 diabetes, atrial fibrillation, DORI, and ESRD on dialysis presenting for an evaluation of kidney stones.     Patient had a urinalysis and urine culture which he was asymptomatic on 3/14/2022, which grew E. coli.  He was treated with a course of ciprofloxacin from 3/18/2022 to 3/24/2022.  Repeat urine culture from 4/1/2022 did not grow bacteria.    He was seen by primary care on 4/15/2022 with complaints of dysuria, hematuria, nocturia, hesitant urinary stream, and right flank pain.  Urine culture from 4/15/2022 again grew E. coli.  He was treated with levofloxacin from 4/15/2022 to 4/25/2022.  CT urogram was obtained on 4/27/2022 which showed an obstructing 1.2 cm stone in the left renal pelvis with moderate left hydronephrosis as well as other nonobstructing stones in the left kidney.    Today, he reports that he does have some midline back pain.  This is chronic in nature though he feels it is a little worse than usual.  He overall feels weak but again reports this is near his baseline.  He continues to have brownish colored urine and dysuria.  He is on a [facility] dialysis schedule and does not produce much urine.    He does report getting a UTI every 1 to 1-1/2 years.  He also reports a several year history of kidney stones, though he has never required stone surgery.         Past Medical History:     Past Medical History:   Diagnosis Date     Arthritis      Atrial fibrillation (H)      CKD (chronic kidney disease) stage 5, GFR less than 15 ml/min (H) 2017     DVT (deep venous thrombosis) (H) 2000     Gout      History of blood transfusion 2016 and 2020    [place], [place] , [facility]     HLD (hyperlipidemia)      HTN (hypertension)       Kidney stone 2016     Type 2 diabetes mellitus (H)     no longer on insulin or oral med            Past Surgical History:     Past Surgical History:   Procedure Laterality Date     ABDOMEN SURGERY  inserted catheter for PD    Everyuthing going fine     ANESTH,UPPER ARM AV FISTULA REPAIR Left 2018     APPENDECTOMY OPEN  1958     AS TOTAL KNEE ARTHROPLASTY Left      BIOPSY  07/2020    colon polyp-     CARDIOVERSION  2000     carpel tunnel surgery Left 2000     COLONOSCOPY  07/2020    [facility]      GI SURGERY  07/2020    [facility]     ORTHOPEDIC SURGERY Left 06/09/2020    left knee- [facility] (Dr. Wanda ROCK)     SINUS SURGERY  1997     VASCULAR SURGERY      peritoneal dialysis 4/2020,left arm fistula     ZZC REPAIR CRUCIATE LIGAMENT,KNEE Left 1976    knee            Medications     Current Outpatient Medications   Medication     Acetaminophen 325 MG CAPS     allopurinol (ZYLOPRIM) 300 MG tablet     apixaban ANTICOAGULANT (ELIQUIS) 5 MG tablet     aspirin 81 MG EC tablet     atorvastatin (LIPITOR) 80 MG tablet     blood glucose (NO BRAND SPECIFIED) test strip     blood glucose monitoring (NO BRAND SPECIFIED) meter device kit     cyanocobalamin (VITAMIN B-12) 1000 MCG tablet     fenofibrate (TRICOR) 145 MG tablet     furosemide (LASIX) 80 MG tablet     gabapentin (NEURONTIN) 100 MG capsule     Glucosamine-Chondroitin 166.7-133.3 MG CAPS     HYDROmorphone (DILAUDID) 2 MG tablet     Lancets 30G MISC     methocarbamol (ROBAXIN) 500 MG tablet     midodrine (PROAMATINE) 10 MG tablet     nortriptyline (PAMELOR) 10 MG capsule     sevelamer carbonate (RENVELA) 800 MG tablet     SODIUM BICARBONATE PO     SODIUM THIOSULFATE IV     UNABLE TO FIND     VANCOMYCIN HCL IV     No current facility-administered medications for this visit.            Allergies:   Penicillins         Review of Systems:  From intake questionnaire   Negative 14 system review except as noted on HPI, nurse's  note.         Physical Exam:   Patient is a 71 year old  male   Vitals: Blood pressure 91/55, pulse 77, weight 115.2 kg (253 lb 15.5 oz), SpO2 100 %.  General Appearance Adult: Alert, no acute distress, oriented.  Lungs: Non-labored breathing.  Heart: No obvious jugular venous distension present.  Neuro: Alert, oriented, speech and mentation normal    PVR: 0 mL      Labs and Pathology:    I personally reviewed all applicable laboratory data and went over findings with patient  Significant for:    BMP RESULTS:  Recent Labs   Lab Test 04/15/22  1052 10/29/20  0000 09/04/20  0000 08/30/20  0000 06/01/20  1623 04/07/20  1636 11/21/19  0916     --   --   --  134 134 134   POTASSIUM 4.2 4.2 4.7 5.1* 5.7* 4.1 5.5*   CHLORIDE 91*  --   --   --  97 95 97   CO2 26  --   --   --  24 30 32   ANIONGAP 18*  --   --   --  13 9 5   * 100* 103* 118* 102* 115* 80   BUN 30  --   --   --  77* 49* 60*   CR 5.48* 3.87* 10.65* 10.98* 12.80* 8.87* 8.62*   GFRESTIMATED 11* 16* 6* 5* 4* 5* 6*   GFRESTBLACK  --  19* 8* 6* 4* 6* 7*   KATY 8.3*  --   --   --  10.6* 9.4 9.9       UA RESULTS:   Recent Labs   Lab Test 04/15/22  1052 04/01/22  1421 03/14/22  1655   SG 1.025 1.020 1.015   URINEPH 7.0 7.5* 7.5*   NITRITE Negative Positive* Negative   RBCU 5-10* >100* 2-5*   WBCU >100* >100* >100*         Imaging:    I personally reviewed all applicable imaging and went over findings with patient.  Significant for:    Results for orders placed or performed in visit on 04/27/22   CT Urogram wo & w Contrast    Narrative    CT UROGRAM WITH AND WITHOUT CONTRAST April 27, 2022 10:24 AM     HISTORY: Gross hematuria. Urinary tract infection. End-stage renal  disease on dialysis.    TECHNIQUE: CT urogram protocol was performed. Volumetric helical  sections were acquired from the lung bases through the ischial  tuberosities prior to administration of IV contrast. 100 mL Isovue-370  IV were administered using a split bolus technique. After  contrast  administration, volumetric helical sections were again acquired from  the lung bases to the ischial tuberosities. Coronal images were also  reconstructed. Radiation dose for this scan was reduced using  automated exposure control, adjustment of the mA and/or kV according  to patient size, or iterative reconstruction technique.    COMPARISON: None.    FINDINGS:      Right urinary tract: Right kidney is atrophic. No urinary calculi. No  hydronephrosis. No solid renal masses. The collecting system and  proximal ureter are not opacified.     Left urinary tract: Left kidney is atrophic. An obstructing 1.2 cm  stone in the left renal pelvis and likely causes moderate left  hydronephrosis. Cluster of nonobstructing stones in the lower pole of  the left kidney measures 1.4 cm. No ureteral calculi. No  hydronephrosis. No solid renal masses. The collecting system and  proximal ureter are not opacified.     Urinary bladder: No bladder stones or masses are identified.    Other findings: Somewhat linear indeterminate nodular opacity in the  right lower lobe (series 7 image 19) measures 2.3 x 0.9 cm. Mild  scattered scarring at both lung bases posteriorly. Coronary artery  calcification. Cholelithiasis. The liver, gallbladder, pancreas,  spleen, and adrenal glands are otherwise unremarkable. Advanced  atherosclerotic aortoiliac calcification. No lymphadenopathy. No  ascites. Degenerative changes throughout the visualized thoracolumbar  spine.       Impression    IMPRESSION:   1. Obstructing 1.2 cm stone in the left renal pelvis likely causes  moderate left hydronephrosis.  2. Cluster of nonobstructing stones in the lower pole of the left  kidney measures 1.4 cm.  3. The kidneys are atrophic bilaterally, with no excretion of contrast  into the collecting systems or ureters.  4. A somewhat linear nodular opacity in the right lower lobe measures  2.3 x 0.9 cm. This finding may represent chronic scarring, but is  considered  indeterminate. A follow-up CT in three to six months is  recommended, depending on the degree of clinical suspicion.  5. Cholelithiasis.    REGINO NICHOLS MD         SYSTEM ID:  [id number]            Assessment and Plan:   Assessment: Leif Ariza is a 71 year old male with ESRD on dialysis who presents with recurrent E. coli urinary tract infections and was found to have an obstructing 1.2 cm left kidney stone.  He completed his most recent course of ciprofloxacin on 4/25/2022.  He continues to report brown urine and dysuria, though he does not make much urine.  His most recent creatinine in the [employer] system was 5.48 on 4/15/2022, though upon review of his labs from his outside nephrologist, it appears his baseline creatinine tends to be around 9.    Plan:  We discussed that his kidney stone could be the source of his recurrent E. Coli UTIs and is too large to pass on its own.  I recommended evaluation by Dr. Argueta or Dr. Wen at the [facility], but the patient does not want to drive to [place]. He would be willing to drive to the [facility] for further evaluation.  I provided the patient with a direct phone number to their facility as well as a card for my clinic should the patient encounter issues making this appointment.    Patient was advised that if he should develop symptoms fever, chills, increased weakness, or extreme back pain that is worsened from baseline he should seek emergency care.     UA and urine culture were ordered for evaluation of his recurrent UTIs.     Ashley Mae PA-C  Department of Urology

## 2022-04-29 NOTE — NURSING NOTE
"Initial BP (!) 80/50 (BP Location: Right arm, Patient Position: Chair, Cuff Size: Adult Regular)   Pulse 77   SpO2 100%  Estimated body mass index is 36.04 kg/m  as calculated from the following:    Height as of 8/3/20: 1.803 m (5' 11\").    Weight as of 4/15/22: 117.2 kg (258 lb 6.1 oz). .    Nasreen Parr on 4/29/2022 at 8:58 AM  "

## 2022-04-29 NOTE — NURSING NOTE
"Initial BP 91/55 (BP Location: Right arm, Patient Position: Chair, Cuff Size: Adult Regular)   Pulse 77   Wt 115.2 kg (253 lb 15.5 oz)   SpO2 100%   BMI 35.42 kg/m   Estimated body mass index is 35.42 kg/m  as calculated from the following:    Height as of 8/3/20: 1.803 m (5' 11\").    Weight as of this encounter: 115.2 kg (253 lb 15.5 oz). .    Active order to obtain bladder scan? Yes   Name of ordering provider:  Ashley Mae  Bladder scan preformed post void Yes.  Bladder scan reveled 0ML  Provider notified?  Yes    Nasreen Parr, [facility]        Nasreen Parr on 4/29/2022 at 9:45 AM  "

## 2022-05-02 NOTE — PROGRESS NOTES
Assessment/Plan:    Assessment & Plan   Leif was seen today for new patient.    Diagnoses and all orders for this visit:    Urinary tract infection with hematuria, site unspecified  -     Urine Culture Aerobic Bacterial - lab collect; Future  -     ciprofloxacin (CIPRO) 250 MG tablet; Take 1 tablet (250 mg) by mouth daily for 14 days    Calculus of kidney        Stone Management Plan  Stone Management 5/2/2022   Urinary Tract Infection Possible Infection   Renal Colic Well controlled symptoms   Renal Failure Chronic renal failure   Chronic Renal Failure <15ml/min/1.73m2   Current CT date 4/27/2022   Right sided stones? No   R Stone Event No current event   Left sided stones? Yes   L Number of ureteral stones No ureteral stones   L Number of kidney stones  2   L GSD of kidney stones 10 - 15   L Hydronephrosis Moderate   L Stone Event No current event   L Current Plan Observe   Observe rationale Other             PLAN    Challenge in treating UTI because of poor excretion of antibiotic in presence of ESRD. Will extend course of antibiotic. Will recheck urine culture in one month    Video call duration: 15 minutes  20 minutes spent on the date of the encounter doing chart review, history and exam, documentation and further activities per the note    SNEHA SCHAFFER MD  [facility] [place]    Subjective:     HPI  Mr. Leif Ariza is a 71 year old  male who is being evaluated via a billable video visit by [facility] [place] PCP consultation.    He is a pleasant gentleman with 5 year history of dialysis dependent ESRD apparently secondary to diabetes. He continues to make small amounts of urine voiding about 3 times a day. He gets a UTI about every year and a half which has responded to antibiotics in the past. He has some voiding discomfort and low back pain which has improved on recent course of antibiotic. No fever or chills. He has recently had 2  positive cultures for E coli. About a year ago he had a Klebsiella infection. He does have a history of stone disease which has not obviously been treated in the past.    CT scan is personally reviewed and demonstrates a pair of left sided stones 12 and 14 mm which are unchanged from imaging in 2020. Of note, despite a contrast enhanced study, there is virtually no contrast excretion from either kidney.    The issue with UTI appears to center on diminished excretion of antibiotics into the collecting system. There has been some effect of recent course of antibiotics. Will extend out the duration of therapy and recheck a culture in about a month.    No indication to intervene for the stable renal calculi at this time.    ROS   Review of systems is negative except for HPI    Past Medical History:   Diagnosis Date     Arthritis      Atrial fibrillation (H)      CKD (chronic kidney disease) stage 5, GFR less than 15 ml/min (H) 2017     DVT (deep venous thrombosis) (H) 2000     Gout      History of blood transfusion 2016 and 2020    [place], [place] , [facility]     HLD (hyperlipidemia)      HTN (hypertension)      Kidney stone 2016     Type 2 diabetes mellitus (H)     no longer on insulin or oral med       Past Surgical History:   Procedure Laterality Date     ABDOMEN SURGERY  inserted catheter for PD    Everyuthing going fine     ANESTH,UPPER ARM AV FISTULA REPAIR Left 2018     APPENDECTOMY OPEN  1958     AS TOTAL KNEE ARTHROPLASTY Left      BIOPSY  07/2020    colon polyp-     CARDIOVERSION  2000     carpel tunnel surgery Left 2000     COLONOSCOPY  07/2020    [facility] ([facility])      GI SURGERY  07/2020    [facility]     ORTHOPEDIC SURGERY Left 06/09/2020    left knee- [facility] (Dr. Wanda ROCK)     SINUS SURGERY  1997     VASCULAR SURGERY      peritoneal dialysis 4/2020,left arm fistula     ZZC REPAIR CRUCIATE LIGAMENT,KNEE Left 1976    knee       Current  Outpatient Medications   Medication Sig Dispense Refill     Acetaminophen 325 MG CAPS Take 1,000 mg by mouth every 6 hours as needed       allopurinol (ZYLOPRIM) 300 MG tablet Take 2 tablets (600 mg) by mouth daily 180 tablet 0     apixaban ANTICOAGULANT (ELIQUIS) 5 MG tablet Take 5 mg by mouth 2 times daily Transitioned from warfarin while at [facility].       aspirin 81 MG EC tablet Take 81 mg by mouth       atorvastatin (LIPITOR) 80 MG tablet Take 80 mg by mouth daily       ciprofloxacin (CIPRO) 250 MG tablet Take 1 tablet (250 mg) by mouth daily for 14 days 14 tablet 0     cyanocobalamin (VITAMIN B-12) 1000 MCG tablet Take 1 tablet (1,000 mcg) by mouth daily       fenofibrate (TRICOR) 145 MG tablet TAKE 1 TABLET EVERY DAY (NEED TO BE SEEN FOR FURTHER REFILLS) 90 tablet 0     furosemide (LASIX) 80 MG tablet Take 1 tablet (80 mg) by mouth daily 90 tablet 0     gabapentin (NEURONTIN) 100 MG capsule Take 1 capsule (100 mg) by mouth 3 times daily 270 capsule 2     Glucosamine-Chondroitin 166.7-133.3 MG CAPS Take 1 capsule twice daily       Lancets 30G MISC Use once daily to test blood sugar brand per insurance or pt preference. 100 each 1     methocarbamol (ROBAXIN) 500 MG tablet Take 1 tablet (500 mg) by mouth 3 times daily 90 tablet 2     midodrine (PROAMATINE) 10 MG tablet Take two tablets  30-60 minutes prior to end of dialysis run and daily prn hypotension.       nortriptyline (PAMELOR) 10 MG capsule Take 1 capsule (10 mg) by mouth At Bedtime 90 capsule 1     sevelamer carbonate (RENVELA) 800 MG tablet TAKE 2 TABLETS BY MOUTH THREE TIMES DAILY WITH MEALS       SODIUM BICARBONATE PO Take 10 g by mouth daily        SODIUM THIOSULFATE IV        UNABLE TO FIND Lidocaine 5gm of 2.5% cream to affected areas prior to or with wound treatment       VANCOMYCIN HCL IV        blood glucose (NO BRAND SPECIFIED) test strip Use to test blood sugar 1 times daily or as directed.  Per insurance and patient preference.  (Patient taking differently: Use to test blood sugar 1 times daily or as directed.  Per insurance and patient preference.) 100 strip 1     blood glucose monitoring (NO BRAND SPECIFIED) meter device kit Use to test blood sugar 1 times daily or as directed.  Per insurance and patient preference; patient would like the one touch ultra 2 if insurance covers (Patient taking differently: Use to test blood sugar 1 times daily or as directed.  Per insurance and patient preference; patient would like the one touch ultra 2 if insurance covers) 1 kit 0     HYDROmorphone (DILAUDID) 2 MG tablet Take 1 tablet (2 mg) by mouth every 4 hours as needed for severe pain         Allergies   Allergen Reactions     Penicillins      Other reaction(s): Unknown Reaction       Social History     Socioeconomic History     Marital status:      Spouse name: Not on file     Number of children: Not on file     Years of education: Not on file     Highest education level: Not on file   Occupational History     Not on file   Tobacco Use     Smoking status: Never Smoker     Smokeless tobacco: Never Used   Vaping Use     Vaping Use: Never used   Substance and Sexual Activity     Alcohol use: Not Currently     Comment: Stopped when I was put on blood thinners     Drug use: Never     Sexual activity: Yes     Partners: Female     Birth control/protection: Post-menopausal   Other Topics Concern     Parent/sibling w/ CABG, MI or angioplasty before 65F 55M? No   Social History Narrative     Not on file     Social Determinants of Health     Financial Resource Strain: Low Risk      Difficulty of Paying Living Expenses: Not hard at all   Food Insecurity: No Food Insecurity     Worried About Running Out of Food in the Last Year: Never true     Ran Out of Food in the Last Year: Never true   Transportation Needs: No Transportation Needs     Lack of Transportation (Medical): No     Lack of Transportation (Non-Medical): No   Physical Activity: Not on file    Stress: Not on file   Social Connections: Not on file   Intimate Partner Violence: Not on file   Housing Stability: Not on file       Family History   Problem Relation Age of Onset     Cerebrovascular Disease Father         Had multiple strokes while in hospital when he passed     Prostate Cancer Brother         passed away in 2000     Other Cancer Brother         passed away in 2000       Objective:     No vitals or physical exam obtained due to virtual visit    LABS  Most Recent 3 CBC's:Recent Labs   Lab Test 01/22/22  0946 11/24/20  0926 08/21/20  1726 06/01/20  1623   WBC 8.5 8.8 11.2* 8.7   HGB  --  9.5* 10.1* 11.5*   MCV  --  107* 106* 103*   PLT  --  383 402 305     Most Recent 3 BMP's:Recent Labs   Lab Test 04/15/22  1052 10/29/20  0000 09/04/20  0000 08/30/20  0000 06/01/20  1623 04/07/20  1636     --   --   --  134 134   POTASSIUM 4.2 4.2 4.7   < > 5.7* 4.1   CHLORIDE 91*  --   --   --  97 95   CO2 26  --   --   --  24 30   BUN 30  --   --   --  77* 49*   CR 5.48* 3.87* 10.65*   < > 12.80* 8.87*   ANIONGAP 18*  --   --   --  13 9   KATY 8.3*  --   --   --  10.6* 9.4   * 100* 103*   < > 102* 115*    < > = values in this interval not displayed.     Most Recent Urinalysis:Recent Labs   Lab Test 04/29/22  0946   COLOR Yellow   APPEARANCE Turbid*   URINEGLC Negative   URINEBILI Negative   URINEKETONE Negative   SG 1.020   UBLD Large*   URINEPH 8.5*   PROTEIN >=300*   UROBILINOGEN 0.2   NITRITE Positive*   LEUKEST Moderate*   RBCU 25-50*   WBCU >100*     Acute Labs   Urine Culture    Culture   Date Value Ref Range Status   04/29/2022 10,000-50,000 CFU/mL Mixture of urogenital monica  Final   04/15/2022 >100,000 CFU/mL Escherichia coli (A)  Final   04/15/2022 >100,000 CFU/mL Escherichia coli (A)  Final

## 2022-05-02 NOTE — PROGRESS NOTES
Patient is roomed via telephone for a virtual visit.  Patient confirmed he is in the [facility] at the time of this appointment.  Patient understands that this virtual visit is billable and agree to proceed with appointment.

## 2022-05-12 NOTE — PROGRESS NOTES
Jean is a 71 year old who is being evaluated via a billable video visit.      How would you like to obtain your AVS? MyChart  If the video visit is dropped, the invitation should be resent by: Text to cell phone: [phone]  Will anyone else be joining your video visit? Yes: wife- not needed. How would they like to receive their invitation? Text to cell phone: not needed    Video Start Time: 3:09 PM    Assessment & Plan   Problem List Items Addressed This Visit        Urinary    ESRD (end stage renal disease) on dialysis (H) (Chronic)      Other Visit Diagnoses     Cough    -  Primary    Relevant Medications    benzonatate (TESSALON) 100 MG capsule    Throat pain        Relevant Medications    benzocaine-menthol (CEPACOL SORE THROAT) 15-2.6 MG lozenge         Impression is likely viral URI including COVID-19. Declined COVID-19 PCR and other testing and will instead do a home test. Appears well and non-toxic and I have low suspicion for impending airway obstruction or respiratory distress.  He will push p.o. fluids, use over-the-counter meds for symptoms, benzonatate, and follow-up with us in 2 weeks if not improving or urgent care/the ER if symptoms worsen/change at any time. Will do dialysis on Saturday and feels he is well enough to wait that long to dialyze as he has in the past.     DDx and Dx discussed with and explained to the pt to their satisfaction.  All questions were answered at this time. Pt expressed understanding of and agreement with this dx, tx, and plan. No further workup warranted and standard medication warnings given. I have given the patient a list of pertinent indications for re-evaluation. Will go to the Emergency Department if symptoms worsen or new concerning symptoms arise. Patient left the call in no apparent distress.      See Patient Instructions    Return in about 4 days (around 5/16/2022) for a recheck of your symptoms if not improving, or call 911/go to an ER anytime if  worsening.    APRIL Mccormick  [facility] [place]    Rickie Pena is a 71 year old who presents for the following health issues     HPI     Acute Illness  Acute illness concerns: URI  Onset/Duration: X 1 week ago started to have a sore throat and raspy voice. Missed his hemodialysis today. Feeling better today than he has in the last week. Wife is taking benzonatate for her cough, which has been helpful.  Symptoms:  Fever: no  Chills/Sweats: no  Headache (location?): no  Sinus Pressure: no  Conjunctivitis:  no  Ear Pain: no  Rhinorrhea: yes  Congestion: YES  Sore Throat: YES  Cough: YES  Wheeze: YES  Decreased Appetite: YES  Nausea: YES  Vomiting: YES  Diarrhea: YES  Dysuria/Freq.: no  Dysuria or Hematuria: -In the middle of treating a UTI, dysuria has resolved with ciprofloxacin  Fatigue/Achiness: YES  Sick/Strep Exposure: YES  Therapies tried and outcome: OTC cough medication    Review of Systems   Constitutional, HEENT, cardiovascular, pulmonary, gi and gu systems are negative, except as otherwise noted.      Objective       Vitals:  No vitals were obtained today due to virtual visit.    Physical Exam   GENERAL: Healthy, alert and no distress  EYES: Eyes grossly normal to inspection.  No discharge or erythema, or obvious scleral/conjunctival abnormalities.  RESP: No audible wheeze, cough, or visible cyanosis.  No visible retractions or increased work of breathing.    SKIN: Visible skin clear. No significant rash, abnormal pigmentation or lesions.  NEURO: Cranial nerves grossly intact.  Mentation and speech appropriate for age.  PSYCH: Mentation appears normal, affect normal/bright, judgement and insight intact, normal speech and appearance well-groomed.     Video-Visit Details    Type of service:  Video Visit    Video End Time: 3:28 PM    Originating Location (pt. Location): Home    Distant Location (provider location):  [facility] [place]     Platform used for Video Visit:  Doximity

## 2022-05-12 NOTE — PATIENT INSTRUCTIONS
Akil Pena,    Thank you for allowing [facility] to manage your care.    I am unsure of the cause of your symptoms, but we must assume that this is COVID until proven otherwise. Contact us if you test positive for COVID by home test.    If you develop worsening/changing symptoms at any time, please go to the emergency department for evaluation. I ordered some lab work, please go to the laboratory to get your studies.    I sent your prescriptions to your pharmacy.    If you have any questions or concerns, please feel free to call us at [phone]    Sincerely,    Gregorio Olmos PA-C    Did you know?      You can schedule a video visit for follow-up appointments as well as future appointments for certain conditions.  Please see the below link.     [email or web address]    If you have not already done so,  I encourage you to sign up for [email or web address] ([email or web address]).  This will allow you to review your results, securely communicate with a provider, and schedule virtual visits as well.

## 2022-05-24 NOTE — TELEPHONE ENCOUNTER
Calling to set pt up for follow up - spoke to son that his father just returned home from Hospital and is now on Hospice.

## 2022-08-10 ENCOUNTER — POST MORTEM DOCUMENTATION (OUTPATIENT)
Dept: TRANSPLANT | Facility: CLINIC | Age: 72
End: 2022-08-10

## 2022-08-10 NOTE — PROGRESS NOTES
Received notification on 2022 of patient's death.  Place of death was reported as home with hospice. Pt chose to stop medications and all treatments. 2022.   The Transplant Office has been notified that patient is . The Post Mortem Encounter has been completed. Notifications have been sent to the admin staff.   Instructions have been sent to send a sympathy card to the family.

## 2022-11-07 NOTE — TELEPHONE ENCOUNTER
I spoke with patient and he has already picked up the rx for heparin at local pharmacy. No further order needs to be sent.I also spoke with Pharmacist at [facility] at [phone] and informed him of this. In future if patient needs heparin at short notice this could be called in as verbal order to [facility] and they can do overnight shipping.  Oma Taylor RN     [Influenza] : Influenza

## 2023-08-03 NOTE — TELEPHONE ENCOUNTER
Instructions sent to patient on  My chart message. Oma Taylor RN     Concentration Of Solution Injected (Mg/Ml): 2.5

## 2023-11-16 NOTE — PROGRESS NOTES
Medium Joint Injection/Arthrocentesis: L ankle  Date/Time: 12/19/2019 12:30 PM  Performed by: Kelvin Nelson MD  Authorized by: Kelvin Nelson MD     Indications:  Pain  Needle Size:  25 G  Guidance: ultrasound    Approach:  Anterior  Location:  Ankle  Site:  L ankle  Medications:  6 mg betamethasone acet & sod phos 6 (3-3) MG/ML; 2 mL ropivacaine 5 MG/ML  Outcome:  Tolerated well, no immediate complications  Procedure discussed: discussed risks, benefits, and alternatives    Consent Given by:  Patient  Timeout: timeout called immediately prior to procedure    Prep: patient was prepped and draped in usual sterile fashion     Patient reported significant improvement of pain after left ankle steroid injection.  Aftercare instructions given to patient.  Plan to follow-up as previously discussed with referring provider.     Kelvin Nelson MD [facility]           no